# Patient Record
Sex: MALE | Race: BLACK OR AFRICAN AMERICAN | NOT HISPANIC OR LATINO | Employment: OTHER | URBAN - METROPOLITAN AREA
[De-identification: names, ages, dates, MRNs, and addresses within clinical notes are randomized per-mention and may not be internally consistent; named-entity substitution may affect disease eponyms.]

---

## 2017-01-26 ENCOUNTER — OFFICE VISIT (OUTPATIENT)
Dept: INTERNAL MEDICINE | Facility: CLINIC | Age: 82
End: 2017-01-26
Payer: MEDICARE

## 2017-01-26 VITALS
HEIGHT: 70 IN | SYSTOLIC BLOOD PRESSURE: 148 MMHG | HEART RATE: 78 BPM | BODY MASS INDEX: 17.3 KG/M2 | WEIGHT: 120.81 LBS | DIASTOLIC BLOOD PRESSURE: 100 MMHG

## 2017-01-26 DIAGNOSIS — E11.8 TYPE 2 DIABETES MELLITUS WITH COMPLICATION, UNSPECIFIED LONG TERM INSULIN USE STATUS: Primary | ICD-10-CM

## 2017-01-26 DIAGNOSIS — E11.3393 CONTROLLED TYPE 2 DIABETES MELLITUS WITH BOTH EYES AFFECTED BY MODERATE NONPROLIFERATIVE RETINOPATHY WITHOUT MACULAR EDEMA, WITHOUT LONG-TERM CURRENT USE OF INSULIN: ICD-10-CM

## 2017-01-26 DIAGNOSIS — Z85.028 HISTORY OF GASTRIC CANCER: ICD-10-CM

## 2017-01-26 DIAGNOSIS — E78.2 MIXED HYPERLIPIDEMIA: Chronic | ICD-10-CM

## 2017-01-26 DIAGNOSIS — I70.0 ATHEROSCLEROSIS OF AORTA: ICD-10-CM

## 2017-01-26 DIAGNOSIS — M1A.00X0 CHRONIC GOUTY ARTHROPATHY WITHOUT MENTION OF TOPHUS (TOPHI): ICD-10-CM

## 2017-01-26 DIAGNOSIS — Z72.0 TOBACCO ABUSE: Chronic | ICD-10-CM

## 2017-01-26 DIAGNOSIS — I10 ESSENTIAL HYPERTENSION, BENIGN: Chronic | ICD-10-CM

## 2017-01-26 PROCEDURE — 1157F ADVNC CARE PLAN IN RCRD: CPT | Mod: S$GLB,,, | Performed by: INTERNAL MEDICINE

## 2017-01-26 PROCEDURE — 1159F MED LIST DOCD IN RCRD: CPT | Mod: S$GLB,,, | Performed by: INTERNAL MEDICINE

## 2017-01-26 PROCEDURE — 99214 OFFICE O/P EST MOD 30 MIN: CPT | Mod: S$GLB,,, | Performed by: INTERNAL MEDICINE

## 2017-01-26 PROCEDURE — 99999 PR PBB SHADOW E&M-EST. PATIENT-LVL III: CPT | Mod: PBBFAC,,, | Performed by: INTERNAL MEDICINE

## 2017-01-26 PROCEDURE — 3080F DIAST BP >= 90 MM HG: CPT | Mod: S$GLB,,, | Performed by: INTERNAL MEDICINE

## 2017-01-26 PROCEDURE — 1126F AMNT PAIN NOTED NONE PRSNT: CPT | Mod: S$GLB,,, | Performed by: INTERNAL MEDICINE

## 2017-01-26 PROCEDURE — 99499 UNLISTED E&M SERVICE: CPT | Mod: S$GLB,,, | Performed by: INTERNAL MEDICINE

## 2017-01-26 PROCEDURE — 1160F RVW MEDS BY RX/DR IN RCRD: CPT | Mod: S$GLB,,, | Performed by: INTERNAL MEDICINE

## 2017-01-26 PROCEDURE — 3077F SYST BP >= 140 MM HG: CPT | Mod: S$GLB,,, | Performed by: INTERNAL MEDICINE

## 2017-01-26 RX ORDER — LOSARTAN POTASSIUM 50 MG/1
50 TABLET ORAL DAILY
Qty: 90 TABLET | Refills: 3 | Status: SHIPPED | OUTPATIENT
Start: 2017-01-26 | End: 2018-01-24 | Stop reason: SDUPTHER

## 2017-01-26 NOTE — MR AVS SNAPSHOT
[facility]  [address]  [facility] [address]  Phone: [phone]  Fax: [phone]                  Julio Benites   2017 10:20 AM   Office Visit    Description:  Male : 10/25/1934   Provider:  Gregorio Jensen MD   Department:  [facility]           Reason for Visit     Diabetes           Diagnoses this Visit        Comments    Type 2 diabetes mellitus with complication, unspecified long term insulin use status    -  Primary     Essential hypertension, benign         Mixed hyperlipidemia         History of gastric cancer         Tobacco abuse         Atherosclerosis of aorta         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    Follow-up and Disposition History       These Medications        Disp Refills Start End    losartan (COZAAR) 50 MG tablet 90 tablet 3 2017    Take 1 tablet (50 mg total) by mouth once daily. - Oral    Pharmacy: [facility] [address] - [place], [place] - [address]  #: [phone]         [facility] On Call     [facility] Nurse Care Line -  Assistance  Registered nurses in the [facility] provide clinical advisement, health education, appointment booking, and other advisory services.  Call for this free service at [phone].             Medications           Message regarding Medications     Verify the  "changes and/or additions to your medication regime listed below are the same as discussed with your clinician today.  If any of these changes or additions are incorrect, please notify your healthcare provider.        START taking these NEW medications        Refills    losartan (COZAAR) 50 MG tablet 3    Sig: Take 1 tablet (50 mg total) by mouth once daily.    Class: Normal    Route: Oral      STOP taking these medications     aspirin (ECOTRIN) 81 MG EC tablet Take 81 mg by mouth.    colchicine 0.6 mg tablet Take 1 tablet (0.6 mg total) by mouth 2 (two) times daily as needed (gout attack).           Verify that the below list of medications is an accurate representation of the medications you are currently taking.  If none reported, the list may be blank. If incorrect, please contact your healthcare provider. Carry this list with you in case of emergency.           Current Medications     amlodipine (NORVASC) 10 MG tablet Take 1 tablet (10 mg total) by mouth once daily.    hydrochlorothiazide (HYDRODIURIL) 12.5 MG Tab Take 1 tablet (12.5 mg total) by mouth once daily.    metformin (GLUCOPHAGE) 1000 MG tablet Take 1 tablet (1,000 mg total) by mouth 2 (two) times daily with meals.    pantoprazole (PROTONIX) 40 MG tablet Take 1 tablet (40 mg total) by mouth once daily.    pravastatin (PRAVACHOL) 40 MG tablet Take 20 mg by mouth.    losartan (COZAAR) 50 MG tablet Take 1 tablet (50 mg total) by mouth once daily.           Clinical Reference Information           Vital Signs - Last Recorded  Most recent update: 1/26/2017 10:09 AM by Jennifer Blanco MA    BP Pulse Ht Wt BMI    (!) 148/100 78 5' 10" (1.778 m) 54.8 kg (120 lb 13 oz) 17.33 kg/m2      Blood Pressure          Most Recent Value    BP  (!)  148/100      Allergies as of 1/26/2017     Lisinopril      Immunizations Administered on Date of Encounter - 1/26/2017     None      Orders Placed During Today's Visit     Future Labs/Procedures Expected by Expires    " Comprehensive metabolic panel  1/26/2017 1/26/2018    Hemoglobin A1c  1/26/2017 3/20/2018    TSH  1/26/2017 1/26/2018      [facility] Sign-Up     Activating your [facility] account is as easy as 1-2-3!     1) Visit [email or web address], select Sign Up Now, enter this activation code and your date of birth, then select Next.  Activation code not generated  Current Patient Portal Status: Account disabled      2) Create a username and password to use when you visit [facility] in the future and select a security question in case you lose your password and select Next.    3) Enter your e-mail address and click Sign Up!    Additional Information  If you have questions, please e-mail [email or web address] or call [phone] to talk to our [facility] staff. Remember, [facility] is NOT to be used for urgent needs. For medical emergencies, dial 911.         Instructions    Your test results will be communicated to you via: [facility], Telephone or Letter.  If you have not received your test results within one week. Please contact the clinic.           Smoking Cessation     If you would like to quit smoking:   You may be eligible for free services if you are a [place] resident and started smoking cigarettes before September 1, 1988.  Call the Smoking Cessation Trust (SCT) toll free at (393) 244-9399 or [phone].   Call 6-078-QUIT-NOW if you do not meet the above criteria.

## 2017-01-26 NOTE — PROGRESS NOTES
Subjective:       Patient ID: Julio Benites is a 82 y.o. male.    Chief Complaint: Diabetes    HPI Comments: Pt here for f/u. Pt's BP is not well controlled. Tolerating meds well. Pt denies cp/sob/ha/vision or neuro changes. Checking at home and is not well controlled. He is on norvasc and hctz.     DM2 is well controlled on metformin. Last A1c was 6.0. 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 No low sugar symptoms.     HLD is tx with pravastatin which he tolerates well. Aortic atherosclerosis was noted on CXR 2012. He is on appropriate risk mitigation meds and given lifestyle recommendations. This is stable.     Still following with heme-onc where he has been found to be doing well from gastric ca standpoint with FANTASMA. He does have mild anemia that is not iron deficient and is being followed by them. Pt is not symptomatic.    Still smoking and not interested in stopping. He has reduced etoh to only on wkends.     Gout has been quiescent without attacks for a while. Dietary discretion has helped.     Diabetes   Pertinent negatives for hypoglycemia include no headaches. Pertinent negatives for diabetes include no chest pain and no polyuria.   Hypertension   Pertinent negatives include no chest pain, headaches or shortness of breath.     Review of Systems   Constitutional: Negative for appetite change, fever and unexpected weight change.   Eyes: Negative for visual disturbance.   Respiratory: Negative for shortness of breath.    Cardiovascular: Negative for chest pain.   Gastrointestinal: Negative for abdominal pain, blood in stool, constipation and diarrhea.   Endocrine: Negative for polyuria.   Genitourinary: Negative for difficulty urinating.   Neurological: Negative for light-headedness and headaches.   Psychiatric/Behavioral: Negative for dysphoric mood.       Objective:          Assessment:       1. Type 2 diabetes mellitus with complication, unspecified long term insulin  use status    2. Essential hypertension, benign    3. Mixed hyperlipidemia    4. History of gastric cancer    5. Tobacco abuse    6. Atherosclerosis of aorta    7. 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 Appropriate labs    2. Home BP and BS monitoring  3. Add losartan 50mg daily  4. Keep f/u with specialists  5. F/u 2 wks with NP        Physical Exam   Constitutional: He is oriented to person, place, and time. He appears well-developed and well-nourished.   HENT:   Head: Normocephalic and atraumatic.   Eyes: EOM are normal. Pupils are equal, round, and reactive to light.   Neck: Neck supple. Carotid bruit is not present. No thyromegaly present.   Cardiovascular: Normal rate, regular rhythm, S1 normal, S2 normal and normal heart sounds.    No murmur heard.  Pulmonary/Chest: Effort normal and breath sounds normal. He has no wheezes.   Abdominal: Soft. Bowel sounds are normal. He exhibits no mass. There is no hepatosplenomegaly. There is no tenderness.   Musculoskeletal: He exhibits no edema.   Lymphadenopathy:     He has no cervical adenopathy.   Neurological: He is alert and oriented to person, place, and time. No cranial nerve deficit.   Psychiatric: He has a normal mood and affect. His behavior is normal.

## 2017-02-02 ENCOUNTER — OFFICE VISIT (OUTPATIENT)
Dept: INTERNAL MEDICINE | Facility: CLINIC | Age: 82
End: 2017-02-02
Payer: MEDICARE

## 2017-02-02 ENCOUNTER — LAB VISIT (OUTPATIENT)
Dept: LAB | Facility: OTHER | Age: 82
End: 2017-02-02
Attending: INTERNAL MEDICINE
Payer: MEDICARE

## 2017-02-02 VITALS
WEIGHT: 121.69 LBS | BODY MASS INDEX: 17.42 KG/M2 | HEIGHT: 70 IN | HEART RATE: 82 BPM | DIASTOLIC BLOOD PRESSURE: 78 MMHG | SYSTOLIC BLOOD PRESSURE: 126 MMHG

## 2017-02-02 DIAGNOSIS — Z72.0 DECLINED SMOKING CESSATION: ICD-10-CM

## 2017-02-02 DIAGNOSIS — I10 ESSENTIAL HYPERTENSION, BENIGN: Chronic | ICD-10-CM

## 2017-02-02 DIAGNOSIS — H92.02 PAIN IN LEFT EAR: ICD-10-CM

## 2017-02-02 DIAGNOSIS — E11.8 TYPE 2 DIABETES MELLITUS WITH COMPLICATION, UNSPECIFIED LONG TERM INSULIN USE STATUS: ICD-10-CM

## 2017-02-02 DIAGNOSIS — I10 ESSENTIAL HYPERTENSION, BENIGN: Primary | Chronic | ICD-10-CM

## 2017-02-02 LAB
ALBUMIN SERPL BCP-MCNC: 3.5 G/DL
ALP SERPL-CCNC: 72 U/L
ALT SERPL W/O P-5'-P-CCNC: 8 U/L
ANION GAP SERPL CALC-SCNC: 12 MMOL/L
ANION GAP SERPL CALC-SCNC: 12 MMOL/L
AST SERPL-CCNC: 15 U/L
BILIRUB SERPL-MCNC: 0.4 MG/DL
BUN SERPL-MCNC: 20 MG/DL
BUN SERPL-MCNC: 20 MG/DL
CALCIUM SERPL-MCNC: 9.3 MG/DL
CALCIUM SERPL-MCNC: 9.3 MG/DL
CHLORIDE SERPL-SCNC: 109 MMOL/L
CHLORIDE SERPL-SCNC: 109 MMOL/L
CO2 SERPL-SCNC: 22 MMOL/L
CO2 SERPL-SCNC: 22 MMOL/L
CREAT SERPL-MCNC: 1 MG/DL
CREAT SERPL-MCNC: 1 MG/DL
EST. GFR  (AFRICAN AMERICAN): >60 ML/MIN/1.73 M^2
EST. GFR  (AFRICAN AMERICAN): >60 ML/MIN/1.73 M^2
EST. GFR  (NON AFRICAN AMERICAN): >60 ML/MIN/1.73 M^2
EST. GFR  (NON AFRICAN AMERICAN): >60 ML/MIN/1.73 M^2
GLUCOSE SERPL-MCNC: 163 MG/DL
GLUCOSE SERPL-MCNC: 163 MG/DL
POTASSIUM SERPL-SCNC: 3.8 MMOL/L
POTASSIUM SERPL-SCNC: 3.8 MMOL/L
PROT SERPL-MCNC: 6.9 G/DL
SODIUM SERPL-SCNC: 143 MMOL/L
SODIUM SERPL-SCNC: 143 MMOL/L
TSH SERPL DL<=0.005 MIU/L-ACNC: 2.75 UIU/ML

## 2017-02-02 PROCEDURE — 3074F SYST BP LT 130 MM HG: CPT | Mod: S$GLB,,, | Performed by: NURSE PRACTITIONER

## 2017-02-02 PROCEDURE — 99499 UNLISTED E&M SERVICE: CPT | Mod: S$GLB,,, | Performed by: NURSE PRACTITIONER

## 2017-02-02 PROCEDURE — 3078F DIAST BP <80 MM HG: CPT | Mod: S$GLB,,, | Performed by: NURSE PRACTITIONER

## 2017-02-02 PROCEDURE — 1126F AMNT PAIN NOTED NONE PRSNT: CPT | Mod: S$GLB,,, | Performed by: NURSE PRACTITIONER

## 2017-02-02 PROCEDURE — 1160F RVW MEDS BY RX/DR IN RCRD: CPT | Mod: S$GLB,,, | Performed by: NURSE PRACTITIONER

## 2017-02-02 PROCEDURE — 1159F MED LIST DOCD IN RCRD: CPT | Mod: S$GLB,,, | Performed by: NURSE PRACTITIONER

## 2017-02-02 PROCEDURE — 1157F ADVNC CARE PLAN IN RCRD: CPT | Mod: S$GLB,,, | Performed by: NURSE PRACTITIONER

## 2017-02-02 PROCEDURE — 99999 PR PBB SHADOW E&M-EST. PATIENT-LVL III: CPT | Mod: PBBFAC,,, | Performed by: NURSE PRACTITIONER

## 2017-02-02 PROCEDURE — 99214 OFFICE O/P EST MOD 30 MIN: CPT | Mod: S$GLB,,, | Performed by: NURSE PRACTITIONER

## 2017-02-02 NOTE — PROGRESS NOTES
Subjective:       Patient ID: Julio Benites is a 82 y.o. male.    Chief Complaint: Hypertension and Otalgia (left x weeks)    HPI Comments: Patient returns today in f/u for BP management.     Pt's BP is well controlled. Currently taking losartan 50 mg daily, HCTZ 12.5 mg daily, and amlodipine 10 mg daily. Tolerating meds well. Pt denies cp/sob/ha/vision or neuro changes. Checking at home and is well controlled SBP <140.     Reports left ear canal discomfort x 2 weeks. Pt reports that it is tender to touch and itchy, but denies otalgia. Pt wears hearing aides. Has always usually worn hearing aid in right ear, but occasionally wears both hearing aides. Discomfort began since last wearing left side appliance.  He denies pain when chewing or auricle movements, drainage, tinnitus, URI symptoms, fevers, or chills. No warmth or swelling at site.       Hypertension   This is a chronic problem. The problem is controlled. Pertinent negatives include no blurred vision, chest pain, headaches, palpitations, peripheral edema or shortness of breath.   Otalgia    Pertinent negatives include no ear discharge, headaches, rhinorrhea or sore throat.     Review of Systems   Constitutional: Negative for chills and fever.   HENT: Positive for ear pain. Negative for congestion, dental problem, ear discharge, rhinorrhea, sinus pressure, sneezing, sore throat and tinnitus.    Eyes: Negative for blurred vision and visual disturbance.   Respiratory: Negative for chest tightness and shortness of breath.    Cardiovascular: Negative for chest pain, palpitations and leg swelling.   Musculoskeletal: Negative for neck stiffness.   Neurological: Negative for dizziness, weakness and headaches.   Psychiatric/Behavioral: Negative for dysphoric mood.       Objective:      Physical Exam   Constitutional: He is oriented to person, place, and time. He appears well-developed and well-nourished.   HENT:   Head: Normocephalic and atraumatic.   Right Ear:  Tympanic membrane, external ear and ear canal normal. No drainage, swelling or tenderness. No foreign bodies. No middle ear effusion. Decreased hearing is noted.   Left Ear: Tympanic membrane, external ear and ear canal normal. There is tenderness. No drainage or swelling. No foreign bodies.  No middle ear effusion. Decreased hearing is noted.   Hearing impaired, wears hearing aides. Last fitting for hearing instrument x2 months ago. Opening of external auditory meatus tender to touch    Eyes: EOM are normal. Pupils are equal, round, and reactive to light.   Neck: Normal range of motion.   Cardiovascular: Normal rate, regular rhythm, normal heart sounds and intact distal pulses.    Pulmonary/Chest: Effort normal and breath sounds normal.   Musculoskeletal: Normal range of motion.   Neurological: He is alert and oriented to person, place, and time.   Skin: Skin is warm and dry.   Psychiatric: His behavior is normal.       Assessment:       1. Essential hypertension, benign    2. Pain in left ear    3. Declined smoking cessation        Plan:       1. Continue home BP  2. No changes to BP meds.  3. Will call with results of TSH, CMP, and A1c from today  4. Ear discomfort likely from instrument fit. No s/s infection. Pt to contact audiologist re: fit of hearing aides. Clinic prompts d/w pt.   5. F/U with Dr. Jensen July 2017, sooner if needed

## 2017-02-02 NOTE — MR AVS SNAPSHOT
[facility]  [address]  [place] [address]  Phone: [phone]  Fax: [phone]                  Julio Haganday   2017 10:30 AM   Office Visit    Description:  Male : 10/25/1934   Provider:  Crispin Gibson NP   Department:  [facility]           Reason for Visit     Hypertension     Otalgia           Diagnoses this Visit        Comments    Essential hypertension, benign    -  Primary            To Do List           Goals (5 Years of Data)     None      Follow-Up and Disposition     Return in about 6 months (around 2017) for f/u Dr. Jensen, may be sooner depending on labs .      [facility] On Call     [facility] On Call Nurse Care Line -  Assistance  Registered nurses in the [facility] On Call Center provide clinical advisement, health education, appointment booking, and other advisory services.  Call for this free service at [phone].             Medications           Message regarding Medications     Verify the changes and/or additions to your medication regime listed below are the same as discussed with your clinician today.  If any of these changes or additions are incorrect, please notify your healthcare provider.             Verify that the below list of medications is an accurate representation of the medications you are currently taking.  If none reported, the list may be blank. If incorrect, please contact your healthcare provider. Carry this list with you in case of emergency.           Current Medications     amlodipine (NORVASC) 10 MG tablet Take 1 tablet (10 mg total) by mouth once daily.    hydrochlorothiazide (HYDRODIURIL) 12.5 MG Tab Take 1 tablet (12.5 mg total) by mouth once daily.    losartan (COZAAR) 50 MG tablet Take 1 tablet (50 mg total) by mouth once daily.    metformin (GLUCOPHAGE) 1000 MG tablet Take 1 tablet (1,000 mg total) by mouth 2 (two) times daily with meals.    pantoprazole (PROTONIX) 40 MG tablet Take 1  "tablet (40 mg total) by mouth once daily.    pravastatin (PRAVACHOL) 40 MG tablet Take 20 mg by mouth.           Clinical Reference Information           Vital Signs - Last Recorded  Most recent update: 2/2/2017 10:06 AM by Carmen Farris MA    BP Pulse Ht Wt BMI    126/78 (BP Location: Left arm, Patient Position: Sitting, BP Method: Manual) 82 5' 10" (1.778 m) 55.2 kg (121 lb 11.1 oz) 17.46 kg/m2      Blood Pressure          Most Recent Value    BP  126/78      Allergies as of 2/2/2017     Lisinopril      Immunizations Administered on Date of Encounter - 2/2/2017     None      [facility] Sign-Up     Activating your [facility] account is as easy as 1-2-3!     1) Visit [email or web address], select Sign Up Now, enter this activation code and your date of birth, then select Next.  Activation code not generated  Current Patient Portal Status: Account disabled      2) Create a username and password to use when you visit [facility] in the future and select a security question in case you lose your password and select Next.    3) Enter your e-mail address and click Sign Up!    Additional Information  If you have questions, please e-mail [email or web address] or call [phone] to talk to our [facility] staff. Remember, [facility] is NOT to be used for urgent needs. For medical emergencies, dial 911.         Instructions    Your test results will be communicated to you via: [facility], Telephone or Letter.  If you have not received your test results within one week. Please contact the clinic.         Smoking Cessation     If you would like to quit smoking:   You may be eligible for free services if you are a [place] resident and started smoking cigarettes before September 1, 1988.  Call the Smoking Cessation Trust (SCT) toll free at (379) 054-1588 or (888) 622-9088.   Call 0-178-QUIT-NOW if you do not meet the above criteria.            "

## 2017-02-05 LAB — HEMOGLOBIN A1C REFERRAL TEST: 6.2 % (ref 4–5.6)

## 2017-03-06 RX ORDER — PANTOPRAZOLE SODIUM 40 MG/1
TABLET, DELAYED RELEASE ORAL
Qty: 90 TABLET | Refills: 1 | Status: SHIPPED | OUTPATIENT
Start: 2017-03-06 | End: 2017-06-21

## 2017-06-21 ENCOUNTER — OFFICE VISIT (OUTPATIENT)
Dept: INTERNAL MEDICINE | Facility: CLINIC | Age: 82
End: 2017-06-21
Payer: MEDICARE

## 2017-06-21 VITALS
BODY MASS INDEX: 18.79 KG/M2 | DIASTOLIC BLOOD PRESSURE: 86 MMHG | HEART RATE: 64 BPM | RESPIRATION RATE: 18 BRPM | OXYGEN SATURATION: 98 % | SYSTOLIC BLOOD PRESSURE: 164 MMHG | HEIGHT: 67 IN | WEIGHT: 119.69 LBS

## 2017-06-21 DIAGNOSIS — I70.0 ATHEROSCLEROSIS OF AORTA: ICD-10-CM

## 2017-06-21 DIAGNOSIS — E11.3393 CONTROLLED TYPE 2 DIABETES MELLITUS WITH BOTH EYES AFFECTED BY MODERATE NONPROLIFERATIVE RETINOPATHY WITHOUT MACULAR EDEMA, WITHOUT LONG-TERM CURRENT USE OF INSULIN: ICD-10-CM

## 2017-06-21 DIAGNOSIS — E78.2 MIXED HYPERLIPIDEMIA: Chronic | ICD-10-CM

## 2017-06-21 DIAGNOSIS — R97.20 ELEVATED PSA: ICD-10-CM

## 2017-06-21 DIAGNOSIS — M1A.00X0 CHRONIC GOUTY ARTHROPATHY WITHOUT MENTION OF TOPHUS (TOPHI): ICD-10-CM

## 2017-06-21 DIAGNOSIS — I10 ESSENTIAL HYPERTENSION, BENIGN: Chronic | ICD-10-CM

## 2017-06-21 DIAGNOSIS — Z72.0 TOBACCO ABUSE: Chronic | ICD-10-CM

## 2017-06-21 DIAGNOSIS — Z00.00 ENCOUNTER FOR PREVENTIVE HEALTH EXAMINATION: Primary | ICD-10-CM

## 2017-06-21 DIAGNOSIS — Z85.028 HISTORY OF GASTRIC CANCER: ICD-10-CM

## 2017-06-21 PROBLEM — H92.02 PAIN IN LEFT EAR: Status: RESOLVED | Noted: 2017-02-02 | Resolved: 2017-06-21

## 2017-06-21 PROCEDURE — 99999 PR PBB SHADOW E&M-EST. PATIENT-LVL V: CPT | Mod: PBBFAC,,, | Performed by: NURSE PRACTITIONER

## 2017-06-21 PROCEDURE — 99499 UNLISTED E&M SERVICE: CPT | Mod: S$GLB,,, | Performed by: NURSE PRACTITIONER

## 2017-06-21 PROCEDURE — G0439 PPPS, SUBSEQ VISIT: HCPCS | Mod: S$GLB,,, | Performed by: NURSE PRACTITIONER

## 2017-06-21 RX ORDER — PANTOPRAZOLE SODIUM 40 MG/1
40 TABLET, DELAYED RELEASE ORAL DAILY
COMMUNITY
End: 2017-09-22

## 2017-06-21 NOTE — PATIENT INSTRUCTIONS
Diabetes: Activity Tips    Being more active can help you manage your diabetes. The tips on this sheet can help you get the most from your exercise. They can also help you stay safe.  Benefit from briskness  Brisk activity gets your heart beating faster. This can help you increase your fitness, lose extra weight, and manage your blood sugar level. Try brisk walking. Or, if you have foot or leg problems, you can try swimming or bike riding. You can break up your exercise into chunks throughout the day. Work up to at least 30 minutes of steady, brisk exercise on most days.  Warm up and cool down  Warming up and cooling down reduce your risk of injury. They also help limit muscle soreness. Do a mild version of your activity for 5 minutes before and after your routine. You can also learn stretches that will help keep your muscles loose. Your healthcare provider may show you good ways to warm up and stretch.  Do the talk-sing test  The talk-sing test is a simple way to tell how hard youre exercising. If you can talk while exercising, youre in a safe range. If youre out of breath, slow down. If you can carry a tune, its time to  the pace. Walk up a hill. Increase the resistance on your stationary bike. Or swim faster.  What about eating?  You may be told to plan your exercise for 1 to 2 hours after a meal. In most cases, you dont need to eat while being active. If you take insulin or medicine that can cause low blood sugar, test your blood sugar before exercising. And carry a fast-acting sugar that will raise your blood sugar level quickly. This includes glucose tablets or hard candy. Use it if you feel low blood sugar symptoms.  Safety tips  These tips can help you stay safe as you become fit:  · Exercise with a friend or carry a cell phone if you have one.  · Carry or wear identification, such as a necklace or bracelet, that says you have diabetes.  · Use the proper footwear and safety equipment for your  activity.  · Drink water before, during, and after exercise.  · Dress properly for the weather.  · Dont exercise in very hot or very cold weather.  · Dont exercise if you are sick.  · If you are instructed to do so, test your blood sugar before and after you exercise. Have a small carbohydrate snack if your blood sugar is low before you start exercising.   When to stop exercising and call your healthcare provider  Stop exercising and call your healthcare provider right away if you notice any of the following:  · Pain, pressure, tightness, or heaviness in the chest  · Pain or heaviness in the neck, shoulders, back, arms, legs, or feet  · Unusual shortness of breath  · Dizziness or lightheadedness  · Unusually rapid or slow pulse  · Increased joint or muscle pain  · Nausea or vomiting  Date Last Reviewed: 5/1/2016 © 2000-2016 [employer]. [address] [place] [address]. All rights reserved. This information is not intended as a substitute for professional medical care. Always follow your healthcare professional's instructions.          Counseling and Referral of Other Preventative  (Italic type indicates deductible and co-insurance are waived)    Patient Name: Julio Benites  Today's Date: 6/21/2017      SERVICE LIMITATIONS RECOMMENDATION    Vaccines    · Pneumococcal (once after 65)    · Influenza (annually)    · Hepatitis B (if medium/high risk)    · Prevnar 13      Hepatitis B medium/high risk factors:       - End-stage renal disease       - Hemophiliacs who received Factor VII or         IX concentrates       - Clients of institutions for the mentally             retarded       - Persons who live in the same house as          a HepB carrier       - Homosexual men       - Illicit injectable drug abusers     Pneumococcal: Done, no repeat necessary     Influenza: N/A     Hepatitis B: N/A     Prevnar 13: Done, no repeat necessary    Prostate cancer screening (annually to age 75)      Prostate specific antigen (PSA) Shared decision making with Provider. Sometimes a co-pay may be required if the patient decides to have this test. The USPSTF no longer recommends prostate cancer screening routinely in medicine: not to follow    Colorectal cancer screening (to age 75)    · Fecal occult blood test (annual)  · Flexible sigmoidoscopy (5y)  · Screening colonoscopy (10y)  · Barium enema   N/A    Diabetes self-management training (no USPSTF recommendations)  Requires referral by treating physician for patient with diabetes or renal disease. 10 hours of initial DSMT sessions of no less than 30 minutes each in a continuous 12-month period. 2 hours of follow-up DSMT in subsequent years.  N/A    Glaucoma screening (no USPSTF recommendation)  Diabetes mellitus, family history   , age 50 or over    American, age 65 or over  Scheduled, see appointments    Medical nutrition therapy for diabetes or renal disease (no recommended schedule)  Requires referral by treating physician for patient with diabetes or renal disease or kidney transplant within the past 3 years.  Can be provided in same year as diabetes self-management training (DSMT), and CMS recommends medical nutrition therapy take place after DSMT. Up to 3 hours for initial year and 2 hours in subsequent years.  N/A    Cardiovascular screening blood tests (every 5 years)  · Fasting lipid panel  Order as a panel if possible  Last done 7/2016, recommend to repeat every 1  years    Diabetes screening tests (at least every 3 years, Medicare covers annually or at 6-month intervals for prediabetic patients)  · Fasting blood sugar (FBS) or glucose tolerance test (GTT)  Patient must be diagnosed with one of the following:       - Hypertension       - Dyslipidemia       - Obesity (BMI 30kg/m2)       - Previous elevated impaired FBS or GTT       ... or any two of the following:       - Overweight (BMI 25 but <30)       - Family history of  diabetes       - Age 65 or older       - History of gestational diabetes or birth of baby weighing more than 9 pounds  N/A    Abdominal aortic aneurysm screening (once)  · Sonogram   Limited to patients who meet one of the following criteria:       - Men who are 65-75 years old and have smoked more than 100 cigarette in their lifetime       - Anyone with a family history of abdominal aortic aneurysm       - Anyone recommended for screening by the USPSTF  N/A    HIV screening (annually for increased risk patients)  · HIV-1 and HIV-2 by EIA, or STARLA, rapid antibody test or oral mucosa transudate  Patients must be at increased risk for HIV infection per USPSTF guidelines or pregnant. Tests covered annually for patient at increased risk or as requested by the patient. Pregnant patients may receive up to 3 tests during pregnancy.  Risks discussed, screening is not recommended    Smoking cessation counseling (up to 8 sessions per year)  Patients must be asymptomatic of tobacco-related conditions to receive as a preventative service.  Referred to Tobacco Cessation Program.    Subsequent annual wellness visit  At least 12 months since last AWV  Return in one year     The following information is provided to all patients.  This information is to help you find resources for any of the problems found today that may be affecting your health:                Living healthy guide: www.[facility].[place].gov      Understanding Diabetes: www.diabetes.org      Eating healthy: www.cdc.gov/healthyweight      CDC home safety checklist: www.cdc.gov/steadi/patient.html      Agency on Aging: www.goea.[place].[facility]      Alcoholics anonymous (AA): www.aa.org      Physical Activity: www.jerry.nih.gov/pv0ohav      Tobacco use: www.quitwithus[place].org

## 2017-06-21 NOTE — PROGRESS NOTES
"Julio Benites presented for a  Medicare AWV and comprehensive Health Risk Assessment today. The following components were reviewed and updated:    · Medical history  · Family History  · Social history  · Allergies and Current Medications  · Health Risk Assessment  · Health Maintenance  · Care Team     ** See Completed Assessments for Annual Wellness Visit within the encounter summary.**       The following assessments were completed:  · Living Situation  · CAGE  · Depression Screening  · Timed Get Up and Go  · Whisper Test (not performed, wears hearing aids)  · Cognitive Function Screening  ·   ·   ·   · Nutrition Screening  · ADL Screening  · PAQ Screening    Vitals:    06/21/17 0827   BP: (!) 164/86   BP Location: Left arm   Patient Position: Sitting   BP Method: Manual   Pulse: 64   Resp: 18   SpO2: 98%   Weight: 54.3 kg (119 lb 11.4 oz)   Height: 5' 7" (1.702 m)     Body mass index is 18.75 kg/m².  Physical Exam   Constitutional: He is oriented to person, place, and time. He appears well-developed and well-nourished. No distress.   HENT:   Head: Normocephalic.   Right Ear: External ear normal.   Left Ear: External ear normal.   Nose: Nose normal.   Eyes: Conjunctivae are normal. No scleral icterus.   Neck: Normal range of motion. Neck supple.   Cardiovascular: Normal rate, regular rhythm and intact distal pulses.  Exam reveals no gallop and no friction rub.    Murmur heard.  Pulses:       Dorsalis pedis pulses are 2+ on the right side, and 2+ on the left side.        Posterior tibial pulses are 2+ on the right side, and 2+ on the left side.   Pulmonary/Chest: Effort normal and breath sounds normal. No respiratory distress. He has no wheezes. He has no rales. He exhibits no tenderness.   Abdominal: Soft. Bowel sounds are normal.   Musculoskeletal: Normal range of motion. He exhibits no edema.        Right foot: There is normal range of motion and no deformity.        Left foot: There is normal range of motion " and no deformity.   Feet:   Right Foot:   Protective Sensation: 8 sites tested. 8 sites sensed.   Skin Integrity: Positive for callus and dry skin. Negative for ulcer, blister, skin breakdown, erythema or warmth.   Left Foot:   Protective Sensation: 8 sites tested. 8 sites sensed.   Skin Integrity: Positive for callus and dry skin. Negative for ulcer, blister, skin breakdown, erythema or warmth.   Neurological: He is alert and oriented to person, place, and time.   Skin: Skin is warm and dry. He is not diaphoretic. No erythema.   Psychiatric: He has a normal mood and affect. His behavior is normal. Judgment and thought content normal.   Vitals reviewed.        Diagnoses and health risks identified today and associated recommendations/orders:    1. 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  Controlled.  Last Hemoglobin A1C was 6.2% from 2/2017. Treated with metformin.  Encouraged to increase exercise as tolerated and to continue diabetic diet. Education provided.  Encouraged to continue treatment plan.  Monitored by PCP.    - Ambulatory Referral to [facility] ([employer])  - [facility] ASSIGN QUESTIONNAIRE SERIES ([employer])  - [facility] Patient Entered [facility] ([employer])    2. Atherosclerosis of aorta  Noted on chest xray from 12/17/2012.  Chronic.  Stable.  Treated with pravastatin.  Monitored by PCP.     3. Encounter for preventive health examination  Annual Health Risk Assessment (HRA) visit today.  Counseling and referral of health maintenance and preventative health measures performed.  Patient given annual wellness paperwork to take home.  Encouraged to return in 1 year for subsequent HRA visit.  - Tetanus: Counseled on immunization.     - Up to date on all other health maintenance measures.     4. Tobacco abuse  Chronic.  He states he smokes about 1/4 pack of cigarettes per  day.  Interested in quitting.  Monitored by PCP.   - Ambulatory referral to Smoking Cessation Program    5. Essential hypertension, benign  Chronic.  Uncontrolled.  Treated with losartan, amlodipine, HCTZ.  He states he drank coffee prior to visit and has not taken antihypertensive medications yet.  He will eat breakfast after visit and take his pills.  Encouraged to increase exercise as tolerated to at least 2 hours and 30 minutes of moderate-intensity aerobic activity per week and  2 days per week of muscle-strengthening activities.  States he does not walk or do any type of exercise currently.  Also encouraged to improve diet to heart healthy, low sodium diet.  Education provided.  He stated he would monitor BP at home and follow up with PCP in 1 month as scheduled.  Monitored by PCP.    - Ambulatory Referral to [facility] ([employer])  - [facility]STION[facility]IES ([employer])  - [facility] Patient Entered [facility] ([employer])    6. History of gastric cancer  Diagnosis of gastric adenocarcinoma in 2012.  S/p subtotal gastrectomy in 2013.  Stable.  Monitored by PCP.     7. Mixed hyperlipidemia  Chronic.  Stable and controlled.  Treated with pravastatin.  Encouraged patient to continue treatment plan. Monitored by PCP.      8. 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  Stable and controlled on no medication.  Monitored by PCP.     9.  Elevated PSA  Chronic.  Stable.  Evaluated by Urology in 2013.  No repeat PSA's recommended, only annual prostate exam.  He denies any urinary symptoms currently.  Encouraged him to follow up with Urology for prostate exams.  Monitored by PCP and Urology.     Provided Julio with a 5-10 year written screening schedule and personal prevention plan. Recommendations were developed using the USPSTF age appropriate recommendations. Education, counseling, and referrals were provided as needed. After Visit Summary printed and given to patient which  includes a list of additional screenings\tests needed.    Return in about 1 year (around 6/21/2018) for your next Health Risk Assessment visit.    Peg Rodriguez, NP

## 2017-07-11 ENCOUNTER — PATIENT OUTREACH (OUTPATIENT)
Dept: ADMINISTRATIVE | Facility: HOSPITAL | Age: 82
End: 2017-07-11

## 2017-07-25 ENCOUNTER — CLINICAL SUPPORT (OUTPATIENT)
Dept: SMOKING CESSATION | Facility: CLINIC | Age: 82
End: 2017-07-25
Payer: COMMERCIAL

## 2017-07-25 ENCOUNTER — OFFICE VISIT (OUTPATIENT)
Dept: INTERNAL MEDICINE | Facility: CLINIC | Age: 82
End: 2017-07-25
Payer: MEDICARE

## 2017-07-25 ENCOUNTER — LAB VISIT (OUTPATIENT)
Dept: LAB | Facility: OTHER | Age: 82
End: 2017-07-25
Attending: INTERNAL MEDICINE
Payer: MEDICARE

## 2017-07-25 VITALS
WEIGHT: 119.25 LBS | SYSTOLIC BLOOD PRESSURE: 138 MMHG | DIASTOLIC BLOOD PRESSURE: 82 MMHG | HEART RATE: 64 BPM | BODY MASS INDEX: 17.66 KG/M2 | HEIGHT: 69 IN | OXYGEN SATURATION: 98 %

## 2017-07-25 DIAGNOSIS — E11.8 TYPE 2 DIABETES MELLITUS WITH COMPLICATION, UNSPECIFIED LONG TERM INSULIN USE STATUS: ICD-10-CM

## 2017-07-25 DIAGNOSIS — I10 ESSENTIAL HYPERTENSION, BENIGN: Chronic | ICD-10-CM

## 2017-07-25 DIAGNOSIS — M1A.00X0 CHRONIC GOUTY ARTHROPATHY WITHOUT MENTION OF TOPHUS (TOPHI): ICD-10-CM

## 2017-07-25 DIAGNOSIS — F17.210 LIGHT CIGARETTE SMOKER (1-9 CIGARETTES PER DAY): Primary | ICD-10-CM

## 2017-07-25 DIAGNOSIS — E11.8 TYPE 2 DIABETES MELLITUS WITH COMPLICATION, UNSPECIFIED LONG TERM INSULIN USE STATUS: Primary | ICD-10-CM

## 2017-07-25 DIAGNOSIS — E11.3393 CONTROLLED TYPE 2 DIABETES MELLITUS WITH BOTH EYES AFFECTED BY MODERATE NONPROLIFERATIVE RETINOPATHY WITHOUT MACULAR EDEMA, WITHOUT LONG-TERM CURRENT USE OF INSULIN: ICD-10-CM

## 2017-07-25 DIAGNOSIS — Z72.0 TOBACCO ABUSE: Chronic | ICD-10-CM

## 2017-07-25 DIAGNOSIS — I70.0 ATHEROSCLEROSIS OF AORTA: ICD-10-CM

## 2017-07-25 DIAGNOSIS — E78.2 MIXED HYPERLIPIDEMIA: Chronic | ICD-10-CM

## 2017-07-25 DIAGNOSIS — Z85.028 HISTORY OF GASTRIC CANCER: ICD-10-CM

## 2017-07-25 LAB
ALBUMIN SERPL BCP-MCNC: 3.5 G/DL
ALP SERPL-CCNC: 72 U/L
ALT SERPL W/O P-5'-P-CCNC: 11 U/L
ANION GAP SERPL CALC-SCNC: 13 MMOL/L
AST SERPL-CCNC: 22 U/L
BASOPHILS # BLD AUTO: 0.02 K/UL
BASOPHILS NFR BLD: 0.4 %
BILIRUB SERPL-MCNC: 0.3 MG/DL
BUN SERPL-MCNC: 21 MG/DL
CALCIUM SERPL-MCNC: 9.5 MG/DL
CHLORIDE SERPL-SCNC: 109 MMOL/L
CHOLEST/HDLC SERPL: 2.4 {RATIO}
CO2 SERPL-SCNC: 22 MMOL/L
CREAT SERPL-MCNC: 0.8 MG/DL
DIFFERENTIAL METHOD: ABNORMAL
EOSINOPHIL # BLD AUTO: 0.5 K/UL
EOSINOPHIL NFR BLD: 11.4 %
ERYTHROCYTE [DISTWIDTH] IN BLOOD BY AUTOMATED COUNT: 14.6 %
EST. GFR  (AFRICAN AMERICAN): >60 ML/MIN/1.73 M^2
EST. GFR  (NON AFRICAN AMERICAN): >60 ML/MIN/1.73 M^2
GLUCOSE SERPL-MCNC: 97 MG/DL
HCT VFR BLD AUTO: 35.3 %
HDL/CHOLESTEROL RATIO: 41.7 %
HDLC SERPL-MCNC: 139 MG/DL
HDLC SERPL-MCNC: 58 MG/DL
HGB BLD-MCNC: 11.5 G/DL
LDLC SERPL CALC-MCNC: 64.6 MG/DL
LYMPHOCYTES # BLD AUTO: 1.3 K/UL
LYMPHOCYTES NFR BLD: 27.5 %
MCH RBC QN AUTO: 26.3 PG
MCHC RBC AUTO-ENTMCNC: 32.6 G/DL
MCV RBC AUTO: 81 FL
MONOCYTES # BLD AUTO: 0.6 K/UL
MONOCYTES NFR BLD: 12.3 %
NEUTROPHILS # BLD AUTO: 2.2 K/UL
NEUTROPHILS NFR BLD: 48 %
NONHDLC SERPL-MCNC: 81 MG/DL
PLATELET # BLD AUTO: 175 K/UL
PMV BLD AUTO: 11.1 FL
POTASSIUM SERPL-SCNC: 4.3 MMOL/L
PROT SERPL-MCNC: 7 G/DL
RBC # BLD AUTO: 4.38 M/UL
SODIUM SERPL-SCNC: 144 MMOL/L
TRIGL SERPL-MCNC: 82 MG/DL
WBC # BLD AUTO: 4.65 K/UL

## 2017-07-25 PROCEDURE — 99499 UNLISTED E&M SERVICE: CPT | Mod: S$GLB,,, | Performed by: INTERNAL MEDICINE

## 2017-07-25 PROCEDURE — 80061 LIPID PANEL: CPT

## 2017-07-25 PROCEDURE — 36415 COLL VENOUS BLD VENIPUNCTURE: CPT

## 2017-07-25 PROCEDURE — 1126F AMNT PAIN NOTED NONE PRSNT: CPT | Mod: S$GLB,,, | Performed by: INTERNAL MEDICINE

## 2017-07-25 PROCEDURE — 80053 COMPREHEN METABOLIC PANEL: CPT

## 2017-07-25 PROCEDURE — 99999 PR PBB SHADOW E&M-EST. PATIENT-LVL III: CPT | Mod: PBBFAC,,, | Performed by: INTERNAL MEDICINE

## 2017-07-25 PROCEDURE — 83036 HEMOGLOBIN GLYCOSYLATED A1C: CPT

## 2017-07-25 PROCEDURE — 85025 COMPLETE CBC W/AUTO DIFF WBC: CPT

## 2017-07-25 PROCEDURE — 1159F MED LIST DOCD IN RCRD: CPT | Mod: S$GLB,,, | Performed by: INTERNAL MEDICINE

## 2017-07-25 PROCEDURE — 99214 OFFICE O/P EST MOD 30 MIN: CPT | Mod: S$GLB,,, | Performed by: INTERNAL MEDICINE

## 2017-07-25 PROCEDURE — 99404 PREV MED CNSL INDIV APPRX 60: CPT | Mod: S$GLB,,,

## 2017-07-25 PROCEDURE — 99999 PR PBB SHADOW E&M-EST. PATIENT-LVL I: CPT | Mod: PBBFAC,,,

## 2017-07-25 RX ORDER — IBUPROFEN 200 MG
1 TABLET ORAL DAILY
Qty: 21 PATCH | Refills: 0 | Status: SHIPPED | OUTPATIENT
Start: 2017-07-25 | End: 2017-12-22

## 2017-07-25 NOTE — PROGRESS NOTES
Subjective:       Patient ID: Julio Benites is a 82 y.o. male.    Chief Complaint: Diabetes    Pt here for f/u. Pt's BP is well controlled. Tolerating meds well. Pt denies cp/sob/ha/vision or neuro changes. Checking at home and is well controlled.      DM2 is well controlled on metformin. Last A1c was 6.2. He does have complications of diab retinopathy and is due for eye exam which he will schedule. No low sugar symptoms.     HLD is tx with pravastatin which he tolerates well. Aortic atherosclerosis was noted on CXR 2012. He is on appropriate risk mitigation meds and given lifestyle recommendations. This is stable.     He is due for f/u with heme-onc whom he has seen for h/o gastric CA. He does have mild anemia that is not iron deficient and is being followed by them. Pt is not symptomatic.    Still smoking but will be visiting with cessation counselor today.      Gout has been quiescent without attacks for a while. Dietary discretion has helped.       Diabetes   Pertinent negatives for hypoglycemia include no headaches. Pertinent negatives for diabetes include no chest pain and no polyuria.   Hypertension   Pertinent negatives include no chest pain, headaches or shortness of breath.     Review of Systems   Constitutional: Negative for appetite change, fever and unexpected weight change.   Eyes: Negative for visual disturbance.   Respiratory: Negative for shortness of breath.    Cardiovascular: Negative for chest pain.   Gastrointestinal: Negative for abdominal pain, blood in stool, constipation and diarrhea.   Endocrine: Negative for polyuria.   Genitourinary: Negative for difficulty urinating.   Neurological: Negative for light-headedness and headaches.   Psychiatric/Behavioral: Negative for dysphoric mood.       Objective:          Assessment:       1. Type 2 diabetes mellitus with complication, unspecified long term insulin use status    2. Essential hypertension, benign    3. Controlled type 2 diabetes  mellitus with both eyes affected by moderate nonproliferative retinopathy without macular edema, without long-term current use of insulin    4. 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 Atherosclerosis of aorta    6. History of gastric cancer    7. Mixed hyperlipidemia    8. Tobacco abuse        Plan:       1. Appropriate labs    2. Home BP and BS monitoring  3. Keep f/u with specialists  4. Optometry referral          Physical Exam   Constitutional: He is oriented to person, place, and time. He appears well-developed and well-nourished.   HENT:   Head: Normocephalic and atraumatic.   Eyes: EOM are normal. Pupils are equal, round, and reactive to light.   Neck: Neck supple. Carotid bruit is not present. No thyromegaly present.   Cardiovascular: Normal rate, regular rhythm, S1 normal, S2 normal and normal heart sounds.    No murmur heard.  Pulmonary/Chest: Effort normal and breath sounds normal. He has no wheezes.   Abdominal: Soft. Bowel sounds are normal. He exhibits no mass. There is no hepatosplenomegaly. There is no tenderness.   Musculoskeletal: He exhibits no edema.   Lymphadenopathy:     He has no cervical adenopathy.   Neurological: He is alert and oriented to person, place, and time. No cranial nerve deficit.   Psychiatric: He has a normal mood and affect. His behavior is normal.

## 2017-07-26 ENCOUNTER — TELEPHONE (OUTPATIENT)
Dept: INTERNAL MEDICINE | Facility: CLINIC | Age: 82
End: 2017-07-26

## 2017-07-26 LAB
ESTIMATED AVG GLUCOSE: 148 MG/DL
HBA1C MFR BLD HPLC: 6.8 %

## 2017-07-26 NOTE — TELEPHONE ENCOUNTER
Spoke with pt advised per Dr. Jensen that labs look ok. No changes are needed at this time. Pt verbalized understanding

## 2017-08-01 ENCOUNTER — CLINICAL SUPPORT (OUTPATIENT)
Dept: SMOKING CESSATION | Facility: CLINIC | Age: 82
End: 2017-08-01
Payer: COMMERCIAL

## 2017-08-01 DIAGNOSIS — F17.210 LIGHT CIGARETTE SMOKER (1-9 CIGARETTES PER DAY): Primary | ICD-10-CM

## 2017-08-01 PROCEDURE — 99407 BEHAV CHNG SMOKING > 10 MIN: CPT | Mod: S$GLB,,,

## 2017-08-15 ENCOUNTER — CLINICAL SUPPORT (OUTPATIENT)
Dept: SMOKING CESSATION | Facility: CLINIC | Age: 82
End: 2017-08-15
Payer: COMMERCIAL

## 2017-08-15 DIAGNOSIS — F17.210 CIGARETTE NICOTINE DEPENDENCE, UNCOMPLICATED: Primary | ICD-10-CM

## 2017-08-15 PROCEDURE — 99402 PREV MED CNSL INDIV APPRX 30: CPT | Mod: S$GLB,,,

## 2017-08-15 NOTE — Clinical Note
Patient reports he has not smoked since 8/3. Patient remains on prescribed tobacco cessation medication regimen of 14 mg patch, without any negative side effects at this time.

## 2017-08-15 NOTE — PROGRESS NOTES
Individual Follow-Up Form    8/15/2017    Quit Date: 8/3/17    Clinical Status of Patient: Outpatient    Length of Service: 30 minutes    Continuing Medication: yes  Patches    Other Medications: none     Target Symptoms: Withdrawal and medication side effects. The following were  rated moderate (3) to severe (4) on TCRS:  · Moderate (3): none  · Severe (4): none    Comments: Patient here for follow up. Report he has not smoked since last time he followed up by phone.Commended him on on smoking. States he uses patch when needed. Discussed when patient use patch and benefits of using it daily. Re-educated on nicotine patch administration and side effects. States he was using it when he hang out others. Will wear patch daily now. Discussed  learned addiction model, personal reasons for quitting, medications, goals. The patient denies any abnormal behavioral or mental changes at this time.       Diagnosis: F17.210    Next Visit: 2 weeks

## 2017-08-29 ENCOUNTER — DOCUMENTATION ONLY (OUTPATIENT)
Dept: SMOKING CESSATION | Facility: CLINIC | Age: 82
End: 2017-08-29

## 2017-08-29 NOTE — PROGRESS NOTES
Spoke with patient regarding missed appointment at 11 am. States he called yesterday because he wanted to confirm his appointment. Says he was told he did not have an appointment on 8/29. Asked if he can come on 8/31 after his 11 am appointment. Told patient I would have to fit him into my schedule. States he was okay with it.

## 2017-08-31 ENCOUNTER — TELEPHONE (OUTPATIENT)
Dept: HEMATOLOGY/ONCOLOGY | Facility: CLINIC | Age: 82
End: 2017-08-31

## 2017-08-31 DIAGNOSIS — Z85.028 HISTORY OF GASTRIC CANCER: Primary | ICD-10-CM

## 2017-08-31 NOTE — TELEPHONE ENCOUNTER
----- Message from Pamela Hernandez sent at 8/31/2017  6:23 AM CDT -----  Contact: self   Patient would like to reschedule appointment.   Patient can be reached at [phone]

## 2017-08-31 NOTE — TELEPHONE ENCOUNTER
called pt on today but received no answer, a detailed message was left on v/m to contact office to discuss next appointment.

## 2017-09-11 ENCOUNTER — TELEPHONE (OUTPATIENT)
Dept: HEMATOLOGY/ONCOLOGY | Facility: CLINIC | Age: 82
End: 2017-09-11

## 2017-09-20 ENCOUNTER — TELEPHONE (OUTPATIENT)
Dept: SMOKING CESSATION | Facility: CLINIC | Age: 82
End: 2017-09-20

## 2017-09-20 NOTE — TELEPHONE ENCOUNTER
Unsuccessful contact at [phone]  . Left message following up on quit status and office number to contact smoking cessation specialist..

## 2017-09-22 RX ORDER — AMLODIPINE BESYLATE 10 MG/1
TABLET ORAL
Qty: 90 TABLET | Refills: 3 | Status: SHIPPED | OUTPATIENT
Start: 2017-09-22 | End: 2018-11-06

## 2017-09-22 RX ORDER — PANTOPRAZOLE SODIUM 40 MG/1
TABLET, DELAYED RELEASE ORAL
Qty: 30 TABLET | Refills: 5 | Status: SHIPPED | OUTPATIENT
Start: 2017-09-22 | End: 2018-10-09 | Stop reason: SDUPTHER

## 2017-10-13 RX ORDER — PRAVASTATIN SODIUM 20 MG/1
TABLET ORAL
Qty: 90 TABLET | Refills: 3 | Status: SHIPPED | OUTPATIENT
Start: 2017-10-13 | End: 2018-10-18 | Stop reason: SDUPTHER

## 2017-12-21 ENCOUNTER — TELEPHONE (OUTPATIENT)
Dept: INTERNAL MEDICINE | Facility: CLINIC | Age: 82
End: 2017-12-21

## 2017-12-21 DIAGNOSIS — E11.3393 CONTROLLED TYPE 2 DIABETES MELLITUS WITH BOTH EYES AFFECTED BY MODERATE NONPROLIFERATIVE RETINOPATHY WITHOUT MACULAR EDEMA, WITHOUT LONG-TERM CURRENT USE OF INSULIN: Primary | ICD-10-CM

## 2017-12-22 ENCOUNTER — OFFICE VISIT (OUTPATIENT)
Dept: INTERNAL MEDICINE | Facility: CLINIC | Age: 82
End: 2017-12-22
Payer: MEDICARE

## 2017-12-22 ENCOUNTER — LAB VISIT (OUTPATIENT)
Dept: LAB | Facility: OTHER | Age: 82
End: 2017-12-22
Attending: INTERNAL MEDICINE
Payer: MEDICARE

## 2017-12-22 VITALS
DIASTOLIC BLOOD PRESSURE: 84 MMHG | HEIGHT: 69 IN | HEART RATE: 71 BPM | SYSTOLIC BLOOD PRESSURE: 126 MMHG | WEIGHT: 118 LBS | BODY MASS INDEX: 17.48 KG/M2 | OXYGEN SATURATION: 98 %

## 2017-12-22 DIAGNOSIS — E11.3393 CONTROLLED TYPE 2 DIABETES MELLITUS WITH BOTH EYES AFFECTED BY MODERATE NONPROLIFERATIVE RETINOPATHY WITHOUT MACULAR EDEMA, WITHOUT LONG-TERM CURRENT USE OF INSULIN: ICD-10-CM

## 2017-12-22 DIAGNOSIS — R97.20 ELEVATED PSA: ICD-10-CM

## 2017-12-22 DIAGNOSIS — I70.0 ATHEROSCLEROSIS OF AORTA: ICD-10-CM

## 2017-12-22 DIAGNOSIS — Z85.028 HISTORY OF GASTRIC CANCER: ICD-10-CM

## 2017-12-22 DIAGNOSIS — M1A.09X0 IDIOPATHIC CHRONIC GOUT OF MULTIPLE SITES WITHOUT TOPHUS: ICD-10-CM

## 2017-12-22 DIAGNOSIS — Z72.0 TOBACCO ABUSE: Chronic | ICD-10-CM

## 2017-12-22 DIAGNOSIS — I10 ESSENTIAL HYPERTENSION, BENIGN: Chronic | ICD-10-CM

## 2017-12-22 DIAGNOSIS — I10 ESSENTIAL HYPERTENSION, BENIGN: Primary | Chronic | ICD-10-CM

## 2017-12-22 DIAGNOSIS — E78.2 MIXED HYPERLIPIDEMIA: Chronic | ICD-10-CM

## 2017-12-22 PROBLEM — E11.319 DIABETIC RETINOPATHY: Status: ACTIVE | Noted: 2017-12-22

## 2017-12-22 LAB
BASOPHILS # BLD AUTO: 0.02 K/UL
BASOPHILS NFR BLD: 0.4 %
DIFFERENTIAL METHOD: ABNORMAL
EOSINOPHIL # BLD AUTO: 0.3 K/UL
EOSINOPHIL NFR BLD: 4.9 %
ERYTHROCYTE [DISTWIDTH] IN BLOOD BY AUTOMATED COUNT: 14.5 %
ESTIMATED AVG GLUCOSE: 148 MG/DL
HBA1C MFR BLD HPLC: 6.8 %
HCT VFR BLD AUTO: 34 %
HGB BLD-MCNC: 11.2 G/DL
LYMPHOCYTES # BLD AUTO: 1 K/UL
LYMPHOCYTES NFR BLD: 17.2 %
MCH RBC QN AUTO: 26.5 PG
MCHC RBC AUTO-ENTMCNC: 32.9 G/DL
MCV RBC AUTO: 81 FL
MONOCYTES # BLD AUTO: 0.8 K/UL
MONOCYTES NFR BLD: 15.2 %
NEUTROPHILS # BLD AUTO: 3.4 K/UL
NEUTROPHILS NFR BLD: 61.8 %
PLATELET # BLD AUTO: 201 K/UL
PMV BLD AUTO: 11.6 FL
RBC # BLD AUTO: 4.22 M/UL
WBC # BLD AUTO: 5.53 K/UL

## 2017-12-22 PROCEDURE — G0008 ADMIN INFLUENZA VIRUS VAC: HCPCS | Mod: S$GLB,,, | Performed by: INTERNAL MEDICINE

## 2017-12-22 PROCEDURE — 99999 PR PBB SHADOW E&M-EST. PATIENT-LVL III: CPT | Mod: PBBFAC,,, | Performed by: INTERNAL MEDICINE

## 2017-12-22 PROCEDURE — 99214 OFFICE O/P EST MOD 30 MIN: CPT | Mod: S$GLB,,, | Performed by: INTERNAL MEDICINE

## 2017-12-22 PROCEDURE — 90662 IIV NO PRSV INCREASED AG IM: CPT | Mod: S$GLB,,, | Performed by: INTERNAL MEDICINE

## 2017-12-22 PROCEDURE — 99499 UNLISTED E&M SERVICE: CPT | Mod: S$GLB,,, | Performed by: INTERNAL MEDICINE

## 2017-12-22 PROCEDURE — 85025 COMPLETE CBC W/AUTO DIFF WBC: CPT

## 2017-12-22 PROCEDURE — 83036 HEMOGLOBIN GLYCOSYLATED A1C: CPT

## 2017-12-22 PROCEDURE — 36415 COLL VENOUS BLD VENIPUNCTURE: CPT

## 2017-12-22 NOTE — PROGRESS NOTES
Subjective:       Patient ID: Julio Benites is a 83 y.o. male.    Chief Complaint: Diabetes and Hypertension    Pt here for f/u. Pt's BP is well controlled. Tolerating meds well. Pt denies cp/sob/ha/vision or neuro changes. Checking at home and is well controlled.      DM2 is well controlled on metformin. Last A1c was 6.8. He does have complications of diab retinopathy and is due for eye exam which he will schedule. No low sugar symptoms.     HLD is tx with pravastatin which he tolerates well. Aortic atherosclerosis was noted on CXR 2012. He is on appropriate risk mitigation meds and given lifestyle recommendations. This is stable.     He is due for f/u with heme-onc whom he has seen for h/o gastric CA. He does have mild anemia that is not iron deficient and is being followed by them. Pt is not symptomatic.    Still smoking and has failed cessation program. He may try again but wants to think about it.       Gout has been quiescent without attacks for a while. Dietary discretion has helped.     He has h/o elevated PSA, last done in 2012 with value of 5.5. We discussed r/b of retesting and he opts not to do so which is not unreasonable.       Diabetes   Pertinent negatives for hypoglycemia include no headaches. Pertinent negatives for diabetes include no chest pain and no polyuria.   Hypertension   Pertinent negatives include no chest pain, headaches or shortness of breath.     Review of Systems   Constitutional: Negative for appetite change, fever and unexpected weight change.   Eyes: Negative for visual disturbance.   Respiratory: Negative for shortness of breath.    Cardiovascular: Negative for chest pain.   Gastrointestinal: Negative for abdominal pain, blood in stool, constipation and diarrhea.   Endocrine: Negative for polyuria.   Genitourinary: Negative for difficulty urinating.   Neurological: Negative for light-headedness and headaches.   Psychiatric/Behavioral: Negative for dysphoric mood.        Objective:          Assessment:       1. Essential hypertension, benign    2. Elevated PSA    3. Atherosclerosis of aorta    4. 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 Mixed hyperlipidemia    6. History of gastric cancer    7. Idiopathic chronic gout of multiple sites without tophus    8. Tobacco abuse        Plan:       1. Appropriate labs    2. Home BP and BS monitoring  3. Keep f/u with specialists  4. F/u optometry and oncology          Physical Exam   Constitutional: He is oriented to person, place, and time. He appears well-developed and well-nourished.   HENT:   Head: Normocephalic and atraumatic.   Eyes: EOM are normal. Pupils are equal, round, and reactive to light.   Neck: Neck supple. Carotid bruit is not present. No thyromegaly present.   Cardiovascular: Normal rate, regular rhythm, S1 normal, S2 normal and normal heart sounds.    No murmur heard.  Pulmonary/Chest: Effort normal and breath sounds normal. He has no wheezes.   Abdominal: Soft. Bowel sounds are normal. He exhibits no mass. There is no hepatosplenomegaly. There is no tenderness.   Musculoskeletal: He exhibits no edema.   Lymphadenopathy:     He has no cervical adenopathy.   Neurological: He is alert and oriented to person, place, and time. No cranial nerve deficit.   Psychiatric: He has a normal mood and affect. His behavior is normal.

## 2017-12-22 NOTE — PROGRESS NOTES
Patient given HD Influenza IM in the RD. Patient tolerated well and Band-Aid was applied. Lot#[id number] Exp:5/25/2018. Patient advised to wait in the lobby for 15 min to make sure no adverse reactions occur. Patient states verbal understanding and has no further questions. Patient given vaccine information sheet.

## 2017-12-23 ENCOUNTER — TELEPHONE (OUTPATIENT)
Dept: INTERNAL MEDICINE | Facility: CLINIC | Age: 82
End: 2017-12-23

## 2018-01-24 RX ORDER — LOSARTAN POTASSIUM 50 MG/1
TABLET ORAL
Qty: 90 TABLET | Refills: 3 | Status: SHIPPED | OUTPATIENT
Start: 2018-01-24 | End: 2019-04-18 | Stop reason: SDUPTHER

## 2018-03-27 ENCOUNTER — PES CALL (OUTPATIENT)
Dept: ADMINISTRATIVE | Facility: CLINIC | Age: 83
End: 2018-03-27

## 2018-05-22 ENCOUNTER — CLINICAL SUPPORT (OUTPATIENT)
Dept: SMOKING CESSATION | Facility: CLINIC | Age: 83
End: 2018-05-22
Payer: COMMERCIAL

## 2018-05-22 DIAGNOSIS — F17.200 NICOTINE DEPENDENCE: Primary | ICD-10-CM

## 2018-05-22 PROCEDURE — 99407 BEHAV CHNG SMOKING > 10 MIN: CPT | Mod: S$GLB,,,

## 2018-05-22 NOTE — PROGRESS NOTES
Spoke to patient regarding 3-6 month phone follow up. He is not tobacco free and not interested at this time in rejoining program. Left contact information so when he is ready to schedule an appointment.

## 2018-07-02 ENCOUNTER — TELEPHONE (OUTPATIENT)
Dept: INTERNAL MEDICINE | Facility: CLINIC | Age: 83
End: 2018-07-02

## 2018-07-02 ENCOUNTER — OFFICE VISIT (OUTPATIENT)
Dept: INTERNAL MEDICINE | Facility: CLINIC | Age: 83
End: 2018-07-02
Payer: MEDICARE

## 2018-07-02 ENCOUNTER — HOSPITAL ENCOUNTER (OUTPATIENT)
Dept: RADIOLOGY | Facility: OTHER | Age: 83
Discharge: HOME | End: 2018-07-02
Attending: INTERNAL MEDICINE
Payer: MEDICARE

## 2018-07-02 VITALS
DIASTOLIC BLOOD PRESSURE: 80 MMHG | HEART RATE: 81 BPM | WEIGHT: 106.25 LBS | HEIGHT: 69 IN | BODY MASS INDEX: 15.74 KG/M2 | SYSTOLIC BLOOD PRESSURE: 110 MMHG

## 2018-07-02 DIAGNOSIS — I10 ESSENTIAL HYPERTENSION, BENIGN: Chronic | ICD-10-CM

## 2018-07-02 DIAGNOSIS — I70.0 ATHEROSCLEROSIS OF AORTA: ICD-10-CM

## 2018-07-02 DIAGNOSIS — M1A.09X0 IDIOPATHIC CHRONIC GOUT OF MULTIPLE SITES WITHOUT TOPHUS: ICD-10-CM

## 2018-07-02 DIAGNOSIS — Z85.028 HISTORY OF GASTRIC CANCER: ICD-10-CM

## 2018-07-02 DIAGNOSIS — E11.9 DIABETIC EYE EXAM: ICD-10-CM

## 2018-07-02 DIAGNOSIS — Z01.00 DIABETIC EYE EXAM: ICD-10-CM

## 2018-07-02 DIAGNOSIS — R97.20 ELEVATED PSA: ICD-10-CM

## 2018-07-02 DIAGNOSIS — D49.0 GASTRIC NEOPLASM: ICD-10-CM

## 2018-07-02 DIAGNOSIS — E11.3393 CONTROLLED TYPE 2 DIABETES MELLITUS WITH BOTH EYES AFFECTED BY MODERATE NONPROLIFERATIVE RETINOPATHY WITHOUT MACULAR EDEMA, WITHOUT LONG-TERM CURRENT USE OF INSULIN: Primary | ICD-10-CM

## 2018-07-02 DIAGNOSIS — E11.319 DIABETIC RETINOPATHY ASSOCIATED WITH TYPE 2 DIABETES MELLITUS, MACULAR EDEMA PRESENCE UNSPECIFIED, UNSPECIFIED LATERALITY, UNSPECIFIED RETINOPATHY SEVERITY: ICD-10-CM

## 2018-07-02 PROCEDURE — 99999 PR PBB SHADOW E&M-EST. PATIENT-LVL V: CPT | Mod: PBBFAC,,, | Performed by: INTERNAL MEDICINE

## 2018-07-02 PROCEDURE — 3074F SYST BP LT 130 MM HG: CPT | Mod: CPTII,S$GLB,, | Performed by: INTERNAL MEDICINE

## 2018-07-02 PROCEDURE — 99214 OFFICE O/P EST MOD 30 MIN: CPT | Mod: S$GLB,,, | Performed by: INTERNAL MEDICINE

## 2018-07-02 PROCEDURE — 99499 UNLISTED E&M SERVICE: CPT | Mod: S$GLB,,, | Performed by: INTERNAL MEDICINE

## 2018-07-02 PROCEDURE — 3079F DIAST BP 80-89 MM HG: CPT | Mod: CPTII,S$GLB,, | Performed by: INTERNAL MEDICINE

## 2018-07-02 NOTE — TELEPHONE ENCOUNTER
Inform pt that labs look good except still mildly anemic so be sure to f/u with Dr. Euceda. Also, potassium is slightly low so increase foods such as citrus fruits and tomatoes.

## 2018-07-02 NOTE — PROGRESS NOTES
Subjective:       Patient ID: Julio Benites is a 83 y.o. male.    Chief Complaint: Diabetes (f/u)    Pt here for f/u. Pt's BP is well controlled. Tolerating meds well. Pt denies cp/sob/ha/vision or neuro changes. Checking at home and is well controlled.      DM2 is well controlled off metformin which he admits he stopped about a year ago. Last A1c was 6.8. He does have complications of diab retinopathy and is due for eye exam which he will schedule. No low sugar symptoms.     HLD is tx with pravastatin which he tolerates well. Aortic atherosclerosis was noted on CXR 2012. He is on appropriate risk mitigation meds and given lifestyle recommendations. This is stable.     He is due for f/u with heme-onc whom he has seen for h/o gastric CA. He does have mild anemia that is not iron deficient and is being followed by them. Pt is not having any GI symptoms but has lost 12lbs in last 6 mos and admits that appetite is not great. Denies abd pain/dysphagia/n/v/changes in BMs.    Still smoking and has failed cessation program. He may try again but is not interested now.       Gout has been quiescent without attacks for a while. Dietary discretion has helped.     He has h/o elevated PSA, last done in 2012 with value of 5.5. We discussed r/b of retesting and he opts not to do so which is not unreasonable.       Diabetes   Pertinent negatives for hypoglycemia include no headaches. Pertinent negatives for diabetes include no chest pain and no polyuria.   Hypertension   Pertinent negatives include no chest pain, headaches or shortness of breath.     Review of Systems   Constitutional: Negative for appetite change, fever and unexpected weight change.   Eyes: Negative for visual disturbance.   Respiratory: Negative for shortness of breath.    Cardiovascular: Negative for chest pain.   Gastrointestinal: Negative for abdominal pain, blood in stool, constipation and diarrhea.   Endocrine: Negative for polyuria.   Genitourinary:  Negative for difficulty urinating.   Neurological: Negative for light-headedness and headaches.   Psychiatric/Behavioral: Negative for dysphoric mood.       Objective:          Assessment:       1. 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 Diabetic eye exam    3. Diabetic retinopathy associated with type 2 diabetes mellitus, macular edema presence unspecified, unspecified laterality, unspecified retinopathy severity    4. Atherosclerosis of aorta    5. Essential hypertension, benign    6. Elevated PSA    7. History of gastric cancer    8. Idiopathic chronic gout of multiple sites without tophus    9. Gastric neoplasm        Plan:       1. Appropriate labs    2. Home BP and BS monitoring  3. Keep f/u with specialists including optometry and oncology  4. CT abd/pelv  5. Keep weight and diet diary and bring to appt in 3 mos; he understands to let me know if losing any more weight          Physical Exam   Constitutional: He is oriented to person, place, and time. He appears well-developed and well-nourished.   HENT:   Head: Normocephalic and atraumatic.   Eyes: EOM are normal. Pupils are equal, round, and reactive to light.   Neck: Neck supple. Carotid bruit is not present. No thyromegaly present.   Cardiovascular: Normal rate, regular rhythm, S1 normal, S2 normal and normal heart sounds.    No murmur heard.  Pulmonary/Chest: Effort normal and breath sounds normal. He has no wheezes.   Abdominal: Soft. Bowel sounds are normal. He exhibits no mass. There is no hepatosplenomegaly. There is no tenderness.   Musculoskeletal: He exhibits no edema.   Lymphadenopathy:     He has no cervical adenopathy.   Neurological: He is alert and oriented to person, place, and time. No cranial nerve deficit.   Psychiatric: He has a normal mood and affect. His behavior is normal.

## 2018-07-11 ENCOUNTER — CLINICAL SUPPORT (OUTPATIENT)
Dept: SMOKING CESSATION | Facility: CLINIC | Age: 83
End: 2018-07-11
Payer: COMMERCIAL

## 2018-07-11 DIAGNOSIS — F17.200 NICOTINE DEPENDENCE: Primary | ICD-10-CM

## 2018-07-11 PROCEDURE — 99407 BEHAV CHNG SMOKING > 10 MIN: CPT | Mod: S$GLB,,,

## 2018-07-11 NOTE — PROGRESS NOTES
Spoke with patient today in regards to smoking cessation progress 12  month phone follow up, states not tobacco free. Patient is not interested in returning to the smoking cessation program at this time. Will call when ready to schedule. Informed patient of benefit period,  contact information. Will complete and resolve smart form for 12 month phone follow up on Quit attempt #1.

## 2018-07-12 ENCOUNTER — OFFICE VISIT (OUTPATIENT)
Dept: HEMATOLOGY/ONCOLOGY | Facility: CLINIC | Age: 83
End: 2018-07-12
Payer: MEDICARE

## 2018-07-12 ENCOUNTER — TELEPHONE (OUTPATIENT)
Dept: HEMATOLOGY/ONCOLOGY | Facility: CLINIC | Age: 83
End: 2018-07-12

## 2018-07-12 VITALS
BODY MASS INDEX: 15.75 KG/M2 | WEIGHT: 110 LBS | RESPIRATION RATE: 18 BRPM | DIASTOLIC BLOOD PRESSURE: 96 MMHG | SYSTOLIC BLOOD PRESSURE: 167 MMHG | HEART RATE: 59 BPM | TEMPERATURE: 98 F | OXYGEN SATURATION: 100 % | HEIGHT: 70 IN

## 2018-07-12 DIAGNOSIS — Z85.028 HISTORY OF GASTRIC CANCER: Primary | ICD-10-CM

## 2018-07-12 DIAGNOSIS — R64 CACHEXIA: ICD-10-CM

## 2018-07-12 PROCEDURE — 99214 OFFICE O/P EST MOD 30 MIN: CPT | Mod: S$GLB,,, | Performed by: INTERNAL MEDICINE

## 2018-07-12 PROCEDURE — 99999 PR PBB SHADOW E&M-EST. PATIENT-LVL V: CPT | Mod: PBBFAC,,, | Performed by: INTERNAL MEDICINE

## 2018-07-12 PROCEDURE — 3077F SYST BP >= 140 MM HG: CPT | Mod: CPTII,S$GLB,, | Performed by: INTERNAL MEDICINE

## 2018-07-12 PROCEDURE — 99499 UNLISTED E&M SERVICE: CPT | Mod: S$GLB,,, | Performed by: INTERNAL MEDICINE

## 2018-07-12 PROCEDURE — 3080F DIAST BP >= 90 MM HG: CPT | Mod: CPTII,S$GLB,, | Performed by: INTERNAL MEDICINE

## 2018-07-12 RX ORDER — CYPROHEPTADINE HYDROCHLORIDE 4 MG/1
4 TABLET ORAL 4 TIMES DAILY
Qty: 120 TABLET | Refills: 6 | Status: SHIPPED | OUTPATIENT
Start: 2018-07-12 | End: 2018-11-06

## 2018-07-12 NOTE — PROGRESS NOTES
Subjective:       Patient ID: Juilo Benites is a 83 y.o. male.    Chief Complaint: History of gastric cancer  ONCOLOGIC HISTORY: 81 year old male with history of gastric cancer s/p subtotal gastrectomy. The patient initially presented to his primary care physician with complaints of shortness of breath, fatigue, generalized weakness. He was found to be anemic with a hemoglobin of 6.2. He was admitted to the hospital and received blood transfusions.    EGD done on 11/09/2012 revealed likely gastric carcinoma of distal stomach prolapsing into duodenal bulb. Biopsies revealed a small separate fragment of atypical papillary neoplasm. resection. CT abdomen/pelvis revealed there is no evidence of obvious gastric abnormality, although the stomach is not well distended. No evidence of adjacent mesenteric or kary hepatis lymphadenopathy. 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 Subcentimeter hepatic hypodensities which are too small to characterize, however likely represent cysts. Prostatomegaly. On 11/12/2012, the patient underwent a subtotal gastrectomy by Dr. Florentino. Pathology revealed a [id number] adenocarcinoma of the stomach with 30 negative lymph nodes and negative margins   He is currently on surveillance.      HPI He comes in today to review labs. He last saw me in 2016 and was recommended to come back in 6 months but did not do so. He notes that he has fatigue and has a recent weight loss of 14 lbs        Review of Systems   Constitutional: Negative for appetite change, fatigue and unexpected weight change.   HENT: Negative for mouth sores.    Eyes: Negative for visual disturbance.   Respiratory: Negative for cough and shortness of breath.    Cardiovascular: Negative for chest pain.   Gastrointestinal: Negative for abdominal pain and diarrhea.   Genitourinary: Negative for frequency.   Musculoskeletal: Negative for back pain.   Skin: Negative for rash.   Neurological: Negative  for headaches.   Hematological: Negative for adenopathy.   Psychiatric/Behavioral: The patient is not nervous/anxious.    All other systems reviewed and are negative.      Objective:      Physical Exam   Constitutional: He is oriented to person, place, and time. He appears well-developed and well-nourished.   HENT:   Mouth/Throat: No oropharyngeal exudate.   Cardiovascular: Normal rate and normal heart sounds.    Pulmonary/Chest: Effort normal and breath sounds normal. He has no wheezes.   Abdominal: Soft. Bowel sounds are normal. There is no tenderness.   Musculoskeletal: He exhibits no edema or tenderness.   Lymphadenopathy:     He has no cervical adenopathy.   Neurological: He is alert and oriented to person, place, and time. Coordination normal.   Skin: Skin is warm and dry. No rash noted.   Psychiatric: He has a normal mood and affect. Judgment and thought content normal.   Vitals reviewed.        LABS:  WBC   Date Value Ref Range Status   07/02/2018 5.32 3.90 - 12.70 K/uL Final     Hemoglobin   Date Value Ref Range Status   07/02/2018 11.0 (L) 14.0 - 18.0 g/dL Final     POC Hematocrit   Date Value Ref Range Status   11/12/2012 29 (L) 36 - 54 %PCV Final     Hematocrit   Date Value Ref Range Status   07/02/2018 33.7 (L) 40.0 - 54.0 % Final     Platelets   Date Value Ref Range Status   07/02/2018 194 150 - 350 K/uL Final     Gran # (ANC)   Date Value Ref Range Status   07/02/2018 2.5 1.8 - 7.7 K/uL Final     Gran%   Date Value Ref Range Status   07/02/2018 46.6 38.0 - 73.0 % Final       Chemistry        Component Value Date/Time     07/02/2018 1049    K 3.4 (L) 07/02/2018 1049     07/02/2018 1049    CO2 22 (L) 07/02/2018 1049    BUN 18 07/02/2018 1049    CREATININE 0.9 07/02/2018 1049     (H) 07/02/2018 1049        Component Value Date/Time    CALCIUM 9.4 07/02/2018 1049    ALKPHOS 75 07/02/2018 1049    AST 20 07/02/2018 1049    ALT 11 07/02/2018 1049    BILITOT 0.2 07/02/2018 1049     ESTGFRAFRICA >60 07/02/2018 1049    EGFRNONAA >60 07/02/2018 1049          Assessment:       1. History of gastric cancer    2. Cachexia        Plan:        1,2. Recommended Endoscopic surveillance and CT scans, however he wants to think about it and will let us know when he is ready to schedule. If above tests are normal he does not need regular surveillance with us. Also escribed periactin    Above care plan was discussed with patient and all questions were addressed to his satisfaction

## 2018-07-18 ENCOUNTER — HOSPITAL ENCOUNTER (OUTPATIENT)
Dept: RADIOLOGY | Facility: OTHER | Age: 83
Discharge: HOME | End: 2018-07-18
Attending: INTERNAL MEDICINE
Payer: MEDICARE

## 2018-07-18 DIAGNOSIS — Z85.028 HISTORY OF GASTRIC CANCER: ICD-10-CM

## 2018-07-18 PROCEDURE — 74177 CT ABD & PELVIS W/CONTRAST: CPT | Mod: 26,,, | Performed by: RADIOLOGY

## 2018-07-18 PROCEDURE — 74177 CT ABD & PELVIS W/CONTRAST: CPT | Mod: TC

## 2018-07-18 PROCEDURE — 71260 CT THORAX DX C+: CPT | Mod: 26,,, | Performed by: RADIOLOGY

## 2018-07-18 PROCEDURE — 25500020 PHARM REV CODE 255: Performed by: INTERNAL MEDICINE

## 2018-07-18 RX ADMIN — IOHEXOL 75 ML: 350 INJECTION, SOLUTION INTRAVENOUS at 11:07

## 2018-07-18 RX ADMIN — IOHEXOL 30 ML: 350 INJECTION, SOLUTION INTRAVENOUS at 10:07

## 2018-07-20 ENCOUNTER — TELEPHONE (OUTPATIENT)
Dept: ENDOSCOPY | Facility: HOSPITAL | Age: 83
End: 2018-07-20

## 2018-07-20 NOTE — TELEPHONE ENCOUNTER
Contacted Pt to schedule EGD/colonoscopy, no answer, left message for Pt to call back to schedule, number provided [phone].

## 2018-07-25 ENCOUNTER — OFFICE VISIT (OUTPATIENT)
Dept: OPTOMETRY | Facility: CLINIC | Age: 83
End: 2018-07-25
Payer: MEDICARE

## 2018-07-25 DIAGNOSIS — H25.12 NUCLEAR SCLEROSIS OF LEFT EYE: ICD-10-CM

## 2018-07-25 DIAGNOSIS — E11.9 TYPE 2 DIABETES MELLITUS WITHOUT OPHTHALMIC MANIFESTATIONS: Primary | ICD-10-CM

## 2018-07-25 DIAGNOSIS — H52.4 PRESBYOPIA: ICD-10-CM

## 2018-07-25 DIAGNOSIS — H40.013 OPEN ANGLE WITH BORDERLINE FINDINGS OF BOTH EYES: ICD-10-CM

## 2018-07-25 PROCEDURE — 99999 PR PBB SHADOW E&M-EST. PATIENT-LVL I: CPT | Mod: PBBFAC,,, | Performed by: OPTOMETRIST

## 2018-07-25 PROCEDURE — 92014 COMPRE OPH EXAM EST PT 1/>: CPT | Mod: S$GLB,,, | Performed by: OPTOMETRIST

## 2018-07-25 NOTE — PROGRESS NOTES
Assessment /Plan     For exam results, see Encounter Report.    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  No retinopathy--monitor yearly.  BS control.  2.  Due to increased c/d ratio R>L.  Previous HVF normal OU.  No family history of glaucoma.  Rim tissue healthy OU.  Low risk.  Monitor yearly.    3.  Educated on cataracts and affects on vision.  Patient happy with vision.  Monitor.  4.  Continue w/ current rx        RTC 1 year for dm exam.

## 2018-07-25 NOTE — LETTER
July 25, 2018      Gregorio Jensen MD  [address]  [place] [address]           [facility]  [address]  [place] [address]  Phone: [phone]  Fax: [phone]          Patient: Julio Benites   MR Number: [id number]   YOB: 1934   Date of Visit: 7/25/2018       Dear Dr. Gregorio Jensen:    Thank you for referring Julio Benites to me for evaluation. Attached you will find relevant portions of my assessment and plan of care.    If you have questions, please do not hesitate to call me. I look forward to following Julio Benites along with you.    Sincerely,    Ema Pike, OD    Enclosure  CC:  No Recipients    If you would like to receive this communication electronically, please contact [email or web address] or [phone] to request more information on [facility] Link access.    For providers and/or their staff who would like to refer a patient to [facility], please contact us through our one-stop-shop provider referral line, [facility], at [phone].    If you feel you have received this communication in error or would no longer like to receive these types of communications, please e-mail [email or web address]

## 2018-09-27 ENCOUNTER — TELEPHONE (OUTPATIENT)
Dept: INTERNAL MEDICINE | Facility: CLINIC | Age: 83
End: 2018-09-27

## 2018-10-09 ENCOUNTER — TELEPHONE (OUTPATIENT)
Dept: INTERNAL MEDICINE | Facility: CLINIC | Age: 83
End: 2018-10-09

## 2018-10-09 RX ORDER — PANTOPRAZOLE SODIUM 40 MG/1
40 TABLET, DELAYED RELEASE ORAL DAILY
Qty: 30 TABLET | Refills: 5 | Status: SHIPPED | OUTPATIENT
Start: 2018-10-09 | End: 2019-07-31 | Stop reason: SDUPTHER

## 2018-10-09 NOTE — TELEPHONE ENCOUNTER
----- Message from Mame Felipe sent at 10/8/2018  8:47 AM CDT -----  Contact: Pt  Can the clinic reply in [facility]:No       Please refill the medication(s) listed below. Please call the patient when the prescription(s) is ready for  at the phone number [phone]    Medication #1:pantoprazole (PROTONIX) 40 MG tablet    Medication #2:      Preferred Pharmacy: [facility] [address] [place] [id number] [phone]

## 2018-10-09 NOTE — TELEPHONE ENCOUNTER
----- Message from Latisha uS sent at 10/9/2018  8:22 AM CDT -----  Name of Who is Calling: DALLIN BLACKMON [[id number]]      What is the request in detail: Pt checking on the status of the refill request for pantoprazole (PROTONIX) 40 MG tablet    Can the clinic reply by [facility]:   No       What Number [facility] Back [facility]: [phone]

## 2018-10-18 RX ORDER — PRAVASTATIN SODIUM 20 MG/1
20 TABLET ORAL NIGHTLY
Qty: 90 TABLET | Refills: 3 | Status: SHIPPED | OUTPATIENT
Start: 2018-10-18 | End: 2019-07-31 | Stop reason: SDUPTHER

## 2018-11-06 ENCOUNTER — OFFICE VISIT (OUTPATIENT)
Dept: INTERNAL MEDICINE | Facility: CLINIC | Age: 83
End: 2018-11-06
Payer: MEDICARE

## 2018-11-06 ENCOUNTER — LAB VISIT (OUTPATIENT)
Dept: LAB | Facility: OTHER | Age: 83
End: 2018-11-06
Attending: INTERNAL MEDICINE
Payer: MEDICARE

## 2018-11-06 ENCOUNTER — TELEPHONE (OUTPATIENT)
Dept: INTERNAL MEDICINE | Facility: CLINIC | Age: 83
End: 2018-11-06

## 2018-11-06 VITALS
WEIGHT: 108.25 LBS | BODY MASS INDEX: 15.5 KG/M2 | HEART RATE: 71 BPM | SYSTOLIC BLOOD PRESSURE: 146 MMHG | DIASTOLIC BLOOD PRESSURE: 102 MMHG | HEIGHT: 70 IN | OXYGEN SATURATION: 98 %

## 2018-11-06 DIAGNOSIS — E78.2 MIXED HYPERLIPIDEMIA: Chronic | ICD-10-CM

## 2018-11-06 DIAGNOSIS — Z85.028 HISTORY OF GASTRIC CANCER: ICD-10-CM

## 2018-11-06 DIAGNOSIS — I10 ESSENTIAL HYPERTENSION, BENIGN: Chronic | ICD-10-CM

## 2018-11-06 DIAGNOSIS — I10 ESSENTIAL HYPERTENSION, BENIGN: Primary | Chronic | ICD-10-CM

## 2018-11-06 DIAGNOSIS — E11.319 DIABETIC RETINOPATHY ASSOCIATED WITH TYPE 2 DIABETES MELLITUS, MACULAR EDEMA PRESENCE UNSPECIFIED, UNSPECIFIED LATERALITY, UNSPECIFIED RETINOPATHY SEVERITY: ICD-10-CM

## 2018-11-06 DIAGNOSIS — I70.0 ATHEROSCLEROSIS OF AORTA: ICD-10-CM

## 2018-11-06 DIAGNOSIS — E11.3393 CONTROLLED TYPE 2 DIABETES MELLITUS WITH BOTH EYES AFFECTED BY MODERATE NONPROLIFERATIVE RETINOPATHY WITHOUT MACULAR EDEMA, WITHOUT LONG-TERM CURRENT USE OF INSULIN: ICD-10-CM

## 2018-11-06 DIAGNOSIS — M1A.09X0 IDIOPATHIC CHRONIC GOUT OF MULTIPLE SITES WITHOUT TOPHUS: ICD-10-CM

## 2018-11-06 LAB
ANION GAP SERPL CALC-SCNC: 9 MMOL/L
BUN SERPL-MCNC: 17 MG/DL
CALCIUM SERPL-MCNC: 9.9 MG/DL
CHLORIDE SERPL-SCNC: 108 MMOL/L
CO2 SERPL-SCNC: 27 MMOL/L
CREAT SERPL-MCNC: 0.8 MG/DL
EST. GFR  (AFRICAN AMERICAN): >60 ML/MIN/1.73 M^2
EST. GFR  (NON AFRICAN AMERICAN): >60 ML/MIN/1.73 M^2
GLUCOSE SERPL-MCNC: 104 MG/DL
POTASSIUM SERPL-SCNC: 4.2 MMOL/L
SODIUM SERPL-SCNC: 144 MMOL/L

## 2018-11-06 PROCEDURE — 99999 PR PBB SHADOW E&M-EST. PATIENT-LVL III: CPT | Mod: PBBFAC,HCNC,, | Performed by: INTERNAL MEDICINE

## 2018-11-06 PROCEDURE — 1101F PT FALLS ASSESS-DOCD LE1/YR: CPT | Mod: CPTII,HCNC,S$GLB, | Performed by: INTERNAL MEDICINE

## 2018-11-06 PROCEDURE — 3080F DIAST BP >= 90 MM HG: CPT | Mod: CPTII,HCNC,S$GLB, | Performed by: INTERNAL MEDICINE

## 2018-11-06 PROCEDURE — 80048 BASIC METABOLIC PNL TOTAL CA: CPT | Mod: HCNC

## 2018-11-06 PROCEDURE — 36415 COLL VENOUS BLD VENIPUNCTURE: CPT | Mod: HCNC

## 2018-11-06 PROCEDURE — 3077F SYST BP >= 140 MM HG: CPT | Mod: CPTII,HCNC,S$GLB, | Performed by: INTERNAL MEDICINE

## 2018-11-06 PROCEDURE — 99214 OFFICE O/P EST MOD 30 MIN: CPT | Mod: HCNC,S$GLB,, | Performed by: INTERNAL MEDICINE

## 2018-11-06 RX ORDER — NIFEDIPINE 60 MG/1
60 TABLET, EXTENDED RELEASE ORAL DAILY
Qty: 30 TABLET | Refills: 11 | Status: SHIPPED | OUTPATIENT
Start: 2018-11-06 | End: 2019-07-31 | Stop reason: SDUPTHER

## 2018-11-06 NOTE — PROGRESS NOTES
Subjective:       Patient ID: Julio Benites is a 84 y.o. male.    Chief Complaint: Hypertension    Pt here for f/u. Pt's BP is not well controlled. Tolerating meds well. Pt denies cp/sob/ha/vision or neuro changes. Not checking at home.      DM2 is well controlled on metformin 1000mg daily. Last A1c was 6.9. 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 No low sugar symptoms.     HLD is tx with pravastatin which he tolerates well. Aortic atherosclerosis was noted on CXR 2012. He is on appropriate risk mitigation meds and given lifestyle recommendations. This is stable.     He is up to date with heme-onc whom he has seen for h/o gastric CA. He does have mild anemia that is not iron deficient and is being followed by them. Pt is not having any GI symptoms. He is thin but weight is stable. Denies abd pain/dysphagia/n/v/changes in BMs.    Still smoking and has failed cessation program and is not interested in further intervention at this time.       Gout has been quiescent without attacks for a while. Dietary discretion has helped.     He has h/o elevated PSA, last done in 2012 with value of 5.5. We discussed r/b of retesting and he opts not to do so which is not unreasonable.       Diabetes   Pertinent negatives for hypoglycemia include no headaches. Pertinent negatives for diabetes include no chest pain and no polyuria.   Hypertension   Pertinent negatives include no chest pain, headaches or shortness of breath.     Review of Systems   Constitutional: Negative for appetite change, fever and unexpected weight change.   Eyes: Negative for visual disturbance.   Respiratory: Negative for shortness of breath.    Cardiovascular: Negative for chest pain.   Gastrointestinal: Negative for abdominal pain, blood in stool, constipation and diarrhea.   Endocrine: Negative for polyuria.   Genitourinary: Negative for difficulty urinating.   Neurological: Negative for light-headedness and  headaches.   Psychiatric/Behavioral: Negative for dysphoric mood.       Objective:          Assessment:       1. Essential hypertension, benign    2. Atherosclerosis of aorta    3. Mixed hyperlipidemia    4. History of gastric cancer    5. Idiopathic chronic gout of multiple sites without tophus    6. 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 Diabetic retinopathy associated with type 2 diabetes mellitus, macular edema presence unspecified, unspecified laterality, unspecified retinopathy severity        Plan:       1. Appropriate labs    2. Home BP and BS monitoring and f/u 2 wks nursing BP check  3. Keep f/u with specialists including optometry and oncology  4. Change amlodipine to nifedipine 60mg daily          Physical Exam   Constitutional: He is oriented to person, place, and time. He appears well-developed and well-nourished.   HENT:   Head: Normocephalic and atraumatic.   Eyes: EOM are normal. Pupils are equal, round, and reactive to light.   Neck: Neck supple. Carotid bruit is not present. No thyromegaly present.   Cardiovascular: Normal rate, regular rhythm, S1 normal, S2 normal and normal heart sounds.   No murmur heard.  Pulmonary/Chest: Effort normal and breath sounds normal. He has no wheezes.   Abdominal: Soft. Bowel sounds are normal. He exhibits no mass. There is no hepatosplenomegaly. There is no tenderness.   Musculoskeletal: He exhibits no edema.   Lymphadenopathy:     He has no cervical adenopathy.   Neurological: He is alert and oriented to person, place, and time. No cranial nerve deficit.   Psychiatric: He has a normal mood and affect. His behavior is normal.

## 2018-11-06 NOTE — TELEPHONE ENCOUNTER
Called pt and Informed pt that labs look good. No changes are needed at this time.  pt showed verbal understanding

## 2018-11-06 NOTE — PATIENT INSTRUCTIONS
1. Stop amlodipine  2. Start nifedipine daily  3. Monitor blood pressure at home  4. Follow up with my nurse in 2-3 weeks for a blood pressure check

## 2018-11-20 ENCOUNTER — CLINICAL SUPPORT (OUTPATIENT)
Dept: INTERNAL MEDICINE | Facility: CLINIC | Age: 83
End: 2018-11-20
Payer: MEDICARE

## 2018-11-20 VITALS — OXYGEN SATURATION: 99 % | SYSTOLIC BLOOD PRESSURE: 120 MMHG | DIASTOLIC BLOOD PRESSURE: 80 MMHG | HEART RATE: 77 BPM

## 2018-11-20 PROCEDURE — 99999 PR PBB SHADOW E&M-EST. PATIENT-LVL III: CPT | Mod: PBBFAC,HCNC,,

## 2018-11-20 NOTE — PATIENT INSTRUCTIONS
Julio XAVIER Hockaday 84 y.o. male is here today for Blood Pressure check.   History of HTN yes.    Review of patient's allergies indicates:   Allergen Reactions    Lisinopril Rash     Patient reports history of rash with previous lisinopril use.     Nicoderm cq [nicotine] Rash     Creatinine   Date Value Ref Range Status   11/06/2018 0.8 0.5 - 1.4 mg/dL Final     Sodium   Date Value Ref Range Status   11/06/2018 144 136 - 145 mmol/L Final     Potassium   Date Value Ref Range Status   11/06/2018 4.2 3.5 - 5.1 mmol/L Final   ]  Patient verifies taking blood pressure medications on a regular basis at the same time of the day.     Current Outpatient Medications:     losartan (COZAAR) 50 MG tablet, take 1 tablet by mouth once daily, Disp: 90 tablet, Rfl: 3    NIFEdipine (PROCARDIA-XL) 60 MG (OSM) 24 hr tablet, Take 1 tablet (60 mg total) by mouth once daily., Disp: 30 tablet, Rfl: 11    metformin (GLUCOPHAGE) 1000 MG tablet, Take 1 tablet (1,000 mg total) by mouth 2 (two) times daily with meals. (Patient taking differently: Take 1,000 mg by mouth daily with breakfast. ), Disp: 180 tablet, Rfl: 0    pantoprazole (PROTONIX) 40 MG tablet, Take 1 tablet (40 mg total) by mouth once daily., Disp: 30 tablet, Rfl: 5    pravastatin (PRAVACHOL) 20 MG tablet, Take 1 tablet (20 mg total) by mouth nightly., Disp: 90 tablet, Rfl: 3  Does patient have record of home blood pressure readings yes. Readings have been averaging 107/60, 140/80.   Last dose of blood pressure medication was taken at 10:30 am.  Patient is asymptomatic.   Denies c/o headaches, dizziness, chest pains, sob, blurred vision, numbness or tingling to extremities.    BP: 120/80 , Pulse: 77 .    Dr. Jensen notified.

## 2019-04-18 RX ORDER — LOSARTAN POTASSIUM 50 MG/1
TABLET ORAL
Qty: 90 TABLET | Refills: 1 | Status: SHIPPED | OUTPATIENT
Start: 2019-04-18 | End: 2019-05-07

## 2019-05-07 ENCOUNTER — LAB VISIT (OUTPATIENT)
Dept: LAB | Facility: OTHER | Age: 84
End: 2019-05-07
Attending: INTERNAL MEDICINE
Payer: MEDICARE

## 2019-05-07 ENCOUNTER — TELEPHONE (OUTPATIENT)
Dept: INTERNAL MEDICINE | Facility: CLINIC | Age: 84
End: 2019-05-07

## 2019-05-07 ENCOUNTER — OFFICE VISIT (OUTPATIENT)
Dept: INTERNAL MEDICINE | Facility: CLINIC | Age: 84
End: 2019-05-07
Payer: MEDICARE

## 2019-05-07 VITALS
WEIGHT: 103.38 LBS | BODY MASS INDEX: 16.22 KG/M2 | SYSTOLIC BLOOD PRESSURE: 144 MMHG | DIASTOLIC BLOOD PRESSURE: 90 MMHG | OXYGEN SATURATION: 99 % | HEART RATE: 79 BPM | HEIGHT: 67 IN

## 2019-05-07 VITALS
HEIGHT: 70 IN | DIASTOLIC BLOOD PRESSURE: 90 MMHG | SYSTOLIC BLOOD PRESSURE: 144 MMHG | HEART RATE: 79 BPM | WEIGHT: 103.38 LBS | BODY MASS INDEX: 14.8 KG/M2

## 2019-05-07 DIAGNOSIS — E78.2 MIXED HYPERLIPIDEMIA: Chronic | ICD-10-CM

## 2019-05-07 DIAGNOSIS — R63.4 WEIGHT LOSS: ICD-10-CM

## 2019-05-07 DIAGNOSIS — I10 ESSENTIAL HYPERTENSION, BENIGN: Chronic | ICD-10-CM

## 2019-05-07 DIAGNOSIS — Z72.0 TOBACCO ABUSE: Chronic | ICD-10-CM

## 2019-05-07 DIAGNOSIS — Z78.9 ALCOHOL USE: Chronic | ICD-10-CM

## 2019-05-07 DIAGNOSIS — Z85.028 HISTORY OF GASTRIC CANCER: ICD-10-CM

## 2019-05-07 DIAGNOSIS — I70.0 ATHEROSCLEROSIS OF AORTA: ICD-10-CM

## 2019-05-07 DIAGNOSIS — R97.20 ELEVATED PSA: ICD-10-CM

## 2019-05-07 DIAGNOSIS — E11.3393 CONTROLLED TYPE 2 DIABETES MELLITUS WITH BOTH EYES AFFECTED BY MODERATE NONPROLIFERATIVE RETINOPATHY WITHOUT MACULAR EDEMA, WITHOUT LONG-TERM CURRENT USE OF INSULIN: ICD-10-CM

## 2019-05-07 DIAGNOSIS — M1A.09X0 IDIOPATHIC CHRONIC GOUT OF MULTIPLE SITES WITHOUT TOPHUS: ICD-10-CM

## 2019-05-07 DIAGNOSIS — E11.3393 CONTROLLED TYPE 2 DIABETES MELLITUS WITH BOTH EYES AFFECTED BY MODERATE NONPROLIFERATIVE RETINOPATHY WITHOUT MACULAR EDEMA, WITHOUT LONG-TERM CURRENT USE OF INSULIN: Primary | ICD-10-CM

## 2019-05-07 DIAGNOSIS — E11.319 DIABETIC RETINOPATHY ASSOCIATED WITH TYPE 2 DIABETES MELLITUS, MACULAR EDEMA PRESENCE UNSPECIFIED, UNSPECIFIED LATERALITY, UNSPECIFIED RETINOPATHY SEVERITY: ICD-10-CM

## 2019-05-07 DIAGNOSIS — Z00.00 ENCOUNTER FOR PREVENTIVE HEALTH EXAMINATION: Primary | ICD-10-CM

## 2019-05-07 LAB
ALBUMIN SERPL BCP-MCNC: 3.9 G/DL (ref 3.5–5.2)
ALP SERPL-CCNC: 79 U/L (ref 55–135)
ALT SERPL W/O P-5'-P-CCNC: 10 U/L (ref 10–44)
ANION GAP SERPL CALC-SCNC: 11 MMOL/L (ref 8–16)
AST SERPL-CCNC: 22 U/L (ref 10–40)
BASOPHILS # BLD AUTO: 0.02 K/UL (ref 0–0.2)
BASOPHILS NFR BLD: 0.5 % (ref 0–1.9)
BILIRUB SERPL-MCNC: 0.5 MG/DL (ref 0.1–1)
BUN SERPL-MCNC: 20 MG/DL (ref 8–23)
CALCIUM SERPL-MCNC: 10.1 MG/DL (ref 8.7–10.5)
CHLORIDE SERPL-SCNC: 108 MMOL/L (ref 95–110)
CHOLEST SERPL-MCNC: 172 MG/DL (ref 120–199)
CHOLEST/HDLC SERPL: 2.3 {RATIO} (ref 2–5)
CO2 SERPL-SCNC: 25 MMOL/L (ref 23–29)
CREAT SERPL-MCNC: 0.9 MG/DL (ref 0.5–1.4)
DIFFERENTIAL METHOD: ABNORMAL
EOSINOPHIL # BLD AUTO: 0.4 K/UL (ref 0–0.5)
EOSINOPHIL NFR BLD: 9.9 % (ref 0–8)
ERYTHROCYTE [DISTWIDTH] IN BLOOD BY AUTOMATED COUNT: 15.7 % (ref 11.5–14.5)
EST. GFR  (AFRICAN AMERICAN): >60 ML/MIN/1.73 M^2
EST. GFR  (NON AFRICAN AMERICAN): >60 ML/MIN/1.73 M^2
ESTIMATED AVG GLUCOSE: 151 MG/DL (ref 68–131)
GLUCOSE SERPL-MCNC: 118 MG/DL (ref 70–110)
HBA1C MFR BLD HPLC: 6.9 % (ref 4–5.6)
HCT VFR BLD AUTO: 36.6 % (ref 40–54)
HDLC SERPL-MCNC: 74 MG/DL (ref 40–75)
HDLC SERPL: 43 % (ref 20–50)
HGB BLD-MCNC: 12 G/DL (ref 14–18)
LDLC SERPL CALC-MCNC: 83.6 MG/DL (ref 63–159)
LYMPHOCYTES # BLD AUTO: 1.2 K/UL (ref 1–4.8)
LYMPHOCYTES NFR BLD: 32 % (ref 18–48)
MCH RBC QN AUTO: 25.8 PG (ref 27–31)
MCHC RBC AUTO-ENTMCNC: 32.8 G/DL (ref 32–36)
MCV RBC AUTO: 79 FL (ref 82–98)
MONOCYTES # BLD AUTO: 0.5 K/UL (ref 0.3–1)
MONOCYTES NFR BLD: 12 % (ref 4–15)
NEUTROPHILS # BLD AUTO: 1.7 K/UL (ref 1.8–7.7)
NEUTROPHILS NFR BLD: 45.3 % (ref 38–73)
NONHDLC SERPL-MCNC: 98 MG/DL
PLATELET # BLD AUTO: 215 K/UL (ref 150–350)
PMV BLD AUTO: 10.7 FL (ref 9.2–12.9)
POTASSIUM SERPL-SCNC: 3.5 MMOL/L (ref 3.5–5.1)
PROT SERPL-MCNC: 7.7 G/DL (ref 6–8.4)
RBC # BLD AUTO: 4.65 M/UL (ref 4.6–6.2)
SODIUM SERPL-SCNC: 144 MMOL/L (ref 136–145)
TRIGL SERPL-MCNC: 72 MG/DL (ref 30–150)
TSH SERPL DL<=0.005 MIU/L-ACNC: 1.7 UIU/ML (ref 0.4–4)
WBC # BLD AUTO: 3.84 K/UL (ref 3.9–12.7)

## 2019-05-07 PROCEDURE — 99999 PR PBB SHADOW E&M-EST. PATIENT-LVL III: ICD-10-PCS | Mod: PBBFAC,HCNC,, | Performed by: NURSE PRACTITIONER

## 2019-05-07 PROCEDURE — 3080F DIAST BP >= 90 MM HG: CPT | Mod: HCNC,CPTII,S$GLB, | Performed by: NURSE PRACTITIONER

## 2019-05-07 PROCEDURE — 80053 COMPREHEN METABOLIC PANEL: CPT | Mod: HCNC

## 2019-05-07 PROCEDURE — 1101F PT FALLS ASSESS-DOCD LE1/YR: CPT | Mod: HCNC,CPTII,S$GLB, | Performed by: INTERNAL MEDICINE

## 2019-05-07 PROCEDURE — 36415 COLL VENOUS BLD VENIPUNCTURE: CPT | Mod: HCNC

## 2019-05-07 PROCEDURE — 3077F PR MOST RECENT SYSTOLIC BLOOD PRESSURE >= 140 MM HG: ICD-10-PCS | Mod: HCNC,CPTII,S$GLB, | Performed by: NURSE PRACTITIONER

## 2019-05-07 PROCEDURE — 99999 PR PBB SHADOW E&M-EST. PATIENT-LVL III: CPT | Mod: PBBFAC,HCNC,, | Performed by: INTERNAL MEDICINE

## 2019-05-07 PROCEDURE — 99214 OFFICE O/P EST MOD 30 MIN: CPT | Mod: HCNC,S$GLB,, | Performed by: INTERNAL MEDICINE

## 2019-05-07 PROCEDURE — 99999 PR PBB SHADOW E&M-EST. PATIENT-LVL III: CPT | Mod: PBBFAC,HCNC,, | Performed by: NURSE PRACTITIONER

## 2019-05-07 PROCEDURE — 3077F SYST BP >= 140 MM HG: CPT | Mod: HCNC,CPTII,S$GLB, | Performed by: INTERNAL MEDICINE

## 2019-05-07 PROCEDURE — 99214 PR OFFICE/OUTPT VISIT, EST, LEVL IV, 30-39 MIN: ICD-10-PCS | Mod: HCNC,S$GLB,, | Performed by: INTERNAL MEDICINE

## 2019-05-07 PROCEDURE — 83036 HEMOGLOBIN GLYCOSYLATED A1C: CPT | Mod: HCNC

## 2019-05-07 PROCEDURE — G0439 PPPS, SUBSEQ VISIT: HCPCS | Mod: HCNC,S$GLB,, | Performed by: NURSE PRACTITIONER

## 2019-05-07 PROCEDURE — 3080F PR MOST RECENT DIASTOLIC BLOOD PRESSURE >= 90 MM HG: ICD-10-PCS | Mod: HCNC,CPTII,S$GLB, | Performed by: NURSE PRACTITIONER

## 2019-05-07 PROCEDURE — 3077F SYST BP >= 140 MM HG: CPT | Mod: HCNC,CPTII,S$GLB, | Performed by: NURSE PRACTITIONER

## 2019-05-07 PROCEDURE — 99499 RISK ADDL DX/OHS AUDIT: ICD-10-PCS | Mod: S$GLB,,, | Performed by: NURSE PRACTITIONER

## 2019-05-07 PROCEDURE — 85025 COMPLETE CBC W/AUTO DIFF WBC: CPT | Mod: HCNC

## 2019-05-07 PROCEDURE — 3077F PR MOST RECENT SYSTOLIC BLOOD PRESSURE >= 140 MM HG: ICD-10-PCS | Mod: HCNC,CPTII,S$GLB, | Performed by: INTERNAL MEDICINE

## 2019-05-07 PROCEDURE — 3080F DIAST BP >= 90 MM HG: CPT | Mod: HCNC,CPTII,S$GLB, | Performed by: INTERNAL MEDICINE

## 2019-05-07 PROCEDURE — G0439 PR MEDICARE ANNUAL WELLNESS SUBSEQUENT VISIT: ICD-10-PCS | Mod: HCNC,S$GLB,, | Performed by: NURSE PRACTITIONER

## 2019-05-07 PROCEDURE — 3080F PR MOST RECENT DIASTOLIC BLOOD PRESSURE >= 90 MM HG: ICD-10-PCS | Mod: HCNC,CPTII,S$GLB, | Performed by: INTERNAL MEDICINE

## 2019-05-07 PROCEDURE — 80061 LIPID PANEL: CPT | Mod: HCNC

## 2019-05-07 PROCEDURE — 84443 ASSAY THYROID STIM HORMONE: CPT | Mod: HCNC

## 2019-05-07 PROCEDURE — 99999 PR PBB SHADOW E&M-EST. PATIENT-LVL III: ICD-10-PCS | Mod: PBBFAC,HCNC,, | Performed by: INTERNAL MEDICINE

## 2019-05-07 PROCEDURE — 99499 UNLISTED E&M SERVICE: CPT | Mod: S$GLB,,, | Performed by: NURSE PRACTITIONER

## 2019-05-07 PROCEDURE — 1101F PR PT FALLS ASSESS DOC 0-1 FALLS W/OUT INJ PAST YR: ICD-10-PCS | Mod: HCNC,CPTII,S$GLB, | Performed by: INTERNAL MEDICINE

## 2019-05-07 RX ORDER — LOSARTAN POTASSIUM 100 MG/1
100 TABLET ORAL DAILY
Qty: 90 TABLET | Refills: 3 | Status: SHIPPED | OUTPATIENT
Start: 2019-05-07 | End: 2019-07-31 | Stop reason: SDUPTHER

## 2019-05-07 RX ORDER — METFORMIN HYDROCHLORIDE 1000 MG/1
1000 TABLET ORAL 2 TIMES DAILY WITH MEALS
Qty: 180 TABLET | Refills: 0 | Status: CANCELLED | OUTPATIENT
Start: 2019-05-07

## 2019-05-07 NOTE — PROGRESS NOTES
Subjective:       Patient ID: Julio Benites is a 84 y.o. male.    Chief Complaint: Diabetes    Pt here for f/u. Pt's BP is not well controlled. Tolerating meds well. Pt denies cp/sob/ha/vision or neuro changes. Home readings are also elevated.      DM2 is well controlled on metformin 1000mg daily but admits he has not taken the medication in almost a year. Last A1c was 6.9. He says FBS have been 120s or less. 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 No low sugar symptoms.     HLD is tx with pravastatin which he tolerates well. Aortic atherosclerosis was noted on CXR 2012. He is on appropriate risk mitigation meds and given lifestyle recommendations. This is stable.     He has seen heme-onc h/o gastric CA. He does have mild anemia that is not iron deficient and is being followed by them. Pt is not having any GI symptoms but has lost weight since I lost saw him. He reports appetite is not great. Denies abd pain/dysphagia/n/v/changes in BMs. Of note, he did have CT 10 mos ago that did not show any concerning problems related to h/o gastric cancer or his weight. He was to see GI to consider EGD/c-scope but did not do so.     Still smoking and has been to cessation program; not interested in further intervention at this time.       Gout has been quiescent without attacks for a while. Dietary discretion has helped.     He has h/o elevated PSA, last done in 2012 with value of 5.5. We discussed r/b of retesting and he opts not to do so which is not unreasonable.     Hypertension   Pertinent negatives include no chest pain, headaches or shortness of breath.   Diabetes   Pertinent negatives for hypoglycemia include no headaches. Pertinent negatives for diabetes include no chest pain and no polyuria.     Review of Systems   Constitutional: Negative for appetite change, fever and unexpected weight change.   Eyes: Negative for visual disturbance.   Respiratory: Negative for shortness of  breath.    Cardiovascular: Negative for chest pain.   Gastrointestinal: Negative for abdominal pain, blood in stool, constipation and diarrhea.   Endocrine: Negative for polyuria.   Genitourinary: Negative for difficulty urinating.   Neurological: Negative for light-headedness and headaches.   Psychiatric/Behavioral: Negative for dysphoric mood.       Objective:          Assessment:       1. 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 Diabetic retinopathy associated with type 2 diabetes mellitus, macular edema presence unspecified, unspecified laterality, unspecified retinopathy severity    3. Atherosclerosis of aorta    4. Essential hypertension, benign    5. Mixed hyperlipidemia    6. Elevated PSA    7. History of gastric cancer    8. Idiopathic chronic gout of multiple sites without tophus    9. Tobacco abuse    10. Weight loss        Plan:       1. Appropriate labs    2. Home BP and BS monitoring and f/u 2 wks nursing BP check  3. Keep f/u with specialists   4. GI referral; he understands importance of completing this  5. Increase losartan to 100mg daily  6. Will hold on restarting metformin until A1c returns; will restart if indicated  7. Home weight monitoring and let us know if losing anymore; I have asked him to supplement meals with glucerna shakes as well              Physical Exam   Constitutional: He is oriented to person, place, and time. He appears well-developed and well-nourished.   HENT:   Head: Normocephalic and atraumatic.   Eyes: Pupils are equal, round, and reactive to light. EOM are normal.   Neck: Neck supple. Carotid bruit is not present. No thyromegaly present.   Cardiovascular: Normal rate, regular rhythm, S1 normal, S2 normal and normal heart sounds.   No murmur heard.  Pulmonary/Chest: Effort normal and breath sounds normal. He has no wheezes.   Abdominal: Soft.  Bowel sounds are normal. He exhibits no mass. There is no hepatosplenomegaly. There is no tenderness.   Musculoskeletal: He exhibits no edema.   Lymphadenopathy:     He has no cervical adenopathy.   Neurological: He is alert and oriented to person, place, and time. No cranial nerve deficit.   Psychiatric: He has a normal mood and affect. His behavior is normal.

## 2019-05-07 NOTE — PROGRESS NOTES
"Julio Benites presented for a  Medicare AWV and comprehensive Health Risk Assessment today. The following components were reviewed and updated:    · Medical history  · Family History  · Social history  · Allergies and Current Medications  · Health Risk Assessment  · Health Maintenance  · Care Team     ** See Completed Assessments for Annual Wellness Visit within the encounter summary.**       The following assessments were completed:  · Living Situation  · CAGE  · Depression Screening  · Timed Get Up and Go  · Whisper Test  · Cognitive Function Screening  · Nutrition Screening  · ADL Screening  · PAQ Screening          Vitals:    05/07/19 0911   BP: (!) 144/90   Pulse: 79   SpO2: 99%   Weight: 46.9 kg (103 lb 6.3 oz)   Height: 5' 7" (1.702 m)     Body mass index is 16.19 kg/m².     Physical Exam   Constitutional: He is oriented to person, place, and time. He appears well-developed and well-nourished.   HENT:   Head: Normocephalic and atraumatic. Not macrocephalic and not microcephalic. Head is without raccoon's eyes, without Currie's sign, without abrasion, without contusion, without laceration, without right periorbital erythema and without left periorbital erythema. Hair is normal.   Right Ear: No lacerations. No drainage, swelling or tenderness. No foreign bodies. No mastoid tenderness. Tympanic membrane is not injected, not scarred, not perforated, not erythematous, not retracted and not bulging. Tympanic membrane mobility is normal. No middle ear effusion. No hemotympanum. No decreased hearing is noted.   Left Ear: No lacerations. No drainage, swelling or tenderness. No foreign bodies. No mastoid tenderness. Tympanic membrane is not injected, not scarred, not perforated, not erythematous, not retracted and not bulging. Tympanic membrane mobility is normal.  No middle ear effusion. No hemotympanum. No decreased hearing is noted.   Nose: Nose normal. No mucosal edema, rhinorrhea, nose lacerations, sinus " tenderness or nasal deformity.   Mouth/Throat: Uvula is midline.   Eyes: Conjunctivae and lids are normal. No scleral icterus.   Neck: Trachea normal. Neck supple. No spinous process tenderness and no muscular tenderness present. No neck rigidity. No edema, no erythema and normal range of motion present. No thyroid mass and no thyromegaly present.   Cardiovascular: Normal rate, regular rhythm, normal heart sounds and intact distal pulses. Exam reveals no gallop and no friction rub.   No murmur heard.  Pulmonary/Chest: Effort normal and breath sounds normal. No stridor. No respiratory distress. He has no wheezes. He has no rales. He exhibits no tenderness.   Abdominal: Soft. Bowel sounds are normal. He exhibits no distension.   Musculoskeletal: Normal range of motion.   Lymphadenopathy:        Head (right side): No submental, no submandibular, no tonsillar, no preauricular and no posterior auricular adenopathy present.        Head (left side): No submental, no submandibular, no tonsillar, no preauricular, no posterior auricular and no occipital adenopathy present.   Neurological: He is alert and oriented to person, place, and time. No cranial nerve deficit.   Skin: Skin is warm and dry.   Psychiatric: He has a normal mood and affect. His behavior is normal. Judgment and thought content normal.   Vitals reviewed.      Diagnoses and health risks identified today and associated recommendations/orders:    1. Encounter for preventive health examination  Annual Health Risk Assessment (HRA) visit today.  Counseling and referral of health maintenance and preventative health measures performed.  Patient given annual wellness paperwork to take home.  Encouraged to return in 1 year for subsequent HRA visit.     2. Atherosclerosis of aorta  Chronic. Stable. Noted on CXR from 2012. Continue current treatment plan as previously prescribed by PCP.    3. Mixed hyperlipidemia  Chronic. Stable. Continue current treatment plan as  previously prescribed by PCP.    4. Essential hypertension, benign  Chronic. Stable. Uncontrolled. Encouraged to increase exercise as tolerated (moderate-intensity aerobic activity and muscle-strengthening activities) improve diet to heart healthy, low sodium diet. Continue current treatment plan as previously prescribed by PCP.    5. 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 Stable. Last Hgb A1c=6.9 from 7/2/18. Continue current treatment plan as previously prescribed by PCP.    6. Diabetic retinopathy associated with type 2 diabetes mellitus, macular edema presence unspecified, unspecified laterality, unspecified retinopathy severity  Chronic. Stable. Continue current treatment plan as previously prescribed by PCP.    7. Elevated PSA  Chronic. Stable. Continue current treatment plan as previously prescribed by PCP.    8. History of gastric cancer  Chronic. Stable. Continue current treatment plan as previously prescribed by PCP.    9. Idiopathic chronic gout of multiple sites without tophus  Chronic. Stable. Continue current treatment plan as previously prescribed by PCP.    10. Tobacco abuse  Chronic. Stable. Continue current treatment plan as previously prescribed by PCP.    11. Alcohol use  Chronic. Stable. Continue current treatment plan as previously prescribed by PCP.        Provided Julio with a 5-10 year written screening schedule and personal prevention plan. Recommendations were developed using the USPSTF age appropriate recommendations. Education, counseling, and referrals were provided as needed. After Visit Summary printed and given to patient which includes a list of additional screenings\tests needed.    Follow up in about 1 year (around 5/7/2020).    Jefferson Luis NP  I offered to discuss end of life issues, including information on how to make advance directives  that the patient could use to name someone who would make medical decisions on their behalf if they became too ill to make themselves.    ___Patient declined  _X_Patient is interested, I provided paper work and offered to discuss.

## 2019-05-07 NOTE — PATIENT INSTRUCTIONS
Counseling and Referral of Other Preventative  (Italic type indicates deductible and co-insurance are waived)    Patient Name: Julio Benites  Today's Date: 5/7/2019    Health Maintenance       Date Due Completion Date    TETANUS VACCINE 10/25/1952 ---    Shingles Vaccine (1 of 2) 10/25/1984 ---    Hemoglobin A1c 01/02/2019 7/2/2018    Lipid Panel 07/02/2019 7/2/2018    Eye Exam 07/25/2019 7/25/2018    Influenza Vaccine 08/01/2019 11/6/2018        No orders of the defined types were placed in this encounter.    The following information is provided to all patients.  This information is to help you find resources for any of the problems found today that may be affecting your health:                Living healthy guide: www.[facility].[place].gov      Understanding Diabetes: www.diabetes.org      Eating healthy: www.cdc.gov/healthyweight      [facility] home safety checklist: www.cdc.gov/steadi/patient.html      Agency on Aging: www.goea.[place].[facility]      Alcoholics anonymous (AA): www.aa.org      Physical Activity: www.jerry.nih.gov/go6elqx      Tobacco use: www.quitwithusla.org

## 2019-06-24 DIAGNOSIS — M10.9 ACUTE GOUT, UNSPECIFIED CAUSE, UNSPECIFIED SITE: Primary | ICD-10-CM

## 2019-06-24 RX ORDER — COLCHICINE 0.6 MG/1
0.6 TABLET ORAL 2 TIMES DAILY PRN
Qty: 60 TABLET | Refills: 3 | Status: SHIPPED | OUTPATIENT
Start: 2019-06-24 | End: 2022-01-02 | Stop reason: SDUPTHER

## 2019-06-24 NOTE — TELEPHONE ENCOUNTER
Refill request. Medication pending authorization, routed to covering provider.   Pt in waiting room.  LOV 05/07/19

## 2019-07-24 ENCOUNTER — PATIENT OUTREACH (OUTPATIENT)
Dept: ADMINISTRATIVE | Facility: HOSPITAL | Age: 84
End: 2019-07-24

## 2019-07-24 DIAGNOSIS — E11.9 DIABETES MELLITUS WITHOUT COMPLICATION: ICD-10-CM

## 2019-07-24 DIAGNOSIS — F17.200 HEAVY TOBACCO SMOKER: Primary | ICD-10-CM

## 2019-07-31 ENCOUNTER — PATIENT OUTREACH (OUTPATIENT)
Dept: ADMINISTRATIVE | Facility: HOSPITAL | Age: 84
End: 2019-07-31

## 2019-07-31 DIAGNOSIS — E78.2 MIXED HYPERLIPIDEMIA: Primary | ICD-10-CM

## 2019-07-31 DIAGNOSIS — K21.9 GASTROESOPHAGEAL REFLUX DISEASE, ESOPHAGITIS PRESENCE NOT SPECIFIED: Primary | ICD-10-CM

## 2019-07-31 DIAGNOSIS — I10 ESSENTIAL HYPERTENSION, BENIGN: ICD-10-CM

## 2019-07-31 DIAGNOSIS — E78.2 MIXED HYPERLIPIDEMIA: ICD-10-CM

## 2019-07-31 RX ORDER — PANTOPRAZOLE SODIUM 40 MG/1
40 TABLET, DELAYED RELEASE ORAL DAILY
Qty: 30 TABLET | Refills: 2 | Status: SHIPPED | OUTPATIENT
Start: 2019-07-31 | End: 2019-10-15 | Stop reason: SDUPTHER

## 2019-07-31 RX ORDER — LOSARTAN POTASSIUM 100 MG/1
100 TABLET ORAL DAILY
Qty: 90 TABLET | Refills: 2 | Status: SHIPPED | OUTPATIENT
Start: 2019-07-31 | End: 2019-11-25 | Stop reason: SDUPTHER

## 2019-07-31 RX ORDER — NIFEDIPINE 60 MG/1
60 TABLET, EXTENDED RELEASE ORAL DAILY
Qty: 30 TABLET | Refills: 2 | Status: SHIPPED | OUTPATIENT
Start: 2019-07-31 | End: 2019-11-25 | Stop reason: SDUPTHER

## 2019-07-31 RX ORDER — PRAVASTATIN SODIUM 20 MG/1
20 TABLET ORAL NIGHTLY
Qty: 90 TABLET | Refills: 2 | Status: SHIPPED | OUTPATIENT
Start: 2019-07-31 | End: 2019-10-22 | Stop reason: SDUPTHER

## 2019-10-02 ENCOUNTER — NURSE TRIAGE (OUTPATIENT)
Dept: ADMINISTRATIVE | Facility: CLINIC | Age: 84
End: 2019-10-02

## 2019-10-02 NOTE — TELEPHONE ENCOUNTER
Please call pt at 2pm to check on him and his BP readings if we haven't heard from him by then. Ensure he has f/u appt with me as scheduled.

## 2019-10-02 NOTE — TELEPHONE ENCOUNTER
Reason for Disposition   Systolic BP >= 180 OR Diastolic >= 110, and missed most recent dose of blood pressure medication    Additional Information   Negative: Sounds like a life-threatening emergency to the triager   Negative: Pregnant > 20 weeks and new hand or face swelling   Negative: Pregnant > 20 weeks and BP > 140/90   Negative: Systolic BP >= 160 OR Diastolic >= 100, and any cardiac or neurologic symptoms (e.g., chest pain, difficulty breathing, unsteady gait, blurred vision)   Negative: Patient sounds very sick or weak to the triager   Negative: BP Systolic BP >= 140 OR Diastolic >= 90 and postpartum < 4 weeks    Protocols used: HIGH BLOOD PRESSURE-A-OH    Pt stated he is at the Dentist office and his BP is 204/116. Pt stated he didn't take any of his blood pressure medication this morning because he did not eat. Pt stated he is about to go home now and take his medication. Pt denies chest pain, difficulty breathing,unsteady gait or blurred vision. Pt advised to take medication as soon as possible and care advice provided per protocol. Pt advised Nurse with PCP's office will contact him for follow up per triage protocol. Pt verbalized understating. High priority message sent to PCP's office to follow up with Pt.

## 2019-10-02 NOTE — TELEPHONE ENCOUNTER
Spoke to pt in regards to how he is feeling since BP is so high. Pt states that he is feeling good. Pt states he is still asymptomatic and is currently at the pharmacy. Nurse advised pt to take BP meds as soon as he arrives home, and to check BP about 1 hr- 1 hr 1/2 after taking meds. Pt verbalized understanding and stated he would call office with BP reading to get further advise if needed. Pt has scheduled appt with MD on 10/22 to further discuss BP issue. Message routed to MD.

## 2019-10-03 ENCOUNTER — TELEPHONE (OUTPATIENT)
Dept: INTERNAL MEDICINE | Facility: CLINIC | Age: 84
End: 2019-10-03

## 2019-10-03 NOTE — TELEPHONE ENCOUNTER
Pt returned call to office. Pt states BP WNL, last BP reading 122/71. Pt states he also needs a clearance for dental procedure on 10/16/19. Pt appt scheduled 10/22/19. Message routed to MD

## 2019-10-10 ENCOUNTER — TELEPHONE (OUTPATIENT)
Dept: INTERNAL MEDICINE | Facility: CLINIC | Age: 84
End: 2019-10-10

## 2019-10-10 NOTE — TELEPHONE ENCOUNTER
LVM for patient to get on the nurse schedule for a blood pressure check to approve his dental exam.

## 2019-10-15 DIAGNOSIS — K21.9 GASTROESOPHAGEAL REFLUX DISEASE, ESOPHAGITIS PRESENCE NOT SPECIFIED: ICD-10-CM

## 2019-10-15 RX ORDER — PANTOPRAZOLE SODIUM 40 MG/1
TABLET, DELAYED RELEASE ORAL
Qty: 90 TABLET | Refills: 2 | Status: SHIPPED | OUTPATIENT
Start: 2019-10-15 | End: 2019-11-15 | Stop reason: SDUPTHER

## 2019-10-22 ENCOUNTER — OFFICE VISIT (OUTPATIENT)
Dept: INTERNAL MEDICINE | Facility: CLINIC | Age: 84
End: 2019-10-22
Payer: MEDICARE

## 2019-10-22 ENCOUNTER — LAB VISIT (OUTPATIENT)
Dept: LAB | Facility: OTHER | Age: 84
End: 2019-10-22
Attending: INTERNAL MEDICINE
Payer: MEDICARE

## 2019-10-22 VITALS
SYSTOLIC BLOOD PRESSURE: 138 MMHG | WEIGHT: 102.31 LBS | DIASTOLIC BLOOD PRESSURE: 82 MMHG | OXYGEN SATURATION: 98 % | HEART RATE: 86 BPM | BODY MASS INDEX: 16.02 KG/M2

## 2019-10-22 DIAGNOSIS — E11.319 DIABETIC RETINOPATHY ASSOCIATED WITH TYPE 2 DIABETES MELLITUS, MACULAR EDEMA PRESENCE UNSPECIFIED, UNSPECIFIED LATERALITY, UNSPECIFIED RETINOPATHY SEVERITY: ICD-10-CM

## 2019-10-22 DIAGNOSIS — Z85.028 HISTORY OF GASTRIC CANCER: ICD-10-CM

## 2019-10-22 DIAGNOSIS — I70.0 ATHEROSCLEROSIS OF AORTA: ICD-10-CM

## 2019-10-22 DIAGNOSIS — I10 ESSENTIAL HYPERTENSION, BENIGN: Primary | Chronic | ICD-10-CM

## 2019-10-22 DIAGNOSIS — I10 ESSENTIAL HYPERTENSION, BENIGN: Chronic | ICD-10-CM

## 2019-10-22 DIAGNOSIS — E78.2 MIXED HYPERLIPIDEMIA: Chronic | ICD-10-CM

## 2019-10-22 LAB
ALBUMIN SERPL BCP-MCNC: 3.8 G/DL (ref 3.5–5.2)
ALP SERPL-CCNC: 79 U/L (ref 55–135)
ALT SERPL W/O P-5'-P-CCNC: 10 U/L (ref 10–44)
ANION GAP SERPL CALC-SCNC: 12 MMOL/L (ref 8–16)
AST SERPL-CCNC: 19 U/L (ref 10–40)
BASOPHILS # BLD AUTO: 0.03 K/UL (ref 0–0.2)
BASOPHILS NFR BLD: 0.6 % (ref 0–1.9)
BILIRUB SERPL-MCNC: 0.4 MG/DL (ref 0.1–1)
BUN SERPL-MCNC: 20 MG/DL (ref 8–23)
CALCIUM SERPL-MCNC: 10 MG/DL (ref 8.7–10.5)
CHLORIDE SERPL-SCNC: 108 MMOL/L (ref 95–110)
CO2 SERPL-SCNC: 25 MMOL/L (ref 23–29)
CREAT SERPL-MCNC: 0.9 MG/DL (ref 0.5–1.4)
DIFFERENTIAL METHOD: ABNORMAL
EOSINOPHIL # BLD AUTO: 0.5 K/UL (ref 0–0.5)
EOSINOPHIL NFR BLD: 9.6 % (ref 0–8)
ERYTHROCYTE [DISTWIDTH] IN BLOOD BY AUTOMATED COUNT: 15 % (ref 11.5–14.5)
EST. GFR  (AFRICAN AMERICAN): >60 ML/MIN/1.73 M^2
EST. GFR  (NON AFRICAN AMERICAN): >60 ML/MIN/1.73 M^2
ESTIMATED AVG GLUCOSE: 154 MG/DL (ref 68–131)
GLUCOSE SERPL-MCNC: 123 MG/DL (ref 70–110)
HBA1C MFR BLD HPLC: 7 % (ref 4–5.6)
HCT VFR BLD AUTO: 38 % (ref 40–54)
HGB BLD-MCNC: 12 G/DL (ref 14–18)
IMM GRANULOCYTES # BLD AUTO: 0.03 K/UL (ref 0–0.04)
IMM GRANULOCYTES NFR BLD AUTO: 0.6 % (ref 0–0.5)
LYMPHOCYTES # BLD AUTO: 1.2 K/UL (ref 1–4.8)
LYMPHOCYTES NFR BLD: 25.4 % (ref 18–48)
MCH RBC QN AUTO: 25.7 PG (ref 27–31)
MCHC RBC AUTO-ENTMCNC: 31.6 G/DL (ref 32–36)
MCV RBC AUTO: 81 FL (ref 82–98)
MONOCYTES # BLD AUTO: 0.6 K/UL (ref 0.3–1)
MONOCYTES NFR BLD: 12.9 % (ref 4–15)
NEUTROPHILS # BLD AUTO: 2.4 K/UL (ref 1.8–7.7)
NEUTROPHILS NFR BLD: 50.9 % (ref 38–73)
NRBC BLD-RTO: 0 /100 WBC
PLATELET # BLD AUTO: 215 K/UL (ref 150–350)
PMV BLD AUTO: 11.4 FL (ref 9.2–12.9)
POTASSIUM SERPL-SCNC: 3.8 MMOL/L (ref 3.5–5.1)
PROT SERPL-MCNC: 7.8 G/DL (ref 6–8.4)
RBC # BLD AUTO: 4.67 M/UL (ref 4.6–6.2)
SODIUM SERPL-SCNC: 145 MMOL/L (ref 136–145)
WBC # BLD AUTO: 4.8 K/UL (ref 3.9–12.7)

## 2019-10-22 PROCEDURE — 80053 COMPREHEN METABOLIC PANEL: CPT

## 2019-10-22 PROCEDURE — 99999 PR PBB SHADOW E&M-EST. PATIENT-LVL III: CPT | Mod: PBBFAC,,, | Performed by: INTERNAL MEDICINE

## 2019-10-22 PROCEDURE — 36415 COLL VENOUS BLD VENIPUNCTURE: CPT

## 2019-10-22 PROCEDURE — 85025 COMPLETE CBC W/AUTO DIFF WBC: CPT

## 2019-10-22 PROCEDURE — 99999 PR PBB SHADOW E&M-EST. PATIENT-LVL III: ICD-10-PCS | Mod: PBBFAC,,, | Performed by: INTERNAL MEDICINE

## 2019-10-22 PROCEDURE — 99214 OFFICE O/P EST MOD 30 MIN: CPT | Mod: S$GLB,,, | Performed by: INTERNAL MEDICINE

## 2019-10-22 PROCEDURE — 83036 HEMOGLOBIN GLYCOSYLATED A1C: CPT

## 2019-10-22 PROCEDURE — 99214 PR OFFICE/OUTPT VISIT, EST, LEVL IV, 30-39 MIN: ICD-10-PCS | Mod: S$GLB,,, | Performed by: INTERNAL MEDICINE

## 2019-10-22 PROCEDURE — 99213 OFFICE O/P EST LOW 20 MIN: CPT | Mod: PBBFAC | Performed by: INTERNAL MEDICINE

## 2019-10-22 RX ORDER — PRAVASTATIN SODIUM 20 MG/1
20 TABLET ORAL NIGHTLY
Qty: 90 TABLET | Refills: 3 | Status: SHIPPED | OUTPATIENT
Start: 2019-10-22 | End: 2020-01-03 | Stop reason: SDUPTHER

## 2019-10-22 NOTE — PROGRESS NOTES
Subjective:       Patient ID: Julio Benites is a 84 y.o. male.    Chief Complaint: Diabetes    Pt here for f/u. Pt's BP is well controlled. Tolerating meds well. Pt denies cp/sob/ha/vision or neuro changes. Home readings are controlled.      DM2 is well controlled with diet. Was previously on metformin but was stopped as A1c was at goal. Last A1c was 6.9. He is not checking sugars. 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 No low sugar symptoms.     HLD is tx with pravastatin which he tolerates well. Aortic atherosclerosis was noted on CXR 2012. He is on appropriate risk mitigation meds and given lifestyle recommendations. This is stable.     He has seen heme-onc h/o gastric CA. He does have mild anemia that is not iron deficient and is being followed by them. Pt is not having any GI symptoms. His weight is low but is stable and has not lost weight in a year. He reports appetite is good. Denies abd pain/dysphagia/n/v/changes in BMs. Oncology said he did not need further f/u with them. He saw GI for EGD/c-scope but concerning issues found except a few small colon polyps and a few small non-bleeding nodules in stomach (was biopsied but path not available to me--will be requested).     Still smoking and has been to cessation program; not interested in further intervention at this time.       Gout has been quiescent without attacks for a while. Dietary discretion has helped.     He has h/o elevated PSA, last done in 2012 with value of 5.5. We discussed r/b of retesting and he opts not to do so which is not unreasonable.     Hypertension   Pertinent negatives include no chest pain, headaches or shortness of breath.   Diabetes   Pertinent negatives for hypoglycemia include no headaches. Pertinent negatives for diabetes include no chest pain and no polyuria.     Review of Systems   Constitutional: Negative for appetite change, fever and unexpected weight change.   Eyes: Negative for  visual disturbance.   Respiratory: Negative for shortness of breath.    Cardiovascular: Negative for chest pain.   Gastrointestinal: Negative for abdominal pain, blood in stool, constipation and diarrhea.   Endocrine: Negative for polyuria.   Genitourinary: Negative for difficulty urinating.   Neurological: Negative for light-headedness and headaches.   Psychiatric/Behavioral: Negative for dysphoric mood.       Objective:          Assessment:       1. Essential hypertension, benign    2. Atherosclerosis of aorta    3. Diabetic retinopathy associated with type 2 diabetes mellitus, macular edema presence unspecified, unspecified laterality, unspecified retinopathy severity    4. Mixed hyperlipidemia    5. History of gastric cancer        Plan:       1. Appropriate labs    2. Home BP and BS monitoring   3. Keep f/u with specialists   4. Will resume metformin if indicated pending A1c  5. Home weight monitoring and let us know if losing wt  6. Request pathology reports from EGD/c-scope              Physical Exam   Constitutional: He is oriented to person, place, and time. He appears well-developed and well-nourished.   HENT:   Head: Normocephalic and atraumatic.   Eyes: Pupils are equal, round, and reactive to light. EOM are normal.   Neck: Neck supple. Carotid bruit is not present. No thyromegaly present.   Cardiovascular: Normal rate, regular rhythm, S1 normal, S2 normal and normal heart sounds.   No murmur heard.  Pulmonary/Chest: Effort normal and breath sounds normal. He has no wheezes.   Abdominal: Soft. Bowel sounds are normal. He exhibits no mass. There is no hepatosplenomegaly. There is no tenderness.   Musculoskeletal: He exhibits no edema.   Lymphadenopathy:     He has no cervical adenopathy.   Neurological: He is alert and oriented to person, place, and time. No cranial nerve deficit.   Psychiatric: He has a normal mood and affect. His behavior is normal.

## 2019-10-23 ENCOUNTER — IMMUNIZATION (OUTPATIENT)
Dept: PHARMACY | Facility: CLINIC | Age: 84
End: 2019-10-23
Payer: COMMERCIAL

## 2019-10-23 ENCOUNTER — TELEPHONE (OUTPATIENT)
Dept: INTERNAL MEDICINE | Facility: CLINIC | Age: 84
End: 2019-10-23

## 2019-10-23 RX ORDER — METFORMIN HYDROCHLORIDE 500 MG/1
500 TABLET ORAL
Qty: 90 TABLET | Refills: 1 | Status: SHIPPED | OUTPATIENT
Start: 2019-10-23 | End: 2020-01-03 | Stop reason: SDUPTHER

## 2019-10-23 NOTE — TELEPHONE ENCOUNTER
Inform pt that labs indicate blood sugars have increased some. Therefore, I'd like him to restart metformin at 500mg once daily and I'll send to pharmacy.

## 2019-11-15 DIAGNOSIS — K21.9 GASTROESOPHAGEAL REFLUX DISEASE, ESOPHAGITIS PRESENCE NOT SPECIFIED: ICD-10-CM

## 2019-11-15 RX ORDER — PANTOPRAZOLE SODIUM 40 MG/1
TABLET, DELAYED RELEASE ORAL
Qty: 30 TABLET | Refills: 0 | Status: SHIPPED | OUTPATIENT
Start: 2019-11-15 | End: 2019-11-25 | Stop reason: SDUPTHER

## 2019-11-16 ENCOUNTER — PATIENT OUTREACH (OUTPATIENT)
Dept: ADMINISTRATIVE | Facility: OTHER | Age: 84
End: 2019-11-16

## 2019-11-25 DIAGNOSIS — E78.2 MIXED HYPERLIPIDEMIA: Chronic | ICD-10-CM

## 2019-11-25 DIAGNOSIS — I10 ESSENTIAL HYPERTENSION, BENIGN: ICD-10-CM

## 2019-11-25 DIAGNOSIS — K21.9 GASTROESOPHAGEAL REFLUX DISEASE, ESOPHAGITIS PRESENCE NOT SPECIFIED: ICD-10-CM

## 2019-11-25 RX ORDER — NIFEDIPINE 60 MG/1
60 TABLET, EXTENDED RELEASE ORAL DAILY
Qty: 90 TABLET | Refills: 3 | Status: SHIPPED | OUTPATIENT
Start: 2019-11-25 | End: 2020-01-03 | Stop reason: SDUPTHER

## 2019-11-25 RX ORDER — PANTOPRAZOLE SODIUM 40 MG/1
TABLET, DELAYED RELEASE ORAL
Qty: 90 TABLET | Refills: 3 | Status: SHIPPED | OUTPATIENT
Start: 2019-11-25 | End: 2020-01-03 | Stop reason: SDUPTHER

## 2019-11-25 RX ORDER — LOSARTAN POTASSIUM 100 MG/1
100 TABLET ORAL DAILY
Qty: 90 TABLET | Refills: 3 | Status: SHIPPED | OUTPATIENT
Start: 2019-11-25 | End: 2020-01-03 | Stop reason: SDUPTHER

## 2019-12-03 ENCOUNTER — TELEPHONE (OUTPATIENT)
Dept: INTERNAL MEDICINE | Facility: CLINIC | Age: 84
End: 2019-12-03

## 2019-12-03 ENCOUNTER — OFFICE VISIT (OUTPATIENT)
Dept: INTERNAL MEDICINE | Facility: CLINIC | Age: 84
End: 2019-12-03
Attending: INTERNAL MEDICINE
Payer: MEDICARE

## 2019-12-03 ENCOUNTER — HOSPITAL ENCOUNTER (OUTPATIENT)
Dept: RADIOLOGY | Facility: OTHER | Age: 84
Discharge: HOME | End: 2019-12-03
Attending: INTERNAL MEDICINE
Payer: MEDICARE

## 2019-12-03 VITALS
OXYGEN SATURATION: 95 % | SYSTOLIC BLOOD PRESSURE: 132 MMHG | DIASTOLIC BLOOD PRESSURE: 84 MMHG | BODY MASS INDEX: 14.27 KG/M2 | WEIGHT: 96.31 LBS | HEIGHT: 69 IN | HEART RATE: 105 BPM

## 2019-12-03 DIAGNOSIS — J06.9 UPPER RESPIRATORY TRACT INFECTION, UNSPECIFIED TYPE: Primary | ICD-10-CM

## 2019-12-03 DIAGNOSIS — J06.9 UPPER RESPIRATORY TRACT INFECTION, UNSPECIFIED TYPE: ICD-10-CM

## 2019-12-03 DIAGNOSIS — J40 BRONCHITIS: ICD-10-CM

## 2019-12-03 PROCEDURE — 1159F MED LIST DOCD IN RCRD: CPT | Mod: S$GLB,,, | Performed by: INTERNAL MEDICINE

## 2019-12-03 PROCEDURE — 1101F PR PT FALLS ASSESS DOC 0-1 FALLS W/OUT INJ PAST YR: ICD-10-PCS | Mod: CPTII,S$GLB,, | Performed by: INTERNAL MEDICINE

## 2019-12-03 PROCEDURE — 99999 PR PBB SHADOW E&M-EST. PATIENT-LVL III: ICD-10-PCS | Mod: PBBFAC,,, | Performed by: INTERNAL MEDICINE

## 2019-12-03 PROCEDURE — 1126F AMNT PAIN NOTED NONE PRSNT: CPT | Mod: S$GLB,,, | Performed by: INTERNAL MEDICINE

## 2019-12-03 PROCEDURE — 99214 OFFICE O/P EST MOD 30 MIN: CPT | Mod: S$GLB,,, | Performed by: INTERNAL MEDICINE

## 2019-12-03 PROCEDURE — 71046 X-RAY EXAM CHEST 2 VIEWS: CPT | Mod: TC,FY

## 2019-12-03 PROCEDURE — 99999 PR PBB SHADOW E&M-EST. PATIENT-LVL III: CPT | Mod: PBBFAC,,, | Performed by: INTERNAL MEDICINE

## 2019-12-03 PROCEDURE — 3075F PR MOST RECENT SYSTOLIC BLOOD PRESS GE 130-139MM HG: ICD-10-PCS | Mod: CPTII,S$GLB,, | Performed by: INTERNAL MEDICINE

## 2019-12-03 PROCEDURE — 1101F PT FALLS ASSESS-DOCD LE1/YR: CPT | Mod: CPTII,S$GLB,, | Performed by: INTERNAL MEDICINE

## 2019-12-03 PROCEDURE — 71046 XR CHEST PA AND LATERAL: ICD-10-PCS | Mod: 26,,, | Performed by: RADIOLOGY

## 2019-12-03 PROCEDURE — 3079F PR MOST RECENT DIASTOLIC BLOOD PRESSURE 80-89 MM HG: ICD-10-PCS | Mod: CPTII,S$GLB,, | Performed by: INTERNAL MEDICINE

## 2019-12-03 PROCEDURE — 3079F DIAST BP 80-89 MM HG: CPT | Mod: CPTII,S$GLB,, | Performed by: INTERNAL MEDICINE

## 2019-12-03 PROCEDURE — 1159F PR MEDICATION LIST DOCUMENTED IN MEDICAL RECORD: ICD-10-PCS | Mod: S$GLB,,, | Performed by: INTERNAL MEDICINE

## 2019-12-03 PROCEDURE — 99214 PR OFFICE/OUTPT VISIT, EST, LEVL IV, 30-39 MIN: ICD-10-PCS | Mod: S$GLB,,, | Performed by: INTERNAL MEDICINE

## 2019-12-03 PROCEDURE — 71046 X-RAY EXAM CHEST 2 VIEWS: CPT | Mod: 26,,, | Performed by: RADIOLOGY

## 2019-12-03 PROCEDURE — 1126F PR PAIN SEVERITY QUANTIFIED, NO PAIN PRESENT: ICD-10-PCS | Mod: S$GLB,,, | Performed by: INTERNAL MEDICINE

## 2019-12-03 PROCEDURE — 3075F SYST BP GE 130 - 139MM HG: CPT | Mod: CPTII,S$GLB,, | Performed by: INTERNAL MEDICINE

## 2019-12-03 RX ORDER — ALBUTEROL SULFATE 90 UG/1
2 AEROSOL, METERED RESPIRATORY (INHALATION) EVERY 6 HOURS PRN
Qty: 1 EACH | Refills: 0 | Status: SHIPPED | OUTPATIENT
Start: 2019-12-03 | End: 2021-09-29

## 2019-12-03 RX ORDER — AZITHROMYCIN 250 MG/1
TABLET, FILM COATED ORAL
Qty: 6 TABLET | Refills: 0 | Status: SHIPPED | OUTPATIENT
Start: 2019-12-03 | End: 2019-12-08

## 2019-12-03 NOTE — PROGRESS NOTES
"Subjective:       Patient ID: Julio Benites is a 85 y.o. male.    Chief Complaint: Shortness of Breath; Cough; and Nasal Congestion    Here for urgent visit  Pt normally cared for by my colleague Dr. Jensen and patient is new to me. I have reviewed patient's past medical, surgical, and social history in addition to MAR and allergies.     84 yo M with PMHx of gastric CA, HTN, DM, tobacco abuse, etoh abuse, HLD, mild anemia, gout, GERD presents with CC of cough and congestion.     2 weeks prior developed cough that became more and more productive. BARON 1-2 blocks at baseline down to 25ft. + active cigarette smoker. He denies nasal congestion, rhinorrhea, sore throat, F/C, wheezing, CP, pleuritic CP, N/V, myalgias, BRBPR, melena.       Review of Systems   Constitutional: Negative for appetite change, chills, fever and unexpected weight change.   HENT: Negative for hearing loss, sore throat and trouble swallowing.    Eyes: Negative for visual disturbance.   Respiratory: Positive for cough and shortness of breath. Negative for chest tightness.    Cardiovascular: Negative for chest pain and leg swelling.   Gastrointestinal: Negative for abdominal pain, blood in stool, constipation, diarrhea, nausea and vomiting.   Endocrine: Negative for polydipsia and polyuria.   Genitourinary: Negative for decreased urine volume, difficulty urinating, dysuria, frequency and urgency.   Musculoskeletal: Negative for gait problem.   Skin: Negative for rash.   Neurological: Negative for dizziness and numbness.   Psychiatric/Behavioral: The patient is not nervous/anxious.        Objective:      Vitals:    12/03/19 1258   BP: 132/84   Pulse: 105   SpO2: 95%   Weight: 43.7 kg (96 lb 5.5 oz)   Height: 5' 9" (1.753 m)      Physical Exam   Constitutional: He is oriented to person, place, and time. He appears well-developed and well-nourished. No distress.   HENT:   Head: Normocephalic and atraumatic.   Mouth/Throat: Oropharynx is clear and " moist. No oropharyngeal exudate.   Eyes: Pupils are equal, round, and reactive to light. Conjunctivae and EOM are normal. No scleral icterus.   Neck: No thyromegaly present.   Cardiovascular: Normal rate, regular rhythm and normal heart sounds.   No murmur heard.  Pulmonary/Chest: Effort normal and breath sounds normal. He has no wheezes. He has no rales.   Abdominal: Soft. He exhibits no distension. There is no tenderness.   Musculoskeletal: He exhibits no edema or tenderness.   Lymphadenopathy:     He has no cervical adenopathy.   Neurological: He is alert and oriented to person, place, and time.   Skin: Skin is warm and dry.   Psychiatric: He has a normal mood and affect. His behavior is normal.       Assessment:       1. Upper respiratory tract infection, unspecified type    2. Bronchitis        Plan:       Julio was seen today for shortness of breath, cough and nasal congestion.    Diagnoses and all orders for this visit:    Upper respiratory tract infection, unspecified type   Duration, lack of constellation of symptoms, and co morbidities buy him Abx regardless of CXR results  -     X-Ray Chest PA And Lateral; Future   Office and Emergency Department prompts discussed.    Bronchitis  -     albuterol (PROVENTIL/VENTOLIN HFA) 90 mcg/actuation inhaler; Inhale 2 puffs into the lungs every 6 (six) hours as needed for Wheezing.  -     azithromycin (Z-NOMI) 250 MG tablet; Take 2 tablets by mouth on day 1; Take 1 tablet by mouth on days 2-5           Oscar Kee MD  [facility]        Side effects of medication(s) were discussed in detail and patient voiced understanding.  Patient will call back for any issues or complications.

## 2019-12-04 NOTE — TELEPHONE ENCOUNTER
Attempted to call Mr. Benites to inform him that his CXR is normal and to RTC if symptoms persist or worsen despite Abx, but no answer.  LVM to call the office.

## 2020-01-03 ENCOUNTER — OFFICE VISIT (OUTPATIENT)
Dept: INTERNAL MEDICINE | Facility: CLINIC | Age: 85
End: 2020-01-03
Payer: MEDICARE

## 2020-01-03 VITALS
BODY MASS INDEX: 14.63 KG/M2 | SYSTOLIC BLOOD PRESSURE: 178 MMHG | HEIGHT: 69 IN | DIASTOLIC BLOOD PRESSURE: 110 MMHG | OXYGEN SATURATION: 99 % | HEART RATE: 96 BPM | WEIGHT: 98.75 LBS

## 2020-01-03 DIAGNOSIS — R63.0 DECREASED APPETITE: ICD-10-CM

## 2020-01-03 DIAGNOSIS — E78.2 MIXED HYPERLIPIDEMIA: Chronic | ICD-10-CM

## 2020-01-03 DIAGNOSIS — K59.00 CONSTIPATION, UNSPECIFIED CONSTIPATION TYPE: Primary | ICD-10-CM

## 2020-01-03 DIAGNOSIS — I10 ESSENTIAL HYPERTENSION, BENIGN: ICD-10-CM

## 2020-01-03 DIAGNOSIS — K21.9 GASTROESOPHAGEAL REFLUX DISEASE, ESOPHAGITIS PRESENCE NOT SPECIFIED: ICD-10-CM

## 2020-01-03 PROCEDURE — 3077F PR MOST RECENT SYSTOLIC BLOOD PRESSURE >= 140 MM HG: ICD-10-PCS | Mod: HCNC,CPTII,S$GLB, | Performed by: INTERNAL MEDICINE

## 2020-01-03 PROCEDURE — 99214 OFFICE O/P EST MOD 30 MIN: CPT | Mod: HCNC,S$GLB,, | Performed by: INTERNAL MEDICINE

## 2020-01-03 PROCEDURE — 3080F PR MOST RECENT DIASTOLIC BLOOD PRESSURE >= 90 MM HG: ICD-10-PCS | Mod: HCNC,CPTII,S$GLB, | Performed by: INTERNAL MEDICINE

## 2020-01-03 PROCEDURE — 99999 PR PBB SHADOW E&M-EST. PATIENT-LVL III: ICD-10-PCS | Mod: PBBFAC,HCNC,, | Performed by: INTERNAL MEDICINE

## 2020-01-03 PROCEDURE — 3077F SYST BP >= 140 MM HG: CPT | Mod: HCNC,CPTII,S$GLB, | Performed by: INTERNAL MEDICINE

## 2020-01-03 PROCEDURE — 99214 PR OFFICE/OUTPT VISIT, EST, LEVL IV, 30-39 MIN: ICD-10-PCS | Mod: HCNC,S$GLB,, | Performed by: INTERNAL MEDICINE

## 2020-01-03 PROCEDURE — 1159F MED LIST DOCD IN RCRD: CPT | Mod: HCNC,S$GLB,, | Performed by: INTERNAL MEDICINE

## 2020-01-03 PROCEDURE — 1101F PR PT FALLS ASSESS DOC 0-1 FALLS W/OUT INJ PAST YR: ICD-10-PCS | Mod: HCNC,CPTII,S$GLB, | Performed by: INTERNAL MEDICINE

## 2020-01-03 PROCEDURE — 1101F PT FALLS ASSESS-DOCD LE1/YR: CPT | Mod: HCNC,CPTII,S$GLB, | Performed by: INTERNAL MEDICINE

## 2020-01-03 PROCEDURE — 99999 PR PBB SHADOW E&M-EST. PATIENT-LVL III: CPT | Mod: PBBFAC,HCNC,, | Performed by: INTERNAL MEDICINE

## 2020-01-03 PROCEDURE — 3080F DIAST BP >= 90 MM HG: CPT | Mod: HCNC,CPTII,S$GLB, | Performed by: INTERNAL MEDICINE

## 2020-01-03 PROCEDURE — 1126F PR PAIN SEVERITY QUANTIFIED, NO PAIN PRESENT: ICD-10-PCS | Mod: HCNC,S$GLB,, | Performed by: INTERNAL MEDICINE

## 2020-01-03 PROCEDURE — 1126F AMNT PAIN NOTED NONE PRSNT: CPT | Mod: HCNC,S$GLB,, | Performed by: INTERNAL MEDICINE

## 2020-01-03 PROCEDURE — 1159F PR MEDICATION LIST DOCUMENTED IN MEDICAL RECORD: ICD-10-PCS | Mod: HCNC,S$GLB,, | Performed by: INTERNAL MEDICINE

## 2020-01-03 RX ORDER — LOSARTAN POTASSIUM 100 MG/1
100 TABLET ORAL DAILY
Qty: 90 TABLET | Refills: 3 | Status: SHIPPED | OUTPATIENT
Start: 2020-01-03 | End: 2021-01-12

## 2020-01-03 RX ORDER — NIFEDIPINE 60 MG/1
60 TABLET, EXTENDED RELEASE ORAL DAILY
Qty: 90 TABLET | Refills: 3 | Status: SHIPPED | OUTPATIENT
Start: 2020-01-03 | End: 2020-01-17

## 2020-01-03 RX ORDER — METFORMIN HYDROCHLORIDE 500 MG/1
500 TABLET ORAL
Qty: 90 TABLET | Refills: 1 | Status: SHIPPED | OUTPATIENT
Start: 2020-01-03 | End: 2020-01-03

## 2020-01-03 RX ORDER — PRAVASTATIN SODIUM 20 MG/1
20 TABLET ORAL NIGHTLY
Qty: 90 TABLET | Refills: 3 | Status: SHIPPED | OUTPATIENT
Start: 2020-01-03 | End: 2020-05-29 | Stop reason: SDUPTHER

## 2020-01-03 RX ORDER — PANTOPRAZOLE SODIUM 40 MG/1
TABLET, DELAYED RELEASE ORAL
Qty: 90 TABLET | Refills: 3 | Status: SHIPPED | OUTPATIENT
Start: 2020-01-03 | End: 2021-01-12

## 2020-01-03 NOTE — PROGRESS NOTES
Subjective:       Patient ID: Julio Benites is a 85 y.o. male.    Chief Complaint: Constipation and Anorexia    Pt was having decreased appetite for a couple of weeks but it improved in the last week and is eating more. He denies any abd pain/n/v. He has had some constipation but has attributed to not eating as much. No blood in stool. He has lost 5lbs in the last month b/c of decr appetite but is feeling better. He saw my partner 4 wks ago for SOB on exertion but this has improved. Denies chest pain.      Pt's BP is not well controlled. Tolerating meds well but ran out a month and has had insurance issues which he worked out and will be getting meds today. Pt denies cp/sob/ha/vision or neuro changes. Home readings are also high.    He is most concerned about getting his meds refilled now that his insurance is straightened out and requests paper Rx.       Review of Systems   Constitutional: Negative for fatigue and fever.   Eyes: Negative for visual disturbance.   Respiratory: Negative for shortness of breath.    Cardiovascular: Negative for chest pain.   Gastrointestinal: Positive for constipation. Negative for abdominal pain, blood in stool, nausea and vomiting.   Neurological: Negative for headaches.   Psychiatric/Behavioral: Negative for dysphoric mood.       Objective:      Physical Exam   Constitutional: He is oriented to person, place, and time. He appears well-developed and well-nourished.   Eyes: Pupils are equal, round, and reactive to light. EOM are normal.   Neck: Neck supple. No thyromegaly present.   Cardiovascular: Normal rate, regular rhythm and normal heart sounds.   Pulmonary/Chest: Effort normal and breath sounds normal.   Abdominal: Soft. Bowel sounds are normal. He exhibits no distension and no mass. There is no tenderness.   Musculoskeletal: He exhibits no edema.   Lymphadenopathy:     He has no cervical adenopathy.   Neurological: He is alert and oriented to person, place, and time. No  cranial nerve deficit.   Psychiatric: He has a normal mood and affect. His behavior is normal.       Assessment:       1. Constipation, unspecified constipation type    2. Decreased appetite    3. Mixed hyperlipidemia    4. Essential hypertension, benign    5. Gastroesophageal reflux disease, esophagitis presence not specified        Plan:       1. Refill requested meds--paper Rxs given; he will get filled today and resume today  2. Pt's appetite has improved; he does not want to pursue tx for constipation as he thinks it will improve now that appetite is better  3. I advised he go to ED due to elevated BP readings but he is adamantly against this; he is asymptomatic but we discussed in detail rationale for going to ED and symptoms that would prompt such which he understood  4. Pt understands to let me know immediately if appetite, wt changes occur again or if SOB occurs again, or any other concerning symptoms

## 2020-01-16 ENCOUNTER — TELEPHONE (OUTPATIENT)
Dept: OPTOMETRY | Facility: CLINIC | Age: 85
End: 2020-01-16

## 2020-01-16 NOTE — TELEPHONE ENCOUNTER
[facility] Ins Benefits as of 1/16/2020    Member Name: DALLIN BLACKMON  Member ID: [id number]  Social Security Number: [id number]  YOB: 1934  Address: [address] [place] [address]  Phone Number:   Gender: Male  Responsible Member: DALLINAYO MALHOTRAGATITO    Network: [address] Medicare FF  Group: [employer] [place] ([id number])  Benefit Level: 766

## 2020-01-17 ENCOUNTER — TELEPHONE (OUTPATIENT)
Dept: INTERNAL MEDICINE | Facility: CLINIC | Age: 85
End: 2020-01-17

## 2020-01-17 ENCOUNTER — CLINICAL SUPPORT (OUTPATIENT)
Dept: INTERNAL MEDICINE | Facility: CLINIC | Age: 85
End: 2020-01-17
Payer: MEDICARE

## 2020-01-17 ENCOUNTER — PATIENT OUTREACH (OUTPATIENT)
Dept: ADMINISTRATIVE | Facility: OTHER | Age: 85
End: 2020-01-17

## 2020-01-17 VITALS — SYSTOLIC BLOOD PRESSURE: 140 MMHG | DIASTOLIC BLOOD PRESSURE: 90 MMHG | OXYGEN SATURATION: 99 % | HEART RATE: 66 BPM

## 2020-01-17 DIAGNOSIS — I10 ESSENTIAL HYPERTENSION, BENIGN: ICD-10-CM

## 2020-01-17 PROCEDURE — 99999 PR PBB SHADOW E&M-EST. PATIENT-LVL II: ICD-10-PCS | Mod: PBBFAC,HCNC,,

## 2020-01-17 PROCEDURE — 99999 PR PBB SHADOW E&M-EST. PATIENT-LVL II: CPT | Mod: PBBFAC,HCNC,,

## 2020-01-17 RX ORDER — NIFEDIPINE 90 MG/1
90 TABLET, EXTENDED RELEASE ORAL DAILY
Qty: 90 TABLET | Refills: 3 | Status: SHIPPED | OUTPATIENT
Start: 2020-01-17 | End: 2020-02-26 | Stop reason: SDUPTHER

## 2020-01-17 NOTE — Clinical Note
Does patient have record of home blood pressure readings no. Readings have been averaging unknown. Last dose of blood pressure medication was taken at 2100.Patient is asymptomatic. States he takes medication most of the time, may miss a day or two. BP: (!) 146/72 , Pulse: 76.Blood pressure reading after 15 minutes was 142/90, Pulse 66.

## 2020-01-17 NOTE — PROGRESS NOTES
Julio XAVIER Hockaday 85 y.o. male is here today for Blood Pressure check.   History of HTN yes.    Review of patient's allergies indicates:   Allergen Reactions    Lisinopril Rash     Patient reports history of rash with previous lisinopril use.     Nicoderm cq [nicotine] Rash     Creatinine   Date Value Ref Range Status   10/22/2019 0.9 0.5 - 1.4 mg/dL Final     Sodium   Date Value Ref Range Status   10/22/2019 145 136 - 145 mmol/L Final     Potassium   Date Value Ref Range Status   10/22/2019 3.8 3.5 - 5.1 mmol/L Final   ]  Patient denies taking blood pressure medications on a regular bases at the same time of the day.     Current Outpatient Medications:     albuterol (PROVENTIL/VENTOLIN HFA) 90 mcg/actuation inhaler, Inhale 2 puffs into the lungs every 6 (six) hours as needed for Wheezing., Disp: 1 each, Rfl: 0    colchicine (COLCRYS) 0.6 mg tablet, Take 1 tablet (0.6 mg total) by mouth 2 (two) times daily as needed (gout attack)., Disp: 60 tablet, Rfl: 3    losartan (COZAAR) 100 MG tablet, Take 1 tablet (100 mg total) by mouth once daily., Disp: 90 tablet, Rfl: 3    NIFEdipine (PROCARDIA-XL) 60 MG (OSM) 24 hr tablet, Take 1 tablet (60 mg total) by mouth once daily., Disp: 90 tablet, Rfl: 3    pantoprazole (PROTONIX) 40 MG tablet, TAKE TABLET BY MOUTH ONCE DAILY, Disp: 90 tablet, Rfl: 3    pravastatin (PRAVACHOL) 20 MG tablet, Take 1 tablet (20 mg total) by mouth nightly., Disp: 90 tablet, Rfl: 3  Does patient have record of home blood pressure readings no. Readings have been averaging unknown.   Last dose of blood pressure medication was taken at 2100.  Patient is asymptomatic.   States he takes medication most of the time, may miss a day or two.   BP: (!) 146/72 , Pulse: 76.  Blood pressure reading after 15 minutes was 142/90, Pulse 66.  Dr. Jensen notified.

## 2020-01-17 NOTE — TELEPHONE ENCOUNTER
"Spoke with pt and informed him that  would like for him to know "  increase nifedipine to 90mg daily and f/u nursing BP check in 4 weeks." scheduled nurse visit for 2/14/2020 @ 0900am. Pt verbalized understanding   "

## 2020-01-17 NOTE — TELEPHONE ENCOUNTER
Does patient have record of home blood pressure readings no. Readings have been averaging unknown.   Last dose of blood pressure medication was taken at 2100.  Patient is asymptomatic.   States he takes medication most of the time, may miss a day or two.   BP: (!) 146/72 , Pulse: 76.  Blood pressure reading after 15 minutes was 142/90, Pulse 66.

## 2020-01-22 ENCOUNTER — PES CALL (OUTPATIENT)
Dept: ADMINISTRATIVE | Facility: CLINIC | Age: 85
End: 2020-01-22

## 2020-02-26 DIAGNOSIS — I10 ESSENTIAL HYPERTENSION, BENIGN: ICD-10-CM

## 2020-02-26 RX ORDER — NIFEDIPINE 90 MG/1
90 TABLET, EXTENDED RELEASE ORAL DAILY
Qty: 30 TABLET | Refills: 2 | Status: SHIPPED | OUTPATIENT
Start: 2020-02-26 | End: 2020-05-26

## 2020-04-07 ENCOUNTER — TELEPHONE (OUTPATIENT)
Dept: INTERNAL MEDICINE | Facility: CLINIC | Age: 85
End: 2020-04-07

## 2020-04-07 NOTE — TELEPHONE ENCOUNTER
----- Message from Xenia Boyce sent at 4/7/2020  1:59 PM CDT -----  Contact: pt   Type:  Patient Returning Call    Who Called: DALLIN BLACKMON [[id number]]    Who Left Message for Patient: Staff     Does the patient know what this is regarding?:no     Best Call Back Number:[phone] (home)     Additional Information:

## 2020-06-17 ENCOUNTER — PATIENT OUTREACH (OUTPATIENT)
Dept: ADMINISTRATIVE | Facility: HOSPITAL | Age: 85
End: 2020-06-17

## 2020-06-29 ENCOUNTER — OFFICE VISIT (OUTPATIENT)
Dept: HOME HEALTH SERVICES | Facility: CLINIC | Age: 85
End: 2020-06-29
Payer: MEDICARE

## 2020-06-29 VITALS
WEIGHT: 105 LBS | HEART RATE: 77 BPM | DIASTOLIC BLOOD PRESSURE: 98 MMHG | HEIGHT: 68 IN | BODY MASS INDEX: 15.91 KG/M2 | SYSTOLIC BLOOD PRESSURE: 147 MMHG

## 2020-06-29 DIAGNOSIS — Z72.0 TOBACCO ABUSE: Chronic | ICD-10-CM

## 2020-06-29 DIAGNOSIS — Z74.09 OTHER REDUCED MOBILITY: ICD-10-CM

## 2020-06-29 DIAGNOSIS — I10 ESSENTIAL HYPERTENSION, BENIGN: Chronic | ICD-10-CM

## 2020-06-29 DIAGNOSIS — Z85.028 HISTORY OF GASTRIC CANCER: ICD-10-CM

## 2020-06-29 DIAGNOSIS — I70.0 ATHEROSCLEROSIS OF AORTA: ICD-10-CM

## 2020-06-29 DIAGNOSIS — R63.6 UNDERWEIGHT: ICD-10-CM

## 2020-06-29 DIAGNOSIS — E78.2 MIXED HYPERLIPIDEMIA: Chronic | ICD-10-CM

## 2020-06-29 DIAGNOSIS — Z00.00 ENCOUNTER FOR PREVENTIVE HEALTH EXAMINATION: Primary | ICD-10-CM

## 2020-06-29 DIAGNOSIS — E11.9 TYPE 2 DIABETES MELLITUS WITHOUT COMPLICATION, WITHOUT LONG-TERM CURRENT USE OF INSULIN: ICD-10-CM

## 2020-06-29 PROCEDURE — 3077F PR MOST RECENT SYSTOLIC BLOOD PRESSURE >= 140 MM HG: ICD-10-PCS | Mod: CPTII,S$GLB,, | Performed by: NURSE PRACTITIONER

## 2020-06-29 PROCEDURE — 3080F PR MOST RECENT DIASTOLIC BLOOD PRESSURE >= 90 MM HG: ICD-10-PCS | Mod: CPTII,S$GLB,, | Performed by: NURSE PRACTITIONER

## 2020-06-29 PROCEDURE — 3080F DIAST BP >= 90 MM HG: CPT | Mod: CPTII,S$GLB,, | Performed by: NURSE PRACTITIONER

## 2020-06-29 PROCEDURE — G0439 PR MEDICARE ANNUAL WELLNESS SUBSEQUENT VISIT: ICD-10-PCS | Mod: S$GLB,,, | Performed by: NURSE PRACTITIONER

## 2020-06-29 PROCEDURE — 3051F PR MOST RECENT HEMOGLOBIN A1C LEVEL 7.0 - < 8.0%: ICD-10-PCS | Mod: CPTII,S$GLB,, | Performed by: NURSE PRACTITIONER

## 2020-06-29 PROCEDURE — 99499 UNLISTED E&M SERVICE: CPT | Mod: S$GLB,,, | Performed by: NURSE PRACTITIONER

## 2020-06-29 PROCEDURE — 3051F HG A1C>EQUAL 7.0%<8.0%: CPT | Mod: CPTII,S$GLB,, | Performed by: NURSE PRACTITIONER

## 2020-06-29 PROCEDURE — 99499 RISK ADDL DX/OHS AUDIT: ICD-10-PCS | Mod: S$GLB,,, | Performed by: NURSE PRACTITIONER

## 2020-06-29 PROCEDURE — G0439 PPPS, SUBSEQ VISIT: HCPCS | Mod: S$GLB,,, | Performed by: NURSE PRACTITIONER

## 2020-06-29 PROCEDURE — 3077F SYST BP >= 140 MM HG: CPT | Mod: CPTII,S$GLB,, | Performed by: NURSE PRACTITIONER

## 2020-06-30 PROBLEM — E11.9 TYPE 2 DIABETES MELLITUS WITHOUT COMPLICATION, WITHOUT LONG-TERM CURRENT USE OF INSULIN: Status: ACTIVE | Noted: 2020-06-30

## 2020-06-30 PROBLEM — E11.319 DIABETIC RETINOPATHY: Status: RESOLVED | Noted: 2017-12-22 | Resolved: 2020-06-30

## 2020-06-30 NOTE — PATIENT INSTRUCTIONS
Counseling and Referral of Other Preventative  (Italic type indicates deductible and co-insurance are waived)    Patient Name: Julio Benites  Today's Date: 6/30/2020    Health Maintenance       Date Due Completion Date    Hemoglobin A1c 04/22/2020 10/22/2019    Lipid Panel 05/07/2020 5/7/2019    TETANUS VACCINE 07/06/2020 (Originally 10/25/1952) ---    Shingles Vaccine (1 of 2) 07/06/2020 (Originally 10/25/1984) ---        No orders of the defined types were placed in this encounter.    The following information is provided to all patients.  This information is to help you find resources for any of the problems found today that may be affecting your health:                Living healthy guide: www.[facility].[place].gov      Understanding Diabetes: www.diabetes.org      Eating healthy: www.cdc.gov/healthyweight      [facility] home safety checklist: www.cdc.gov/steadi/patient.html      Agency on Aging: www.goea.[place].[facility]      Alcoholics anonymous (AA): www.aa.org      Physical Activity: www.jerry.nih.gov/ml9yndr      Tobacco use: www.quitwithusla.org

## 2020-06-30 NOTE — PROGRESS NOTES
"Julio Benites presented for a  Medicare AWV and comprehensive Health Risk Assessment today. The following components were reviewed and updated:    · Medical history  · Family History  · Social history  · Allergies and Current Medications  · Health Risk Assessment  · Health Maintenance  · Care Team     ** See Completed Assessments for Annual Wellness Visit within the encounter summary.**       The following assessments were completed:  · Living Situation  · CAGE  · Depression Screening  · Timed Get Up and Go  · Whisper Test  · Cognitive Function Screening  ·   ·   ·   · Nutrition Screening  · ADL Screening  · PAQ Screening    Vitals:    06/29/20 1054   BP: (!) 147/98   Pulse: 77   Weight: 47.6 kg (105 lb)   Height: 5' 8" (1.727 m)     Body mass index is 15.97 kg/m².  Physical Exam  Constitutional:       Appearance: Normal appearance.   HENT:      Head: Normocephalic and atraumatic.      Ears:      Comments: [place]  Hearing aids present     Nose: Nose normal.   Eyes:      Extraocular Movements: Extraocular movements intact.   Neck:      Musculoskeletal: Normal range of motion.   Cardiovascular:      Rate and Rhythm: Normal rate and regular rhythm.      Heart sounds: Normal heart sounds.   Pulmonary:      Effort: Pulmonary effort is normal. No respiratory distress.      Breath sounds: Normal breath sounds.   Abdominal:      General: Bowel sounds are normal. There is no distension.      Palpations: Abdomen is soft.   Musculoskeletal: Normal range of motion.         General: No swelling.   Skin:     General: Skin is warm and dry.   Neurological:      General: No focal deficit present.      Mental Status: He is alert and oriented to person, place, and time.   Psychiatric:         Mood and Affect: Mood normal.         Behavior: Behavior normal.           Diagnoses and health risks identified today and associated recommendations/orders:    1. Encounter for preventive health examination  Assessment completed. Preventive " measures reviewed with patient. Patient verbalizes understanding.    2. Other reduced mobility  Stable, followed by PCP.    3. Underweight  Stable, followed by PCP.    4. Atherosclerosis of aorta  Stable, followed by PCP.    5. Essential hypertension, benign  Stable, followed by PCP.    6. Mixed hyperlipidemia  Stable, followed by PCP.    7. History of gastric cancer  Stable, followed by PCP.    8. Type 2 diabetes mellitus without complication, without long-term current use of insulin  Stable, followed by PCP.    9. Tobacco abuse  Stable, followed by PCP.    Provided Julio with a 5-10 year written screening schedule and personal prevention plan. Recommendations were developed using the USPSTF age appropriate recommendations. Education, counseling, and referrals were provided as needed. After Visit Summary printed and given to patient which includes a list of additional screenings\tests needed.    No follow-ups on file.    Mi Rico NP  I offered to discuss end of life issues, including information on how to make advance directives that the patient could use to name someone who would make medical decisions on their behalf if they became too ill to make themselves.    ___Patient declined  _X_Patient is interested, I provided paper work and offered to discuss.

## 2020-09-14 ENCOUNTER — IMMUNIZATION (OUTPATIENT)
Dept: PHARMACY | Facility: CLINIC | Age: 85
End: 2020-09-14
Payer: MEDICARE

## 2021-01-12 DIAGNOSIS — I10 ESSENTIAL HYPERTENSION, BENIGN: ICD-10-CM

## 2021-01-12 DIAGNOSIS — K21.9 GASTROESOPHAGEAL REFLUX DISEASE: ICD-10-CM

## 2021-01-12 RX ORDER — PANTOPRAZOLE SODIUM 40 MG/1
TABLET, DELAYED RELEASE ORAL
Qty: 90 TABLET | Refills: 0 | Status: SHIPPED | OUTPATIENT
Start: 2021-01-12 | End: 2021-04-07

## 2021-01-12 RX ORDER — LOSARTAN POTASSIUM 100 MG/1
TABLET ORAL
Qty: 90 TABLET | Refills: 0 | Status: SHIPPED | OUTPATIENT
Start: 2021-01-12 | End: 2021-04-07

## 2021-04-07 DIAGNOSIS — K21.9 GASTROESOPHAGEAL REFLUX DISEASE: ICD-10-CM

## 2021-04-07 DIAGNOSIS — I10 ESSENTIAL HYPERTENSION, BENIGN: ICD-10-CM

## 2021-04-07 RX ORDER — PANTOPRAZOLE SODIUM 40 MG/1
TABLET, DELAYED RELEASE ORAL
Qty: 90 TABLET | Refills: 0 | Status: SHIPPED | OUTPATIENT
Start: 2021-04-07 | End: 2021-08-24 | Stop reason: SDUPTHER

## 2021-04-07 RX ORDER — LOSARTAN POTASSIUM 100 MG/1
TABLET ORAL
Qty: 90 TABLET | Refills: 0 | Status: SHIPPED | OUTPATIENT
Start: 2021-04-07 | End: 2021-08-05 | Stop reason: SDUPTHER

## 2021-05-24 ENCOUNTER — PES CALL (OUTPATIENT)
Dept: ADMINISTRATIVE | Facility: CLINIC | Age: 86
End: 2021-05-24

## 2021-06-15 ENCOUNTER — TELEPHONE (OUTPATIENT)
Dept: ADMINISTRATIVE | Facility: CLINIC | Age: 86
End: 2021-06-15

## 2021-06-18 RX ORDER — NIFEDIPINE 90 MG/1
90 TABLET, FILM COATED, EXTENDED RELEASE ORAL DAILY
Qty: 90 TABLET | Refills: 0 | Status: SHIPPED | OUTPATIENT
Start: 2021-06-18 | End: 2021-09-11

## 2021-08-02 ENCOUNTER — PES CALL (OUTPATIENT)
Dept: ADMINISTRATIVE | Facility: CLINIC | Age: 86
End: 2021-08-02

## 2021-08-05 DIAGNOSIS — I10 ESSENTIAL HYPERTENSION, BENIGN: ICD-10-CM

## 2021-08-05 RX ORDER — LOSARTAN POTASSIUM 100 MG/1
100 TABLET ORAL DAILY
Qty: 30 TABLET | Refills: 0 | Status: SHIPPED | OUTPATIENT
Start: 2021-08-05 | End: 2021-09-02

## 2021-08-10 ENCOUNTER — PATIENT OUTREACH (OUTPATIENT)
Dept: ADMINISTRATIVE | Facility: HOSPITAL | Age: 86
End: 2021-08-10

## 2021-08-10 DIAGNOSIS — Z13.6 ENCOUNTER FOR LIPID SCREENING FOR CARDIOVASCULAR DISEASE: Primary | ICD-10-CM

## 2021-08-10 DIAGNOSIS — Z13.220 ENCOUNTER FOR LIPID SCREENING FOR CARDIOVASCULAR DISEASE: Primary | ICD-10-CM

## 2021-08-10 DIAGNOSIS — E11.9 DIABETES MELLITUS WITHOUT COMPLICATION: ICD-10-CM

## 2021-08-24 ENCOUNTER — OFFICE VISIT (OUTPATIENT)
Dept: INTERNAL MEDICINE | Facility: CLINIC | Age: 86
End: 2021-08-24
Payer: MEDICARE

## 2021-08-24 VITALS
BODY MASS INDEX: 13.67 KG/M2 | HEIGHT: 68 IN | HEART RATE: 85 BPM | SYSTOLIC BLOOD PRESSURE: 128 MMHG | WEIGHT: 90.19 LBS | DIASTOLIC BLOOD PRESSURE: 86 MMHG | OXYGEN SATURATION: 97 %

## 2021-08-24 DIAGNOSIS — R06.02 SHORTNESS OF BREATH: ICD-10-CM

## 2021-08-24 DIAGNOSIS — R63.4 WEIGHT LOSS: ICD-10-CM

## 2021-08-24 DIAGNOSIS — M1A.09X0 IDIOPATHIC CHRONIC GOUT OF MULTIPLE SITES WITHOUT TOPHUS: ICD-10-CM

## 2021-08-24 DIAGNOSIS — I10 ESSENTIAL HYPERTENSION, BENIGN: Primary | ICD-10-CM

## 2021-08-24 DIAGNOSIS — E78.2 MIXED HYPERLIPIDEMIA: ICD-10-CM

## 2021-08-24 DIAGNOSIS — R97.20 ELEVATED PSA: ICD-10-CM

## 2021-08-24 DIAGNOSIS — I70.0 ATHEROSCLEROSIS OF AORTA: ICD-10-CM

## 2021-08-24 DIAGNOSIS — Z85.028 HISTORY OF GASTRIC CANCER: ICD-10-CM

## 2021-08-24 DIAGNOSIS — E11.9 TYPE 2 DIABETES MELLITUS WITHOUT COMPLICATION, WITHOUT LONG-TERM CURRENT USE OF INSULIN: ICD-10-CM

## 2021-08-24 DIAGNOSIS — K21.9 GASTROESOPHAGEAL REFLUX DISEASE WITHOUT ESOPHAGITIS: ICD-10-CM

## 2021-08-24 DIAGNOSIS — Z72.0 TOBACCO ABUSE: ICD-10-CM

## 2021-08-24 DIAGNOSIS — D64.9 ANEMIA, UNSPECIFIED TYPE: ICD-10-CM

## 2021-08-24 DIAGNOSIS — Z78.9 ALCOHOL USE: ICD-10-CM

## 2021-08-24 PROCEDURE — 1159F MED LIST DOCD IN RCRD: CPT | Mod: CPTII,S$GLB,, | Performed by: INTERNAL MEDICINE

## 2021-08-24 PROCEDURE — 1126F AMNT PAIN NOTED NONE PRSNT: CPT | Mod: CPTII,S$GLB,, | Performed by: INTERNAL MEDICINE

## 2021-08-24 PROCEDURE — 1101F PR PT FALLS ASSESS DOC 0-1 FALLS W/OUT INJ PAST YR: ICD-10-PCS | Mod: CPTII,S$GLB,, | Performed by: INTERNAL MEDICINE

## 2021-08-24 PROCEDURE — 99499 RISK ADDL DX/OHS AUDIT: ICD-10-PCS | Mod: HCNC,S$GLB,, | Performed by: INTERNAL MEDICINE

## 2021-08-24 PROCEDURE — 3288F PR FALLS RISK ASSESSMENT DOCUMENTED: ICD-10-PCS | Mod: CPTII,S$GLB,, | Performed by: INTERNAL MEDICINE

## 2021-08-24 PROCEDURE — 1126F PR PAIN SEVERITY QUANTIFIED, NO PAIN PRESENT: ICD-10-PCS | Mod: CPTII,S$GLB,, | Performed by: INTERNAL MEDICINE

## 2021-08-24 PROCEDURE — 1159F PR MEDICATION LIST DOCUMENTED IN MEDICAL RECORD: ICD-10-PCS | Mod: CPTII,S$GLB,, | Performed by: INTERNAL MEDICINE

## 2021-08-24 PROCEDURE — 99499 UNLISTED E&M SERVICE: CPT | Mod: HCNC,S$GLB,, | Performed by: INTERNAL MEDICINE

## 2021-08-24 PROCEDURE — 99999 PR PBB SHADOW E&M-EST. PATIENT-LVL IV: ICD-10-PCS | Mod: PBBFAC,,, | Performed by: INTERNAL MEDICINE

## 2021-08-24 PROCEDURE — 99214 OFFICE O/P EST MOD 30 MIN: CPT | Mod: S$GLB,,, | Performed by: INTERNAL MEDICINE

## 2021-08-24 PROCEDURE — 3288F FALL RISK ASSESSMENT DOCD: CPT | Mod: CPTII,S$GLB,, | Performed by: INTERNAL MEDICINE

## 2021-08-24 PROCEDURE — 99999 PR PBB SHADOW E&M-EST. PATIENT-LVL IV: CPT | Mod: PBBFAC,,, | Performed by: INTERNAL MEDICINE

## 2021-08-24 PROCEDURE — 1160F RVW MEDS BY RX/DR IN RCRD: CPT | Mod: CPTII,S$GLB,, | Performed by: INTERNAL MEDICINE

## 2021-08-24 PROCEDURE — 1101F PT FALLS ASSESS-DOCD LE1/YR: CPT | Mod: CPTII,S$GLB,, | Performed by: INTERNAL MEDICINE

## 2021-08-24 PROCEDURE — 99214 PR OFFICE/OUTPT VISIT, EST, LEVL IV, 30-39 MIN: ICD-10-PCS | Mod: S$GLB,,, | Performed by: INTERNAL MEDICINE

## 2021-08-24 PROCEDURE — 1160F PR REVIEW ALL MEDS BY PRESCRIBER/CLIN PHARMACIST DOCUMENTED: ICD-10-PCS | Mod: CPTII,S$GLB,, | Performed by: INTERNAL MEDICINE

## 2021-08-24 RX ORDER — PRAVASTATIN SODIUM 20 MG/1
20 TABLET ORAL NIGHTLY
Qty: 90 TABLET | Refills: 3 | Status: SHIPPED | OUTPATIENT
Start: 2021-08-24 | End: 2022-07-26

## 2021-08-24 RX ORDER — PANTOPRAZOLE SODIUM 40 MG/1
40 TABLET, DELAYED RELEASE ORAL DAILY
Qty: 90 TABLET | Refills: 3 | Status: SHIPPED | OUTPATIENT
Start: 2021-08-24 | End: 2022-11-01

## 2021-08-25 ENCOUNTER — TELEPHONE (OUTPATIENT)
Dept: INTERNAL MEDICINE | Facility: CLINIC | Age: 86
End: 2021-08-25

## 2021-08-25 DIAGNOSIS — D50.9 MICROCYTIC ANEMIA: Primary | ICD-10-CM

## 2021-08-25 PROBLEM — E11.9 TYPE 2 DIABETES MELLITUS WITHOUT COMPLICATION, WITHOUT LONG-TERM CURRENT USE OF INSULIN: Status: RESOLVED | Noted: 2020-06-30 | Resolved: 2021-08-25

## 2021-08-25 PROBLEM — E11.29 TYPE 2 DIABETES MELLITUS WITH MICROALBUMINURIA, WITHOUT LONG-TERM CURRENT USE OF INSULIN: Chronic | Status: ACTIVE | Noted: 2020-06-30

## 2021-08-25 PROBLEM — R80.9 TYPE 2 DIABETES MELLITUS WITH MICROALBUMINURIA, WITHOUT LONG-TERM CURRENT USE OF INSULIN: Chronic | Status: ACTIVE | Noted: 2020-06-30

## 2021-08-26 ENCOUNTER — HOSPITAL ENCOUNTER (OUTPATIENT)
Dept: RADIOLOGY | Facility: OTHER | Age: 86
Discharge: HOME | End: 2021-08-26
Attending: INTERNAL MEDICINE
Payer: MEDICARE

## 2021-08-26 ENCOUNTER — TELEPHONE (OUTPATIENT)
Dept: INTERNAL MEDICINE | Facility: CLINIC | Age: 86
End: 2021-08-26

## 2021-08-26 DIAGNOSIS — R63.4 WEIGHT LOSS: ICD-10-CM

## 2021-08-26 DIAGNOSIS — T17.908D ASPIRATION INTO RESPIRATORY TRACT, SUBSEQUENT ENCOUNTER: ICD-10-CM

## 2021-08-26 DIAGNOSIS — I77.810 ECTATIC THORACIC AORTA: ICD-10-CM

## 2021-08-26 DIAGNOSIS — R06.02 SHORTNESS OF BREATH: ICD-10-CM

## 2021-08-26 DIAGNOSIS — R13.11 DYSPHAGIA, ORAL PHASE: ICD-10-CM

## 2021-08-26 DIAGNOSIS — J61 ASBESTOSIS: Primary | ICD-10-CM

## 2021-08-26 DIAGNOSIS — R68.89 OTHER GENERAL SYMPTOMS AND SIGNS: ICD-10-CM

## 2021-08-26 PROCEDURE — 71250 CT THORAX DX C-: CPT | Mod: 26,,, | Performed by: RADIOLOGY

## 2021-08-26 PROCEDURE — 71250 CT CHEST WITHOUT CONTRAST: ICD-10-PCS | Mod: 26,,, | Performed by: RADIOLOGY

## 2021-08-26 PROCEDURE — 71250 CT THORAX DX C-: CPT | Mod: TC

## 2021-08-26 RX ORDER — AMOXICILLIN AND CLAVULANATE POTASSIUM 875; 125 MG/1; MG/1
1 TABLET, FILM COATED ORAL 2 TIMES DAILY
Qty: 20 TABLET | Refills: 0 | Status: SHIPPED | OUTPATIENT
Start: 2021-08-26 | End: 2021-09-29

## 2021-09-15 ENCOUNTER — PES CALL (OUTPATIENT)
Dept: ADMINISTRATIVE | Facility: CLINIC | Age: 86
End: 2021-09-15

## 2021-09-21 ENCOUNTER — HOSPITAL ENCOUNTER (OUTPATIENT)
Dept: RADIOLOGY | Facility: OTHER | Age: 86
Discharge: HOME | End: 2021-09-21
Attending: INTERNAL MEDICINE
Payer: MEDICARE

## 2021-09-21 ENCOUNTER — CLINICAL SUPPORT (OUTPATIENT)
Dept: INTERNAL MEDICINE | Facility: CLINIC | Age: 86
End: 2021-09-21
Payer: MEDICARE

## 2021-09-21 VITALS — BODY MASS INDEX: 14.38 KG/M2 | WEIGHT: 94.56 LBS

## 2021-09-21 DIAGNOSIS — R68.89 OTHER GENERAL SYMPTOMS AND SIGNS: ICD-10-CM

## 2021-09-21 DIAGNOSIS — R13.11 DYSPHAGIA, ORAL PHASE: ICD-10-CM

## 2021-09-21 DIAGNOSIS — T17.908D ASPIRATION INTO RESPIRATORY TRACT, SUBSEQUENT ENCOUNTER: ICD-10-CM

## 2021-09-21 PROCEDURE — 74230 X-RAY XM SWLNG FUNCJ C+: CPT | Mod: 26,HCNC,, | Performed by: RADIOLOGY

## 2021-09-21 PROCEDURE — 92611 MOTION FLUOROSCOPY/SWALLOW: CPT | Mod: HCNC

## 2021-09-21 PROCEDURE — 25500020 PHARM REV CODE 255: Mod: HCNC | Performed by: INTERNAL MEDICINE

## 2021-09-21 PROCEDURE — 74230 X-RAY XM SWLNG FUNCJ C+: CPT | Mod: TC,HCNC

## 2021-09-21 PROCEDURE — A9698 NON-RAD CONTRAST MATERIALNOC: HCPCS | Mod: HCNC | Performed by: INTERNAL MEDICINE

## 2021-09-21 PROCEDURE — 74230 FL MODIFIED BARIUM SWALLOW SPEECH STUDY: ICD-10-PCS | Mod: 26,HCNC,, | Performed by: RADIOLOGY

## 2021-09-21 RX ADMIN — BARIUM SULFATE 50 ML: 0.81 POWDER, FOR SUSPENSION ORAL at 10:09

## 2021-09-22 ENCOUNTER — TELEPHONE (OUTPATIENT)
Dept: INTERNAL MEDICINE | Facility: CLINIC | Age: 86
End: 2021-09-22

## 2021-09-22 DIAGNOSIS — J39.8 OTHER SPECIFIED DISEASES OF UPPER RESPIRATORY TRACT: ICD-10-CM

## 2021-09-22 DIAGNOSIS — R13.10 DYSPHAGIA, UNSPECIFIED TYPE: Primary | ICD-10-CM

## 2021-09-28 ENCOUNTER — IMMUNIZATION (OUTPATIENT)
Dept: INTERNAL MEDICINE | Facility: CLINIC | Age: 86
End: 2021-09-28
Payer: MEDICARE

## 2021-09-28 DIAGNOSIS — Z23 NEED FOR VACCINATION: Primary | ICD-10-CM

## 2021-09-28 PROBLEM — J61 ASBESTOSIS: Status: ACTIVE | Noted: 2021-09-28

## 2021-09-28 PROBLEM — K21.9 GASTROESOPHAGEAL REFLUX DISEASE WITHOUT ESOPHAGITIS: Status: ACTIVE | Noted: 2021-09-28

## 2021-09-28 PROBLEM — I77.810 ECTATIC THORACIC AORTA: Status: ACTIVE | Noted: 2021-09-28

## 2021-09-28 PROCEDURE — 91300 COVID-19, MRNA, LNP-S, PF, 30 MCG/0.3 ML DOSE VACCINE: CPT | Mod: HCNC,PBBFAC | Performed by: INTERNAL MEDICINE

## 2021-09-28 PROCEDURE — 0003A COVID-19, MRNA, LNP-S, PF, 30 MCG/0.3 ML DOSE VACCINE: CPT | Mod: HCNC,PBBFAC | Performed by: INTERNAL MEDICINE

## 2021-09-29 ENCOUNTER — OFFICE VISIT (OUTPATIENT)
Dept: INTERNAL MEDICINE | Facility: CLINIC | Age: 86
End: 2021-09-29
Payer: MEDICARE

## 2021-09-29 ENCOUNTER — OFFICE VISIT (OUTPATIENT)
Dept: GASTROENTEROLOGY | Facility: CLINIC | Age: 86
End: 2021-09-29
Payer: MEDICARE

## 2021-09-29 VITALS
SYSTOLIC BLOOD PRESSURE: 118 MMHG | OXYGEN SATURATION: 99 % | HEART RATE: 79 BPM | BODY MASS INDEX: 14.03 KG/M2 | HEIGHT: 68 IN | WEIGHT: 92.56 LBS | DIASTOLIC BLOOD PRESSURE: 84 MMHG

## 2021-09-29 VITALS
DIASTOLIC BLOOD PRESSURE: 99 MMHG | HEART RATE: 65 BPM | HEIGHT: 69 IN | BODY MASS INDEX: 13.71 KG/M2 | SYSTOLIC BLOOD PRESSURE: 169 MMHG | WEIGHT: 92.56 LBS

## 2021-09-29 DIAGNOSIS — I77.810 ECTATIC THORACIC AORTA: ICD-10-CM

## 2021-09-29 DIAGNOSIS — I70.0 ATHEROSCLEROSIS OF AORTA: ICD-10-CM

## 2021-09-29 DIAGNOSIS — K21.9 GASTROESOPHAGEAL REFLUX DISEASE WITHOUT ESOPHAGITIS: ICD-10-CM

## 2021-09-29 DIAGNOSIS — Z72.0 TOBACCO ABUSE: ICD-10-CM

## 2021-09-29 DIAGNOSIS — J61 ASBESTOSIS: ICD-10-CM

## 2021-09-29 DIAGNOSIS — E11.29 TYPE 2 DIABETES MELLITUS WITH MICROALBUMINURIA, WITHOUT LONG-TERM CURRENT USE OF INSULIN: ICD-10-CM

## 2021-09-29 DIAGNOSIS — R13.10 DYSPHAGIA, UNSPECIFIED TYPE: ICD-10-CM

## 2021-09-29 DIAGNOSIS — Z85.028 HISTORY OF GASTRIC CANCER: ICD-10-CM

## 2021-09-29 DIAGNOSIS — R80.9 TYPE 2 DIABETES MELLITUS WITH MICROALBUMINURIA, WITHOUT LONG-TERM CURRENT USE OF INSULIN: ICD-10-CM

## 2021-09-29 DIAGNOSIS — Z78.9 ALCOHOL USE: ICD-10-CM

## 2021-09-29 DIAGNOSIS — D64.9 ANEMIA, UNSPECIFIED TYPE: ICD-10-CM

## 2021-09-29 DIAGNOSIS — I10 ESSENTIAL HYPERTENSION, BENIGN: Primary | ICD-10-CM

## 2021-09-29 DIAGNOSIS — M1A.09X0 IDIOPATHIC CHRONIC GOUT OF MULTIPLE SITES WITHOUT TOPHUS: ICD-10-CM

## 2021-09-29 DIAGNOSIS — E78.2 MIXED HYPERLIPIDEMIA: ICD-10-CM

## 2021-09-29 PROCEDURE — 1126F AMNT PAIN NOTED NONE PRSNT: CPT | Mod: HCNC,CPTII,S$GLB, | Performed by: STUDENT IN AN ORGANIZED HEALTH CARE EDUCATION/TRAINING PROGRAM

## 2021-09-29 PROCEDURE — 99999 PR PBB SHADOW E&M-EST. PATIENT-LVL III: CPT | Mod: PBBFAC,HCNC,, | Performed by: INTERNAL MEDICINE

## 2021-09-29 PROCEDURE — 1160F PR REVIEW ALL MEDS BY PRESCRIBER/CLIN PHARMACIST DOCUMENTED: ICD-10-PCS | Mod: HCNC,CPTII,S$GLB, | Performed by: INTERNAL MEDICINE

## 2021-09-29 PROCEDURE — 1126F AMNT PAIN NOTED NONE PRSNT: CPT | Mod: HCNC,CPTII,S$GLB, | Performed by: INTERNAL MEDICINE

## 2021-09-29 PROCEDURE — 1159F PR MEDICATION LIST DOCUMENTED IN MEDICAL RECORD: ICD-10-PCS | Mod: HCNC,CPTII,S$GLB, | Performed by: STUDENT IN AN ORGANIZED HEALTH CARE EDUCATION/TRAINING PROGRAM

## 2021-09-29 PROCEDURE — 99999 PR PBB SHADOW E&M-EST. PATIENT-LVL III: ICD-10-PCS | Mod: PBBFAC,HCNC,, | Performed by: STUDENT IN AN ORGANIZED HEALTH CARE EDUCATION/TRAINING PROGRAM

## 2021-09-29 PROCEDURE — 1101F PR PT FALLS ASSESS DOC 0-1 FALLS W/OUT INJ PAST YR: ICD-10-PCS | Mod: HCNC,CPTII,S$GLB, | Performed by: STUDENT IN AN ORGANIZED HEALTH CARE EDUCATION/TRAINING PROGRAM

## 2021-09-29 PROCEDURE — 3288F PR FALLS RISK ASSESSMENT DOCUMENTED: ICD-10-PCS | Mod: HCNC,CPTII,S$GLB, | Performed by: INTERNAL MEDICINE

## 2021-09-29 PROCEDURE — 1126F PR PAIN SEVERITY QUANTIFIED, NO PAIN PRESENT: ICD-10-PCS | Mod: HCNC,CPTII,S$GLB, | Performed by: INTERNAL MEDICINE

## 2021-09-29 PROCEDURE — 1101F PT FALLS ASSESS-DOCD LE1/YR: CPT | Mod: HCNC,CPTII,S$GLB, | Performed by: INTERNAL MEDICINE

## 2021-09-29 PROCEDURE — 1159F MED LIST DOCD IN RCRD: CPT | Mod: HCNC,CPTII,S$GLB, | Performed by: INTERNAL MEDICINE

## 2021-09-29 PROCEDURE — 1160F RVW MEDS BY RX/DR IN RCRD: CPT | Mod: HCNC,CPTII,S$GLB, | Performed by: INTERNAL MEDICINE

## 2021-09-29 PROCEDURE — 1101F PR PT FALLS ASSESS DOC 0-1 FALLS W/OUT INJ PAST YR: ICD-10-PCS | Mod: HCNC,CPTII,S$GLB, | Performed by: INTERNAL MEDICINE

## 2021-09-29 PROCEDURE — 1126F PR PAIN SEVERITY QUANTIFIED, NO PAIN PRESENT: ICD-10-PCS | Mod: HCNC,CPTII,S$GLB, | Performed by: STUDENT IN AN ORGANIZED HEALTH CARE EDUCATION/TRAINING PROGRAM

## 2021-09-29 PROCEDURE — 99214 OFFICE O/P EST MOD 30 MIN: CPT | Mod: HCNC,S$GLB,, | Performed by: INTERNAL MEDICINE

## 2021-09-29 PROCEDURE — 99214 PR OFFICE/OUTPT VISIT, EST, LEVL IV, 30-39 MIN: ICD-10-PCS | Mod: HCNC,S$GLB,, | Performed by: INTERNAL MEDICINE

## 2021-09-29 PROCEDURE — 99999 PR PBB SHADOW E&M-EST. PATIENT-LVL III: CPT | Mod: PBBFAC,HCNC,, | Performed by: STUDENT IN AN ORGANIZED HEALTH CARE EDUCATION/TRAINING PROGRAM

## 2021-09-29 PROCEDURE — 99204 OFFICE O/P NEW MOD 45 MIN: CPT | Mod: HCNC,S$GLB,, | Performed by: STUDENT IN AN ORGANIZED HEALTH CARE EDUCATION/TRAINING PROGRAM

## 2021-09-29 PROCEDURE — 1159F PR MEDICATION LIST DOCUMENTED IN MEDICAL RECORD: ICD-10-PCS | Mod: HCNC,CPTII,S$GLB, | Performed by: INTERNAL MEDICINE

## 2021-09-29 PROCEDURE — 3288F PR FALLS RISK ASSESSMENT DOCUMENTED: ICD-10-PCS | Mod: HCNC,CPTII,S$GLB, | Performed by: STUDENT IN AN ORGANIZED HEALTH CARE EDUCATION/TRAINING PROGRAM

## 2021-09-29 PROCEDURE — 99204 PR OFFICE/OUTPT VISIT, NEW, LEVL IV, 45-59 MIN: ICD-10-PCS | Mod: HCNC,S$GLB,, | Performed by: STUDENT IN AN ORGANIZED HEALTH CARE EDUCATION/TRAINING PROGRAM

## 2021-09-29 PROCEDURE — 99999 PR PBB SHADOW E&M-EST. PATIENT-LVL III: ICD-10-PCS | Mod: PBBFAC,HCNC,, | Performed by: INTERNAL MEDICINE

## 2021-09-29 PROCEDURE — 1159F MED LIST DOCD IN RCRD: CPT | Mod: HCNC,CPTII,S$GLB, | Performed by: STUDENT IN AN ORGANIZED HEALTH CARE EDUCATION/TRAINING PROGRAM

## 2021-09-29 PROCEDURE — 3288F FALL RISK ASSESSMENT DOCD: CPT | Mod: HCNC,CPTII,S$GLB, | Performed by: INTERNAL MEDICINE

## 2021-09-29 PROCEDURE — 1101F PT FALLS ASSESS-DOCD LE1/YR: CPT | Mod: HCNC,CPTII,S$GLB, | Performed by: STUDENT IN AN ORGANIZED HEALTH CARE EDUCATION/TRAINING PROGRAM

## 2021-09-29 PROCEDURE — 3288F FALL RISK ASSESSMENT DOCD: CPT | Mod: HCNC,CPTII,S$GLB, | Performed by: STUDENT IN AN ORGANIZED HEALTH CARE EDUCATION/TRAINING PROGRAM

## 2021-10-07 ENCOUNTER — TELEPHONE (OUTPATIENT)
Dept: REHABILITATION | Facility: HOSPITAL | Age: 86
End: 2021-10-07

## 2021-11-01 ENCOUNTER — TELEPHONE (OUTPATIENT)
Dept: INTERNAL MEDICINE | Facility: CLINIC | Age: 86
End: 2021-11-01
Payer: MEDICARE

## 2022-01-01 ENCOUNTER — HOSPITAL ENCOUNTER (OUTPATIENT)
Facility: HOSPITAL | Age: 87
Discharge: HOME | End: 2022-01-03
Attending: EMERGENCY MEDICINE | Admitting: FAMILY MEDICINE
Payer: MEDICARE

## 2022-01-01 DIAGNOSIS — I21.4 NSTEMI (NON-ST ELEVATED MYOCARDIAL INFARCTION): ICD-10-CM

## 2022-01-01 DIAGNOSIS — R07.9 CHEST PAIN: ICD-10-CM

## 2022-01-01 DIAGNOSIS — R00.1 BRADYCARDIA: ICD-10-CM

## 2022-01-01 DIAGNOSIS — R79.89 ELEVATED TROPONIN: Primary | ICD-10-CM

## 2022-01-01 DIAGNOSIS — R06.02 SOB (SHORTNESS OF BREATH): ICD-10-CM

## 2022-01-01 LAB
ALBUMIN SERPL BCP-MCNC: 3.5 G/DL (ref 3.5–5.2)
ALP SERPL-CCNC: 59 U/L (ref 55–135)
ALT SERPL W/O P-5'-P-CCNC: 14 U/L (ref 10–44)
ANION GAP SERPL CALC-SCNC: 15 MMOL/L (ref 8–16)
AST SERPL-CCNC: 19 U/L (ref 10–40)
BASOPHILS # BLD AUTO: 0.02 K/UL (ref 0–0.2)
BASOPHILS NFR BLD: 0.3 % (ref 0–1.9)
BILIRUB SERPL-MCNC: 0.3 MG/DL (ref 0.1–1)
BILIRUB UR QL STRIP: NEGATIVE
BNP SERPL-MCNC: 27 PG/ML (ref 0–99)
BUN SERPL-MCNC: 36 MG/DL (ref 8–23)
CALCIUM SERPL-MCNC: 9.3 MG/DL (ref 8.7–10.5)
CHLORIDE SERPL-SCNC: 112 MMOL/L (ref 95–110)
CLARITY UR: CLEAR
CO2 SERPL-SCNC: 19 MMOL/L (ref 23–29)
COLOR UR: YELLOW
CREAT SERPL-MCNC: 0.8 MG/DL (ref 0.5–1.4)
DIFFERENTIAL METHOD: ABNORMAL
EOSINOPHIL # BLD AUTO: 0.1 K/UL (ref 0–0.5)
EOSINOPHIL NFR BLD: 1.2 % (ref 0–8)
ERYTHROCYTE [DISTWIDTH] IN BLOOD BY AUTOMATED COUNT: 14.8 % (ref 11.5–14.5)
EST. GFR  (AFRICAN AMERICAN): >60 ML/MIN/1.73 M^2
EST. GFR  (NON AFRICAN AMERICAN): >60 ML/MIN/1.73 M^2
GLUCOSE SERPL-MCNC: 87 MG/DL (ref 70–110)
GLUCOSE UR QL STRIP: NEGATIVE
HCT VFR BLD AUTO: 34.1 % (ref 40–54)
HGB BLD-MCNC: 11 G/DL (ref 14–18)
HGB UR QL STRIP: NEGATIVE
HYALINE CASTS #/AREA URNS LPF: 1 /LPF
IMM GRANULOCYTES # BLD AUTO: 0.02 K/UL (ref 0–0.04)
IMM GRANULOCYTES NFR BLD AUTO: 0.3 % (ref 0–0.5)
KETONES UR QL STRIP: NEGATIVE
LEUKOCYTE ESTERASE UR QL STRIP: ABNORMAL
LYMPHOCYTES # BLD AUTO: 0.8 K/UL (ref 1–4.8)
LYMPHOCYTES NFR BLD: 13 % (ref 18–48)
MCH RBC QN AUTO: 25.9 PG (ref 27–31)
MCHC RBC AUTO-ENTMCNC: 32.3 G/DL (ref 32–36)
MCV RBC AUTO: 80 FL (ref 82–98)
MICROSCOPIC COMMENT: ABNORMAL
MONOCYTES # BLD AUTO: 0.7 K/UL (ref 0.3–1)
MONOCYTES NFR BLD: 11 % (ref 4–15)
NEUTROPHILS # BLD AUTO: 4.8 K/UL (ref 1.8–7.7)
NEUTROPHILS NFR BLD: 74.2 % (ref 38–73)
NITRITE UR QL STRIP: NEGATIVE
NRBC BLD-RTO: 0 /100 WBC
PH UR STRIP: 5 [PH] (ref 5–8)
PLATELET # BLD AUTO: 181 K/UL (ref 150–450)
PMV BLD AUTO: 11.1 FL (ref 9.2–12.9)
POTASSIUM SERPL-SCNC: 4.2 MMOL/L (ref 3.5–5.1)
PROT SERPL-MCNC: 6.6 G/DL (ref 6–8.4)
PROT UR QL STRIP: ABNORMAL
RBC # BLD AUTO: 4.25 M/UL (ref 4.6–6.2)
RBC #/AREA URNS HPF: 1 /HPF (ref 0–4)
SARS-COV-2 RDRP RESP QL NAA+PROBE: NEGATIVE
SODIUM SERPL-SCNC: 146 MMOL/L (ref 136–145)
SP GR UR STRIP: 1.02 (ref 1–1.03)
TROPONIN I SERPL DL<=0.01 NG/ML-MCNC: 0.04 NG/ML (ref 0–0.03)
URN SPEC COLLECT METH UR: ABNORMAL
UROBILINOGEN UR STRIP-ACNC: ABNORMAL EU/DL
WBC # BLD AUTO: 6.48 K/UL (ref 3.9–12.7)
WBC #/AREA URNS HPF: 13 /HPF (ref 0–5)

## 2022-01-01 PROCEDURE — U0002 COVID-19 LAB TEST NON-CDC: HCPCS | Mod: HCNC | Performed by: EMERGENCY MEDICINE

## 2022-01-01 PROCEDURE — 84484 ASSAY OF TROPONIN QUANT: CPT | Mod: HCNC | Performed by: EMERGENCY MEDICINE

## 2022-01-01 PROCEDURE — 25000003 PHARM REV CODE 250: Mod: HCNC | Performed by: NURSE PRACTITIONER

## 2022-01-01 PROCEDURE — 99285 EMERGENCY DEPT VISIT HI MDM: CPT | Mod: 25,HCNC,CS

## 2022-01-01 PROCEDURE — 87086 URINE CULTURE/COLONY COUNT: CPT | Mod: HCNC | Performed by: NURSE PRACTITIONER

## 2022-01-01 PROCEDURE — 85025 COMPLETE CBC W/AUTO DIFF WBC: CPT | Mod: HCNC | Performed by: EMERGENCY MEDICINE

## 2022-01-01 PROCEDURE — 96372 THER/PROPH/DIAG INJ SC/IM: CPT | Mod: 59,HCNC

## 2022-01-01 PROCEDURE — 83880 ASSAY OF NATRIURETIC PEPTIDE: CPT | Mod: HCNC | Performed by: EMERGENCY MEDICINE

## 2022-01-01 PROCEDURE — 81000 URINALYSIS NONAUTO W/SCOPE: CPT | Mod: HCNC | Performed by: NURSE PRACTITIONER

## 2022-01-01 PROCEDURE — 80053 COMPREHEN METABOLIC PANEL: CPT | Mod: HCNC | Performed by: EMERGENCY MEDICINE

## 2022-01-01 PROCEDURE — 63600175 PHARM REV CODE 636 W HCPCS: Mod: HCNC | Performed by: NURSE PRACTITIONER

## 2022-01-01 PROCEDURE — A4216 STERILE WATER/SALINE, 10 ML: HCPCS | Mod: HCNC | Performed by: NURSE PRACTITIONER

## 2022-01-01 PROCEDURE — G0378 HOSPITAL OBSERVATION PER HR: HCPCS | Mod: HCNC

## 2022-01-01 RX ORDER — NALOXONE HCL 0.4 MG/ML
0.02 VIAL (ML) INJECTION
Status: DISCONTINUED | OUTPATIENT
Start: 2022-01-01 | End: 2022-01-03 | Stop reason: HOSPADM

## 2022-01-01 RX ORDER — TALC
6 POWDER (GRAM) TOPICAL NIGHTLY PRN
Status: DISCONTINUED | OUTPATIENT
Start: 2022-01-01 | End: 2022-01-03 | Stop reason: HOSPADM

## 2022-01-01 RX ORDER — DIAZEPAM 5 MG/1
5 TABLET ORAL EVERY 6 HOURS PRN
Status: DISCONTINUED | OUTPATIENT
Start: 2022-01-01 | End: 2022-01-03 | Stop reason: HOSPADM

## 2022-01-01 RX ORDER — PRAVASTATIN SODIUM 20 MG/1
20 TABLET ORAL NIGHTLY
Status: DISCONTINUED | OUTPATIENT
Start: 2022-01-02 | End: 2022-01-03 | Stop reason: HOSPADM

## 2022-01-01 RX ORDER — IPRATROPIUM BROMIDE AND ALBUTEROL SULFATE 2.5; .5 MG/3ML; MG/3ML
3 SOLUTION RESPIRATORY (INHALATION) EVERY 4 HOURS PRN
Status: DISCONTINUED | OUTPATIENT
Start: 2022-01-01 | End: 2022-01-03 | Stop reason: HOSPADM

## 2022-01-01 RX ORDER — IBUPROFEN 200 MG
24 TABLET ORAL
Status: DISCONTINUED | OUTPATIENT
Start: 2022-01-01 | End: 2022-01-03 | Stop reason: HOSPADM

## 2022-01-01 RX ORDER — NIFEDIPINE 30 MG/1
90 TABLET, EXTENDED RELEASE ORAL DAILY
Refills: 3 | Status: DISCONTINUED | OUTPATIENT
Start: 2022-01-02 | End: 2022-01-03 | Stop reason: HOSPADM

## 2022-01-01 RX ORDER — LOSARTAN POTASSIUM 50 MG/1
100 TABLET ORAL DAILY
Status: DISCONTINUED | OUTPATIENT
Start: 2022-01-02 | End: 2022-01-03 | Stop reason: HOSPADM

## 2022-01-01 RX ORDER — IBUPROFEN 200 MG
16 TABLET ORAL
Status: DISCONTINUED | OUTPATIENT
Start: 2022-01-01 | End: 2022-01-03 | Stop reason: HOSPADM

## 2022-01-01 RX ORDER — BARIUM SULFATE 0.81 G/G
POWDER, FOR SUSPENSION ORAL
COMMUNITY
Start: 2021-09-21 | End: 2022-01-01 | Stop reason: CLARIF

## 2022-01-01 RX ORDER — POLYETHYLENE GLYCOL 3350 17 G/17G
17 POWDER, FOR SOLUTION ORAL DAILY PRN
Status: DISCONTINUED | OUTPATIENT
Start: 2022-01-01 | End: 2022-01-03 | Stop reason: HOSPADM

## 2022-01-01 RX ORDER — FOLIC ACID 1 MG/1
1 TABLET ORAL DAILY
Status: DISCONTINUED | OUTPATIENT
Start: 2022-01-02 | End: 2022-01-03 | Stop reason: HOSPADM

## 2022-01-01 RX ORDER — ENOXAPARIN SODIUM 100 MG/ML
40 INJECTION SUBCUTANEOUS EVERY 24 HOURS
Status: DISCONTINUED | OUTPATIENT
Start: 2022-01-01 | End: 2022-01-03 | Stop reason: HOSPADM

## 2022-01-01 RX ORDER — ONDANSETRON 2 MG/ML
4 INJECTION INTRAMUSCULAR; INTRAVENOUS EVERY 8 HOURS PRN
Status: DISCONTINUED | OUTPATIENT
Start: 2022-01-01 | End: 2022-01-03 | Stop reason: HOSPADM

## 2022-01-01 RX ORDER — SODIUM CHLORIDE 0.9 % (FLUSH) 0.9 %
10 SYRINGE (ML) INJECTION EVERY 8 HOURS
Status: DISCONTINUED | OUTPATIENT
Start: 2022-01-01 | End: 2022-01-03 | Stop reason: HOSPADM

## 2022-01-01 RX ORDER — PANTOPRAZOLE SODIUM 40 MG/1
40 TABLET, DELAYED RELEASE ORAL DAILY
Status: DISCONTINUED | OUTPATIENT
Start: 2022-01-02 | End: 2022-01-03 | Stop reason: HOSPADM

## 2022-01-01 RX ORDER — ACETAMINOPHEN 325 MG/1
650 TABLET ORAL EVERY 8 HOURS PRN
Status: DISCONTINUED | OUTPATIENT
Start: 2022-01-01 | End: 2022-01-03 | Stop reason: HOSPADM

## 2022-01-01 RX ORDER — GLUCAGON 1 MG
1 KIT INJECTION
Status: DISCONTINUED | OUTPATIENT
Start: 2022-01-01 | End: 2022-01-03 | Stop reason: HOSPADM

## 2022-01-01 RX ADMIN — Medication 10 ML: at 10:01

## 2022-01-01 RX ADMIN — ENOXAPARIN SODIUM 40 MG: 100 INJECTION SUBCUTANEOUS at 09:01

## 2022-01-02 LAB
ANION GAP SERPL CALC-SCNC: 15 MMOL/L (ref 8–16)
BASOPHILS # BLD AUTO: 0.01 K/UL (ref 0–0.2)
BASOPHILS NFR BLD: 0.2 % (ref 0–1.9)
BUN SERPL-MCNC: 31 MG/DL (ref 8–23)
CALCIUM SERPL-MCNC: 9.7 MG/DL (ref 8.7–10.5)
CHLORIDE SERPL-SCNC: 111 MMOL/L (ref 95–110)
CO2 SERPL-SCNC: 21 MMOL/L (ref 23–29)
CREAT SERPL-MCNC: 0.8 MG/DL (ref 0.5–1.4)
DIFFERENTIAL METHOD: ABNORMAL
EOSINOPHIL # BLD AUTO: 0.1 K/UL (ref 0–0.5)
EOSINOPHIL NFR BLD: 1.9 % (ref 0–8)
ERYTHROCYTE [DISTWIDTH] IN BLOOD BY AUTOMATED COUNT: 14.6 % (ref 11.5–14.5)
EST. GFR  (AFRICAN AMERICAN): >60 ML/MIN/1.73 M^2
EST. GFR  (NON AFRICAN AMERICAN): >60 ML/MIN/1.73 M^2
ESTIMATED AVG GLUCOSE: 137 MG/DL (ref 68–131)
GLUCOSE SERPL-MCNC: 108 MG/DL (ref 70–110)
HBA1C MFR BLD: 6.4 % (ref 4–5.6)
HCT VFR BLD AUTO: 37.9 % (ref 40–54)
HGB BLD-MCNC: 12.2 G/DL (ref 14–18)
IMM GRANULOCYTES # BLD AUTO: 0.02 K/UL (ref 0–0.04)
IMM GRANULOCYTES NFR BLD AUTO: 0.3 % (ref 0–0.5)
LYMPHOCYTES # BLD AUTO: 1.1 K/UL (ref 1–4.8)
LYMPHOCYTES NFR BLD: 18.1 % (ref 18–48)
MAGNESIUM SERPL-MCNC: 1.9 MG/DL (ref 1.6–2.6)
MCH RBC QN AUTO: 25.8 PG (ref 27–31)
MCHC RBC AUTO-ENTMCNC: 32.2 G/DL (ref 32–36)
MCV RBC AUTO: 80 FL (ref 82–98)
MONOCYTES # BLD AUTO: 0.6 K/UL (ref 0.3–1)
MONOCYTES NFR BLD: 10.5 % (ref 4–15)
NEUTROPHILS # BLD AUTO: 4 K/UL (ref 1.8–7.7)
NEUTROPHILS NFR BLD: 69 % (ref 38–73)
NRBC BLD-RTO: 0 /100 WBC
PLATELET # BLD AUTO: 196 K/UL (ref 150–450)
PMV BLD AUTO: 10.9 FL (ref 9.2–12.9)
POTASSIUM SERPL-SCNC: 4.5 MMOL/L (ref 3.5–5.1)
RBC # BLD AUTO: 4.73 M/UL (ref 4.6–6.2)
SODIUM SERPL-SCNC: 147 MMOL/L (ref 136–145)
TROPONIN I SERPL DL<=0.01 NG/ML-MCNC: 0.03 NG/ML (ref 0–0.03)
TROPONIN I SERPL DL<=0.01 NG/ML-MCNC: 0.05 NG/ML (ref 0–0.03)
TROPONIN I SERPL DL<=0.01 NG/ML-MCNC: 0.38 NG/ML (ref 0–0.03)
TROPONIN I SERPL DL<=0.01 NG/ML-MCNC: 0.56 NG/ML (ref 0–0.03)
WBC # BLD AUTO: 5.81 K/UL (ref 3.9–12.7)

## 2022-01-02 PROCEDURE — 25000003 PHARM REV CODE 250: Mod: HCNC | Performed by: NURSE PRACTITIONER

## 2022-01-02 PROCEDURE — 99220 PR INITIAL OBSERVATION CARE,LEVL III: ICD-10-PCS | Mod: HCNC,,, | Performed by: INTERNAL MEDICINE

## 2022-01-02 PROCEDURE — 80048 BASIC METABOLIC PNL TOTAL CA: CPT | Mod: HCNC | Performed by: NURSE PRACTITIONER

## 2022-01-02 PROCEDURE — A4216 STERILE WATER/SALINE, 10 ML: HCPCS | Mod: HCNC | Performed by: NURSE PRACTITIONER

## 2022-01-02 PROCEDURE — 99900035 HC TECH TIME PER 15 MIN (STAT): Mod: HCNC

## 2022-01-02 PROCEDURE — 83735 ASSAY OF MAGNESIUM: CPT | Mod: HCNC | Performed by: NURSE PRACTITIONER

## 2022-01-02 PROCEDURE — 84484 ASSAY OF TROPONIN QUANT: CPT | Mod: HCNC | Performed by: NURSE PRACTITIONER

## 2022-01-02 PROCEDURE — 96372 THER/PROPH/DIAG INJ SC/IM: CPT | Mod: 59,HCNC

## 2022-01-02 PROCEDURE — 85025 COMPLETE CBC W/AUTO DIFF WBC: CPT | Mod: HCNC | Performed by: NURSE PRACTITIONER

## 2022-01-02 PROCEDURE — G0378 HOSPITAL OBSERVATION PER HR: HCPCS | Mod: HCNC

## 2022-01-02 PROCEDURE — 63600175 PHARM REV CODE 636 W HCPCS: Mod: HCNC | Performed by: NURSE PRACTITIONER

## 2022-01-02 PROCEDURE — 96374 THER/PROPH/DIAG INJ IV PUSH: CPT | Mod: HCNC

## 2022-01-02 PROCEDURE — 84484 ASSAY OF TROPONIN QUANT: CPT | Mod: 91,HCNC | Performed by: FAMILY MEDICINE

## 2022-01-02 PROCEDURE — 83036 HEMOGLOBIN GLYCOSYLATED A1C: CPT | Mod: HCNC | Performed by: NURSE PRACTITIONER

## 2022-01-02 PROCEDURE — 99220 PR INITIAL OBSERVATION CARE,LEVL III: CPT | Mod: HCNC,,, | Performed by: INTERNAL MEDICINE

## 2022-01-02 PROCEDURE — 84484 ASSAY OF TROPONIN QUANT: CPT | Mod: 91,HCNC | Performed by: NURSE PRACTITIONER

## 2022-01-02 PROCEDURE — 94761 N-INVAS EAR/PLS OXIMETRY MLT: CPT | Mod: HCNC

## 2022-01-02 RX ORDER — GUAIFENESIN 100 MG/5ML
200 SOLUTION ORAL EVERY 4 HOURS PRN
Status: DISCONTINUED | OUTPATIENT
Start: 2022-01-02 | End: 2022-01-02

## 2022-01-02 RX ORDER — GUAIFENESIN 100 MG/5ML
200 SOLUTION ORAL EVERY 4 HOURS PRN
Status: DISCONTINUED | OUTPATIENT
Start: 2022-01-02 | End: 2022-01-03 | Stop reason: HOSPADM

## 2022-01-02 RX ORDER — COLCHICINE 0.6 MG/1
0.6 TABLET ORAL 2 TIMES DAILY PRN
Status: ON HOLD | COMMUNITY
End: 2023-07-14 | Stop reason: SDUPTHER

## 2022-01-02 RX ADMIN — NIFEDIPINE 90 MG: 30 TABLET, FILM COATED, EXTENDED RELEASE ORAL at 08:01

## 2022-01-02 RX ADMIN — THERA TABS 1 TABLET: TAB at 08:01

## 2022-01-02 RX ADMIN — Medication 10 ML: at 02:01

## 2022-01-02 RX ADMIN — PRAVASTATIN SODIUM 20 MG: 20 TABLET ORAL at 08:01

## 2022-01-02 RX ADMIN — FOLIC ACID 1 MG: 1 TABLET ORAL at 08:01

## 2022-01-02 RX ADMIN — ONDANSETRON 4 MG: 2 INJECTION INTRAMUSCULAR; INTRAVENOUS at 11:01

## 2022-01-02 RX ADMIN — ENOXAPARIN SODIUM 40 MG: 100 INJECTION SUBCUTANEOUS at 06:01

## 2022-01-02 RX ADMIN — Medication 10 ML: at 06:01

## 2022-01-02 RX ADMIN — LOSARTAN POTASSIUM 100 MG: 50 TABLET, FILM COATED ORAL at 08:01

## 2022-01-02 RX ADMIN — PANTOPRAZOLE SODIUM 40 MG: 40 TABLET, DELAYED RELEASE ORAL at 08:01

## 2022-01-02 RX ADMIN — Medication 10 ML: at 10:01

## 2022-01-02 NOTE — ED NOTES
Pt resting in bed with eyes closed. No complaints noted at this time. Pending admit bed. Will continue to monitor.

## 2022-01-02 NOTE — ASSESSMENT & PLAN NOTE
Presented with c/o SOB and cough for 2 months  Reports taking Abx without relief  Smokes about 1 PPD for past 71 years  Drinks pint of whiskey every weekend  Hx of HLD, DM, HTN  Troponin 0.036  CXR with no airspace disease. Changes suggestive of prior asbestos exposure.  EKG without any ST or T wave abnormalities  -trend troponin 0.036 > 0.029 > 0.051  -ECHO pending   -consult cardiology-appreciates rec's

## 2022-01-02 NOTE — ASSESSMENT & PLAN NOTE
Reported mild anemia that is not iron deficient and followed by hematology  Hgb currenlty stable  monitor

## 2022-01-02 NOTE — ASSESSMENT & PLAN NOTE
Dangers of cigarette smoking were reviewed with patient in detail for 3 minutes and patient was encouraged to quit. Nicotine replacement options were discussed with the patient and they decided to not engage in cessation at this time.

## 2022-01-02 NOTE — HPI
Julio Benites is an 87-year-old male with a past history of gastric carcinoma, he had 2/3 of his stomach removed 70 years ago and states he had no chemotherapy or radiation therapy, current smoker of 71 years, current use of alcohol, HTN, HLD, DM, GERD who presented to the emergency department with shortness of breath and a productive cough for the past 2 months. He reports his daughters told him to come to the ED because they were noticing he was SOB. Patient denies use of antibiotics. He states she is fine at rest and SOB on exertion, for example ambulating to the mailbox then he feels the need to sit. He denies any chest pain, Nausea, vomiting, diarrhea, fever, or chills.     In ED troponin 0.036, EKG without ST or T wave abnormalities. CXR with no airspace disease. Changes suggestive of prior asbestos exposure. Admitted to [facility] for further care.

## 2022-01-02 NOTE — ED NOTES
Pt. Is sleeping without distress presently. Respirations unlabored, no visible distress. Call light in reach, side rails up. Urinal and glasses and pt. Phone in reach.

## 2022-01-02 NOTE — ED NOTES
Received report from CRYSTAL Brewer. Assumed care of patient. 86yo M resting in bed with eyes closed, arousable to verbal stimuli. Pt oriented x4, GCS 15. Pt denies all complaints at this time, asking about home medications. Pt bradycardic in 40s while sleeping - on CM. Pending admit bed.    Respirations even, unlabored, symmetrical chest expansion, BBS clear. Abd soft, nondistended, nontender, bowel sounds present. Full ROM and sensation bilaterally. Afebrile, VSS, bed locked in lowest position, side rails up x2. Will continue to monitor.

## 2022-01-02 NOTE — PHARMACY MED REC
"Admission Medication History     The home medication history was taken by Maisha Jordan CPhT.    Medication history obtained from,Patient verified    You may go to "Admission" then "Reconcile Home Medications" tabs to review and/or act upon these items.      The home medication list has been updated by the Pharmacy department.    Please read ALL comments highlighted in yellow.    Please address this information as you see fit.     Feel free to contact us if you have any questions or require assistance.          Maisha Jordan CPhT.  Ext [phone]                  .          "

## 2022-01-02 NOTE — ASSESSMENT & PLAN NOTE
Reports drinking a pint of whiskey on weekends  Denies history of withdrawal  -CIWA  -PRN diazepam  -Seizure precautions  -Aspiration precautions  -PRN lorazepam

## 2022-01-02 NOTE — ASSESSMENT & PLAN NOTE
Hemoglobin A1C   Date Value Ref Range Status   08/24/2021 6.8 (H) 4.0 - 5.6 % Final   Per PCP patient is at goal, well controlled with diet. Was previously on metformin but was stopped as A1c was at goal. He is not checking sugars. 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  -A1c pending

## 2022-01-02 NOTE — PROGRESS NOTES
[facility]  Progress Note    Patient Name: Julio Benites  MRN: [id number]  Patient Class: OP- Observation   Admission Date: 1/1/2022  Length of Stay: 0 days  Attending Physician: Sanna Fallon*  Primary Care Provider: Gregorio Jensen MD        Subjective:     Principal Problem:NSTEMI (non-ST elevated myocardial infarction)        HPI:  Julio Benites is an 87-year-old male with a past history of gastric carcinoma, he had 2/3 of his stomach removed 70 years ago and states he had no chemotherapy or radiation therapy, current smoker of 71 years, current use of alcohol, HTN, HLD, DM, GERD who presented to the emergency department with shortness of breath and a productive cough for the past 2 months. He reports his daughters told him to come to the ED because they were noticing he was SOB. Patient denies use of antibiotics. He states she is fine at rest and SOB on exertion, for example ambulating to the mailbox then he feels the need to sit. He denies any chest pain, Nausea, vomiting, diarrhea, fever, or chills.     In ED troponin 0.036, EKG without ST or T wave abnormalities. CXR with no airspace disease. Changes suggestive of prior asbestos exposure. Admitted to [facility] for further care.      Overview/Hospital Course:  No notes on file    Interval History: awake and alert,     Troponin 0.036 > 0.029 > 0.051-   Oxygen saturation is 96% on RA  TTE pending   Appreciates cardiology rec's    Review of Systems   Constitutional: Negative for activity change, appetite change, chills, diaphoresis, fatigue, fever and unexpected weight change.   Eyes: Negative for visual disturbance.   Respiratory: Positive for cough and shortness of breath. Negative for wheezing.    Cardiovascular: Negative for chest pain, palpitations and leg swelling.   Gastrointestinal: Negative for diarrhea, nausea and vomiting.   Genitourinary: Negative for difficulty urinating.    Musculoskeletal: Negative for myalgias.   Skin: Negative for color change.   Neurological: Negative for dizziness, weakness, light-headedness and headaches.     Objective:     Vital Signs (Most Recent):  Temp: 97.7 °F (36.5 °C) (01/02/22 0715)  Pulse: (!) 46 (01/02/22 0802)  Resp: (!) 21 (01/02/22 0802)  BP: (!) 156/77 (01/02/22 0802)  SpO2: 96 % (01/02/22 0802) Vital Signs (24h Range):  Temp:  [97.7 °F (36.5 °C)-98.1 °F (36.7 °C)] 97.7 °F (36.5 °C)  Pulse:  [41-97] 46  Resp:  [15-29] 21  SpO2:  [93 %-100 %] 96 %  BP: (138-209)/() 156/77     Weight: 45.4 kg (100 lb)  Body mass index is 14.77 kg/m².    Intake/Output Summary (Last 24 hours) at 1/2/2022 1025  Last data filed at 1/2/2022 0335  Gross per 24 hour   Intake --   Output 250 ml   Net -250 ml      Physical Exam  Vitals and nursing note reviewed.   Constitutional:       General: He is not in acute distress.     Appearance: He is cachectic. He is not ill-appearing.   HENT:      Head: Normocephalic and atraumatic.   Cardiovascular:      Rate and Rhythm: Normal rate and regular rhythm.      Pulses: Normal pulses.      Heart sounds: Normal heart sounds. No murmur heard.      Pulmonary:      Effort: Pulmonary effort is normal. Tachypnea present. No respiratory distress.      Breath sounds: Normal breath sounds.   Abdominal:      General: Bowel sounds are normal. There is no distension.      Palpations: Abdomen is soft.      Tenderness: There is no abdominal tenderness.   Musculoskeletal:         General: No swelling. Normal range of motion.      Cervical back: Normal range of motion.   Skin:     General: Skin is warm and dry.      Capillary Refill: Capillary refill takes 2 to 3 seconds.   Neurological:      General: No focal deficit present.      Mental Status: He is alert and oriented to person, place, and time. Mental status is at baseline.   Psychiatric:         Mood and Affect: Mood normal.         Behavior: Behavior normal.         Significant Labs:    A1C:   Recent Labs   Lab 08/24/21  1507 01/02/22  0223   HGBA1C 6.8* 6.4*     ABGs: No results for input(s): PH, PCO2, HCO3, POCSATURATED, BE, TOTALHB, COHB, METHB, O2HB, POCFIO2, PO2 in the last 48 hours.  Blood Culture: No results for input(s): LABBLOO in the last 48 hours.  CBC:   Recent Labs   Lab 01/01/22 1804 01/02/22 0223   WBC 6.48 5.81   HGB 11.0* 12.2*   HCT 34.1* 37.9*    196     CMP:   Recent Labs   Lab 01/01/22 1804 01/02/22 0223   * 147*   K 4.2 4.5   * 111*   CO2 19* 21*   GLU 87 108   BUN 36* 31*   CREATININE 0.8 0.8   CALCIUM 9.3 9.7   PROT 6.6  --    ALBUMIN 3.5  --    BILITOT 0.3  --    ALKPHOS 59  --    AST 19  --    ALT 14  --    ANIONGAP 15 15   EGFRNONAA >60 >60     Lactic Acid: No results for input(s): LACTATE in the last 48 hours.  Lipase: No results for input(s): LIPASE in the last 48 hours.  Lipid Panel: No results for input(s): CHOL, HDL, LDLCALC, TRIG, CHOLHDL in the last 48 hours.  Magnesium:   Recent Labs   Lab 01/02/22 0223   MG 1.9     Troponin:   Recent Labs   Lab 01/01/22  1804 01/02/22  0021 01/02/22 0224   TROPONINI 0.036* 0.029* 0.051*     TSH:   Recent Labs   Lab 08/24/21  1507   TSH 1.267     Urine Culture: No results for input(s): LABURIN in the last 48 hours.  Urine Studies:   Recent Labs   Lab 01/01/22  2000   COLORU Yellow   APPEARANCEUA Clear   PHUR 5.0   SPECGRAV 1.025   PROTEINUA Trace*   GLUCUA Negative   KETONESU Negative   BILIRUBINUA Negative   OCCULTUA Negative   NITRITE Negative   UROBILINOGEN 2.0-3.0*   LEUKOCYTESUR 2+*   RBCUA 1   WBCUA 13*   HYALINECASTS 1       Significant Imaging: I have reviewed all pertinent imaging results/findings within the past 24 hours.      Assessment/Plan:      * NSTEMI (non-ST elevated myocardial infarction)  Presented with c/o SOB and cough for 2 months  Reports taking Abx without relief  Smokes about 1 PPD for past 71 years  Drinks pint of whiskey every weekend  Hx of HLD, DM, HTN  Troponin 0.036  CXR  with no airspace disease. Changes suggestive of prior asbestos exposure.  EKG without any ST or T wave abnormalities  -trend troponin 0.036 > 0.029 > 0.051  -ECHO pending   -consult cardiology-appreciates rec's     Gastroesophageal reflux disease without esophagitis  Continue home protonix      Asbestosis  Per PCP on 9/29/21 CT was complete which showed findings of probable aspiration and asbestosis (former occupation as ship builder). Was tx with augmentin and he felt this helped. Swallow study showed some aspiration. Has seen speech therapy and GI. He has pulm referral pending.   -aspiration precautions      Ectatic thoracic aorta  stable      Type 2 diabetes mellitus with microalbuminuria, without long-term current use of insulin  Hemoglobin A1C   Date Value Ref Range Status   08/24/2021 6.8 (H) 4.0 - 5.6 % Final   Per PCP patient is at goal, well controlled with diet. Was previously on metformin but was stopped as A1c was at goal. He is not checking sugars. 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  -A1c pending    Anemia  Reported mild anemia that is not iron deficient and followed by hematology  Hgb veronaenlty stable  monitor      Idiopathic chronic gout of multiple sites without tophus  Was taking Colchicine but gout has been controlled with dietary means      Atherosclerosis of aorta  Noted on CXR 2012  Continue with home statin      History of gastric cancer  Per PCP on 9/29/21: He has seen heme-onc h/o gastric CA. His weight is low and down 10lbs in last year. He reports appetite isn't great. Oncology said he did not need further f/u with them. He saw GI for EGD/c-scope but no concerning issues found except a few small colon polyps and a few small non-bleeding nodules in stomach (was biopsied but path not available to me).      Tobacco abuse  Dangers of cigarette smoking were reviewed with patient in detail for 3 minutes and patient was encouraged to quit. Nicotine  replacement options were discussed with the patient and they decided to not engage in cessation at this time.    Alcohol use  Reports drinking a pint of whiskey on weekends  Denies history of withdrawal  -CIWA  -PRN diazepam  -Seizure precautions  -Aspiration precautions  -PRN lorazepam       Mixed hyperlipidemia  Continue home statin      Essential hypertension, benign  Hypertensive on admission  Takes Nifedipine and losartan at home  Resume and monitor        VTE Risk Mitigation (From admission, onward)         Ordered     enoxaparin injection 40 mg  Daily         01/01/22 1949     IP VTE HIGH RISK PATIENT  Once         01/01/22 1948     Place sequential compression device  Until discontinued         01/01/22 1949                Discharge Planning   KYLAH:      Code Status: Full Code   Is the patient medically ready for discharge?:     Reason for patient still in hospital (select all that apply): Patient trending condition                     Sanna Fallon MD  Department of Hospital Medicine   [facility] - Emergency Dept

## 2022-01-02 NOTE — SUBJECTIVE & OBJECTIVE
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     Nicoderm cq [nicotine] Rash       No current facility-administered medications on file prior to encounter.     Current Outpatient Medications on File Prior to Encounter   Medication Sig    colchicine (COLCRYS) 0.6 mg tablet Take 1 tablet (0.6 mg total) by mouth 2 (two) times daily as needed (gout attack). (Patient not taking: Reported on 9/29/2021)    losartan (COZAAR) 100 MG tablet TAKE 1 TABLET BY MOUTH EVERY DAY    NIFEdipine (ADALAT CC) 90 MG TbSR TAKE 1 TABLET (90 MG TOTAL) BY MOUTH ONCE DAILY.    pantoprazole (PROTONIX) 40 MG tablet Take 1 tablet (40 mg total) by mouth once daily.    pravastatin (PRAVACHOL) 20 MG tablet Take 1 tablet (20 mg total) by mouth nightly.    [DISCONTINUED] VARIBAR THIN LIQUID 81 % (w/w) Powd oral powder      Family History     Problem Relation (Age of Onset)    Breast cancer Sister, Daughter    Lung cancer Mother    No Known Problems Father, Son, Maternal Grandmother, Maternal Grandfather, Paternal Grandmother, Paternal Grandfather        Tobacco Use    Smoking status:  Current Every Day Smoker     Packs/day: 0.25     Types: Cigarettes    Smokeless tobacco: Never Used    Tobacco comment: patient states that he started smoking cigarettes again: half a pack of cigarettes over 4 or 5 days   Substance and Sexual Activity    Alcohol use: Yes     Comment: drinks scotch 2X weekly    Drug use: No    Sexual activity: Not Currently     Partners: Female     Review of Systems   Constitutional: Negative for activity change, appetite change, chills, diaphoresis, fatigue, fever and unexpected weight change.   HENT: Negative for trouble swallowing.    Eyes: Negative for visual disturbance.   Respiratory: Positive for cough and shortness of breath. Negative for wheezing.    Cardiovascular: Negative for chest pain, palpitations and leg swelling.   Gastrointestinal: Negative for diarrhea, nausea and vomiting.   Genitourinary: Negative for difficulty urinating.   Musculoskeletal: Negative for myalgias.   Skin: Negative for color change.   Neurological: Negative for dizziness, weakness, light-headedness and headaches.     Objective:     Vital Signs (Most Recent):  Temp: 97.9 °F (36.6 °C) (01/01/22 2119)  Pulse: 68 (01/01/22 2119)  Resp: (!) 22 (01/01/22 2119)  BP: (!) 188/92 (01/01/22 2103)  SpO2: 99 % (01/01/22 2119) Vital Signs (24h Range):  Temp:  [97.7 °F (36.5 °C)-97.9 °F (36.6 °C)] 97.9 °F (36.6 °C)  Pulse:  [58-97] 68  Resp:  [19-29] 22  SpO2:  [96 %-100 %] 99 %  BP: (138-209)/() 188/92     Weight: 45.4 kg (100 lb)  Body mass index is 14.77 kg/m².    Physical Exam  Vitals and nursing note reviewed.   Constitutional:       General: He is not in acute distress.     Appearance: He is cachectic. He is not ill-appearing.   HENT:      Head: Normocephalic and atraumatic.      Mouth/Throat:      Mouth: Mucous membranes are moist.   Eyes:      Extraocular Movements: Extraocular movements intact.      Pupils: Pupils are equal, round, and reactive to light.   Cardiovascular:      Rate and  Rhythm: Normal rate and regular rhythm.      Pulses: Normal pulses.      Heart sounds: Normal heart sounds. No murmur heard.      Pulmonary:      Effort: Pulmonary effort is normal. Tachypnea present. No respiratory distress.      Breath sounds: Normal breath sounds.   Abdominal:      General: Bowel sounds are normal. There is no distension.      Palpations: Abdomen is soft.      Tenderness: There is no abdominal tenderness.   Musculoskeletal:         General: No swelling. Normal range of motion.      Cervical back: Normal range of motion.   Skin:     General: Skin is warm and dry.      Capillary Refill: Capillary refill takes 2 to 3 seconds.   Neurological:      General: No focal deficit present.      Mental Status: He is alert and oriented to person, place, and time. Mental status is at baseline.   Psychiatric:         Mood and Affect: Mood normal.         Behavior: Behavior normal.         Thought Content: Thought content normal.         Judgment: Judgment normal.           CRANIAL NERVES     CN III, IV, VI   Pupils are equal, round, and reactive to light.       Significant Labs:   All pertinent labs within the past 24 hours have been reviewed.  Recent Lab Results       01/01/22  2000   01/01/22  1816   01/01/22  1804        Albumin     3.5       Alkaline Phosphatase     59       ALT     14       Anion Gap     15       Appearance, UA Clear           AST     19       Baso #     0.02       Basophil %     0.3       Bilirubin (UA) Negative           BILIRUBIN TOTAL     0.3  Comment: For infants and newborns, interpretation of results should be based  on gestational age, weight and in agreement with clinical  observations.    Premature Infant recommended reference ranges:  Up to 24 hours.............<8.0 mg/dL  Up to 48 hours............<12.0 mg/dL  3-5 days..................<15.0 mg/dL  6-29 days.................<15.0 mg/dL         BNP     27  Comment: Values of less than 100 pg/ml are consistent with non-CHF  populations.       BUN     36       Calcium     9.3       Chloride     112       CO2     19       Color, UA Yellow           Creatinine     0.8       Differential Method     Automated       eGFR if      >60       eGFR if non      >60  Comment: Calculation used to obtain the estimated glomerular filtration  rate (eGFR) is the CKD-EPI equation.          Eos #     0.1       Eosinophil %     1.2       Glucose     87       Glucose, UA Negative           Gran # (ANC)     4.8       Gran %     74.2       Hematocrit     34.1       Hemoglobin     11.0       Hyaline Casts, UA 1           Immature Grans (Abs)     0.02  Comment: Mild elevation in immature granulocytes is non specific and   can be seen in a variety of conditions including stress response,   acute inflammation, trauma and pregnancy. Correlation with other   laboratory and clinical findings is essential.         Immature Granulocytes     0.3       Ketones, UA Negative           Leukocytes, UA 2+           Lymph #     0.8       Lymph %     13.0       MCH     25.9       MCHC     32.3       MCV     80       Microscopic Comment SEE COMMENT  Comment: Other formed elements not mentioned in the report are not   present in the microscopic examination.              Mono #     0.7       Mono %     11.0       MPV     11.1       NITRITE UA Negative           nRBC     0       Occult Blood UA Negative           pH, UA 5.0           Platelets     181       Potassium     4.2       PROTEIN TOTAL     6.6       Protein, UA Trace  Comment: Recommend a 24 hour urine protein or a urine   protein/creatinine ratio if globulin induced proteinuria is  clinically suspected.             RBC     4.25       RBC, UA 1           RDW     14.8       SARS-CoV-2 RNA, Amplification, Qual   Negative  Comment: This test utilizes isothermal nucleic acid amplification   technology to detect the SARS-CoV-2 RdRp nucleic acid segment.   The analytical sensitivity (limit of  detection) is 125 genome   equivalents/mL.     A POSITIVE result implies infection with the SARS-CoV-2 virus;  the patient is presumed to be contagious.    A NEGATIVE result means that SARS-CoV-2 nucleic acids are not  present above the limit of detection. A NEGATIVE result should be   treated as presumptive. It does not rule out the possibility of   COVID-19 and should not be the sole basis for treatment decisions.   If COVID-19 is strongly suspected based on clinical and exposure   history, re-testing using an alternate molecular assay should be   considered.       This test is only for use under the Food and Drug   Administration s Emergency Use Authorization (EUA).   Commercial kits are provided by [employer].   Performance characteristics of the EUA have been independently  verified by [facility] Department of  Pathology and Laboratory Medicine.   _________________________________________________________________  The ID NOW COVID-19 Letter of Authorization, along with the   authorized Fact Sheet for Healthcare Providers, the authorized Fact  Sheet for Patients, and authorized labeling are available on the FDA   website:  www.fda.gov/MedicalDevices/Safety/EmergencySituations/epa619953.htm           Sodium     146       Specific [place], UA 1.025           Specimen UA Urine, Clean Catch           Troponin I     0.036  Comment: The reference interval for Troponin I represents the 99th percentile   cutoff   for our facility and is consistent with 3rd generation assay   performance.         UROBILINOGEN UA 2.0-3.0           WBC, UA 13           WBC     6.48             Significant Imaging: I have reviewed all pertinent imaging results/findings within the past 24 hours.

## 2022-01-02 NOTE — H&P
[facility] Emergency [facility] Medicine  History & Physical    Patient Name: Julio Benites  MRN: [id number]  Patient Class: OP- Observation  Admission Date: 1/1/2022  Attending Physician: Sanna Fallon*   Primary Care Provider: Gregorio Jensen MD         Patient information was obtained from patient, past medical records and ER records.     Subjective:     Principal Problem:NSTEMI (non-ST elevated myocardial infarction)    Chief Complaint:   Chief Complaint   Patient presents with    Cough     Cough x 4 months. Denies improvement with oral abx. + dyspnea with coughing spells.         HPI: Julio Benites is an 87-year-old male with a past history of gastric carcinoma, he had 2/3 of his stomach removed 70 years ago and states he had no chemotherapy or radiation therapy, current smoker of 71 years, current use of alcohol, HTN, HLD, DM, GERD who presented to the emergency department with shortness of breath and a productive cough for the past 2 months. He reports his daughters told him to come to the ED because they were noticing he was SOB. Patient denies use of antibiotics. He states she is fine at rest and SOB on exertion, for example ambulating to the mailbox then he feels the need to sit. He denies any chest pain, Nausea, vomiting, diarrhea, fever, or chills.     In ED troponin 0.036, EKG without ST or T wave abnormalities. CXR with no airspace disease. Changes suggestive of prior asbestos exposure. Admitted to [facility] for further care.      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     Nicoderm cq [nicotine] Rash       No current facility-administered medications on file prior to encounter.     Current Outpatient Medications on File Prior to Encounter   Medication Sig    colchicine (COLCRYS) 0.6 mg tablet Take 1 tablet (0.6 mg total) by mouth 2 (two) times daily as needed (gout attack). (Patient not taking: Reported on 9/29/2021)    losartan (COZAAR) 100 MG tablet TAKE 1 TABLET BY MOUTH EVERY DAY    NIFEdipine (ADALAT CC) 90 MG TbSR TAKE 1 TABLET (90 MG TOTAL) BY MOUTH ONCE DAILY.    pantoprazole (PROTONIX) 40 MG tablet Take 1 tablet (40 mg total) by mouth once daily.    pravastatin (PRAVACHOL) 20 MG tablet Take 1 tablet (20 mg total) by mouth nightly.    [DISCONTINUED] VARIBAR THIN LIQUID 81 % (w/w) Powd oral powder      Family History     Problem Relation (Age of Onset)    Breast cancer Sister, Daughter    Lung cancer Mother    No Known Problems Father, Son, Maternal Grandmother, Maternal Grandfather, Paternal Grandmother, Paternal Grandfather        Tobacco Use    Smoking status: Current Every Day Smoker     Packs/day: 0.25     Types: Cigarettes    Smokeless tobacco: Never Used    Tobacco comment: patient states that he started smoking cigarettes again: half a pack of cigarettes over 4 or 5 days   Substance and Sexual Activity    Alcohol use: Yes     Comment: drinks scotch 2X weekly    Drug use: No    Sexual activity: Not Currently     Partners: Female     Review of Systems   Constitutional: Negative for  activity change, appetite change, chills, diaphoresis, fatigue, fever and unexpected weight change.   HENT: Negative for trouble swallowing.    Eyes: Negative for visual disturbance.   Respiratory: Positive for cough and shortness of breath. Negative for wheezing.    Cardiovascular: Negative for chest pain, palpitations and leg swelling.   Gastrointestinal: Negative for diarrhea, nausea and vomiting.   Genitourinary: Negative for difficulty urinating.   Musculoskeletal: Negative for myalgias.   Skin: Negative for color change.   Neurological: Negative for dizziness, weakness, light-headedness and headaches.     Objective:     Vital Signs (Most Recent):  Temp: 97.9 °F (36.6 °C) (01/01/22 2119)  Pulse: 68 (01/01/22 2119)  Resp: (!) 22 (01/01/22 2119)  BP: (!) 188/92 (01/01/22 2103)  SpO2: 99 % (01/01/22 2119) Vital Signs (24h Range):  Temp:  [97.7 °F (36.5 °C)-97.9 °F (36.6 °C)] 97.9 °F (36.6 °C)  Pulse:  [58-97] 68  Resp:  [19-29] 22  SpO2:  [96 %-100 %] 99 %  BP: (138-209)/() 188/92     Weight: 45.4 kg (100 lb)  Body mass index is 14.77 kg/m².    Physical Exam  Vitals and nursing note reviewed.   Constitutional:       General: He is not in acute distress.     Appearance: He is cachectic. He is not ill-appearing.   HENT:      Head: Normocephalic and atraumatic.      Mouth/Throat:      Mouth: Mucous membranes are moist.   Eyes:      Extraocular Movements: Extraocular movements intact.      Pupils: Pupils are equal, round, and reactive to light.   Cardiovascular:      Rate and Rhythm: Normal rate and regular rhythm.      Pulses: Normal pulses.      Heart sounds: Normal heart sounds. No murmur heard.      Pulmonary:      Effort: Pulmonary effort is normal. Tachypnea present. No respiratory distress.      Breath sounds: Normal breath sounds.   Abdominal:      General: Bowel sounds are normal. There is no distension.      Palpations: Abdomen is soft.      Tenderness: There is no abdominal tenderness.    Musculoskeletal:         General: No swelling. Normal range of motion.      Cervical back: Normal range of motion.   Skin:     General: Skin is warm and dry.      Capillary Refill: Capillary refill takes 2 to 3 seconds.   Neurological:      General: No focal deficit present.      Mental Status: He is alert and oriented to person, place, and time. Mental status is at baseline.   Psychiatric:         Mood and Affect: Mood normal.         Behavior: Behavior normal.         Thought Content: Thought content normal.         Judgment: Judgment normal.           CRANIAL NERVES     CN III, IV, VI   Pupils are equal, round, and reactive to light.       Significant Labs:   All pertinent labs within the past 24 hours have been reviewed.  Recent Lab Results       01/01/22 2000 01/01/22  1816   01/01/22  1804        Albumin     3.5       Alkaline Phosphatase     59       ALT     14       Anion Gap     15       Appearance, UA Clear           AST     19       Baso #     0.02       Basophil %     0.3       Bilirubin (UA) Negative           BILIRUBIN TOTAL     0.3  Comment: For infants and newborns, interpretation of results should be based  on gestational age, weight and in agreement with clinical  observations.    Premature Infant recommended reference ranges:  Up to 24 hours.............<8.0 mg/dL  Up to 48 hours............<12.0 mg/dL  3-5 days..................<15.0 mg/dL  6-29 days.................<15.0 mg/dL         BNP     27  Comment: Values of less than 100 pg/ml are consistent with non-CHF populations.       BUN     36       Calcium     9.3       Chloride     112       CO2     19       Color, UA Yellow           Creatinine     0.8       Differential Method     Automated       eGFR if      >60       eGFR if non      >60  Comment: Calculation used to obtain the estimated glomerular filtration  rate (eGFR) is the CKD-EPI equation.          Eos #     0.1       Eosinophil %     1.2        Glucose     87       Glucose, UA Negative           Gran # (ANC)     4.8       Gran %     74.2       Hematocrit     34.1       Hemoglobin     11.0       Hyaline Casts, UA 1           Immature Grans (Abs)     0.02  Comment: Mild elevation in immature granulocytes is non specific and   can be seen in a variety of conditions including stress response,   acute inflammation, trauma and pregnancy. Correlation with other   laboratory and clinical findings is essential.         Immature Granulocytes     0.3       Ketones, UA Negative           Leukocytes, UA 2+           Lymph #     0.8       Lymph %     13.0       MCH     25.9       MCHC     32.3       MCV     80       Microscopic Comment SEE COMMENT  Comment: Other formed elements not mentioned in the report are not   present in the microscopic examination.              Mono #     0.7       Mono %     11.0       MPV     11.1       NITRITE UA Negative           nRBC     0       Occult Blood UA Negative           pH, UA 5.0           Platelets     181       Potassium     4.2       PROTEIN TOTAL     6.6       Protein, UA Trace  Comment: Recommend a 24 hour urine protein or a urine   protein/creatinine ratio if globulin induced proteinuria is  clinically suspected.             RBC     4.25       RBC, UA 1           RDW     14.8       SARS-CoV-2 RNA, Amplification, Qual   Negative  Comment: This test utilizes isothermal nucleic acid amplification   technology to detect the SARS-CoV-2 RdRp nucleic acid segment.   The analytical sensitivity (limit of detection) is 125 genome   equivalents/mL.     A POSITIVE result implies infection with the SARS-CoV-2 virus;  the patient is presumed to be contagious.    A NEGATIVE result means that SARS-CoV-2 nucleic acids are not  present above the limit of detection. A NEGATIVE result should be   treated as presumptive. It does not rule out the possibility of   COVID-19 and should not be the sole basis for treatment decisions.   If COVID-19 is  strongly suspected based on clinical and exposure   history, re-testing using an alternate molecular assay should be   considered.       This test is only for use under the Food and Drug   Administration s Emergency Use Authorization (EUA).   Commercial kits are provided by [employer].   Performance characteristics of the EUA have been independently  verified by [facility] Department of  Pathology and Laboratory Medicine.   _________________________________________________________________  The ID NOW COVID-19 Letter of Authorization, along with the   authorized Fact Sheet for Healthcare Providers, the authorized Fact  Sheet for Patients, and authorized labeling are available on the FDA   website:  www.fda.gov/MedicalDevices/Safety/EmergencySituations/vcm775676.htm           Sodium     146       Specific [place], UA 1.025           Specimen UA Urine, Clean Catch           Troponin I     0.036  Comment: The reference interval for Troponin I represents the 99th percentile   cutoff   for our facility and is consistent with 3rd generation assay   performance.         UROBILINOGEN UA 2.0-3.0           WBC, UA 13           WBC     6.48             Significant Imaging: I have reviewed all pertinent imaging results/findings within the past 24 hours.    Assessment/Plan:     * NSTEMI (non-ST elevated myocardial infarction)  Presented with c/o SOB and cough for 2 months  Reports taking Abx without relief  Smokes about 1 PPD for past 71 years  Drinks pint of whiskey every weekend  Hx of HLD, DM, HTN  Troponin 0.036  CXR with no airspace disease. Changes suggestive of prior asbestos exposure.  EKG without any ST or T wave abnormalities  -trend troponin  -ECHO  -consult cardiology    Gastroesophageal reflux disease without esophagitis  Continue home protonix      Asbestosis  Per PCP on 9/29/21 CT was complete which showed findings of probable aspiration and asbestosis (former occupation as ship builder). Was tx  with augmentin and he felt this helped. Swallow study showed some aspiration. Has seen speech therapy and GI. He has pulm referral pending.   -aspiration precautions      Ectatic thoracic aorta  stable      Type 2 diabetes mellitus with microalbuminuria, without long-term current use of insulin  Hemoglobin A1C   Date Value Ref Range Status   08/24/2021 6.8 (H) 4.0 - 5.6 % Final   Per PCP patient is at goal, well controlled with diet. Was previously on metformin but was stopped as A1c was at goal. He is not checking sugars. 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  -A1c pending    Anemia  Reported mild anemia that is not iron deficient and followed by hematology  Hgb currenlty stable  monitor      Idiopathic chronic gout of multiple sites without tophus  Was taking Colchicine but gout has been controlled with dietary means      Atherosclerosis of aorta  Noted on CXR 2012  Continue with home statin      History of gastric cancer  Per PCP on 9/29/21: He has seen heme-onc h/o gastric CA. His weight is low and down 10lbs in last year. He reports appetite isn't great. Oncology said he did not need further f/u with them. He saw GI for EGD/c-scope but no concerning issues found except a few small colon polyps and a few small non-bleeding nodules in stomach (was biopsied but path not available to me).      Tobacco abuse  Dangers of cigarette smoking were reviewed with patient in detail for 3 minutes and patient was encouraged to quit. Nicotine replacement options were discussed with the patient and they decided to not engage in cessation at this time.    Alcohol use  Reports drinking a pint of whiskey on weekends  Denies history of withdrawal  -CIWA  -PRN diazepam  -Seizure precautions  -Aspiration precautions  -PRN lorazepam       Mixed hyperlipidemia  Continue home statin      Essential hypertension, benign  Hypertensive on admission  Takes Nifedipine and losartan at home  Resume and  monitor        VTE Risk Mitigation (From admission, onward)         Ordered     enoxaparin injection 40 mg  Daily         01/01/22 1949     IP VTE HIGH RISK PATIENT  Once         01/01/22 1948     Place sequential compression device  Until discontinued         01/01/22 1949                   Hillary Miller NP  Department of Hospital Medicine   [facility] - Emergency Dept

## 2022-01-02 NOTE — ED PROVIDER NOTES
Encounter Date: 1/1/2022       History     Chief Complaint   Patient presents with    Cough     Cough x 4 months. Denies improvement with oral abx. + dyspnea with coughing spells.      The patient is an 87-year-old male who presents to the emergency department with shortness of breath and a productive cough for the past 2 months.  The patient states he took a round of antibiotics and had no improvement.  The patient has been having dyspnea on exertion as well.  He denies any chest pain.  Patient has a remote history of gastric carcinoma, he had 2/3 of his stomach removed 70 years ago and states he had no chemotherapy or radiation therapy.  The patient continues to smoke approximately 1 pack a day for the past 71 years.  He also drinks pt of whiskey each weekend (he drinks on Fridays and Sundays).        Review of patient's allergies indicates:   Allergen Reactions    Lisinopril Rash     Patient reports history of rash with previous lisinopril use.    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     Past Surgical History:   Procedure Laterality Date    CATARACT EXTRACTION Right     EYE SURGERY      INFECTED SKIN DEBRIDEMENT  8/2012    spider bite with surgery to right forearm    [place] KAVIN,SWALLOW FUNCTION,CINE/VIDEO RECORD  9/22/2021         subtotal gastrectomy  2013     Family History   Problem Relation Age of Onset    Lung cancer Mother     Breast cancer Sister     No Known Problems Father     Breast cancer Daughter     No Known Problems Son     No Known Problems Maternal Grandmother     No Known  Problems Maternal Grandfather     No Known Problems Paternal Grandmother     No Known Problems Paternal Grandfather      Social History     Tobacco Use    Smoking status: Current Every Day Smoker     Packs/day: 0.25     Types: Cigarettes    Smokeless tobacco: Never Used    Tobacco comment: patient states that he started smoking cigarettes again: half a pack of cigarettes over 4 or 5 days   Substance Use Topics    Alcohol use: Yes     Comment: drinks scotch 2X weekly    Drug use: No     Review of Systems   Constitutional: Negative for activity change, appetite change, chills, fatigue, fever and unexpected weight change.   HENT: Negative for sore throat.    Respiratory: Positive for cough and shortness of breath. Negative for chest tightness.    Cardiovascular: Negative for chest pain, palpitations and leg swelling.   Gastrointestinal: Negative for nausea.   Genitourinary: Negative for dysuria.   Musculoskeletal: Negative for back pain.   Skin: Negative for rash.   Neurological: Negative for weakness.   Hematological: Does not bruise/bleed easily.       Physical Exam     Initial Vitals   BP Pulse Resp Temp SpO2   01/01/22 1605 01/01/22 1559 01/01/22 1600 01/01/22 1600 01/01/22 1559   138/80 68 19 97.7 °F (36.5 °C) 100 %      MAP       --                Physical Exam    Nursing note and vitals reviewed.  Constitutional:   Very thin cachectic male   HENT:   Head: Normocephalic and atraumatic.   Eyes: EOM are normal. Pupils are equal, round, and reactive to light.   Neck: Neck supple.   Normal range of motion.  Cardiovascular: Normal rate, regular rhythm, normal heart sounds and intact distal pulses.   Pulmonary/Chest: He has rhonchi (Scattered diffuse rhonchi).   Abdominal: Abdomen is soft. Bowel sounds are normal. He exhibits no distension. There is no abdominal tenderness. There is no rebound and no guarding.   Musculoskeletal:         General: No edema. Normal range of motion.      Cervical back: Normal range  of motion and neck supple.     Neurological: He is alert and oriented to person, place, and time.   Skin: Skin is warm and dry.   Psychiatric: He has a normal mood and affect. His behavior is normal. Judgment and thought content normal.         ED Course   Procedures  Labs Reviewed   CBC W/ AUTO DIFFERENTIAL - Abnormal; Notable for the following components:       Result Value    RBC 4.25 (*)     Hemoglobin 11.0 (*)     Hematocrit 34.1 (*)     MCV 80 (*)     MCH 25.9 (*)     RDW 14.8 (*)     Lymph # 0.8 (*)     Gran % 74.2 (*)     Lymph % 13.0 (*)     All other components within normal limits   COMPREHENSIVE METABOLIC PANEL - Abnormal; Notable for the following components:    Sodium 146 (*)     Chloride 112 (*)     CO2 19 (*)     BUN 36 (*)     All other components within normal limits   TROPONIN I - Abnormal; Notable for the following components:    Troponin I 0.036 (*)     All other components within normal limits   B-TYPE NATRIURETIC PEPTIDE   SARS-COV-2 RNA AMPLIFICATION, QUAL     EKG Readings: (Independently Interpreted)   Initial: 1820. Rhythm: Normal Sinus Rhythm. Heart Rate: 62. Ectopy: No Ectopy. Conduction: 1st Degree AV Block. ST Segments: Normal ST Segments. T Waves: Normal.       Imaging Results          X-Ray Chest AP Portable (Final result)  Result time 01/01/22 18:18:09    Final result by Andrea Escobar MD (01/01/22 18:18:09)                 Impression:      No airspace disease.    Changes suggestive of prior asbestos exposure.      Electronically signed by: Andrea Escobar MD  Date:    01/01/2022  Time:    18:18             Narrative:    EXAMINATION:  XR CHEST AP PORTABLE    CLINICAL HISTORY:  cough;    TECHNIQUE:  Single frontal view of the chest was performed.    COMPARISON:  12/03/2019.    FINDINGS:  Monitoring EKG leads are present.  The trachea is unremarkable.  There are calcifications of the aortic knob.  The cardiomediastinal silhouette is within normal limits.  There is no evidence of free air  beneath the hemidiaphragms.  There are no pleural effusions.  There is unchanged appearance of bilateral pleural plaques.  There is no evidence of a pneumothorax.  There is no evidence of pneumomediastinum.  No airspace disease identified.  There are degenerative changes in the osseous structures.                                 Medications - No data to display              ED Course as of 01/01/22 1921   Sat Jan 01, 2022   1920 Will consult [facility] hospitalist for admission for elevated troponin and shortness of breath [ST]      ED Course User Index  [ST] Claudia Beck MD             Clinical Impression:   Final diagnoses:  [R06.02] SOB (shortness of breath)  [R77.8] Elevated troponin (Primary)          ED Disposition Condition    Observation               Claudia Beck MD  01/01/22 [id number]

## 2022-01-02 NOTE — ED NOTES
Pt. Is awake, pt. Void 250cc clear yellow urine per urinal (some urine spilled onto gown and bed. Assisted with linen change. Pt. Is able to stand and ambulate to bedside chair. Pt. Provided extra pillows and blanket. Repositioned in bed for comfort. Pt. Requesting water-picture placed at bedside, tolerating well. Pt.is watching t.v. he states he feels ok and has no complaints.

## 2022-01-02 NOTE — ASSESSMENT & PLAN NOTE
Per PCP on 9/29/21: He has seen heme-onc h/o gastric CA. His weight is low and down 10lbs in last year. He reports appetite isn't great. Oncology said he did not need further f/u with them. He saw GI for EGD/c-scope but no concerning issues found except a few small colon polyps and a few small non-bleeding nodules in stomach (was biopsied but path not available to me).

## 2022-01-02 NOTE — ED NOTES
"Pt reporting nausea at this time. RN to bedside with PRN ondansetron. Pt states "it's because I took all those pills." Pt denies any other symptoms. VSS. Pending admit bed.  "

## 2022-01-02 NOTE — SUBJECTIVE & OBJECTIVE
Interval History: awake and alert,     Troponin 0.036 > 0.029 > 0.051-   Oxygen saturation is 96% on RA  TTE pending   Appreciates cardiology rec's    Review of Systems   Constitutional: Negative for activity change, appetite change, chills, diaphoresis, fatigue, fever and unexpected weight change.   Eyes: Negative for visual disturbance.   Respiratory: Positive for cough and shortness of breath. Negative for wheezing.    Cardiovascular: Negative for chest pain, palpitations and leg swelling.   Gastrointestinal: Negative for diarrhea, nausea and vomiting.   Genitourinary: Negative for difficulty urinating.   Musculoskeletal: Negative for myalgias.   Skin: Negative for color change.   Neurological: Negative for dizziness, weakness, light-headedness and headaches.     Objective:     Vital Signs (Most Recent):  Temp: 97.7 °F (36.5 °C) (01/02/22 0715)  Pulse: (!) 46 (01/02/22 0802)  Resp: (!) 21 (01/02/22 0802)  BP: (!) 156/77 (01/02/22 0802)  SpO2: 96 % (01/02/22 0802) Vital Signs (24h Range):  Temp:  [97.7 °F (36.5 °C)-98.1 °F (36.7 °C)] 97.7 °F (36.5 °C)  Pulse:  [41-97] 46  Resp:  [15-29] 21  SpO2:  [93 %-100 %] 96 %  BP: (138-209)/() 156/77     Weight: 45.4 kg (100 lb)  Body mass index is 14.77 kg/m².    Intake/Output Summary (Last 24 hours) at 1/2/2022 1025  Last data filed at 1/2/2022 0335  Gross per 24 hour   Intake --   Output 250 ml   Net -250 ml      Physical Exam  Vitals and nursing note reviewed.   Constitutional:       General: He is not in acute distress.     Appearance: He is cachectic. He is not ill-appearing.   HENT:      Head: Normocephalic and atraumatic.   Cardiovascular:      Rate and Rhythm: Normal rate and regular rhythm.      Pulses: Normal pulses.      Heart sounds: Normal heart sounds. No murmur heard.      Pulmonary:      Effort: Pulmonary effort is normal. Tachypnea present. No respiratory distress.      Breath sounds: Normal breath sounds.   Abdominal:      General: Bowel sounds are  normal. There is no distension.      Palpations: Abdomen is soft.      Tenderness: There is no abdominal tenderness.   Musculoskeletal:         General: No swelling. Normal range of motion.      Cervical back: Normal range of motion.   Skin:     General: Skin is warm and dry.      Capillary Refill: Capillary refill takes 2 to 3 seconds.   Neurological:      General: No focal deficit present.      Mental Status: He is alert and oriented to person, place, and time. Mental status is at baseline.   Psychiatric:         Mood and Affect: Mood normal.         Behavior: Behavior normal.         Significant Labs:   A1C:   Recent Labs   Lab 08/24/21  1507 01/02/22 0223   HGBA1C 6.8* 6.4*     ABGs: No results for input(s): PH, PCO2, HCO3, POCSATURATED, BE, TOTALHB, COHB, METHB, O2HB, POCFIO2, PO2 in the last 48 hours.  Blood Culture: No results for input(s): LABBLOO in the last 48 hours.  CBC:   Recent Labs   Lab 01/01/22 1804 01/02/22 0223   WBC 6.48 5.81   HGB 11.0* 12.2*   HCT 34.1* 37.9*    196     CMP:   Recent Labs   Lab 01/01/22  1804 01/02/22 0223   * 147*   K 4.2 4.5   * 111*   CO2 19* 21*   GLU 87 108   BUN 36* 31*   CREATININE 0.8 0.8   CALCIUM 9.3 9.7   PROT 6.6  --    ALBUMIN 3.5  --    BILITOT 0.3  --    ALKPHOS 59  --    AST 19  --    ALT 14  --    ANIONGAP 15 15   EGFRNONAA >60 >60     Lactic Acid: No results for input(s): LACTATE in the last 48 hours.  Lipase: No results for input(s): LIPASE in the last 48 hours.  Lipid Panel: No results for input(s): CHOL, HDL, LDLCALC, TRIG, CHOLHDL in the last 48 hours.  Magnesium:   Recent Labs   Lab 01/02/22 0223   MG 1.9     Troponin:   Recent Labs   Lab 01/01/22  1804 01/02/22  0021 01/02/22 0224   TROPONINI 0.036* 0.029* 0.051*     TSH:   Recent Labs   Lab 08/24/21  1507   TSH 1.267     Urine Culture: No results for input(s): LABURIN in the last 48 hours.  Urine Studies:   Recent Labs   Lab 01/01/22 2000   COLORU Yellow   APPEARANCEUA Clear    PHUR 5.0   SPECGRAV 1.025   PROTEINUA Trace*   GLUCUA Negative   KETONESU Negative   BILIRUBINUA Negative   OCCULTUA Negative   NITRITE Negative   UROBILINOGEN 2.0-3.0*   LEUKOCYTESUR 2+*   RBCUA 1   WBCUA 13*   HYALINECASTS 1       Significant Imaging: I have reviewed all pertinent imaging results/findings within the past 24 hours.

## 2022-01-02 NOTE — ASSESSMENT & PLAN NOTE
Presented with c/o SOB and cough for 2 months  Reports taking Abx without relief  Smokes about 1 PPD for past 71 years  Drinks pint of whiskey every weekend  Hx of HLD, DM, HTN  Troponin 0.036  CXR with no airspace disease. Changes suggestive of prior asbestos exposure.  EKG without any ST or T wave abnormalities  -trend troponin  -ECHO  -consult cardiology

## 2022-01-02 NOTE — ASSESSMENT & PLAN NOTE
Per PCP on 9/29/21 CT was complete which showed findings of probable aspiration and asbestosis (former occupation as ship builder). Was tx with augmentin and he felt this helped. Swallow study showed some aspiration. Has seen speech therapy and GI. He has pulm referral pending.   -aspiration precautions

## 2022-01-02 NOTE — CONSULTS
[facility] - Emergency Dept  Cardiology  Consult Note    Patient Name: Julio Benites  MRN: [id number]  Admission Date: 1/1/2022  Hospital Length of Stay: 0 days  Code Status: Full Code   Attending Provider: Sanna Fallon*   Consulting Provider: Marsha Norris MD  Primary Care Physician: Gregorio Jensen MD  Principal Problem:NSTEMI (non-ST elevated myocardial infarction)    Patient information was obtained from patient, past medical records and ER records.     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 Reports on-going for 2 weeks. Denies CP. Reports h/o aspiration. Cardiology consulted for elevated troponin.    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     Nicoderm cq [nicotine] Rash       No current facility-administered medications on file prior to encounter.     Current  Outpatient Medications on File Prior to Encounter   Medication Sig    losartan (COZAAR) 100 MG tablet TAKE 1 TABLET BY MOUTH EVERY DAY    NIFEdipine (ADALAT CC) 90 MG TbSR TAKE 1 TABLET (90 MG TOTAL) BY MOUTH ONCE DAILY.    pantoprazole (PROTONIX) 40 MG tablet Take 1 tablet (40 mg total) by mouth once daily.    pravastatin (PRAVACHOL) 20 MG tablet Take 1 tablet (20 mg total) by mouth nightly.    colchicine (COLCRYS) 0.6 mg tablet Take 1 tablet (0.6 mg total) by mouth 2 (two) times daily as needed (gout attack). (Patient not taking: Reported on 9/29/2021)     Family History     Problem Relation (Age of Onset)    Breast cancer Sister, Daughter    Lung cancer Mother    No Known Problems Father, Son, Maternal Grandmother, Maternal Grandfather, Paternal Grandmother, Paternal Grandfather        Tobacco Use    Smoking status: Current Every Day Smoker     Packs/day: 0.25     Types: Cigarettes    Smokeless tobacco: Never Used    Tobacco comment: patient states that he started smoking cigarettes again: half a pack of cigarettes over 4 or 5 days   Substance and Sexual Activity    Alcohol use: Yes     Comment: drinks scotch 2X weekly    Drug use: No    Sexual activity: Not Currently     Partners: Female     Review of Systems   Constitutional: Negative for fever.   HENT: Negative for nosebleeds.    Cardiovascular: Negative for chest pain, irregular heartbeat, leg swelling, near-syncope, orthopnea, palpitations, paroxysmal nocturnal dyspnea and syncope.        As above   Respiratory: Positive for cough and shortness of breath. Negative for hemoptysis.    Hematologic/Lymphatic: Negative for bleeding problem.   Musculoskeletal: Negative for arthritis.   Gastrointestinal: Negative for hematochezia.   Genitourinary: Negative for hematuria.   Neurological: Negative for seizures.   Allergic/Immunologic: Negative for environmental allergies.     Objective:     Vital Signs (Most Recent):  Temp: 97.7 °F (36.5 °C) (01/02/22  0715)  Pulse: (!) 46 (01/02/22 0802)  Resp: (!) 21 (01/02/22 0802)  BP: (!) 156/77 (01/02/22 0802)  SpO2: 96 % (01/02/22 0802) Vital Signs (24h Range):  Temp:  [97.7 °F (36.5 °C)-98.1 °F (36.7 °C)] 97.7 °F (36.5 °C)  Pulse:  [41-97] 46  Resp:  [15-29] 21  SpO2:  [93 %-100 %] 96 %  BP: (138-209)/() 156/77     Weight: 45.4 kg (100 lb)  Body mass index is 14.77 kg/m².    SpO2: 96 %  O2 Device (Oxygen Therapy): room air      Intake/Output Summary (Last 24 hours) at 1/2/2022 0907  Last data filed at 1/2/2022 0335  Gross per 24 hour   Intake --   Output 250 ml   Net -250 ml       Lines/Drains/Airways     Peripheral Intravenous Line                 Peripheral IV - Single Lumen 01/01/22 1805 20 G Right Forearm <1 day                Physical Exam  Constitutional:       General: He is not in acute distress.     Appearance: He is well-developed. He is not diaphoretic.   HENT:      Head: Normocephalic.   Neck:      Vascular: No JVD.   Cardiovascular:      Rate and Rhythm: Normal rate and regular rhythm.      Heart sounds: No murmur heard.  No friction rub. No gallop.    Pulmonary:      Effort: Pulmonary effort is normal. No respiratory distress.      Breath sounds: Rhonchi present.   Abdominal:      Palpations: Abdomen is soft.      Tenderness: There is no abdominal tenderness.   Musculoskeletal:         General: No swelling.      Cervical back: Normal range of motion.   Skin:     General: Skin is warm.   Neurological:      Mental Status: He is alert.   Psychiatric:         Mood and Affect: Mood normal.         Significant Labs:   CMP   Recent Labs   Lab 01/01/22  1804 01/02/22  0223   * 147*   K 4.2 4.5   * 111*   CO2 19* 21*   GLU 87 108   BUN 36* 31*   CREATININE 0.8 0.8   CALCIUM 9.3 9.7   PROT 6.6  --    ALBUMIN 3.5  --    BILITOT 0.3  --    ALKPHOS 59  --    AST 19  --    ALT 14  --    ANIONGAP 15 15   ESTGFRAFRICA >60 >60   EGFRNONAA >60 >60   , CBC   Recent Labs   Lab 01/01/22  1804 01/01/22  180  01/02/22  0223   WBC 6.48  --  5.81   HGB 11.0*  --  12.2*   HCT 34.1*   < > 37.9*     --  196    < > = values in this interval not displayed.   , INR No results for input(s): INR, PROTIME in the last 48 hours. and Troponin   Recent Labs   Lab 01/01/22  1804 01/02/22  0021 01/02/22  0224   TROPONINI 0.036* 0.029* 0.051*       Assessment and Plan:     SOB, cough  - Unclear if due to aspiration. CXR personally reviewd with focal densities - read as pleural plaques (asbestos exposure)  - No evidence of volume overload  - Possible ACS? Symptoms on going for 2 weeks, with troponins positive but low range so far. Recommend echo. Continue to trend troponin until peaks    Troponin elevated  Trend until peaks    DM  Management per primary team      Active Diagnoses:    Diagnosis Date Noted POA    PRINCIPAL PROBLEM:  NSTEMI (non-ST elevated myocardial infarction) [I21.4] 01/01/2022 Yes    Ectatic thoracic aorta [I77.810] 09/28/2021 Yes    Asbestosis [J61] 09/28/2021 Yes    Gastroesophageal reflux disease without esophagitis [K21.9] 09/28/2021 Yes    Type 2 diabetes mellitus with microalbuminuria, without long-term current use of insulin [E11.29, R80.9] 06/30/2020 Yes     Chronic    Atherosclerosis of aorta [I70.0] 08/31/2015 Yes    Idiopathic chronic gout of multiple sites without tophus [M1A.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] 08/31/2015 Yes    History of gastric cancer [Z85.028] 05/01/2014 Yes    Alcohol use [Z72.89] 11/08/2012 Yes     Chronic    Tobacco abuse [Z72.0] 11/08/2012 Yes     Chronic    Essential hypertension, benign [I10] 11/07/2012 Yes     Chronic    Mixed hyperlipidemia [E78.2] 11/07/2012 Yes     Chronic    Anemia [D64.9] 08/25/2012 Yes      Problems Resolved During this Admission:       VTE Risk Mitigation (From admission, onward)         Ordered     enoxaparin injection 40 mg  Daily         01/01/22 1949     IP VTE HIGH RISK PATIENT  Once         01/01/22 1948     Place sequential compression device  Until  discontinued         01/01/22 1949                Thank you for your consult. I will follow-up with patient. Please contact us if you have any additional questions.    Marsha Norris MD  Cardiology   [facility] - Emergency Dept

## 2022-01-03 ENCOUNTER — TELEPHONE (OUTPATIENT)
Dept: INTERNAL MEDICINE | Facility: CLINIC | Age: 87
End: 2022-01-03
Payer: MEDICARE

## 2022-01-03 ENCOUNTER — CLINICAL SUPPORT (OUTPATIENT)
Dept: SMOKING CESSATION | Facility: CLINIC | Age: 87
End: 2022-01-03
Payer: COMMERCIAL

## 2022-01-03 VITALS
RESPIRATION RATE: 17 BRPM | OXYGEN SATURATION: 98 % | HEIGHT: 69 IN | BODY MASS INDEX: 12 KG/M2 | DIASTOLIC BLOOD PRESSURE: 68 MMHG | HEART RATE: 64 BPM | SYSTOLIC BLOOD PRESSURE: 136 MMHG | WEIGHT: 81 LBS | TEMPERATURE: 98 F

## 2022-01-03 DIAGNOSIS — F17.210 CIGARETTE SMOKER: Primary | ICD-10-CM

## 2022-01-03 LAB
ANION GAP SERPL CALC-SCNC: 12 MMOL/L (ref 8–16)
AV INDEX (PROSTH): 0.5
AV MEAN GRADIENT: 6 MMHG
AV PEAK GRADIENT: 13 MMHG
AV VALVE AREA: 1.53 CM2
AV VELOCITY RATIO: 0.51
BACTERIA UR CULT: NO GROWTH
BASOPHILS # BLD AUTO: 0.03 K/UL (ref 0–0.2)
BASOPHILS NFR BLD: 0.3 % (ref 0–1.9)
BSA FOR ECHO PROCEDURE: 1.34 M2
BUN SERPL-MCNC: 21 MG/DL (ref 8–23)
CALCIUM SERPL-MCNC: 9.4 MG/DL (ref 8.7–10.5)
CHLORIDE SERPL-SCNC: 110 MMOL/L (ref 95–110)
CO2 SERPL-SCNC: 22 MMOL/L (ref 23–29)
CREAT SERPL-MCNC: 0.7 MG/DL (ref 0.5–1.4)
CV ECHO LV RWT: 0.48 CM
DIFFERENTIAL METHOD: ABNORMAL
DOP CALC AO PEAK VEL: 1.77 M/S
DOP CALC AO VTI: 30.49 CM
DOP CALC LVOT AREA: 3 CM2
DOP CALC LVOT DIAMETER: 1.97 CM
DOP CALC LVOT PEAK VEL: 0.9 M/S
DOP CALC LVOT STROKE VOLUME: 46.52 CM3
DOP CALC MV VTI: 32.09 CM
DOP CALCLVOT PEAK VEL VTI: 15.27 CM
E WAVE DECELERATION TIME: 238.4 MSEC
E/A RATIO: 0.87
E/E' RATIO: 17.75 M/S
ECHO LV POSTERIOR WALL: 0.84 CM (ref 0.6–1.1)
EJECTION FRACTION: 40 %
EOSINOPHIL # BLD AUTO: 0 K/UL (ref 0–0.5)
EOSINOPHIL NFR BLD: 0.1 % (ref 0–8)
ERYTHROCYTE [DISTWIDTH] IN BLOOD BY AUTOMATED COUNT: 14.6 % (ref 11.5–14.5)
EST. GFR  (AFRICAN AMERICAN): >60 ML/MIN/1.73 M^2
EST. GFR  (NON AFRICAN AMERICAN): >60 ML/MIN/1.73 M^2
FRACTIONAL SHORTENING: 15 % (ref 28–44)
GLUCOSE SERPL-MCNC: 109 MG/DL (ref 70–110)
HCT VFR BLD AUTO: 34.7 % (ref 40–54)
HGB BLD-MCNC: 11.4 G/DL (ref 14–18)
IMM GRANULOCYTES # BLD AUTO: 0.03 K/UL (ref 0–0.04)
IMM GRANULOCYTES NFR BLD AUTO: 0.3 % (ref 0–0.5)
INTERVENTRICULAR SEPTUM: 0.85 CM (ref 0.6–1.1)
LEFT ATRIUM SIZE: 1.8 CM
LEFT INTERNAL DIMENSION IN SYSTOLE: 2.95 CM (ref 2.1–4)
LEFT VENTRICLE DIASTOLIC VOLUME INDEX: 35.23 ML/M2
LEFT VENTRICLE DIASTOLIC VOLUME: 49.68 ML
LEFT VENTRICLE MASS INDEX: 57 G/M2
LEFT VENTRICLE SYSTOLIC VOLUME INDEX: 23.9 ML/M2
LEFT VENTRICLE SYSTOLIC VOLUME: 33.68 ML
LEFT VENTRICULAR INTERNAL DIMENSION IN DIASTOLE: 3.47 CM (ref 3.5–6)
LEFT VENTRICULAR MASS: 80.14 G
LV LATERAL E/E' RATIO: 17.75 M/S
LV SEPTAL E/E' RATIO: 17.75 M/S
LYMPHOCYTES # BLD AUTO: 0.8 K/UL (ref 1–4.8)
LYMPHOCYTES NFR BLD: 7.9 % (ref 18–48)
MAGNESIUM SERPL-MCNC: 1.8 MG/DL (ref 1.6–2.6)
MCH RBC QN AUTO: 25.7 PG (ref 27–31)
MCHC RBC AUTO-ENTMCNC: 32.9 G/DL (ref 32–36)
MCV RBC AUTO: 78 FL (ref 82–98)
MONOCYTES # BLD AUTO: 0.9 K/UL (ref 0.3–1)
MONOCYTES NFR BLD: 8.8 % (ref 4–15)
MV A" WAVE DURATION": 14.08 MSEC
MV MEAN GRADIENT: 0 MMHG
MV PEAK A VEL: 0.82 M/S
MV PEAK E VEL: 0.71 M/S
MV PEAK GRADIENT: 3 MMHG
MV STENOSIS PRESSURE HALF TIME: 69.14 MS
MV VALVE AREA BY CONTINUITY EQUATION: 1.45 CM2
MV VALVE AREA P 1/2 METHOD: 3.18 CM2
NEUTROPHILS # BLD AUTO: 8.1 K/UL (ref 1.8–7.7)
NEUTROPHILS NFR BLD: 82.6 % (ref 38–73)
NRBC BLD-RTO: 0 /100 WBC
PISA TR MAX VEL: 2.6 M/S
PLATELET # BLD AUTO: 217 K/UL (ref 150–450)
PMV BLD AUTO: 11.8 FL (ref 9.2–12.9)
POCT GLUCOSE: 101 MG/DL (ref 70–110)
POCT GLUCOSE: 124 MG/DL (ref 70–110)
POTASSIUM SERPL-SCNC: 3.9 MMOL/L (ref 3.5–5.1)
PULM VEIN S/D RATIO: 1.73
PV PEAK D VEL: 0.26 M/S
PV PEAK S VEL: 0.45 M/S
PV PEAK VELOCITY: 0.87 CM/S
RA PRESSURE: 3 MMHG
RBC # BLD AUTO: 4.44 M/UL (ref 4.6–6.2)
RIGHT VENTRICULAR END-DIASTOLIC DIMENSION: 2.07 CM
SODIUM SERPL-SCNC: 144 MMOL/L (ref 136–145)
TDI LATERAL: 0.04 M/S
TDI SEPTAL: 0.04 M/S
TDI: 0.04 M/S
TR MAX PG: 27 MMHG
TROPONIN I SERPL DL<=0.01 NG/ML-MCNC: 0.41 NG/ML (ref 0–0.03)
TV REST PULMONARY ARTERY PRESSURE: 30 MMHG
WBC # BLD AUTO: 9.8 K/UL (ref 3.9–12.7)

## 2022-01-03 PROCEDURE — 99226 PR SUBSEQUENT OBSERVATION CARE,LEVEL III: CPT | Mod: HCNC,,, | Performed by: NURSE PRACTITIONER

## 2022-01-03 PROCEDURE — 90694 VACC AIIV4 NO PRSRV 0.5ML IM: CPT | Mod: HCNC | Performed by: FAMILY MEDICINE

## 2022-01-03 PROCEDURE — 36415 COLL VENOUS BLD VENIPUNCTURE: CPT | Mod: HCNC | Performed by: FAMILY MEDICINE

## 2022-01-03 PROCEDURE — 90471 IMMUNIZATION ADMIN: CPT | Mod: HCNC | Performed by: FAMILY MEDICINE

## 2022-01-03 PROCEDURE — 25000003 PHARM REV CODE 250: Mod: HCNC | Performed by: NURSE PRACTITIONER

## 2022-01-03 PROCEDURE — 99999 PR PBB SHADOW E&M-EST. PATIENT-LVL I: ICD-10-PCS | Mod: PBBFAC,,,

## 2022-01-03 PROCEDURE — 80048 BASIC METABOLIC PNL TOTAL CA: CPT | Mod: HCNC | Performed by: NURSE PRACTITIONER

## 2022-01-03 PROCEDURE — 63600175 PHARM REV CODE 636 W HCPCS: Mod: HCNC | Performed by: FAMILY MEDICINE

## 2022-01-03 PROCEDURE — 84484 ASSAY OF TROPONIN QUANT: CPT | Mod: HCNC | Performed by: FAMILY MEDICINE

## 2022-01-03 PROCEDURE — 99900035 HC TECH TIME PER 15 MIN (STAT): Mod: HCNC

## 2022-01-03 PROCEDURE — G0378 HOSPITAL OBSERVATION PER HR: HCPCS | Mod: HCNC

## 2022-01-03 PROCEDURE — G0008 ADMIN INFLUENZA VIRUS VAC: HCPCS | Mod: HCNC | Performed by: FAMILY MEDICINE

## 2022-01-03 PROCEDURE — 85025 COMPLETE CBC W/AUTO DIFF WBC: CPT | Mod: HCNC | Performed by: NURSE PRACTITIONER

## 2022-01-03 PROCEDURE — 99407 BEHAV CHNG SMOKING > 10 MIN: CPT | Mod: S$GLB,,,

## 2022-01-03 PROCEDURE — 94761 N-INVAS EAR/PLS OXIMETRY MLT: CPT | Mod: HCNC

## 2022-01-03 PROCEDURE — 36415 COLL VENOUS BLD VENIPUNCTURE: CPT | Mod: HCNC | Performed by: NURSE PRACTITIONER

## 2022-01-03 PROCEDURE — A4216 STERILE WATER/SALINE, 10 ML: HCPCS | Mod: HCNC | Performed by: NURSE PRACTITIONER

## 2022-01-03 PROCEDURE — 99999 PR PBB SHADOW E&M-EST. PATIENT-LVL I: CPT | Mod: PBBFAC,,,

## 2022-01-03 PROCEDURE — 99407 PR TOBACCO USE CESSATION INTENSIVE >10 MINUTES: ICD-10-PCS | Mod: S$GLB,,,

## 2022-01-03 PROCEDURE — 25000003 PHARM REV CODE 250: Mod: HCNC | Performed by: FAMILY MEDICINE

## 2022-01-03 PROCEDURE — 99226 PR SUBSEQUENT OBSERVATION CARE,LEVEL III: ICD-10-PCS | Mod: HCNC,,, | Performed by: NURSE PRACTITIONER

## 2022-01-03 PROCEDURE — 83735 ASSAY OF MAGNESIUM: CPT | Mod: HCNC | Performed by: NURSE PRACTITIONER

## 2022-01-03 RX ORDER — ASPIRIN 81 MG/1
81 TABLET ORAL DAILY
Status: DISCONTINUED | OUTPATIENT
Start: 2022-01-03 | End: 2022-01-03

## 2022-01-03 RX ORDER — FOLIC ACID 1 MG/1
1 TABLET ORAL DAILY
Qty: 30 TABLET | Refills: 0 | Status: SHIPPED | OUTPATIENT
Start: 2022-01-04 | End: 2022-03-11

## 2022-01-03 RX ORDER — ASPIRIN 81 MG/1
81 TABLET ORAL DAILY
Qty: 30 TABLET | Refills: 5 | Status: SHIPPED | OUTPATIENT
Start: 2022-01-03 | End: 2022-03-11 | Stop reason: SDUPTHER

## 2022-01-03 RX ORDER — METOPROLOL SUCCINATE 25 MG/1
12.5 TABLET, EXTENDED RELEASE ORAL DAILY
Qty: 15 TABLET | Refills: 11 | Status: SHIPPED | OUTPATIENT
Start: 2022-01-04 | End: 2022-03-11 | Stop reason: SDUPTHER

## 2022-01-03 RX ORDER — ASPIRIN 81 MG/1
81 TABLET ORAL DAILY
Status: DISCONTINUED | OUTPATIENT
Start: 2022-01-03 | End: 2022-01-03 | Stop reason: HOSPADM

## 2022-01-03 RX ADMIN — LOSARTAN POTASSIUM 100 MG: 50 TABLET, FILM COATED ORAL at 08:01

## 2022-01-03 RX ADMIN — ASPIRIN 81 MG: 81 TABLET, COATED ORAL at 04:01

## 2022-01-03 RX ADMIN — FOLIC ACID 1 MG: 1 TABLET ORAL at 08:01

## 2022-01-03 RX ADMIN — PANTOPRAZOLE SODIUM 40 MG: 40 TABLET, DELAYED RELEASE ORAL at 08:01

## 2022-01-03 RX ADMIN — INFLUENZA VACCINE, ADJUVANTED 0.5 ML: 15; 15; 15; 15 INJECTION, SUSPENSION INTRAMUSCULAR at 04:01

## 2022-01-03 RX ADMIN — THERA TABS 1 TABLET: TAB at 08:01

## 2022-01-03 RX ADMIN — NIFEDIPINE 90 MG: 30 TABLET, FILM COATED, EXTENDED RELEASE ORAL at 08:01

## 2022-01-03 RX ADMIN — Medication 10 ML: at 06:01

## 2022-01-03 RX ADMIN — Medication 10 ML: at 01:01

## 2022-01-03 RX ADMIN — METOPROLOL SUCCINATE 12.5 MG: 25 TABLET, EXTENDED RELEASE ORAL at 01:01

## 2022-01-03 NOTE — SUBJECTIVE & OBJECTIVE
Review of Systems   Constitutional: Negative. Negative for diaphoresis and fever.   HENT: Negative.    Eyes: Negative.    Cardiovascular: Negative.  Negative for chest pain, irregular heartbeat, leg swelling, near-syncope, orthopnea, palpitations, paroxysmal nocturnal dyspnea and syncope.   Respiratory: Negative for cough and shortness of breath.    Endocrine: Negative.    Hematologic/Lymphatic: Negative.    Skin: Negative.    Musculoskeletal: Negative.    Gastrointestinal: Negative.    Genitourinary: Negative.    Neurological: Negative.    Psychiatric/Behavioral: Negative.    Allergic/Immunologic: Negative.      Objective:     Vital Signs (Most Recent):  Temp: 97.6 °F (36.4 °C) (01/03/22 0857)  Pulse: 70 (01/03/22 0857)  Resp: 20 (01/03/22 0857)  BP: (!) 162/93 (01/03/22 0857)  SpO2: 97 % (01/03/22 0857) Vital Signs (24h Range):  Temp:  [96.4 °F (35.8 °C)-98.2 °F (36.8 °C)] 97.6 °F (36.4 °C)  Pulse:  [] 70  Resp:  [16-24] 20  SpO2:  [95 %-100 %] 97 %  BP: (121-180)/(77-93) 162/93     Weight: 36.9 kg (81 lb 5.6 oz)  Body mass index is 12.01 kg/m².     SpO2: 97 %  O2 Device (Oxygen Therapy): room air      Intake/Output Summary (Last 24 hours) at 1/3/2022 1037  Last data filed at 1/3/2022 0600  Gross per 24 hour   Intake 0 ml   Output 475 ml   Net -475 ml       Lines/Drains/Airways     Peripheral Intravenous Line                 Peripheral IV - Single Lumen 01/01/22 1805 20 G Right Forearm 1 day                Physical Exam  Constitutional:       General: He is awake.      Appearance: He is cachectic.   HENT:      Head: Atraumatic.   Eyes:      General:         Right eye: No discharge.         Left eye: No discharge.   Cardiovascular:      Rate and Rhythm: Normal rate.      Heart sounds: Murmur heard.       Pulmonary:      Breath sounds: No rales.   Abdominal:      General: Bowel sounds are normal.      Palpations: Abdomen is soft.   Musculoskeletal:      Right lower leg: No edema.      Left lower leg: No  edema.   Skin:     General: Skin is warm and dry.      Capillary Refill: Capillary refill takes 2 to 3 seconds.   Neurological:      Mental Status: Mental status is at baseline.         Significant Labs:   BMP:   Recent Labs   Lab 01/01/22 1804 01/02/22 0223 01/03/22  0506   GLU 87 108 109   * 147* 144   K 4.2 4.5 3.9   * 111* 110   CO2 19* 21* 22*   BUN 36* 31* 21   CREATININE 0.8 0.8 0.7   CALCIUM 9.3 9.7 9.4   MG  --  1.9 1.8   , CMP   Recent Labs   Lab 01/01/22 1804 01/02/22 0223 01/03/22  0506   * 147* 144   K 4.2 4.5 3.9   * 111* 110   CO2 19* 21* 22*   GLU 87 108 109   BUN 36* 31* 21   CREATININE 0.8 0.8 0.7   CALCIUM 9.3 9.7 9.4   PROT 6.6  --   --    ALBUMIN 3.5  --   --    BILITOT 0.3  --   --    ALKPHOS 59  --   --    AST 19  --   --    ALT 14  --   --    ANIONGAP 15 15 12   ESTGFRAFRICA >60 >60 >60   EGFRNONAA >60 >60 >60   , CBC   Recent Labs   Lab 01/01/22 1804 01/01/22 1804 01/02/22 0223 01/02/22 0223 01/03/22  0506   WBC 6.48  --  5.81  --  9.80   HGB 11.0*  --  12.2*  --  11.4*   HCT 34.1*   < > 37.9*   < > 34.7*     --  196  --  217    < > = values in this interval not displayed.   , INR No results for input(s): INR, PROTIME in the last 48 hours., Lipid Panel No results for input(s): CHOL, HDL, LDLCALC, TRIG, CHOLHDL in the last 48 hours., Troponin   Recent Labs   Lab 01/02/22  1042 01/02/22  1653 01/03/22  0034   TROPONINI 0.381* 0.556* 0.415*    and All pertinent lab results from the last 24 hours have been reviewed.    Significant Imaging: Echocardiogram: Transthoracic echo (TTE) complete (Cupid Only): No results found for this or any previous visit.

## 2022-01-03 NOTE — PLAN OF CARE
Problem: Adult Inpatient Plan of Care  Goal: Plan of Care Review  1/3/2022 1719 by Hien Patino RN  Outcome: Met  1/3/2022 0844 by Hien Patino RN  Outcome: Ongoing, Progressing  Goal: Patient-Specific Goal (Individualized)  Outcome: Met  Goal: Absence of Hospital-Acquired Illness or Injury  Outcome: Met  Goal: Optimal Comfort and Wellbeing  Outcome: Met  Goal: Readiness for Transition of Care  Outcome: Met     Problem: Fall Injury Risk  Goal: Absence of Fall and Fall-Related Injury  Outcome: Met     Problem: Diabetes Comorbidity  Goal: Blood Glucose Level Within Targeted Range  Outcome: Met

## 2022-01-03 NOTE — PROGRESS NOTES
Patient has received discharge instructions. Prescriptions received. Instructions reviewed with pt using teachback method. All questions answered to pt satisfaction. Any non-implanted  IV access and telemetry present,  have been  removed per bedside nurse. Transport will be called for discharge         01/03/22 1714   Admission   Communication Issues? Patient Hearing   Shift   Virtual Nurse - Rounding Complete  (Discharge)   Virtual Nurse - Patient Verbalized Approval Of Camera Use   Safety/Activity   Patient Rounds bed in low position;call light in patient/parent reach;visualized patient   Activity Management Sitting at edge of bed - L2

## 2022-01-03 NOTE — PLAN OF CARE
Discharge orders noted. TN went to meet with patient. I informed him PCP and cards follow-up to be scheduled. Patient declined coming to [facility] for cards follow-up. I spoke with [facility] navigator to schedule closer to his house. Patient is in agreement (Will schedule near [facility]). No further CM needs. Patient confirmed he will have transport on discharge.     Cards appt. Scheduled. Patient is awaiting PCP follow-up. He will be called with follow-up information if not in Epic.     Patient Contacts    Name Relation Home Work Mobile   Natali Ahn Daughter   [phone]   Jovita To   [phone]     Future Appointments   Date Time Provider Department Center   1/10/2022 10:00 AM Janeth Whittaker, [facility]   1/20/2022 10:00 AM Izaiah Castellanos MD [facility]   2/21/2022  8:30 AM LAB, [facility]     Follow-up With  Details  Why  Contact Info   Gregorio Jensen MD    Appointment requested. You will be called with follow-up information. Primary Care Physician  [id number]  [place] [address]  [phone]      01/03/22 1544   Final Note   Assessment Type Final Discharge Note   Anticipated Discharge Disposition Home   Hospital Resources/Appts/Education Provided Appointments scheduled by Navigator/Coordinator   Post-Acute Status   Discharge Delays None known at this time     Amy Honeycutt RN    [phone]

## 2022-01-03 NOTE — ASSESSMENT & PLAN NOTE
Troponin peaked at 0.5  EKG SR without acute ischemic changes  TTE for completeness  NSTEMI likely type II in setting of hypertensive urgency on admission (209/134)  Continue statin, ARB, BB added- recommend asa if no contraindication

## 2022-01-03 NOTE — ASSESSMENT & PLAN NOTE
Hemoglobin A1C   Date Value Ref Range Status   08/24/2021 6.8 (H) 4.0 - 5.6 % Final   Per PCP patient is at goal, well controlled with diet. Was previously on metformin but was stopped as A1c was at goal. He is not checking sugars. 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

## 2022-01-03 NOTE — PROGRESS NOTES
01/02/22 2100   Patient Request   Patient Requested patient states he's unable to hear VN   Nurse Notification   Bedside Nurse Notified? Yes   Name of Bedside Nurse PAVITHRA Johnson   Nurse Notfication Method Secure Chat   Nurse Notified Of Patient Request   Admission   Initial VN Admission Questions Incomplete   Communication Issues? Patient Hearing   Shift   Virtual Nurse - Rounding Incomplete

## 2022-01-03 NOTE — ASSESSMENT & PLAN NOTE
Presented with c/o SOB and cough for 2 months  Reports taking Abx without relief  Smokes about 1 PPD for past 71 years  Drinks pint of whiskey every weekend  Hx of HLD, DM, HTN  Troponin 0.036  CXR with no airspace disease. Changes suggestive of prior asbestos exposure.  EKG without any ST or T wave abnormalities  -trend troponin 0.036 > 0.029 > 0.051  -ECHO   · The left ventricle is normal in size with concentric remodeling and mildly decreased systolic function.  · The estimated ejection fraction is 40%.  · There are segmental left ventricular wall motion abnormalities.  · Grade I left ventricular diastolic dysfunction.        -consult cardiology-appreciates rec's  continue statin, CCB, ARB, BB, and aspirin

## 2022-01-03 NOTE — PLAN OF CARE
TN went to meet with patient. Patient is [facility]. He reports he is independent and lives at home alone. He does not have any DME or HH. Patient still drives, but will have his friend transport on discharge. TN requested PCP and cards follow-up from access navigator. Awaiting to be scheduled. Patient inquired about having someone do some housekeeping at his house, etc. I did tell him inform him these services are out of pocket. I also provided patient with a Senior Resource Guide. Patient encouraged to call with any questions or concerns.   will continue to follow patient through transitions of care and assist with any discharge needs.     Patient Contacts    Name Relation Home Work Mobile   Natali Ahn Daughter   [phone]   Jovita To Relative   [phone]     Future Appointments   Date Time Provider Department Center   1/10/2022 10:00 AM Janeth Whittaker, [facility]   1/20/2022  3:20 PM Marsha Norris MD [facility] C[facility]   2/21/2022  8:30 AM LAB, [facility]        01/03/22 1522   Discharge Assessment   Assessment Type Discharge Planning Assessment   Confirmed/corrected address, phone number and insurance Yes   Confirmed Demographics Correct on Facesheet   Source of Information patient   Lives With alone   Facility Arrived From: Home   Do you expect to return to your current living situation? Yes   Prior to hospitilization cognitive status: Alert/Oriented   Current cognitive status: Alert/Oriented   Walking or Climbing Stairs Difficulty none   Dressing/Bathing Difficulty none   Do you have any problems with: Needs other help   Specify other help Housekeeping   Equipment Currently Used at Home none   Do you have any problems affording any of your prescribed medications? No   Is the patient taking medications as prescribed? yes   How do you get to doctors appointments? car, drives self;family or friend will provide   Are you on dialysis? No   Do you  take coumadin? No   Discharge Plan A Home   Discharge Plan B Home;Home Health   DME Needed Upon Discharge  none   Discharge Plan discussed with: Patient   Discharge Barriers Identified None     Amy Honeycutt RN    [phone]

## 2022-01-03 NOTE — DISCHARGE SUMMARY
[facility]  Discharge Summary      Patient Name: Julio Benites  MRN: [id number]  Patient Class: OP- Observation  Admission Date: 1/1/2022  Hospital Length of Stay: 0 days  Discharge Date and Time: 1/3/2022  5:46 PM  Attending Physician: Sanna Fallon*   Discharging Provider: Sanna Fallon MD  Primary Care Provider: Gregorio Jensen MD      HPI:   Julio Benites is an 87-year-old male with a past history of gastric carcinoma, he had 2/3 of his stomach removed 70 years ago and states he had no chemotherapy or radiation therapy, current smoker of 71 years, current use of alcohol, HTN, HLD, DM, GERD who presented to the emergency department with shortness of breath and a productive cough for the past 2 months. He reports his daughters told him to come to the ED because they were noticing he was SOB. Patient denies use of antibiotics. He states she is fine at rest and SOB on exertion, for example ambulating to the mailbox then he feels the need to sit. He denies any chest pain, Nausea, vomiting, diarrhea, fever, or chills.     In ED troponin 0.036, EKG without ST or T wave abnormalities. CXR with no airspace disease. Changes suggestive of prior asbestos exposure. Admitted to [facility] for further care.      * No surgery found *      Hospital Course:   No notes on file     Goals of Care Treatment Preferences:  Code Status: Full Code      Consults:   Consults (From admission, onward)          Status Ordering Provider     Cardiology-[facility]  Once        Provider:  (Not yet assigned)    ERICA Zabala            * NSTEMI (non-ST elevated myocardial infarction)  Presented with c/o SOB and cough for 2 months  Reports taking Abx without relief  Smokes about 1 PPD for past 71 years  Drinks pint of whiskey every weekend  Hx of HLD, DM, HTN  Troponin 0.036  CXR with no airspace disease. Changes suggestive of prior asbestos exposure.  EKG without  any ST or T wave abnormalities  -trend troponin 0.036 > 0.029 > 0.051  -ECHO   The left ventricle is normal in size with concentric remodeling and mildly decreased systolic function.  The estimated ejection fraction is 40%.  There are segmental left ventricular wall motion abnormalities.  Grade I left ventricular diastolic dysfunction.        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 Swallow study showed some aspiration. Has seen speech therapy and GI. He has pulm referral pending.   -aspiration precautions      Ectatic thoracic aorta  stable      Type 2 diabetes mellitus with microalbuminuria, without long-term current use of insulin  Hemoglobin A1C   Date Value Ref Range Status   08/24/2021 6.8 (H) 4.0 - 5.6 % Final   Per PCP patient is at goal, well controlled with diet. Was previously on metformin but was stopped as A1c was at goal. He is not checking sugars. 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      Anemia  Reported mild anemia that is not iron deficient and followed by hematology  Hgb currenlty stable  monitor      Idiopathic chronic gout of multiple sites without tophus  Was taking Colchicine but gout has been controlled with dietary means      Atherosclerosis of aorta  Noted on CXR 2012  Continue with home statin      History of gastric cancer  Per PCP on 9/29/21: He has seen heme-onc h/o gastric CA. His weight is low and down 10lbs in last year. He reports appetite isn't great. Oncology said he did not need further f/u with them. He saw GI for EGD/c-scope but no concerning issues found except a few small colon polyps and a few small non-bleeding nodules in  stomach (was biopsied but path not available to me).      Tobacco abuse  Dangers of cigarette smoking were reviewed with patient in detail for 3 minutes and patient was encouraged to quit. Nicotine replacement options were discussed with the patient and they decided to not engage in cessation at this time.    Alcohol use  Reports drinking a pint of whiskey on weekends  Denies history of withdrawal  -CIWA  -PRN diazepam  -Seizure precautions  -Aspiration precautions  -PRN lorazepam       Mixed hyperlipidemia  Continue home statin      Essential hypertension, benign  Hypertensive on admission  Takes Nifedipine and losartan at home  Resume and monitor        Final Active Diagnoses:    Diagnosis Date Noted POA    PRINCIPAL PROBLEM:  NSTEMI (non-ST elevated myocardial infarction) [I21.4] 01/01/2022 Yes    Ectatic thoracic aorta [I77.810] 09/28/2021 Yes    Asbestosis [J61] 09/28/2021 Yes    Gastroesophageal reflux disease without esophagitis [K21.9] 09/28/2021 Yes    Type 2 diabetes mellitus with microalbuminuria, without long-term current use of insulin [E11.29, R80.9] 06/30/2020 Yes     Chronic    Atherosclerosis of aorta [I70.0] 08/31/2015 Yes    Idiopathic chronic gout of multiple sites without tophus [M1A.09X0] 08/31/2015 Yes    History of gastric cancer [Z85.028] 05/01/2014 Yes    Alcohol use [Z72.89] 11/08/2012 Yes     Chronic    Tobacco abuse [Z72.0] 11/08/2012 Yes     Chronic    Essential hypertension, benign [I10] 11/07/2012 Yes     Chronic    Mixed hyperlipidemia [E78.2] 11/07/2012 Yes     Chronic    Anemia [D64.9] 08/25/2012 Yes      Problems Resolved During this Admission:       Discharged Condition: stable    Disposition: Home or Self Care    Follow Up:     Patient Instructions:      Diet Cardiac     Activity as tolerated         Pending Diagnostic Studies:       Procedure Component Value Units Date/Time    EKG 12-lead [[id number]]     Order Status: Sent Lab Status: No result             Medications:  Reconciled Home Medications:      Medication List        START taking these medications      aspirin 81 MG EC tablet  Commonly known as: ECOTRIN  Take 1 tablet (81 mg total) by mouth once daily.     folic acid 1 MG tablet  Commonly known as: FOLVITE  Take 1 tablet (1 mg total) by mouth once daily.  Start taking on: January 4, 2022     metoprolol succinate 25 MG 24 hr tablet  Commonly known as: TOPROL-XL  Take 0.5 tablets (12.5 mg total) by mouth once daily.  Start taking on: January 4, 2022            CONTINUE taking these medications      colchicine 0.6 mg tablet  Commonly known as: COLCRYS  Take 0.6 mg by mouth 2 (two) times daily as needed (gout attack).     losartan 100 MG tablet  Commonly known as: COZAAR  TAKE 1 TABLET BY MOUTH EVERY DAY     NIFEdipine 90 MG Tbsr  Commonly known as: ADALAT CC  TAKE 1 TABLET (90 MG TOTAL) BY MOUTH ONCE DAILY.     pantoprazole 40 MG tablet  Commonly known as: PROTONIX  Take 1 tablet (40 mg total) by mouth once daily.     pravastatin 20 MG tablet  Commonly known as: PRAVACHOL  Take 1 tablet (20 mg total) by mouth nightly.              Indwelling Lines/Drains at time of discharge:   Lines/Drains/Airways       None                   Time spent on the discharge of patient: 35 minutes         Sanna Fallon MD  Department of Hospital Medicine  [facility]

## 2022-01-03 NOTE — ED NOTES
Report received from Trish ROJAS  Care assumed.  Rounded on pt, Family friend at the bedside. Updated on bed assignment and she contacted family.   Pt resting on stretcher, cardiac monitor in progress. Bed low and locked, SR up.   Call bell in reach.   Instruct to call with problems, needs, or concerns.  Verbalized understanding  Will continue to monitor

## 2022-01-03 NOTE — TELEPHONE ENCOUNTER
Just wanted to inform you that I was able to schedule patient for an upcoming appointment at 3:40 on 1/5/2022.

## 2022-01-03 NOTE — PROGRESS NOTES
[facility] - Telemetry  Cardiology  Progress Note    Patient Name: Julio Benites  MRN: [id number]  Admission Date: 1/1/2022  Hospital Length of Stay: 0 days  Code Status: Full Code   Attending Physician: Sanna Fallon*   Primary Care Physician: Gregorio Jensen MD  Expected Discharge Date:   Principal Problem:NSTEMI (non-ST elevated myocardial infarction)    Subjective:     Hospital Course:   01/03/2021 Intermittent ST and accelerated junctional rhythm noted. Low dose BB initiated. No reports of chest pain. Hemodynamically stable.            Review of Systems   Constitutional: Negative. Negative for diaphoresis and fever.   HENT: Negative.    Eyes: Negative.    Cardiovascular: Negative.  Negative for chest pain, irregular heartbeat, leg swelling, near-syncope, orthopnea, palpitations, paroxysmal nocturnal dyspnea and syncope.   Respiratory: Negative for cough and shortness of breath.    Endocrine: Negative.    Hematologic/Lymphatic: Negative.    Skin: Negative.    Musculoskeletal: Negative.    Gastrointestinal: Negative.    Genitourinary: Negative.    Neurological: Negative.    Psychiatric/Behavioral: Negative.    Allergic/Immunologic: Negative.      Objective:     Vital Signs (Most Recent):  Temp: 97.6 °F (36.4 °C) (01/03/22 0857)  Pulse: 70 (01/03/22 0857)  Resp: 20 (01/03/22 0857)  BP: (!) 162/93 (01/03/22 0857)  SpO2: 97 % (01/03/22 0857) Vital Signs (24h Range):  Temp:  [96.4 °F (35.8 °C)-98.2 °F (36.8 °C)] 97.6 °F (36.4 °C)  Pulse:  [] 70  Resp:  [16-24] 20  SpO2:  [95 %-100 %] 97 %  BP: (121-180)/(77-93) 162/93     Weight: 36.9 kg (81 lb 5.6 oz)  Body mass index is 12.01 kg/m².     SpO2: 97 %  O2 Device (Oxygen Therapy): room air      Intake/Output Summary (Last 24 hours) at 1/3/2022 1037  Last data filed at 1/3/2022 0600  Gross per 24 hour   Intake 0 ml   Output 475 ml   Net -475 ml       Lines/Drains/Airways     Peripheral Intravenous Line                 Peripheral IV - Single Lumen  01/01/22 1805 20 G Right Forearm 1 day                Physical Exam  Constitutional:       General: He is awake.      Appearance: He is cachectic.   HENT:      Head: Atraumatic.   Eyes:      General:         Right eye: No discharge.         Left eye: No discharge.   Cardiovascular:      Rate and Rhythm: Normal rate.      Heart sounds: Murmur heard.       Pulmonary:      Breath sounds: No rales.   Abdominal:      General: Bowel sounds are normal.      Palpations: Abdomen is soft.   Musculoskeletal:      Right lower leg: No edema.      Left lower leg: No edema.   Skin:     General: Skin is warm and dry.      Capillary Refill: Capillary refill takes 2 to 3 seconds.   Neurological:      Mental Status: Mental status is at baseline.         Significant Labs:   BMP:   Recent Labs   Lab 01/01/22 1804 01/02/22 0223 01/03/22  0506   GLU 87 108 109   * 147* 144   K 4.2 4.5 3.9   * 111* 110   CO2 19* 21* 22*   BUN 36* 31* 21   CREATININE 0.8 0.8 0.7   CALCIUM 9.3 9.7 9.4   MG  --  1.9 1.8   , CMP   Recent Labs   Lab 01/01/22 1804 01/02/22 0223 01/03/22  0506   * 147* 144   K 4.2 4.5 3.9   * 111* 110   CO2 19* 21* 22*   GLU 87 108 109   BUN 36* 31* 21   CREATININE 0.8 0.8 0.7   CALCIUM 9.3 9.7 9.4   PROT 6.6  --   --    ALBUMIN 3.5  --   --    BILITOT 0.3  --   --    ALKPHOS 59  --   --    AST 19  --   --    ALT 14  --   --    ANIONGAP 15 15 12   ESTGFRAFRICA >60 >60 >60   EGFRNONAA >60 >60 >60   , CBC   Recent Labs   Lab 01/01/22  1804 01/01/22 1804 01/02/22 0223 01/02/22 0223 01/03/22  0506   WBC 6.48  --  5.81  --  9.80   HGB 11.0*  --  12.2*  --  11.4*   HCT 34.1*   < > 37.9*   < > 34.7*     --  196  --  217    < > = values in this interval not displayed.   , INR No results for input(s): INR, PROTIME in the last 48 hours., Lipid Panel No results for input(s): CHOL, HDL, LDLCALC, TRIG, CHOLHDL in the last 48 hours., Troponin   Recent Labs   Lab 01/02/22  1042 01/02/22  1653 01/03/22  0034    TROPONINI 0.381* 0.556* 0.415*    and All pertinent lab results from the last 24 hours have been reviewed.    Significant Imaging: Echocardiogram: Transthoracic echo (TTE) complete (Cupid Only): No results found for this or any previous visit.    Assessment and Plan:     Brief HPI: Patient seen this morning on rounds without CV complaint. POC discussed for TTE today.     * NSTEMI (non-ST elevated myocardial infarction)  Troponin peaked at 0.5  EKG SR without acute ischemic changes  TTE for completeness  NSTEMI likely type II in setting of hypertensive urgency on admission (209/134)  Continue statin, ARB, BB added- recommend asa if no contraindication     Tobacco abuse  Smoking cessation     Mixed hyperlipidemia  Continue statin     Essential hypertension, benign  BP uncontrolled on admission   Continue CCB, ARB, BB added- low dose- intermittent accelerated junctional rhythm and ST overnight          VTE Risk Mitigation (From admission, onward)         Ordered     enoxaparin injection 40 mg  Daily         01/01/22 1949     IP VTE HIGH RISK PATIENT  Once         01/01/22 1948     Place sequential compression device  Until discontinued         01/01/22 1949                Mian Mccormick NP  Cardiology  [facility] - Telemetry

## 2022-01-03 NOTE — PLAN OF CARE
Plan of care reviewed with patient, understanding verbalized. Pt remains SR on tele. No complaints or acute distress noted. BG monitored and SSI administered per orders. Frequent weight shift encouraged. Instructed to call for any assistance, understanding verbalized. Bed alarm on, call light in reach, fall precautions in place. Will continue current plan of care.

## 2022-01-03 NOTE — HPI
86 yo male h/o DM, HTN, HLD  Gastric carcinoma (sp resection ?1950)  Active tobacco use     Adm for SOB, cough. Reports on-going for 2 weeks. Denies CP. Reports h/o aspiration. Cardiology consulted for elevated troponin.     Trop 0.036-->0.029-->0.051  No CP

## 2022-01-03 NOTE — PROGRESS NOTES
[facility]  Progress Note    Patient Name: Julio Benites  MRN: [id number]  Patient Class: OP- Observation   Admission Date: 1/1/2022  Length of Stay: 0 days  Attending Physician: Sanna Fallon*  Primary Care Provider: Gregorio Jensen MD        Subjective:     Principal Problem:NSTEMI (non-ST elevated myocardial infarction)        HPI:  Julio Benites is an 87-year-old male with a past history of gastric carcinoma, he had 2/3 of his stomach removed 70 years ago and states he had no chemotherapy or radiation therapy, current smoker of 71 years, current use of alcohol, HTN, HLD, DM, GERD who presented to the emergency department with shortness of breath and a productive cough for the past 2 months. He reports his daughters told him to come to the ED because they were noticing he was SOB. Patient denies use of antibiotics. He states she is fine at rest and SOB on exertion, for example ambulating to the mailbox then he feels the need to sit. He denies any chest pain, Nausea, vomiting, diarrhea, fever, or chills.     In ED troponin 0.036, EKG without ST or T wave abnormalities. CXR with no airspace disease. Changes suggestive of prior asbestos exposure. Admitted to [facility] for further care.      Overview/Hospital Course:  No notes on file    Interval History: awake and alert,   EKG with sinus rhythm without acute ischemic changes  Troponin 0.036 > 0.029 > 0.051-   Oxygen saturation is 96% on RA  TTE reviewed  Appreciates cardiology rec's continue statin, CCB, ARB, BB, and aspirin    Review of Systems   Constitutional: Negative for activity change, chills, diaphoresis, fatigue, fever and unexpected weight change.   Eyes: Negative for visual disturbance.   Respiratory: Negative for cough, shortness of breath and wheezing.    Cardiovascular: Negative for chest pain, palpitations and leg swelling.   Gastrointestinal: Negative for diarrhea, nausea and vomiting.    Genitourinary: Negative for difficulty urinating.   Musculoskeletal: Negative for myalgias.   Skin: Negative for color change.   Neurological: Negative for dizziness, weakness, light-headedness and headaches.     Objective:     Vital Signs (Most Recent):  Temp: 97.6 °F (36.4 °C) (01/03/22 0857)  Pulse: 66 (01/03/22 1104)  Resp: 18 (01/03/22 1104)  BP: (!) 162/93 (01/03/22 0857)  SpO2: 98 % (01/03/22 1104) Vital Signs (24h Range):  Temp:  [96.4 °F (35.8 °C)-98.2 °F (36.8 °C)] 97.6 °F (36.4 °C)  Pulse:  [] 66  Resp:  [16-24] 18  SpO2:  [95 %-100 %] 98 %  BP: (121-162)/(77-93) 162/93     Weight: 36.7 kg (81 lb)  Body mass index is 11.96 kg/m².    Intake/Output Summary (Last 24 hours) at 1/3/2022 1448  Last data filed at 1/3/2022 0600  Gross per 24 hour   Intake 0 ml   Output 475 ml   Net -475 ml      Physical Exam  Vitals and nursing note reviewed.   Constitutional:       General: He is not in acute distress.     Appearance: He is cachectic. He is not ill-appearing.   HENT:      Head: Normocephalic and atraumatic.   Cardiovascular:      Rate and Rhythm: Normal rate and regular rhythm.      Pulses: Normal pulses.      Heart sounds: Normal heart sounds. No murmur heard.      Pulmonary:      Effort: Pulmonary effort is normal. Tachypnea present. No respiratory distress.      Breath sounds: Normal breath sounds.   Abdominal:      General: Bowel sounds are normal. There is no distension.      Palpations: Abdomen is soft.      Tenderness: There is no abdominal tenderness.   Musculoskeletal:         General: No swelling. Normal range of motion.      Cervical back: Normal range of motion.   Skin:     General: Skin is warm and dry.      Capillary Refill: Capillary refill takes 2 to 3 seconds.   Neurological:      General: No focal deficit present.      Mental Status: He is alert and oriented to person, place, and time. Mental status is at baseline.   Psychiatric:         Mood and Affect: Mood normal.         Behavior:  Behavior normal.         Significant Labs:   A1C:   Recent Labs   Lab 08/24/21  1507 01/02/22  0223   HGBA1C 6.8* 6.4*     ABGs: No results for input(s): PH, PCO2, HCO3, POCSATURATED, BE, TOTALHB, COHB, METHB, O2HB, POCFIO2, PO2 in the last 48 hours.  Blood Culture: No results for input(s): LABBLOO in the last 48 hours.  CBC:   Recent Labs   Lab 01/01/22 1804 01/02/22 0223 01/03/22  0506   WBC 6.48 5.81 9.80   HGB 11.0* 12.2* 11.4*   HCT 34.1* 37.9* 34.7*    196 217     CMP:   Recent Labs   Lab 01/01/22  1804 01/02/22 0223 01/03/22  0506   * 147* 144   K 4.2 4.5 3.9   * 111* 110   CO2 19* 21* 22*   GLU 87 108 109   BUN 36* 31* 21   CREATININE 0.8 0.8 0.7   CALCIUM 9.3 9.7 9.4   PROT 6.6  --   --    ALBUMIN 3.5  --   --    BILITOT 0.3  --   --    ALKPHOS 59  --   --    AST 19  --   --    ALT 14  --   --    ANIONGAP 15 15 12   EGFRNONAA >60 >60 >60     Lactic Acid: No results for input(s): LACTATE in the last 48 hours.  Lipase: No results for input(s): LIPASE in the last 48 hours.  Lipid Panel: No results for input(s): CHOL, HDL, LDLCALC, TRIG, CHOLHDL in the last 48 hours.  Magnesium:   Recent Labs   Lab 01/02/22  0223 01/03/22  0506   MG 1.9 1.8     Troponin:   Recent Labs   Lab 01/02/22  1042 01/02/22  1653 01/03/22  0034   TROPONINI 0.381* 0.556* 0.415*     TSH:   Recent Labs   Lab 08/24/21  1507   TSH 1.267     Urine Culture:   Recent Labs   Lab 01/01/22 2000   LABURIN No growth     Urine Studies:   Recent Labs   Lab 01/01/22 2000   COLORU Yellow   APPEARANCEUA Clear   PHUR 5.0   SPECGRAV 1.025   PROTEINUA Trace*   GLUCUA Negative   KETONESU Negative   BILIRUBINUA Negative   OCCULTUA Negative   NITRITE Negative   UROBILINOGEN 2.0-3.0*   LEUKOCYTESUR 2+*   RBCUA 1   WBCUA 13*   HYALINECASTS 1       Significant Imaging: I have reviewed all pertinent imaging results/findings within the past 24 hours.      Assessment/Plan:      * NSTEMI (non-ST elevated myocardial infarction)  Presented with  c/o SOB and cough for 2 months  Reports taking Abx without relief  Smokes about 1 PPD for past 71 years  Drinks pint of whiskey every weekend  Hx of HLD, DM, HTN  Troponin 0.036  CXR with no airspace disease. Changes suggestive of prior asbestos exposure.  EKG without any ST or T wave abnormalities  -trend troponin 0.036 > 0.029 > 0.051  -ECHO   · The left ventricle is normal in size with concentric remodeling and mildly decreased systolic function.  · The estimated ejection fraction is 40%.  · There are segmental left ventricular wall motion abnormalities.  · Grade I left ventricular diastolic dysfunction.        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 Swallow study showed some aspiration. Has seen speech therapy and GI. He has pulm referral pending.   -aspiration precautions      Ectatic thoracic aorta  stable      Type 2 diabetes mellitus with microalbuminuria, without long-term current use of insulin  Hemoglobin A1C   Date Value Ref Range Status   08/24/2021 6.8 (H) 4.0 - 5.6 % Final   Per PCP patient is at goal, well controlled with diet. Was previously on metformin but was stopped as A1c was at goal. He is not checking sugars. 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      Anemia  Reported mild anemia that is not iron deficient and followed by hematology  Hgb currenlty stable  monitor      Idiopathic chronic gout of multiple sites without tophus  Was taking Colchicine but gout has been controlled with dietary means      Atherosclerosis of aorta  Noted on CXR 2012  Continue with home statin      History of gastric cancer  Per PCP on 9/29/21: He has seen heme-onc  h/o gastric CA. His weight is low and down 10lbs in last year. He reports appetite isn't great. Oncology said he did not need further f/u with them. He saw GI for EGD/c-scope but no concerning issues found except a few small colon polyps and a few small non-bleeding nodules in stomach (was biopsied but path not available to me).      Tobacco abuse  Dangers of cigarette smoking were reviewed with patient in detail for 3 minutes and patient was encouraged to quit. Nicotine replacement options were discussed with the patient and they decided to not engage in cessation at this time.    Alcohol use  Reports drinking a pint of whiskey on weekends  Denies history of withdrawal  -CIWA  -PRN diazepam  -Seizure precautions  -Aspiration precautions  -PRN lorazepam       Mixed hyperlipidemia  Continue home statin      Essential hypertension, benign  Hypertensive on admission  Takes Nifedipine and losartan at home  Resume and monitor        VTE Risk Mitigation (From admission, onward)         Ordered     enoxaparin injection 40 mg  Daily         01/01/22 1949     IP VTE HIGH RISK PATIENT  Once         01/01/22 1948     Place sequential compression device  Until discontinued         01/01/22 1949                Discharge Planning   KYLAH: 1/3/2022     Code Status: Full Code   Is the patient medically ready for discharge?:     Reason for patient still in hospital (select all that apply): Pending disposition                 Sanna Fallon MD  Department of Hospital Medicine   [facility]

## 2022-01-03 NOTE — PROGRESS NOTES
Individual Follow-Up Form    1/3/2022    Quit Date: To be determined    Clinical Status of Patient: INpatient    Length of Service: 30 minutes    Comments: Smoking cessation education provided. Pt is a 0.5 pk/day cigarette smoker x 72 yrs, He denies nicotine withdrawal symptoms and states that he does not wish to use the nicotine patch during this hospital admission. Pt states ready to quit smoking. He is currently enrolled in the [employer]. Ambulatory referral to Smoking Cessation Program following hospital discharge.     Diagnosis: F17.210    Next Visit: Telephone appointment 1/10/2021 10am with DANETTE Fowler ( [facility])

## 2022-01-03 NOTE — NURSING
Patient discharged. IV and heart monitor removed. AVS provided to patient and virtual nurse reviewed discharge education with patient. Patient left via wheelchair with family. No distress noted.

## 2022-01-03 NOTE — HOSPITAL COURSE
01/03/2021 Intermittent ST and accelerated junctional rhythm noted. Low dose BB initiated. No reports of chest pain. Hemodynamically stable.

## 2022-01-03 NOTE — TELEPHONE ENCOUNTER
Patient was scheduled for hospital follow up on 1/5/2022 at 3:40. Routed to Emilie to inform. Thanks

## 2022-01-03 NOTE — ASSESSMENT & PLAN NOTE
BP uncontrolled on admission   Continue CCB, ARB, BB added- low dose- intermittent accelerated junctional rhythm and ST overnight

## 2022-01-03 NOTE — SUBJECTIVE & OBJECTIVE
Interval History: awake and alert,   EKG with sinus rhythm without acute ischemic changes  Troponin 0.036 > 0.029 > 0.051-   Oxygen saturation is 96% on RA  TTE reviewed  Appreciates cardiology rec's continue statin, CCB, ARB, BB, and aspirin    Review of Systems   Constitutional: Negative for activity change, chills, diaphoresis, fatigue, fever and unexpected weight change.   Eyes: Negative for visual disturbance.   Respiratory: Negative for cough, shortness of breath and wheezing.    Cardiovascular: Negative for chest pain, palpitations and leg swelling.   Gastrointestinal: Negative for diarrhea, nausea and vomiting.   Genitourinary: Negative for difficulty urinating.   Musculoskeletal: Negative for myalgias.   Skin: Negative for color change.   Neurological: Negative for dizziness, weakness, light-headedness and headaches.     Objective:     Vital Signs (Most Recent):  Temp: 97.6 °F (36.4 °C) (01/03/22 0857)  Pulse: 66 (01/03/22 1104)  Resp: 18 (01/03/22 1104)  BP: (!) 162/93 (01/03/22 0857)  SpO2: 98 % (01/03/22 1104) Vital Signs (24h Range):  Temp:  [96.4 °F (35.8 °C)-98.2 °F (36.8 °C)] 97.6 °F (36.4 °C)  Pulse:  [] 66  Resp:  [16-24] 18  SpO2:  [95 %-100 %] 98 %  BP: (121-162)/(77-93) 162/93     Weight: 36.7 kg (81 lb)  Body mass index is 11.96 kg/m².    Intake/Output Summary (Last 24 hours) at 1/3/2022 1448  Last data filed at 1/3/2022 0600  Gross per 24 hour   Intake 0 ml   Output 475 ml   Net -475 ml      Physical Exam  Vitals and nursing note reviewed.   Constitutional:       General: He is not in acute distress.     Appearance: He is cachectic. He is not ill-appearing.   HENT:      Head: Normocephalic and atraumatic.   Cardiovascular:      Rate and Rhythm: Normal rate and regular rhythm.      Pulses: Normal pulses.      Heart sounds: Normal heart sounds. No murmur heard.      Pulmonary:      Effort: Pulmonary effort is normal. Tachypnea present. No respiratory distress.      Breath sounds: Normal  breath sounds.   Abdominal:      General: Bowel sounds are normal. There is no distension.      Palpations: Abdomen is soft.      Tenderness: There is no abdominal tenderness.   Musculoskeletal:         General: No swelling. Normal range of motion.      Cervical back: Normal range of motion.   Skin:     General: Skin is warm and dry.      Capillary Refill: Capillary refill takes 2 to 3 seconds.   Neurological:      General: No focal deficit present.      Mental Status: He is alert and oriented to person, place, and time. Mental status is at baseline.   Psychiatric:         Mood and Affect: Mood normal.         Behavior: Behavior normal.         Significant Labs:   A1C:   Recent Labs   Lab 08/24/21  1507 01/02/22 0223   HGBA1C 6.8* 6.4*     ABGs: No results for input(s): PH, PCO2, HCO3, POCSATURATED, BE, TOTALHB, COHB, METHB, O2HB, POCFIO2, PO2 in the last 48 hours.  Blood Culture: No results for input(s): LABBLOO in the last 48 hours.  CBC:   Recent Labs   Lab 01/01/22  1804 01/02/22 0223 01/03/22  0506   WBC 6.48 5.81 9.80   HGB 11.0* 12.2* 11.4*   HCT 34.1* 37.9* 34.7*    196 217     CMP:   Recent Labs   Lab 01/01/22  1804 01/02/22  0223 01/03/22  0506   * 147* 144   K 4.2 4.5 3.9   * 111* 110   CO2 19* 21* 22*   GLU 87 108 109   BUN 36* 31* 21   CREATININE 0.8 0.8 0.7   CALCIUM 9.3 9.7 9.4   PROT 6.6  --   --    ALBUMIN 3.5  --   --    BILITOT 0.3  --   --    ALKPHOS 59  --   --    AST 19  --   --    ALT 14  --   --    ANIONGAP 15 15 12   EGFRNONAA >60 >60 >60     Lactic Acid: No results for input(s): LACTATE in the last 48 hours.  Lipase: No results for input(s): LIPASE in the last 48 hours.  Lipid Panel: No results for input(s): CHOL, HDL, LDLCALC, TRIG, CHOLHDL in the last 48 hours.  Magnesium:   Recent Labs   Lab 01/02/22  0223 01/03/22  0506   MG 1.9 1.8     Troponin:   Recent Labs   Lab 01/02/22  1042 01/02/22  1653 01/03/22  0034   TROPONINI 0.381* 0.556* 0.415*     TSH:   Recent Labs    Lab 08/24/21  1507   TSH 1.267     Urine Culture:   Recent Labs   Lab 01/01/22 2000   LABURIN No growth     Urine Studies:   Recent Labs   Lab 01/01/22 2000   COLORU Yellow   APPEARANCEUA Clear   PHUR 5.0   SPECGRAV 1.025   PROTEINUA Trace*   GLUCUA Negative   KETONESU Negative   BILIRUBINUA Negative   OCCULTUA Negative   NITRITE Negative   UROBILINOGEN 2.0-3.0*   LEUKOCYTESUR 2+*   RBCUA 1   WBCUA 13*   HYALINECASTS 1       Significant Imaging: I have reviewed all pertinent imaging results/findings within the past 24 hours.

## 2022-01-03 NOTE — TELEPHONE ENCOUNTER
----- Message from Emilie Ulloa sent at 1/3/2022  3:11 PM CST -----  Regarding: Hosp f/u appt  Patient is preparing for discharge from [facility] and is requiring a hospital follow up appointment with   within 7 days.  If possible, can you please assist with scheduling this patient and message me back?    Dx NSTEMI       Thank you,    Emilie Ulloa  High End Access Navigator/Given discharge project   [facility]

## 2022-01-04 ENCOUNTER — TELEPHONE (OUTPATIENT)
Dept: INTERNAL MEDICINE | Facility: CLINIC | Age: 87
End: 2022-01-04
Payer: MEDICARE

## 2022-01-04 NOTE — TELEPHONE ENCOUNTER
Scrubbing schedules to offer virtuals to symptomatic pts due to covid uptick and also to make sure hosp and ER f/u pts are not symptomatic, and are already established w/ PCP      I did not reach out to pt to check on symptoms, instead read through hosp visit notes which show pt's cough and SOB are ongoing and not acute.

## 2022-01-05 ENCOUNTER — OFFICE VISIT (OUTPATIENT)
Dept: INTERNAL MEDICINE | Facility: CLINIC | Age: 87
End: 2022-01-05
Payer: MEDICARE

## 2022-01-05 VITALS
OXYGEN SATURATION: 97 % | BODY MASS INDEX: 12.64 KG/M2 | WEIGHT: 85.31 LBS | HEIGHT: 69 IN | DIASTOLIC BLOOD PRESSURE: 70 MMHG | SYSTOLIC BLOOD PRESSURE: 120 MMHG | HEART RATE: 83 BPM

## 2022-01-05 DIAGNOSIS — R80.9 TYPE 2 DIABETES MELLITUS WITH MICROALBUMINURIA, WITHOUT LONG-TERM CURRENT USE OF INSULIN: ICD-10-CM

## 2022-01-05 DIAGNOSIS — Z09 HOSPITAL DISCHARGE FOLLOW-UP: Primary | ICD-10-CM

## 2022-01-05 DIAGNOSIS — E11.29 TYPE 2 DIABETES MELLITUS WITH MICROALBUMINURIA, WITHOUT LONG-TERM CURRENT USE OF INSULIN: ICD-10-CM

## 2022-01-05 DIAGNOSIS — R63.4 WEIGHT LOSS: ICD-10-CM

## 2022-01-05 DIAGNOSIS — I10 ESSENTIAL HYPERTENSION, BENIGN: ICD-10-CM

## 2022-01-05 DIAGNOSIS — I25.10 CORONARY ARTERY DISEASE, UNSPECIFIED VESSEL OR LESION TYPE, UNSPECIFIED WHETHER ANGINA PRESENT, UNSPECIFIED WHETHER NATIVE OR TRANSPLANTED HEART: ICD-10-CM

## 2022-01-05 DIAGNOSIS — R53.81 PHYSICAL DEBILITY: ICD-10-CM

## 2022-01-05 DIAGNOSIS — I25.2 HISTORY OF NON-ST ELEVATION MYOCARDIAL INFARCTION (NSTEMI): ICD-10-CM

## 2022-01-05 DIAGNOSIS — R06.02 SHORTNESS OF BREATH: ICD-10-CM

## 2022-01-05 DIAGNOSIS — D64.9 ANEMIA, UNSPECIFIED TYPE: ICD-10-CM

## 2022-01-05 PROCEDURE — 1101F PR PT FALLS ASSESS DOC 0-1 FALLS W/OUT INJ PAST YR: ICD-10-PCS | Mod: HCNC,CPTII,S$GLB, | Performed by: STUDENT IN AN ORGANIZED HEALTH CARE EDUCATION/TRAINING PROGRAM

## 2022-01-05 PROCEDURE — 1126F AMNT PAIN NOTED NONE PRSNT: CPT | Mod: HCNC,CPTII,S$GLB, | Performed by: STUDENT IN AN ORGANIZED HEALTH CARE EDUCATION/TRAINING PROGRAM

## 2022-01-05 PROCEDURE — 1159F MED LIST DOCD IN RCRD: CPT | Mod: HCNC,CPTII,S$GLB, | Performed by: STUDENT IN AN ORGANIZED HEALTH CARE EDUCATION/TRAINING PROGRAM

## 2022-01-05 PROCEDURE — 99999 PR PBB SHADOW E&M-EST. PATIENT-LVL V: ICD-10-PCS | Mod: PBBFAC,HCNC,, | Performed by: STUDENT IN AN ORGANIZED HEALTH CARE EDUCATION/TRAINING PROGRAM

## 2022-01-05 PROCEDURE — 99499 RISK ADDL DX/OHS AUDIT: ICD-10-PCS | Mod: S$GLB,,, | Performed by: STUDENT IN AN ORGANIZED HEALTH CARE EDUCATION/TRAINING PROGRAM

## 2022-01-05 PROCEDURE — 1126F PR PAIN SEVERITY QUANTIFIED, NO PAIN PRESENT: ICD-10-PCS | Mod: HCNC,CPTII,S$GLB, | Performed by: STUDENT IN AN ORGANIZED HEALTH CARE EDUCATION/TRAINING PROGRAM

## 2022-01-05 PROCEDURE — 1159F PR MEDICATION LIST DOCUMENTED IN MEDICAL RECORD: ICD-10-PCS | Mod: HCNC,CPTII,S$GLB, | Performed by: STUDENT IN AN ORGANIZED HEALTH CARE EDUCATION/TRAINING PROGRAM

## 2022-01-05 PROCEDURE — 99499 UNLISTED E&M SERVICE: CPT | Mod: S$GLB,,, | Performed by: STUDENT IN AN ORGANIZED HEALTH CARE EDUCATION/TRAINING PROGRAM

## 2022-01-05 PROCEDURE — 99496 TRANSITIONAL CARE MANAGE SERVICE 7 DAY DISCHARGE: ICD-10-PCS | Mod: HCNC,S$GLB,, | Performed by: STUDENT IN AN ORGANIZED HEALTH CARE EDUCATION/TRAINING PROGRAM

## 2022-01-05 PROCEDURE — 99999 PR PBB SHADOW E&M-EST. PATIENT-LVL V: CPT | Mod: PBBFAC,HCNC,, | Performed by: STUDENT IN AN ORGANIZED HEALTH CARE EDUCATION/TRAINING PROGRAM

## 2022-01-05 PROCEDURE — 3288F PR FALLS RISK ASSESSMENT DOCUMENTED: ICD-10-PCS | Mod: HCNC,CPTII,S$GLB, | Performed by: STUDENT IN AN ORGANIZED HEALTH CARE EDUCATION/TRAINING PROGRAM

## 2022-01-05 PROCEDURE — 1101F PT FALLS ASSESS-DOCD LE1/YR: CPT | Mod: HCNC,CPTII,S$GLB, | Performed by: STUDENT IN AN ORGANIZED HEALTH CARE EDUCATION/TRAINING PROGRAM

## 2022-01-05 PROCEDURE — 99496 TRANSJ CARE MGMT HIGH F2F 7D: CPT | Mod: HCNC,S$GLB,, | Performed by: STUDENT IN AN ORGANIZED HEALTH CARE EDUCATION/TRAINING PROGRAM

## 2022-01-05 PROCEDURE — 3288F FALL RISK ASSESSMENT DOCD: CPT | Mod: HCNC,CPTII,S$GLB, | Performed by: STUDENT IN AN ORGANIZED HEALTH CARE EDUCATION/TRAINING PROGRAM

## 2022-01-05 RX ORDER — TIOTROPIUM BROMIDE AND OLODATEROL 3.124; 2.736 UG/1; UG/1
2 SPRAY, METERED RESPIRATORY (INHALATION) DAILY
Qty: 4 G | Refills: 5 | Status: SHIPPED | OUTPATIENT
Start: 2022-01-05 | End: 2022-09-27

## 2022-01-05 NOTE — PROGRESS NOTES
Subjective:       Patient ID: Julio Benites is a 87 y.o. male.    Chief Complaint: Coronary artery disease, unspecified vessel or lesion type, unspecified whether angina present, unspecified whether native or transplanted heart [I25.10]    Patient is new to me, PCP is Dr. Jensen.    HTN, HLD, CAD, anemia, T2DM, abestos exposure, tobacco use disorder (early remission), history gastric cancer (s/p subtotal gastrectomy)    Hospitalized for NSTEMI 01JAN2022  SOB and cough for 2 months  Smoked about 1 PPD for past 71 years.   Quit smoking when went into the hospital.   Concern for asbestosis on chest CT [id number]. Worked as ship builder.   Treated for aspiration pneumonia at that time as well.  Swallow study with aspiration   Was referred to pulmonology, but missed appointment  Echo in hospital with EF 40%, grade I DD  Has appointment with cardiologist scheduled 20JAN  Currently taking ASA, pravastatin, metoprolol, nifedipine, and losartan    Significant weight loss  Endorses was ~120 lbs 5 years ago  110 lbs 2018  States he has difficulty maintaining weight after subtotal gastrectomy for stomach cancer   Currently taking Protonix  Endorses lost at least 10 lbs in the last year  Endorses minimal appetite  Has tried meal supplements previously, but not using currently  Followed with heme/onc JULY2018 for cachexia, prescribed periactin, but no longer taking  Saw Dr. Holly for GI, had EGD/colonoscopy [id number]   EGD with gastric nodules, biopsied (results unavailable)  Colonoscopy with severe diverticulosis and polyps     Anemia of chronic disease -   Iron WNL, ferritin elevated   Hemoglobin stable    Lives by himself and completes all his own ADL's  Neighbor is able to drive him when needed  Daughter lives out of state  Has cane for ambulation, but rarely uses this     Type 2 diabetes mellitus -   Not currently on medication  Hemoglobin A1C       Date                     Value               Ref Range           Status       "          01/02/2022               6.4 (H)             4.0 - 5.6 %         Final                        Review of Systems   Constitutional: Positive for appetite change and unexpected weight change. Negative for chills, fatigue and fever.   Respiratory: Positive for cough and shortness of breath.    Cardiovascular: Negative for chest pain, palpitations and leg swelling.   Gastrointestinal: Negative for abdominal pain, blood in stool, constipation, diarrhea, nausea and vomiting.   Genitourinary: Negative for difficulty urinating and frequency.   Skin: Negative for rash.   Neurological: Positive for weakness. Negative for dizziness, syncope and headaches.         Current Outpatient Medications   Medication Instructions    aspirin (ECOTRIN) 81 mg, Oral, Daily    colchicine (COLCRYS) 0.6 mg, Oral, 2 times daily PRN    folic acid (FOLVITE) 1 mg, Oral, Daily    losartan (COZAAR) 100 MG tablet TAKE 1 TABLET BY MOUTH EVERY DAY    metoprolol succinate (TOPROL-XL) 12.5 mg, Oral, Daily    NIFEdipine (ADALAT CC) 90 mg, Oral, Daily    pantoprazole (PROTONIX) 40 mg, Oral, Daily    pravastatin (PRAVACHOL) 20 mg, Oral, Nightly     Objective:      Vitals:    01/05/22 1528   BP: 120/70   Pulse: 83   SpO2: 97%   Weight: 38.7 kg (85 lb 5.1 oz)   Height: 5' 9" (1.753 m)   PainSc: 0-No pain     Body mass index is 12.6 kg/m².    Physical Exam  Vitals and nursing note reviewed.   Constitutional:       General: He is not in acute distress.     Appearance: Normal appearance. He is cachectic. He is ill-appearing (chronically). He is not diaphoretic.      Comments: Seated in wheelchair   HENT:      Head: Normocephalic and atraumatic.      Right Ear: Tympanic membrane, ear canal and external ear normal. There is no impacted cerumen.      Left Ear: Tympanic membrane, ear canal and external ear normal. There is no impacted cerumen.      Nose: Nose normal. No rhinorrhea.      Mouth/Throat:      Mouth: Mucous membranes are moist.      " Pharynx: Oropharynx is clear. No oropharyngeal exudate or posterior oropharyngeal erythema.   Eyes:      General: No scleral icterus.        Right eye: No discharge.         Left eye: No discharge.      Conjunctiva/sclera: Conjunctivae normal.   Neck:      Thyroid: No thyromegaly or thyroid tenderness.      Trachea: Trachea normal.   Cardiovascular:      Rate and Rhythm: Normal rate and regular rhythm.      Heart sounds: Normal heart sounds. No murmur heard.  No friction rub. No gallop.    Pulmonary:      Effort: Pulmonary effort is normal. No respiratory distress.      Breath sounds: Normal breath sounds. No stridor. No wheezing, rhonchi or rales.   Chest:   Breasts:      Right: No supraclavicular adenopathy.      Left: No supraclavicular adenopathy.       Musculoskeletal:         General: No swelling or deformity.      Cervical back: Neck supple.   Lymphadenopathy:      Head:      Right side of head: No submandibular or posterior auricular adenopathy.      Left side of head: No submandibular or posterior auricular adenopathy.      Cervical: No cervical adenopathy.      Right cervical: No superficial, deep or posterior cervical adenopathy.     Left cervical: No superficial, deep or posterior cervical adenopathy.      Upper Body:      Right upper body: No supraclavicular adenopathy.      Left upper body: No supraclavicular adenopathy.   Skin:     General: Skin is warm and dry.      Comments: Color WNL   Neurological:      Mental Status: He is alert. Mental status is at baseline.   Psychiatric:         Mood and Affect: Mood normal.         Speech: Speech normal.         Behavior: Behavior normal.         Cognition and Memory: Memory is impaired.         Assessment:       1. Hospital discharge follow-up    2. Coronary artery disease, unspecified vessel or lesion type, unspecified whether angina present, unspecified whether native or transplanted heart    3. History of non-ST elevation myocardial infarction (NSTEMI)     4. Shortness of breath    5. Anemia, unspecified type    6. Weight loss    7. Physical debility    8. Essential hypertension, benign    9. Type 2 diabetes mellitus with microalbuminuria, without long-term current use of insulin        Plan:       Hospital discharge follow-up  Coronary artery disease, unspecified vessel or lesion type, unspecified whether angina present, unspecified whether native or transplanted heart  History of non-ST elevation myocardial infarction (NSTEMI)  Essential hypertension, benign  Continue ASA, pravastatin, metoprolol, nifedipine, and losartan  Continue medication, evaluation, and management per cardiology. Has appointment scheduled    Shortness of breath  Referral to pulmonology placed again, has appointment scheduled tomorrow  Start Stiolto inhaler given his longstanding history of smoking and concern dyspnea contributing to cachexia   -     Ambulatory referral/consult to Pulmonology; Future  -     tiotropium-olodateroL (STIOLTO RESPIMAT) 2.5-2.5 mcg/actuation Mist; Inhale 2 puffs into the lungs once daily. Controller    Anemia, unspecified type  Check FOBT  Iron studies consistent with AoCD  -     Fecal Immunochemical Test (iFOBT); Future    Weight loss  Physical debility  Start high calorie ensure/boost supplementation 2-3 times per day in addition to meals.   Daughter states she will purchase supplements for patient.   Avoid periactin at this time due to high fall risk.   Will order home health to assist patient with medication adherence and monitoring of health status  Needs home PT for balance and fall prevention.  Needs evaluation for assistive devices   RTC in 1 month for follow up of weight.   -     Ambulatory referral/consult to Home Health; Future  -     Ambulatory referral/consult to Physical/Occupational Therapy; Future    Type 2 diabetes mellitus with microalbuminuria, without long-term current use of insulin  Noted, HbA1c at goal off medication      76 minutes were  spent in extensive chart review of prior documentation, recent hospitalization, and review of results, and evaluation, treatment, and counseling of patient on the same day of service.      Dana Rodriguez MD  1/5/2022      Transitional Care Note    Family and/or Caretaker present at visit?  Yes.  Diagnostic tests reviewed/disposition: No diagnosic tests pending after this hospitalization.  Disease/illness education: Yes  Home health/community services discussion/referrals: Patient does not have home health established from hospital visit.  They do need home health.  We will set up home health for the patient.   Establishment or re-establishment of referral orders for community resources: Physical therapy.   Discussion with other health care providers: No discussion with other health care providers necessary.

## 2022-01-06 ENCOUNTER — OFFICE VISIT (OUTPATIENT)
Dept: PULMONOLOGY | Facility: CLINIC | Age: 87
End: 2022-01-06
Payer: MEDICARE

## 2022-01-06 ENCOUNTER — PATIENT OUTREACH (OUTPATIENT)
Dept: ADMINISTRATIVE | Facility: OTHER | Age: 87
End: 2022-01-06
Payer: MEDICARE

## 2022-01-06 VITALS
OXYGEN SATURATION: 97 % | DIASTOLIC BLOOD PRESSURE: 70 MMHG | SYSTOLIC BLOOD PRESSURE: 110 MMHG | HEIGHT: 69 IN | HEART RATE: 74 BPM | WEIGHT: 89.75 LBS | BODY MASS INDEX: 13.29 KG/M2

## 2022-01-06 DIAGNOSIS — I70.0 ATHEROSCLEROSIS OF AORTA: Primary | ICD-10-CM

## 2022-01-06 DIAGNOSIS — R06.02 SHORTNESS OF BREATH: ICD-10-CM

## 2022-01-06 DIAGNOSIS — J41.0 SIMPLE CHRONIC BRONCHITIS: ICD-10-CM

## 2022-01-06 PROCEDURE — 3288F FALL RISK ASSESSMENT DOCD: CPT | Mod: CPTII,S$GLB,, | Performed by: INTERNAL MEDICINE

## 2022-01-06 PROCEDURE — 1126F PR PAIN SEVERITY QUANTIFIED, NO PAIN PRESENT: ICD-10-PCS | Mod: CPTII,S$GLB,, | Performed by: INTERNAL MEDICINE

## 2022-01-06 PROCEDURE — 3288F PR FALLS RISK ASSESSMENT DOCUMENTED: ICD-10-PCS | Mod: CPTII,S$GLB,, | Performed by: INTERNAL MEDICINE

## 2022-01-06 PROCEDURE — 99499 UNLISTED E&M SERVICE: CPT | Mod: S$GLB,,, | Performed by: INTERNAL MEDICINE

## 2022-01-06 PROCEDURE — 99203 OFFICE O/P NEW LOW 30 MIN: CPT | Mod: S$GLB,,, | Performed by: INTERNAL MEDICINE

## 2022-01-06 PROCEDURE — 1101F PT FALLS ASSESS-DOCD LE1/YR: CPT | Mod: CPTII,S$GLB,, | Performed by: INTERNAL MEDICINE

## 2022-01-06 PROCEDURE — 99999 PR PBB SHADOW E&M-EST. PATIENT-LVL III: ICD-10-PCS | Mod: PBBFAC,,, | Performed by: INTERNAL MEDICINE

## 2022-01-06 PROCEDURE — 1126F AMNT PAIN NOTED NONE PRSNT: CPT | Mod: CPTII,S$GLB,, | Performed by: INTERNAL MEDICINE

## 2022-01-06 PROCEDURE — 1101F PR PT FALLS ASSESS DOC 0-1 FALLS W/OUT INJ PAST YR: ICD-10-PCS | Mod: CPTII,S$GLB,, | Performed by: INTERNAL MEDICINE

## 2022-01-06 PROCEDURE — 99999 PR PBB SHADOW E&M-EST. PATIENT-LVL III: CPT | Mod: PBBFAC,,, | Performed by: INTERNAL MEDICINE

## 2022-01-06 PROCEDURE — 99203 PR OFFICE/OUTPT VISIT, NEW, LEVL III, 30-44 MIN: ICD-10-PCS | Mod: S$GLB,,, | Performed by: INTERNAL MEDICINE

## 2022-01-06 PROCEDURE — 99499 RISK ADDL DX/OHS AUDIT: ICD-10-PCS | Mod: S$GLB,,, | Performed by: INTERNAL MEDICINE

## 2022-01-06 NOTE — PROGRESS NOTES
Subjective:       Patient ID: Julio Benites is a 87 y.o. male.    Chief Complaint: Shortness of Breath    HPI:   Julio Benites is a 87 y.o. male new to me who presents for evaluation of dyspnea.    Patient was hospitalized earlier this month with an NSTEMI. TTE w/ depressed LVEF of 40% with mild DD. Following with cardiology. C/f aspiration PNA. Does report a postprandial cough.    Longstanding smoking history up to 1ppd. Quit when he was hospitalized and has not resumed smoking. Has an appointment with smoking cessation clinic. Using a LABA/LAMA with some improvement.    Currently taking ASA, pravastatin, metoprolol, nifedipine, and losartan    Other exposures include asbestosis. He worked in the ship yards.    Patient in a wheelchair. Does not ambulate regularly. BMI 13. Longstanding weight loss.     Review of Systems   Constitutional: Positive for weight loss. Negative for fever, chills, activity change, appetite change, night sweats and weakness.   HENT: Negative for postnasal drip, sinus pressure, voice change and congestion.    Eyes: Negative for redness.   Respiratory: Positive for cough, shortness of breath, previous hospitalization due to pulmonary problems and dyspnea on extertion. Negative for hemoptysis, sputum production, wheezing, asthma nighttime symptoms, somnolence and Paroxysmal Nocturnal Dyspnea.    Cardiovascular: Negative for chest pain, palpitations and leg swelling.   Musculoskeletal: Negative for joint swelling and myalgias.   Skin: Negative for rash.   Gastrointestinal: Negative for acid reflux.   Neurological: Negative for syncope and weakness.   Psychiatric/Behavioral: Negative for sleep disturbance.         Social History     Tobacco Use    Smoking status: Former Smoker     Packs/day: 0.25     Types: Cigarettes     Quit date: 2022     Years since quittin.2    Smokeless tobacco: Never Used    Tobacco comment: patient states that he started smoking cigarettes again: half  a pack of cigarettes over 4 or 5 days   Substance Use Topics    Alcohol use: Yes     Comment: drinks scotch 2X weekly       Review of patient's allergies indicates:   Allergen Reactions    Lisinopril Rash     Patient reports history of rash with previous lisinopril use.    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    losartan (COZAAR) 100 MG tablet TAKE 1 TABLET BY MOUTH EVERY DAY    NIFEdipine (ADALAT CC) 90 MG TbSR TAKE 1 TABLET (90 MG TOTAL) BY MOUTH ONCE DAILY.    pantoprazole (PROTONIX) 40 MG tablet Take 1 tablet (40 mg total) by mouth once daily.    pravastatin (PRAVACHOL) 20 MG tablet Take 1 tablet (20 mg total) by mouth nightly.    tiotropium-olodateroL (STIOLTO RESPIMAT) 2.5-2.5 mcg/actuation Mist Inhale 2 puffs into the lungs once daily. Controller     No current facility-administered medications on file prior to visit.       Objective:      Vitals:    01/06/22 1337   BP: 110/70   BP Location: Left arm   Patient Position:  "Sitting   Pulse: 74   SpO2: 97%   Weight: 40.7 kg (89 lb 11.6 oz)   Height: 5' 9" (1.753 m)     Physical Exam   Constitutional: He is oriented to person, place, and time. He appears cachectic.   HENT:   Nose: No mucosal edema.   Mouth/Throat: Oropharynx is clear and moist. Mallampati Score: I.   Neck: No tracheal deviation present.   Cardiovascular: Normal rate, regular rhythm and intact distal pulses.   Pulmonary/Chest: Normal expansion, symmetric chest wall expansion, effort normal and breath sounds normal. No respiratory distress. He has no wheezes. He has no rhonchi. He has no rales.   Abdominal: He exhibits no distension.   Musculoskeletal:         General: No edema.      Cervical back: Neck supple.   Lymphadenopathy: No supraclavicular adenopathy is present.     He has no cervical adenopathy.   Neurological: He is alert and oriented to person, place, and time.   Skin: Skin is warm and dry. No cyanosis or erythema. Nails show no clubbing.   Psychiatric: He has a normal mood and affect.   Nursing note and vitals reviewed.      Personal Diagnostic Review    Labs-  Bicarb 21 on 1/2/22    CT Chest 8/26/21- Images personally reviewed and compared to prior. I agree w/ the radiologist who notes  Secretions within the right mainstem and right lower lobe bronchus as well as within distal left lower lobe bronchi.  Tree-in-bud nodularity in the left lower lobe and left lung apex.  Constellation of findings most suggestive of aspiration.  Consider short-term follow-up.     New basilar bronchiectasis basilar, peripheral predominant reticulation in this patient with this best is exposure, concerning for development of asbestosis.     Ectatic ascending aorta measuring 4.1 cm.  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    CXR 1/1/21- Images personally reviewed. I agree w/ the radiologist who notes  No airspace disease.  Changes suggestive of prior asbestos exposure.      No flowsheet data found.        Assessment: "     Problem List Items Addressed This Visit        Pulmonary    Simple chronic bronchitis     Needs PFTs when recovered fromt his acute illness.     - Cont LABA/LAMA  - RTC in 3mn with pfts and 6 MWT at that time              Cardiac/Vascular    Atherosclerosis of aorta - Primary     Noted on CT Chest. Mnmt per PCP              Other    RESOLVED: Shortness of breath

## 2022-01-06 NOTE — PROGRESS NOTES
Health Maintenance Due   Topic Date Due    Shingles Vaccine (1 of 2) Never done     Updates were requested from care everywhere.  Chart was reviewed for overdue Proactive [facility] Encounters ([facility]) topics (CRS, Breast Cancer Screening, Eye exam)  Health Maintenance has been updated.  LINKS immunization registry triggered.  Immunizations were reconciled.

## 2022-01-10 ENCOUNTER — CLINICAL SUPPORT (OUTPATIENT)
Dept: SMOKING CESSATION | Facility: CLINIC | Age: 87
End: 2022-01-10
Payer: COMMERCIAL

## 2022-01-10 DIAGNOSIS — F17.210 CIGARETTE SMOKER: Primary | ICD-10-CM

## 2022-01-10 PROCEDURE — 99403 PR PREVENT COUNSEL,INDIV,45 MIN: ICD-10-PCS | Mod: S$GLB,,,

## 2022-01-10 PROCEDURE — 99403 PREV MED CNSL INDIV APPRX 45: CPT | Mod: S$GLB,,,

## 2022-01-10 NOTE — Clinical Note
Patient was seen for tobacco cessation intake.  Patient is currently tobacco free and in need of counseling only.  Patient has agreed to bi weekly follow up.

## 2022-01-17 ENCOUNTER — LAB VISIT (OUTPATIENT)
Dept: LAB | Facility: HOSPITAL | Age: 87
End: 2022-01-17
Attending: STUDENT IN AN ORGANIZED HEALTH CARE EDUCATION/TRAINING PROGRAM
Payer: MEDICARE

## 2022-01-17 DIAGNOSIS — D64.9 ANEMIA, UNSPECIFIED TYPE: ICD-10-CM

## 2022-01-17 PROCEDURE — 82274 ASSAY TEST FOR BLOOD FECAL: CPT | Mod: HCNC | Performed by: STUDENT IN AN ORGANIZED HEALTH CARE EDUCATION/TRAINING PROGRAM

## 2022-01-18 ENCOUNTER — CLINICAL SUPPORT (OUTPATIENT)
Dept: SMOKING CESSATION | Facility: CLINIC | Age: 87
End: 2022-01-18
Payer: COMMERCIAL

## 2022-01-18 DIAGNOSIS — F17.210 CIGARETTE NICOTINE DEPENDENCE, UNCOMPLICATED: Primary | ICD-10-CM

## 2022-01-18 LAB — HEMOCCULT STL QL IA: NEGATIVE

## 2022-01-18 PROCEDURE — 99406 BEHAV CHNG SMOKING 3-10 MIN: CPT | Mod: S$GLB,,,

## 2022-01-18 PROCEDURE — 99406 PR TOBACCO USE CESSATION INTERMEDIATE 3-10 MINUTES: ICD-10-PCS | Mod: S$GLB,,,

## 2022-01-18 PROCEDURE — 99999 PR PBB SHADOW E&M-EST. PATIENT-LVL I: CPT | Mod: PBBFAC,,,

## 2022-01-18 PROCEDURE — 99999 PR PBB SHADOW E&M-EST. PATIENT-LVL I: ICD-10-PCS | Mod: PBBFAC,,,

## 2022-01-19 ENCOUNTER — TELEPHONE (OUTPATIENT)
Dept: INTERNAL MEDICINE | Facility: CLINIC | Age: 87
End: 2022-01-19
Payer: MEDICARE

## 2022-01-19 NOTE — TELEPHONE ENCOUNTER
Please let patient know stool testing was negative for blood, thank you!     Called to give patient the above message. There was no answer on either number.

## 2022-01-20 ENCOUNTER — OFFICE VISIT (OUTPATIENT)
Dept: CARDIOLOGY | Facility: CLINIC | Age: 87
End: 2022-01-20
Payer: MEDICARE

## 2022-01-20 ENCOUNTER — TELEPHONE (OUTPATIENT)
Dept: INTERNAL MEDICINE | Facility: CLINIC | Age: 87
End: 2022-01-20
Payer: MEDICARE

## 2022-01-20 VITALS
SYSTOLIC BLOOD PRESSURE: 108 MMHG | OXYGEN SATURATION: 98 % | DIASTOLIC BLOOD PRESSURE: 70 MMHG | WEIGHT: 92.38 LBS | HEART RATE: 78 BPM | HEIGHT: 69 IN | BODY MASS INDEX: 13.68 KG/M2

## 2022-01-20 DIAGNOSIS — I25.10 CORONARY ARTERY DISEASE INVOLVING NATIVE CORONARY ARTERY OF NATIVE HEART WITHOUT ANGINA PECTORIS: Primary | ICD-10-CM

## 2022-01-20 DIAGNOSIS — R06.02 SOB (SHORTNESS OF BREATH): ICD-10-CM

## 2022-01-20 DIAGNOSIS — I25.10 ASCVD (ARTERIOSCLEROTIC CARDIOVASCULAR DISEASE): ICD-10-CM

## 2022-01-20 DIAGNOSIS — E78.2 MIXED HYPERLIPIDEMIA: ICD-10-CM

## 2022-01-20 DIAGNOSIS — I77.810 ECTATIC THORACIC AORTA: ICD-10-CM

## 2022-01-20 DIAGNOSIS — J40 BRONCHITIS: ICD-10-CM

## 2022-01-20 DIAGNOSIS — F17.200 SMOKER: ICD-10-CM

## 2022-01-20 DIAGNOSIS — E11.9 TYPE 2 DIABETES MELLITUS WITHOUT COMPLICATION, WITHOUT LONG-TERM CURRENT USE OF INSULIN: ICD-10-CM

## 2022-01-20 DIAGNOSIS — I10 ESSENTIAL HYPERTENSION, BENIGN: ICD-10-CM

## 2022-01-20 DIAGNOSIS — R79.89 ELEVATED TROPONIN: ICD-10-CM

## 2022-01-20 PROCEDURE — 1126F AMNT PAIN NOTED NONE PRSNT: CPT | Mod: HCNC,CPTII,S$GLB, | Performed by: INTERNAL MEDICINE

## 2022-01-20 PROCEDURE — 1159F MED LIST DOCD IN RCRD: CPT | Mod: HCNC,CPTII,S$GLB, | Performed by: INTERNAL MEDICINE

## 2022-01-20 PROCEDURE — 3288F PR FALLS RISK ASSESSMENT DOCUMENTED: ICD-10-PCS | Mod: HCNC,CPTII,S$GLB, | Performed by: INTERNAL MEDICINE

## 2022-01-20 PROCEDURE — 99999 PR PBB SHADOW E&M-EST. PATIENT-LVL IV: CPT | Mod: PBBFAC,HCNC,, | Performed by: INTERNAL MEDICINE

## 2022-01-20 PROCEDURE — 3288F FALL RISK ASSESSMENT DOCD: CPT | Mod: HCNC,CPTII,S$GLB, | Performed by: INTERNAL MEDICINE

## 2022-01-20 PROCEDURE — 1101F PR PT FALLS ASSESS DOC 0-1 FALLS W/OUT INJ PAST YR: ICD-10-PCS | Mod: HCNC,CPTII,S$GLB, | Performed by: INTERNAL MEDICINE

## 2022-01-20 PROCEDURE — 1126F PR PAIN SEVERITY QUANTIFIED, NO PAIN PRESENT: ICD-10-PCS | Mod: HCNC,CPTII,S$GLB, | Performed by: INTERNAL MEDICINE

## 2022-01-20 PROCEDURE — 1159F PR MEDICATION LIST DOCUMENTED IN MEDICAL RECORD: ICD-10-PCS | Mod: HCNC,CPTII,S$GLB, | Performed by: INTERNAL MEDICINE

## 2022-01-20 PROCEDURE — 99204 OFFICE O/P NEW MOD 45 MIN: CPT | Mod: HCNC,S$GLB,, | Performed by: INTERNAL MEDICINE

## 2022-01-20 PROCEDURE — 99204 PR OFFICE/OUTPT VISIT, NEW, LEVL IV, 45-59 MIN: ICD-10-PCS | Mod: HCNC,S$GLB,, | Performed by: INTERNAL MEDICINE

## 2022-01-20 PROCEDURE — 1101F PT FALLS ASSESS-DOCD LE1/YR: CPT | Mod: HCNC,CPTII,S$GLB, | Performed by: INTERNAL MEDICINE

## 2022-01-20 PROCEDURE — 99999 PR PBB SHADOW E&M-EST. PATIENT-LVL IV: ICD-10-PCS | Mod: PBBFAC,HCNC,, | Performed by: INTERNAL MEDICINE

## 2022-01-20 RX ORDER — GUAIFENESIN AND DEXTROMETHORPHAN HYDROBROMIDE 600; 30 MG/1; MG/1
1 TABLET, EXTENDED RELEASE ORAL 2 TIMES DAILY
Qty: 20 TABLET | Refills: 0
Start: 2022-01-20 | End: 2022-01-30

## 2022-01-20 NOTE — TELEPHONE ENCOUNTER
""Please let patient know stool testing was negative for blood"    Attempted to call patient with the above information. No answer on either number dialed    "

## 2022-01-20 NOTE — PROGRESS NOTES
Subjective:    Patient ID:  Julio Benites is a 87 y.o. male who presents for follow-up of hospital admit 1/1/22    HPI     Julio Benites is an 87-year-old male with a past history of gastric carcinoma, he had 2/3 of his stomach removed 7 years ago and states he had no chemotherapy or radiation therapy, current smoker of 71 years, current use of alcohol, HTN, HLD, DM, GERD who presented to the emergency department 1/1/22 with shortness of breath and a productive cough for the past 2 months. He did not report chest pain. Two EKGs were done but are not in MUSE. His troponin peaked at 0.556 and BNP 27. . CXR unremarkable accept calcification related to known asbestosis. CT chest 8/24/21 revealed coronary calcifications. He denies chest pain. He continues with cough occasionally productive of grayish sputum. No fever.  Today's EKG is normal. No evidence of a NSTEMI  Summary1/3/22    · The left ventricle is normal in size with concentric remodeling and mildly decreased systolic function.  · The estimated ejection fraction is 40%.  · There are segmental left ventricular wall motion abnormalities.  · Grade I left ventricular diastolic dysfunction.  · With normal right ventricular systolic function.  · The estimated PA systolic pressure is 30 mmHg.  · Normal central venous pressure (3 mmHg).       Lab Results   Component Value Date     01/03/2022    K 3.9 01/03/2022     01/03/2022    CO2 22 (L) 01/03/2022    BUN 21 01/03/2022    CREATININE 0.7 01/03/2022     01/03/2022    HGBA1C 6.4 (H) 01/02/2022    MG 1.8 01/03/2022    AST 19 01/01/2022    ALT 14 01/01/2022    ALBUMIN 3.5 01/01/2022    PROT 6.6 01/01/2022    BILITOT 0.3 01/01/2022    WBC 9.80 01/03/2022    HGB 11.4 (L) 01/03/2022    HCT 34.7 (L) 01/03/2022    HCT 29 (L) 11/12/2012    MCV 78 (L) 01/03/2022     01/03/2022    INR 1.0 12/17/2012    PSA 5.53 (H) 12/26/2012    TSH 1.267 08/24/2021         Lab Results   Component Value Date    CHOL  154 08/24/2021    HDL 66 08/24/2021    TRIG 108 08/24/2021       Lab Results   Component Value Date    LDLCALC 66.4 08/24/2021       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    Cataract     Cellulitis of right forearm 8/25/2012    Overview:  dx update    Diabetes mellitus type II     Diabetic retinopathy     Elevated PSA     GERD (gastroesophageal reflux disease)     Gout attack     Hyperlipidemia     Hypertension     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/2013    NSTEMI (non-ST elevated myocardial infarction) 1/1/2022       Current Outpatient Medications:     aspirin (ECOTRIN) 81 MG EC tablet, Take 1 tablet (81 mg total) by mouth once daily., Disp: 30 tablet, Rfl: 5    colchicine (COLCRYS) 0.6 mg tablet, Take 0.6 mg by mouth 2 (two) times daily as needed (gout attack)., Disp: , Rfl:     folic acid (FOLVITE) 1 MG tablet, Take 1 tablet (1 mg total) by mouth once daily., Disp: 30 tablet, Rfl: 0    losartan (COZAAR) 100 MG tablet, TAKE 1 TABLET BY MOUTH EVERY DAY (Patient taking differently: Take 100 mg by mouth once daily.), Disp: 90 tablet, Rfl: 3    metoprolol succinate (TOPROL-XL) 25 MG 24 hr tablet, Take 0.5 tablets (12.5 mg total) by mouth once daily., Disp: 15 tablet, Rfl: 11    NIFEdipine (ADALAT CC) 90 MG TbSR, TAKE 1 TABLET (90 MG TOTAL) BY MOUTH ONCE DAILY., Disp: 90 tablet, Rfl: 3    pantoprazole (PROTONIX) 40 MG tablet, Take 1 tablet (40 mg total) by mouth once daily., Disp: 90 tablet, Rfl: 3    pravastatin (PRAVACHOL) 20 MG tablet, Take 1 tablet (20 mg total) by mouth nightly., Disp: 90 tablet, Rfl: 3    tiotropium-olodateroL (STIOLTO RESPIMAT) 2.5-2.5 mcg/actuation Mist, Inhale 2 puffs into the lungs once daily. Controller, Disp: 4 g, Rfl: 5          Review of Systems   Constitutional: Negative for decreased appetite, diaphoresis, fever, malaise/fatigue, weight gain and weight loss.   HENT: Negative for  "congestion, ear discharge, ear pain and nosebleeds.    Eyes: Negative for blurred vision, double vision and visual disturbance.   Cardiovascular: Negative for chest pain, claudication, cyanosis, dyspnea on exertion, irregular heartbeat, leg swelling, near-syncope, orthopnea, palpitations, paroxysmal nocturnal dyspnea and syncope.   Respiratory: Positive for cough, shortness of breath and sputum production. Negative for hemoptysis, sleep disturbances due to breathing, snoring and wheezing.    Endocrine: Negative for polydipsia, polyphagia and polyuria.   Hematologic/Lymphatic: Negative for adenopathy and bleeding problem. Does not bruise/bleed easily.   Skin: Negative for color change, nail changes, poor wound healing and rash.   Musculoskeletal: Negative for muscle cramps and muscle weakness.   Gastrointestinal: Negative for abdominal pain, anorexia, change in bowel habit, hematochezia, nausea and vomiting.   Genitourinary: Negative for dysuria, frequency and hematuria.   Neurological: Negative for brief paralysis, difficulty with concentration, excessive daytime sleepiness, dizziness, focal weakness, headaches, light-headedness, seizures, vertigo and weakness.   Psychiatric/Behavioral: Negative for altered mental status and depression.   Allergic/Immunologic: Negative for persistent infections.        Objective:/70   Pulse 78   Ht 5' 9" (1.753 m)   Wt 41.9 kg (92 lb 6 oz)   SpO2 98%   BMI 13.64 kg/m²             Physical Exam  Constitutional:       Appearance: He is well-developed and well-nourished. He is ill-appearing.   HENT:      Head: Normocephalic.      Right Ear: External ear normal.      Left Ear: External ear normal.      Nose: Nose normal.        Comments: Inspection of lips, teeth and gums normalEyes:      General: No scleral icterus.     Extraocular Movements: EOM normal.      Pupils: Pupils are equal, round, and reactive to light.   Neck:      Thyroid: No thyromegaly.      Vascular: No JVD. "      Trachea: No tracheal deviation.   Cardiovascular:      Rate and Rhythm: Normal rate and regular rhythm.      Pulses: Intact distal pulses.           Carotid pulses are 2+ on the right side and 2+ on the left side.       Femoral pulses are 2+ on the right side and 0 on the left side.       Dorsalis pedis pulses are 0 on the right side and 0 on the left side.        Posterior tibial pulses are 0 on the right side.      Heart sounds: No murmur heard.  No friction rub. No gallop.    Pulmonary:      Effort: Pulmonary effort is normal.      Breath sounds: Normal breath sounds.   Abdominal:      General: Bowel sounds are normal. There is no distension.      Palpations: There is no hepatosplenomegaly.      Tenderness: There is no abdominal tenderness. There is no guarding.   Musculoskeletal:         General: No tenderness or edema. Normal range of motion.      Cervical back: Normal range of motion and neck supple.   Lymphadenopathy:      Comments: Palpation of neck and groin lymph nodes normal   Skin:     General: Skin is dry.      Comments: Palpation of skin normal   Neurological:      Mental Status: He is alert and oriented to person, place, and time.      Cranial Nerves: No cranial nerve deficit.      Motor: No abnormal muscle tone.      Coordination: Coordination normal.   Psychiatric:         Behavior: Behavior normal.         Thought Content: Thought content normal.         Judgment: Judgment normal.           Assessment:       1. Coronary artery disease involving native coronary artery of native heart without angina pectoris no evidence of a recentl NSTEMI   2. Elevated troponin    3. SOB (shortness of breath)    4. Type 2 diabetes mellitus without complication, without long-term current use of insulin    5. ASCVD (arteriosclerotic cardiovascular disease)    6. Bronchitis    7. Smoker    8. Ectatic thoracic aorta    9. Essential hypertension, benign    10. Mixed hyperlipidemia         Plan:       Julio was seen  today for coronary artery disease.    Diagnoses and all orders for this visit:    Coronary artery disease involving native coronary artery of native heart without angina pectoris    Elevated troponin    SOB (shortness of breath)    Type 2 diabetes mellitus without complication, without long-term current use of insulin    ASCVD (arteriosclerotic cardiovascular disease)    Bronchitis    Smoker    Ectatic thoracic aorta    Essential hypertension, benign    Mixed hyperlipidemia

## 2022-01-21 ENCOUNTER — TELEPHONE (OUTPATIENT)
Dept: INTERNAL MEDICINE | Facility: CLINIC | Age: 87
End: 2022-01-21
Payer: MEDICARE

## 2022-01-21 ENCOUNTER — EXTERNAL HOME HEALTH (OUTPATIENT)
Dept: HOME HEALTH SERVICES | Facility: HOSPITAL | Age: 87
End: 2022-01-21
Payer: MEDICARE

## 2022-01-21 NOTE — TELEPHONE ENCOUNTER
""Please let patient know stool testing was negative for blood"    Informed Natali of the above results. States Mr. Benites does not answer his number due to spam calls.    "

## 2022-02-21 ENCOUNTER — LAB VISIT (OUTPATIENT)
Dept: LAB | Facility: OTHER | Age: 87
End: 2022-02-21
Attending: INTERNAL MEDICINE
Payer: MEDICARE

## 2022-02-21 ENCOUNTER — TELEPHONE (OUTPATIENT)
Dept: INTERNAL MEDICINE | Facility: CLINIC | Age: 87
End: 2022-02-21
Payer: MEDICARE

## 2022-02-21 DIAGNOSIS — E11.9 TYPE 2 DIABETES MELLITUS WITHOUT COMPLICATION, WITHOUT LONG-TERM CURRENT USE OF INSULIN: ICD-10-CM

## 2022-02-21 DIAGNOSIS — D50.9 MICROCYTIC ANEMIA: Primary | ICD-10-CM

## 2022-02-21 DIAGNOSIS — I10 ESSENTIAL HYPERTENSION, BENIGN: ICD-10-CM

## 2022-02-21 LAB
ALBUMIN SERPL BCP-MCNC: 3.3 G/DL (ref 3.5–5.2)
ALP SERPL-CCNC: 59 U/L (ref 55–135)
ALT SERPL W/O P-5'-P-CCNC: 12 U/L (ref 10–44)
ANION GAP SERPL CALC-SCNC: 13 MMOL/L (ref 8–16)
AST SERPL-CCNC: 18 U/L (ref 10–40)
BASOPHILS # BLD AUTO: 0.03 K/UL (ref 0–0.2)
BASOPHILS NFR BLD: 0.6 % (ref 0–1.9)
BILIRUB SERPL-MCNC: 0.2 MG/DL (ref 0.1–1)
BUN SERPL-MCNC: 19 MG/DL (ref 8–23)
CALCIUM SERPL-MCNC: 9.3 MG/DL (ref 8.7–10.5)
CHLORIDE SERPL-SCNC: 107 MMOL/L (ref 95–110)
CO2 SERPL-SCNC: 22 MMOL/L (ref 23–29)
CREAT SERPL-MCNC: 0.7 MG/DL (ref 0.5–1.4)
DIFFERENTIAL METHOD: ABNORMAL
EOSINOPHIL # BLD AUTO: 0.2 K/UL (ref 0–0.5)
EOSINOPHIL NFR BLD: 3.7 % (ref 0–8)
ERYTHROCYTE [DISTWIDTH] IN BLOOD BY AUTOMATED COUNT: 15.2 % (ref 11.5–14.5)
EST. GFR  (AFRICAN AMERICAN): >60 ML/MIN/1.73 M^2
EST. GFR  (NON AFRICAN AMERICAN): >60 ML/MIN/1.73 M^2
ESTIMATED AVG GLUCOSE: 146 MG/DL (ref 68–131)
GLUCOSE SERPL-MCNC: 145 MG/DL (ref 70–110)
HBA1C MFR BLD: 6.7 % (ref 4–5.6)
HCT VFR BLD AUTO: 31.8 % (ref 40–54)
HGB BLD-MCNC: 10.2 G/DL (ref 14–18)
IMM GRANULOCYTES # BLD AUTO: 0.04 K/UL (ref 0–0.04)
IMM GRANULOCYTES NFR BLD AUTO: 0.8 % (ref 0–0.5)
LYMPHOCYTES # BLD AUTO: 1.7 K/UL (ref 1–4.8)
LYMPHOCYTES NFR BLD: 33.1 % (ref 18–48)
MCH RBC QN AUTO: 25.9 PG (ref 27–31)
MCHC RBC AUTO-ENTMCNC: 32.1 G/DL (ref 32–36)
MCV RBC AUTO: 81 FL (ref 82–98)
MONOCYTES # BLD AUTO: 0.8 K/UL (ref 0.3–1)
MONOCYTES NFR BLD: 14.8 % (ref 4–15)
NEUTROPHILS # BLD AUTO: 2.4 K/UL (ref 1.8–7.7)
NEUTROPHILS NFR BLD: 47 % (ref 38–73)
NRBC BLD-RTO: 0 /100 WBC
PLATELET # BLD AUTO: 189 K/UL (ref 150–450)
PMV BLD AUTO: 10.7 FL (ref 9.2–12.9)
POTASSIUM SERPL-SCNC: 3.6 MMOL/L (ref 3.5–5.1)
PROT SERPL-MCNC: 6.7 G/DL (ref 6–8.4)
RBC # BLD AUTO: 3.94 M/UL (ref 4.6–6.2)
SODIUM SERPL-SCNC: 142 MMOL/L (ref 136–145)
WBC # BLD AUTO: 5.13 K/UL (ref 3.9–12.7)

## 2022-02-21 PROCEDURE — 82728 ASSAY OF FERRITIN: CPT | Mod: HCNC | Performed by: INTERNAL MEDICINE

## 2022-02-21 PROCEDURE — 80053 COMPREHEN METABOLIC PANEL: CPT | Mod: HCNC | Performed by: INTERNAL MEDICINE

## 2022-02-21 PROCEDURE — 85025 COMPLETE CBC W/AUTO DIFF WBC: CPT | Mod: HCNC | Performed by: INTERNAL MEDICINE

## 2022-02-21 PROCEDURE — 84466 ASSAY OF TRANSFERRIN: CPT | Mod: HCNC | Performed by: INTERNAL MEDICINE

## 2022-02-21 PROCEDURE — 36415 COLL VENOUS BLD VENIPUNCTURE: CPT | Mod: HCNC | Performed by: INTERNAL MEDICINE

## 2022-02-21 PROCEDURE — 83036 HEMOGLOBIN GLYCOSYLATED A1C: CPT | Mod: HCNC | Performed by: INTERNAL MEDICINE

## 2022-02-21 NOTE — TELEPHONE ENCOUNTER
""Please let patient know stool testing was negative for blood"    The listed message was given to the patient.  "

## 2022-02-21 NOTE — TELEPHONE ENCOUNTER
Spoke w/ [facility]  She states that they are trying to reschedule a discharge visit w/ pt and just needed a verbal that we are okay with them rescheduling the appt in accordance w/ pt's schedule  I said that it's ok, but if she needs doctor's orders he will be back in office tomorrow. She said it's fine for me to give verbal  She verbalized understanding and had no further concerns or questions.

## 2022-02-21 NOTE — TELEPHONE ENCOUNTER
----- Message from Adriana Adamson MA sent at 2/21/2022  1:24 PM CST -----  Regarding: returning call  Vicki with [facility] returned naeem 's call please contact her at [phone] in regards to pt discharged orders

## 2022-02-22 ENCOUNTER — TELEPHONE (OUTPATIENT)
Dept: INTERNAL MEDICINE | Facility: CLINIC | Age: 87
End: 2022-02-22
Payer: MEDICARE

## 2022-02-22 LAB
FERRITIN SERPL-MCNC: 332 NG/ML (ref 20–300)
IRON SERPL-MCNC: 46 UG/DL (ref 45–160)
SATURATED IRON: 17 % (ref 20–50)
TOTAL IRON BINDING CAPACITY: 263 UG/DL (ref 250–450)
TRANSFERRIN SERPL-MCNC: 178 MG/DL (ref 200–375)

## 2022-02-22 NOTE — TELEPHONE ENCOUNTER
Called pt to schedule additional labs  Home phone number did not have option for voicemail, just kept ringing    Tried mobile as second attempt  No option for voicemail  Routing to self for third attempt

## 2022-02-22 NOTE — TELEPHONE ENCOUNTER
In addition to scheduling labs,          Inform pt that labs look ok. He is due for f/u with me. Please schedule.

## 2022-02-22 NOTE — TELEPHONE ENCOUNTER
Received fax from [facility]  w/ form for Dr. Jensen to sign since pt was discharged from     Dr. Jensen signed  I faxed back and sent to be scanned to chart

## 2022-02-22 NOTE — TELEPHONE ENCOUNTER
Called lab 02.21.2022 twice and was on hold for several minutes. Had to leave for the day.  Called again 02.22.2022  Spoke to lab and they were able to add iron and TIBC, and ferritin    Routing to provider to advise if pt needs to come in for second blood draw

## 2022-02-22 NOTE — TELEPHONE ENCOUNTER
Stearns back from lab and they said they were unable to link bc it was main campus I had been transferred to and the sample was still at [facility].   I called [facility] to see if they can add iron and tibc  They said yes    I still need to reach pt to schedule two other labs they are not able to link

## 2022-02-24 NOTE — TELEPHONE ENCOUNTER
Spoke to Natali from pt's chart  Let her know as per Dr. Jensen labs look okay.  Scheduled f/u and labs  She verbalized understanding and had no further concerns or questions.

## 2022-03-03 ENCOUNTER — DOCUMENT SCAN (OUTPATIENT)
Dept: HOME HEALTH SERVICES | Facility: HOSPITAL | Age: 87
End: 2022-03-03
Payer: MEDICARE

## 2022-03-10 NOTE — PROGRESS NOTES
Subjective:       Patient ID: Julio Benites is a 87 y.o. male.    Chief Complaint: Hypertension    Pt here for f/u. He was hospitalized earlier this year for NSTEMI. Doing well now and has appropriate f/u with cardiology. Record reviewed. He was also found to have EF of 40% on ECHO c/w systolic CHF. He is currently well compensated and denies c/p/sob/angina/edema/wt gain.     Pt's BP is well controlled. Tolerating meds well. Pt denies cp/sob/ha/vision or neuro changes. Home readings are controlled.      DM2 is well controlled with diet. Was previously on metformin but was stopped as A1c was at goal. Last A1c was 6.7. He is not checking sugars. 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 No low sugar symptoms.     HLD is tx with pravastatin which he tolerates well. Aortic atherosclerosis was noted on CXR 2012. Also found to have ectatic aorta on imaging incidentally. He is on appropriate risk mitigation meds and given lifestyle recommendations. This is stable.     He has seen heme-onc h/o gastric CA. He has chronic mild anemia that is not iron deficient and is being followed by them. His weight is chronically low as he reports appetite isn't great. Denies abd pain/dysphagia/n/v/changes in BMs or bloody/black stool. Oncology said he did not need further f/u with them. He saw GI for EGD/c-scope but no concerning issues found except a few small colon polyps and a few small non-bleeding nodules in stomach (was biopsied but path not available to me).     In relation to w/u for wt loss, we did CT which showed findings of probable aspiration and asbestosis (former occupation as ship builder). We tx with augmentin and he felt this helped. Swallow study showed some aspiration so he has been setup with speech therapy and GI. He also saw pulm recently.    GERD is well controlled with protonix.    Still smoking and has been to cessation program; not interested in further intervention at this  time.       Gout has been quiescent without attacks for a while. Dietary discretion has helped.     He has h/o elevated PSA, last done in 2012 with value of 5.5. We discussed r/b of retesting and he opts not to do so.       Hypertension  Pertinent negatives include no chest pain, headaches or shortness of breath.   Diabetes  Pertinent negatives for hypoglycemia include no headaches. Pertinent negatives for diabetes include no chest pain and no polyuria.     Review of Systems   Constitutional: Negative for appetite change, fever and unexpected weight change.   Eyes: Negative for visual disturbance.   Respiratory: Negative for shortness of breath.    Cardiovascular: Negative for chest pain.   Gastrointestinal: Negative for abdominal pain, blood in stool, constipation and diarrhea.   Endocrine: Negative for polyuria.   Genitourinary: Negative for difficulty urinating.   Neurological: Negative for light-headedness and headaches.   Psychiatric/Behavioral: Negative for dysphoric mood.       Objective:          Assessment:       1. Essential hypertension, benign    2. Mixed hyperlipidemia    3. History of gastric cancer    4. Tobacco abuse    5. Atherosclerosis of aorta    6. Type 2 diabetes mellitus with microalbuminuria, without long-term current use of insulin    7. Gastroesophageal reflux disease without esophagitis    8. Coronary artery disease involving native coronary artery of native heart without angina pectoris    9. Asbestosis    10. Physical debility    11. Ectatic thoracic aorta    12. Idiopathic chronic gout of multiple sites without tophus    13. Elevated PSA    14. Alcohol use    15. Anemia, unspecified type    16. Chronic systolic (congestive) heart failure        Plan:       1. Recent labs reviewed     2. Home BP and BS monitoring   3. Keep f/u with specialists   4. Home weight monitoring and let us know if losing wt  5. Ensure supplement bid  6. F/u cardiology      Physical Exam  Vitals reviewed.    Constitutional:       Appearance: He is cachectic.   HENT:      Head: Normocephalic and atraumatic.   Eyes:      Extraocular Movements: Extraocular movements intact.      Pupils: Pupils are equal, round, and reactive to light.   Neck:      Thyroid: No thyromegaly.      Vascular: No carotid bruit.   Cardiovascular:      Rate and Rhythm: Normal rate and regular rhythm.      Heart sounds: Normal heart sounds, S1 normal and S2 normal. No murmur heard.  Pulmonary:      Effort: Pulmonary effort is normal.      Breath sounds: Normal breath sounds. No wheezing.   Abdominal:      General: Bowel sounds are normal.      Palpations: Abdomen is soft. There is no mass.      Tenderness: There is no abdominal tenderness.   Musculoskeletal:      Cervical back: Neck supple.      Right lower leg: No edema.      Left lower leg: No edema.   Lymphadenopathy:      Cervical: No cervical adenopathy.   Neurological:      Mental Status: He is alert and oriented to person, place, and time.      Cranial Nerves: No cranial nerve deficit.   Psychiatric:         Mood and Affect: Mood normal.         Behavior: Behavior normal.

## 2022-03-11 ENCOUNTER — TELEPHONE (OUTPATIENT)
Dept: OPTOMETRY | Facility: CLINIC | Age: 87
End: 2022-03-11
Payer: MEDICARE

## 2022-03-11 ENCOUNTER — LAB VISIT (OUTPATIENT)
Dept: LAB | Facility: OTHER | Age: 87
End: 2022-03-11
Attending: INTERNAL MEDICINE
Payer: MEDICARE

## 2022-03-11 ENCOUNTER — OFFICE VISIT (OUTPATIENT)
Dept: INTERNAL MEDICINE | Facility: CLINIC | Age: 87
End: 2022-03-11
Payer: MEDICARE

## 2022-03-11 VITALS
WEIGHT: 92.5 LBS | BODY MASS INDEX: 13.7 KG/M2 | HEART RATE: 70 BPM | SYSTOLIC BLOOD PRESSURE: 132 MMHG | OXYGEN SATURATION: 99 % | DIASTOLIC BLOOD PRESSURE: 74 MMHG | HEIGHT: 69 IN

## 2022-03-11 DIAGNOSIS — I10 ESSENTIAL HYPERTENSION, BENIGN: Primary | ICD-10-CM

## 2022-03-11 DIAGNOSIS — I77.810 ECTATIC THORACIC AORTA: ICD-10-CM

## 2022-03-11 DIAGNOSIS — R97.20 ELEVATED PSA: ICD-10-CM

## 2022-03-11 DIAGNOSIS — I50.22 CHRONIC SYSTOLIC (CONGESTIVE) HEART FAILURE: ICD-10-CM

## 2022-03-11 DIAGNOSIS — I25.10 CORONARY ARTERY DISEASE INVOLVING NATIVE CORONARY ARTERY OF NATIVE HEART WITHOUT ANGINA PECTORIS: ICD-10-CM

## 2022-03-11 DIAGNOSIS — D64.9 ANEMIA, UNSPECIFIED TYPE: ICD-10-CM

## 2022-03-11 DIAGNOSIS — I70.0 ATHEROSCLEROSIS OF AORTA: ICD-10-CM

## 2022-03-11 DIAGNOSIS — E11.29 TYPE 2 DIABETES MELLITUS WITH MICROALBUMINURIA, WITHOUT LONG-TERM CURRENT USE OF INSULIN: ICD-10-CM

## 2022-03-11 DIAGNOSIS — H26.9 CATARACT, UNSPECIFIED CATARACT TYPE, UNSPECIFIED LATERALITY: ICD-10-CM

## 2022-03-11 DIAGNOSIS — Z72.0 TOBACCO ABUSE: ICD-10-CM

## 2022-03-11 DIAGNOSIS — Z78.9 ALCOHOL USE: ICD-10-CM

## 2022-03-11 DIAGNOSIS — D50.9 MICROCYTIC ANEMIA: ICD-10-CM

## 2022-03-11 DIAGNOSIS — J61 ASBESTOSIS: ICD-10-CM

## 2022-03-11 DIAGNOSIS — Z85.028 HISTORY OF GASTRIC CANCER: ICD-10-CM

## 2022-03-11 DIAGNOSIS — E78.2 MIXED HYPERLIPIDEMIA: ICD-10-CM

## 2022-03-11 DIAGNOSIS — R53.81 PHYSICAL DEBILITY: ICD-10-CM

## 2022-03-11 DIAGNOSIS — R80.9 TYPE 2 DIABETES MELLITUS WITH MICROALBUMINURIA, WITHOUT LONG-TERM CURRENT USE OF INSULIN: ICD-10-CM

## 2022-03-11 DIAGNOSIS — M1A.09X0 IDIOPATHIC CHRONIC GOUT OF MULTIPLE SITES WITHOUT TOPHUS: ICD-10-CM

## 2022-03-11 DIAGNOSIS — K21.9 GASTROESOPHAGEAL REFLUX DISEASE WITHOUT ESOPHAGITIS: ICD-10-CM

## 2022-03-11 LAB
LDH SERPL L TO P-CCNC: 208 U/L (ref 110–260)
RETICS/RBC NFR AUTO: 1.2 % (ref 0.4–2)

## 2022-03-11 PROCEDURE — 83615 LACTATE (LD) (LDH) ENZYME: CPT | Mod: HCNC | Performed by: INTERNAL MEDICINE

## 2022-03-11 PROCEDURE — 1126F PR PAIN SEVERITY QUANTIFIED, NO PAIN PRESENT: ICD-10-PCS | Mod: CPTII,S$GLB,, | Performed by: INTERNAL MEDICINE

## 2022-03-11 PROCEDURE — 1101F PR PT FALLS ASSESS DOC 0-1 FALLS W/OUT INJ PAST YR: ICD-10-PCS | Mod: CPTII,S$GLB,, | Performed by: INTERNAL MEDICINE

## 2022-03-11 PROCEDURE — 1126F AMNT PAIN NOTED NONE PRSNT: CPT | Mod: CPTII,S$GLB,, | Performed by: INTERNAL MEDICINE

## 2022-03-11 PROCEDURE — 1159F MED LIST DOCD IN RCRD: CPT | Mod: CPTII,S$GLB,, | Performed by: INTERNAL MEDICINE

## 2022-03-11 PROCEDURE — 1101F PT FALLS ASSESS-DOCD LE1/YR: CPT | Mod: CPTII,S$GLB,, | Performed by: INTERNAL MEDICINE

## 2022-03-11 PROCEDURE — 36415 COLL VENOUS BLD VENIPUNCTURE: CPT | Mod: HCNC | Performed by: INTERNAL MEDICINE

## 2022-03-11 PROCEDURE — 3288F PR FALLS RISK ASSESSMENT DOCUMENTED: ICD-10-PCS | Mod: CPTII,S$GLB,, | Performed by: INTERNAL MEDICINE

## 2022-03-11 PROCEDURE — 99214 PR OFFICE/OUTPT VISIT, EST, LEVL IV, 30-39 MIN: ICD-10-PCS | Mod: S$GLB,,, | Performed by: INTERNAL MEDICINE

## 2022-03-11 PROCEDURE — 1160F RVW MEDS BY RX/DR IN RCRD: CPT | Mod: CPTII,S$GLB,, | Performed by: INTERNAL MEDICINE

## 2022-03-11 PROCEDURE — 99499 UNLISTED E&M SERVICE: CPT | Mod: HCNC,S$GLB,, | Performed by: INTERNAL MEDICINE

## 2022-03-11 PROCEDURE — 99999 PR PBB SHADOW E&M-EST. PATIENT-LVL IV: CPT | Mod: PBBFAC,HCNC,, | Performed by: INTERNAL MEDICINE

## 2022-03-11 PROCEDURE — 99999 PR PBB SHADOW E&M-EST. PATIENT-LVL IV: ICD-10-PCS | Mod: PBBFAC,HCNC,, | Performed by: INTERNAL MEDICINE

## 2022-03-11 PROCEDURE — 1159F PR MEDICATION LIST DOCUMENTED IN MEDICAL RECORD: ICD-10-PCS | Mod: CPTII,S$GLB,, | Performed by: INTERNAL MEDICINE

## 2022-03-11 PROCEDURE — 99499 RISK ADDL DX/OHS AUDIT: ICD-10-PCS | Mod: HCNC,S$GLB,, | Performed by: INTERNAL MEDICINE

## 2022-03-11 PROCEDURE — 85045 AUTOMATED RETICULOCYTE COUNT: CPT | Mod: HCNC | Performed by: INTERNAL MEDICINE

## 2022-03-11 PROCEDURE — 1160F PR REVIEW ALL MEDS BY PRESCRIBER/CLIN PHARMACIST DOCUMENTED: ICD-10-PCS | Mod: CPTII,S$GLB,, | Performed by: INTERNAL MEDICINE

## 2022-03-11 PROCEDURE — 99214 OFFICE O/P EST MOD 30 MIN: CPT | Mod: S$GLB,,, | Performed by: INTERNAL MEDICINE

## 2022-03-11 PROCEDURE — 3288F FALL RISK ASSESSMENT DOCD: CPT | Mod: CPTII,S$GLB,, | Performed by: INTERNAL MEDICINE

## 2022-03-11 RX ORDER — METOPROLOL SUCCINATE 25 MG/1
12.5 TABLET, EXTENDED RELEASE ORAL DAILY
Qty: 45 TABLET | Refills: 3 | Status: ON HOLD | OUTPATIENT
Start: 2022-03-11 | End: 2023-07-14

## 2022-03-11 RX ORDER — ASPIRIN 81 MG/1
81 TABLET ORAL DAILY
Qty: 90 TABLET | Refills: 3 | Status: SHIPPED | OUTPATIENT
Start: 2022-03-11 | End: 2023-04-14

## 2022-03-16 ENCOUNTER — TELEPHONE (OUTPATIENT)
Dept: INTERNAL MEDICINE | Facility: CLINIC | Age: 87
End: 2022-03-16
Payer: MEDICARE

## 2022-03-18 ENCOUNTER — OFFICE VISIT (OUTPATIENT)
Dept: CARDIOLOGY | Facility: CLINIC | Age: 87
End: 2022-03-18
Payer: MEDICARE

## 2022-03-18 VITALS
BODY MASS INDEX: 13.54 KG/M2 | OXYGEN SATURATION: 98 % | SYSTOLIC BLOOD PRESSURE: 144 MMHG | DIASTOLIC BLOOD PRESSURE: 92 MMHG | HEART RATE: 64 BPM | WEIGHT: 91.69 LBS

## 2022-03-18 DIAGNOSIS — J61 ASBESTOSIS: ICD-10-CM

## 2022-03-18 DIAGNOSIS — I50.22 CHRONIC SYSTOLIC HEART FAILURE: ICD-10-CM

## 2022-03-18 DIAGNOSIS — E11.29 TYPE 2 DIABETES MELLITUS WITH MICROALBUMINURIA, WITHOUT LONG-TERM CURRENT USE OF INSULIN: Chronic | ICD-10-CM

## 2022-03-18 DIAGNOSIS — R80.9 TYPE 2 DIABETES MELLITUS WITH MICROALBUMINURIA, WITHOUT LONG-TERM CURRENT USE OF INSULIN: Chronic | ICD-10-CM

## 2022-03-18 DIAGNOSIS — I25.118 ATHEROSCLEROSIS OF NATIVE CORONARY ARTERY OF NATIVE HEART WITH STABLE ANGINA PECTORIS: Primary | ICD-10-CM

## 2022-03-18 DIAGNOSIS — I70.0 ATHEROSCLEROSIS OF AORTA: ICD-10-CM

## 2022-03-18 PROBLEM — J41.0 SIMPLE CHRONIC BRONCHITIS: Status: ACTIVE | Noted: 2022-03-18

## 2022-03-18 PROBLEM — R06.02 SHORTNESS OF BREATH: Status: RESOLVED | Noted: 2022-01-05 | Resolved: 2022-03-18

## 2022-03-18 PROCEDURE — 1159F MED LIST DOCD IN RCRD: CPT | Mod: CPTII,S$GLB,, | Performed by: INTERNAL MEDICINE

## 2022-03-18 PROCEDURE — 93010 ELECTROCARDIOGRAM REPORT: CPT | Mod: S$GLB,,, | Performed by: INTERNAL MEDICINE

## 2022-03-18 PROCEDURE — 1126F AMNT PAIN NOTED NONE PRSNT: CPT | Mod: CPTII,S$GLB,, | Performed by: INTERNAL MEDICINE

## 2022-03-18 PROCEDURE — 93010 EKG 12-LEAD: ICD-10-PCS | Mod: S$GLB,,, | Performed by: INTERNAL MEDICINE

## 2022-03-18 PROCEDURE — 3288F FALL RISK ASSESSMENT DOCD: CPT | Mod: CPTII,S$GLB,, | Performed by: INTERNAL MEDICINE

## 2022-03-18 PROCEDURE — 93005 ELECTROCARDIOGRAM TRACING: CPT

## 2022-03-18 PROCEDURE — 1101F PR PT FALLS ASSESS DOC 0-1 FALLS W/OUT INJ PAST YR: ICD-10-PCS | Mod: CPTII,S$GLB,, | Performed by: INTERNAL MEDICINE

## 2022-03-18 PROCEDURE — 99999 PR PBB SHADOW E&M-EST. PATIENT-LVL III: CPT | Mod: PBBFAC,,, | Performed by: INTERNAL MEDICINE

## 2022-03-18 PROCEDURE — 99499 RISK ADDL DX/OHS AUDIT: ICD-10-PCS | Mod: HCNC,S$GLB,, | Performed by: INTERNAL MEDICINE

## 2022-03-18 PROCEDURE — 1160F PR REVIEW ALL MEDS BY PRESCRIBER/CLIN PHARMACIST DOCUMENTED: ICD-10-PCS | Mod: CPTII,S$GLB,, | Performed by: INTERNAL MEDICINE

## 2022-03-18 PROCEDURE — 3288F PR FALLS RISK ASSESSMENT DOCUMENTED: ICD-10-PCS | Mod: CPTII,S$GLB,, | Performed by: INTERNAL MEDICINE

## 2022-03-18 PROCEDURE — 99999 PR PBB SHADOW E&M-EST. PATIENT-LVL III: ICD-10-PCS | Mod: PBBFAC,,, | Performed by: INTERNAL MEDICINE

## 2022-03-18 PROCEDURE — 99215 OFFICE O/P EST HI 40 MIN: CPT | Mod: S$GLB,,, | Performed by: INTERNAL MEDICINE

## 2022-03-18 PROCEDURE — 99215 PR OFFICE/OUTPT VISIT, EST, LEVL V, 40-54 MIN: ICD-10-PCS | Mod: S$GLB,,, | Performed by: INTERNAL MEDICINE

## 2022-03-18 PROCEDURE — 1160F RVW MEDS BY RX/DR IN RCRD: CPT | Mod: CPTII,S$GLB,, | Performed by: INTERNAL MEDICINE

## 2022-03-18 PROCEDURE — 1126F PR PAIN SEVERITY QUANTIFIED, NO PAIN PRESENT: ICD-10-PCS | Mod: CPTII,S$GLB,, | Performed by: INTERNAL MEDICINE

## 2022-03-18 PROCEDURE — 99499 UNLISTED E&M SERVICE: CPT | Mod: HCNC,S$GLB,, | Performed by: INTERNAL MEDICINE

## 2022-03-18 PROCEDURE — 1159F PR MEDICATION LIST DOCUMENTED IN MEDICAL RECORD: ICD-10-PCS | Mod: CPTII,S$GLB,, | Performed by: INTERNAL MEDICINE

## 2022-03-18 PROCEDURE — 1101F PT FALLS ASSESS-DOCD LE1/YR: CPT | Mod: CPTII,S$GLB,, | Performed by: INTERNAL MEDICINE

## 2022-03-18 NOTE — PROGRESS NOTES
[facility]    Chief Complaint  Chief Complaint   Patient presents with    Congestive Heart Failure       HPI:    Patient presents to establish cardiac follow-up.  In January he was hospitalized in [place] for exertional dyspnea.  Cardiac enzymes were elevated.  Echocardiogram showed mild left ventricular systolic dysfunction.  He was treated for heart failure and acute coronary syndrome.  He improved quickly and was sent home on medical therapy.  Since discharge, he has not had a recurrence of his symptoms.  He is not very active.  Compliant with medications.  No chest pain.  No cardiac history prior to his January events.    Medications  Current Outpatient Medications   Medication Sig Dispense Refill    aspirin (ECOTRIN) 81 MG EC tablet Take 1 tablet (81 mg total) by mouth once daily. 90 tablet 3    colchicine (COLCRYS) 0.6 mg tablet Take 0.6 mg by mouth 2 (two) times daily as needed (gout attack).      losartan (COZAAR) 100 MG tablet TAKE 1 TABLET BY MOUTH EVERY DAY 90 tablet 3    metoprolol succinate (TOPROL-XL) 25 MG 24 hr tablet Take 0.5 tablets (12.5 mg total) by mouth once daily. 45 tablet 3    NIFEdipine (ADALAT CC) 90 MG TbSR TAKE 1 TABLET (90 MG TOTAL) BY MOUTH ONCE DAILY. 90 tablet 3    pantoprazole (PROTONIX) 40 MG tablet Take 1 tablet (40 mg total) by mouth once daily. 90 tablet 3    pravastatin (PRAVACHOL) 20 MG tablet Take 1 tablet (20 mg total) by mouth nightly. 90 tablet 3    tiotropium-olodateroL (STIOLTO RESPIMAT) 2.5-2.5 mcg/actuation Mist Inhale 2 puffs into the lungs once daily. Controller 4 g 5     No current facility-administered medications for this visit.        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    Cataract     Cellulitis of right forearm 8/25/2012    Overview:  dx update    Chronic systolic heart failure 3/18/2022    Diabetes mellitus type II     Diabetic retinopathy     Elevated PSA     GERD  (gastroesophageal reflux disease)     Gout attack     Hyperlipidemia     Hypertension     Iron deficiency anemia secondary to blood loss (chronic) 2013    NSTEMI (non-ST elevated myocardial infarction) 2022     Past Surgical History:   Procedure Laterality Date    CATARACT EXTRACTION Right     EYE SURGERY      INFECTED SKIN DEBRIDEMENT  2012    spider bite with surgery to right forearm    MS EVAL,SWALLOW FUNCTION,CINE/VIDEO RECORD  2021         subtotal gastrectomy  2013     Social History     Socioeconomic History    Marital status:    Occupational History    Occupation: Retired     Comment: Insulator   Tobacco Use    Smoking status: Former Smoker     Packs/day: 0.25     Types: Cigarettes     Quit date: 2022     Years since quittin.2    Smokeless tobacco: Never Used    Tobacco comment: patient states that he started smoking cigarettes again: half a pack of cigarettes over 4 or 5 days   Substance and Sexual Activity    Alcohol use: Yes     Comment: drinks scotch 2X weekly    Drug use: No    Sexual activity: Not Currently     Partners: Female     Family History   Problem Relation Age of Onset    Lung cancer Mother     Breast cancer Sister     No Known Problems Father     Breast cancer Daughter     No Known Problems Son     No Known Problems Maternal Grandmother     No Known Problems Maternal Grandfather     No Known Problems Paternal Grandmother     No Known Problems Paternal Grandfather         Allergies  Review of patient's allergies indicates:   Allergen Reactions    Lisinopril Rash     Patient reports history of rash with previous lisinopril use.     Nicoderm     Nicoderm cq [nicotine] Rash       Review of Systems   Review of Systems   Constitutional: Negative for malaise/fatigue, weight gain and weight loss.   Eyes: Negative for visual disturbance.   Cardiovascular: Positive for dyspnea on exertion. Negative for chest pain, claudication, cyanosis,  irregular heartbeat, leg swelling, near-syncope, orthopnea, palpitations, paroxysmal nocturnal dyspnea and syncope.   Respiratory: Negative for cough, hemoptysis, shortness of breath, sleep disturbances due to breathing and wheezing.    Hematologic/Lymphatic: Negative for bleeding problem. Does not bruise/bleed easily.   Skin: Negative for poor wound healing.   Musculoskeletal: Negative for muscle cramps and myalgias.   Gastrointestinal: Negative for abdominal pain, anorexia, diarrhea, heartburn, hematemesis, hematochezia, melena, nausea and vomiting.   Genitourinary: Negative for hematuria and nocturia.   Neurological: Negative for excessive daytime sleepiness, dizziness, focal weakness, light-headedness and weakness.       Physical Exam  Vitals:    03/18/22 1318   BP: (!) 144/92   Pulse: 64     Wt Readings from Last 1 Encounters:   03/18/22 41.6 kg (91 lb 11.2 oz)     Physical Exam  Vitals and nursing note reviewed.   Constitutional:       General: He is not in acute distress.     Appearance: He is cachectic. He is not toxic-appearing or diaphoretic.   HENT:      Head: Normocephalic and atraumatic.      Mouth/Throat:      Lips: Pink.      Mouth: Mucous membranes are moist.   Eyes:      General: No scleral icterus.     Conjunctiva/sclera: Conjunctivae normal.   Neck:      Thyroid: No thyromegaly.      Vascular: No carotid bruit, hepatojugular reflux or JVD.      Trachea: Trachea normal.   Cardiovascular:      Rate and Rhythm: Normal rate and regular rhythm.  No extrasystoles are present.     Chest Wall: PMI is not displaced.      Pulses:           Carotid pulses are 2+ on the right side and 2+ on the left side.       Radial pulses are 2+ on the right side and 2+ on the left side.      Heart sounds: S1 normal and S2 normal. Murmur heard.    Systolic murmur is present with a grade of 2/6 at the lower left sternal border.    No friction rub. No S3 or S4 sounds.   Pulmonary:      Effort: Pulmonary effort is normal. No  tachypnea, bradypnea, accessory muscle usage or respiratory distress.      Breath sounds: Normal breath sounds and air entry. No decreased breath sounds, wheezing, rhonchi or rales.   Abdominal:      General: Bowel sounds are normal. There is no distension or abdominal bruit.      Palpations: Abdomen is soft. There is no hepatomegaly, splenomegaly or pulsatile mass.      Tenderness: There is no abdominal tenderness.   Musculoskeletal:         General: No tenderness or deformity.      Right lower leg: No edema.      Left lower leg: No edema.   Skin:     General: Skin is warm and dry.      Capillary Refill: Capillary refill takes less than 2 seconds.      Coloration: Skin is not cyanotic or pale.      Nails: There is no clubbing.   Neurological:      General: No focal deficit present.      Mental Status: He is alert and oriented to person, place, and time.   Psychiatric:         Attention and Perception: Attention normal.         Mood and Affect: Mood normal.         Speech: Speech normal.         Behavior: Behavior normal. Behavior is cooperative.         Labs  Lab Visit on 03/11/2022   Component Date Value Ref Range Status    Retic 03/11/2022 1.2  0.4 - 2.0 % Final    LD 03/11/2022 208  110 - 260 U/L Final    Results are increased in hemolyzed samples.   Lab Visit on 02/21/2022   Component Date Value Ref Range Status    Sodium 02/21/2022 142  136 - 145 mmol/L Final    Potassium 02/21/2022 3.6  3.5 - 5.1 mmol/L Final    Chloride 02/21/2022 107  95 - 110 mmol/L Final    CO2 02/21/2022 22 (A) 23 - 29 mmol/L Final    Glucose 02/21/2022 145 (A) 70 - 110 mg/dL Final    BUN 02/21/2022 19  8 - 23 mg/dL Final    Creatinine 02/21/2022 0.7  0.5 - 1.4 mg/dL Final    Calcium 02/21/2022 9.3  8.7 - 10.5 mg/dL Final    Total Protein 02/21/2022 6.7  6.0 - 8.4 g/dL Final    Albumin 02/21/2022 3.3 (A) 3.5 - 5.2 g/dL Final    Total Bilirubin 02/21/2022 0.2  0.1 - 1.0 mg/dL Final    Comment: For infants and newborns,  interpretation of results should be based  on gestational age, weight and in agreement with clinical  observations.    Premature Infant recommended reference ranges:  Up to 24 hours.............<8.0 mg/dL  Up to 48 hours............<12.0 mg/dL  3-5 days..................<15.0 mg/dL  6-29 days.................<15.0 mg/dL      Alkaline Phosphatase 02/21/2022 59  55 - 135 U/L Final    AST 02/21/2022 18  10 - 40 U/L Final    ALT 02/21/2022 12  10 - 44 U/L Final    Anion Gap 02/21/2022 13  8 - 16 mmol/L Final    eGFR if African American 02/21/2022 >60  >60 mL/min/1.73 m^2 Final    eGFR if non African American 02/21/2022 >60  >60 mL/min/1.73 m^2 Final    Comment: Calculation used to obtain the estimated glomerular filtration  rate (eGFR) is the CKD-EPI equation.       WBC 02/21/2022 5.13  3.90 - 12.70 K/uL Final    RBC 02/21/2022 3.94 (A) 4.60 - 6.20 M/uL Final    Hemoglobin 02/21/2022 10.2 (A) 14.0 - 18.0 g/dL Final    Hematocrit 02/21/2022 31.8 (A) 40.0 - 54.0 % Final    MCV 02/21/2022 81 (A) 82 - 98 fL Final    MCH 02/21/2022 25.9 (A) 27.0 - 31.0 pg Final    MCHC 02/21/2022 32.1  32.0 - 36.0 g/dL Final    RDW 02/21/2022 15.2 (A) 11.5 - 14.5 % Final    Platelets 02/21/2022 189  150 - 450 K/uL Final    MPV 02/21/2022 10.7  9.2 - 12.9 fL Final    Immature Granulocytes 02/21/2022 0.8 (A) 0.0 - 0.5 % Final    Gran # (ANC) 02/21/2022 2.4  1.8 - 7.7 K/uL Final    Immature Grans (Abs) 02/21/2022 0.04  0.00 - 0.04 K/uL Final    Comment: Mild elevation in immature granulocytes is non specific and   can be seen in a variety of conditions including stress response,   acute inflammation, trauma and pregnancy. Correlation with other   laboratory and clinical findings is essential.      Lymph # 02/21/2022 1.7  1.0 - 4.8 K/uL Final    Mono # 02/21/2022 0.8  0.3 - 1.0 K/uL Final    Eos # 02/21/2022 0.2  0.0 - 0.5 K/uL Final    Baso # 02/21/2022 0.03  0.00 - 0.20 K/uL Final    nRBC 02/21/2022 0  0 /100 WBC Final     Gran % 02/21/2022 47.0  38.0 - 73.0 % Final    Lymph % 02/21/2022 33.1  18.0 - 48.0 % Final    Mono % 02/21/2022 14.8  4.0 - 15.0 % Final    Eosinophil % 02/21/2022 3.7  0.0 - 8.0 % Final    Basophil % 02/21/2022 0.6  0.0 - 1.9 % Final    Differential Method 02/21/2022 Automated   Final    Hemoglobin A1C 02/21/2022 6.7 (A) 4.0 - 5.6 % Final    Comment: ADA Screening Guidelines:  5.7-6.4%  Consistent with prediabetes  >or=6.5%  Consistent with diabetes    High levels of fetal hemoglobin interfere with the HbA1C  assay. Heterozygous hemoglobin variants (HbS, HgC, etc)do  not significantly interfere with this assay.   However, presence of multiple variants may affect accuracy.      Estimated Avg Glucose 02/21/2022 146 (A) 68 - 131 mg/dL Final    Ferritin 02/22/2022 332 (A) 20.0 - 300.0 ng/mL Final    Iron 02/22/2022 46  45 - 160 ug/dL Final    Transferrin 02/22/2022 178 (A) 200 - 375 mg/dL Final    TIBC 02/22/2022 263  250 - 450 ug/dL Final    Saturated Iron 02/22/2022 17 (A) 20 - 50 % Final   Lab Visit on 01/17/2022   Component Date Value Ref Range Status    Fecal Immunochemical Test (iFOBT) 01/18/2022 Negative  Negative Final   Admission on 01/01/2022, Discharged on 01/03/2022   Component Date Value Ref Range Status    BNP 01/01/2022 27  0 - 99 pg/mL Final    Values of less than 100 pg/ml are consistent with non-CHF populations.    WBC 01/01/2022 6.48  3.90 - 12.70 K/uL Final    RBC 01/01/2022 4.25 (A) 4.60 - 6.20 M/uL Final    Hemoglobin 01/01/2022 11.0 (A) 14.0 - 18.0 g/dL Final    Hematocrit 01/01/2022 34.1 (A) 40.0 - 54.0 % Final    MCV 01/01/2022 80 (A) 82 - 98 fL Final    MCH 01/01/2022 25.9 (A) 27.0 - 31.0 pg Final    MCHC 01/01/2022 32.3  32.0 - 36.0 g/dL Final    RDW 01/01/2022 14.8 (A) 11.5 - 14.5 % Final    Platelets 01/01/2022 181  150 - 450 K/uL Final    MPV 01/01/2022 11.1  9.2 - 12.9 fL Final    Immature Granulocytes 01/01/2022 0.3  0.0 - 0.5 % Final    Gran # (ANC)  01/01/2022 4.8  1.8 - 7.7 K/uL Final    Immature Grans (Abs) 01/01/2022 0.02  0.00 - 0.04 K/uL Final    Comment: Mild elevation in immature granulocytes is non specific and   can be seen in a variety of conditions including stress response,   acute inflammation, trauma and pregnancy. Correlation with other   laboratory and clinical findings is essential.      Lymph # 01/01/2022 0.8 (A) 1.0 - 4.8 K/uL Final    Mono # 01/01/2022 0.7  0.3 - 1.0 K/uL Final    Eos # 01/01/2022 0.1  0.0 - 0.5 K/uL Final    Baso # 01/01/2022 0.02  0.00 - 0.20 K/uL Final    nRBC 01/01/2022 0  0 /100 WBC Final    Gran % 01/01/2022 74.2 (A) 38.0 - 73.0 % Final    Lymph % 01/01/2022 13.0 (A) 18.0 - 48.0 % Final    Mono % 01/01/2022 11.0  4.0 - 15.0 % Final    Eosinophil % 01/01/2022 1.2  0.0 - 8.0 % Final    Basophil % 01/01/2022 0.3  0.0 - 1.9 % Final    Differential Method 01/01/2022 Automated   Final    Sodium 01/01/2022 146 (A) 136 - 145 mmol/L Final    Potassium 01/01/2022 4.2  3.5 - 5.1 mmol/L Final    Chloride 01/01/2022 112 (A) 95 - 110 mmol/L Final    CO2 01/01/2022 19 (A) 23 - 29 mmol/L Final    Glucose 01/01/2022 87  70 - 110 mg/dL Final    BUN 01/01/2022 36 (A) 8 - 23 mg/dL Final    Creatinine 01/01/2022 0.8  0.5 - 1.4 mg/dL Final    Calcium 01/01/2022 9.3  8.7 - 10.5 mg/dL Final    Total Protein 01/01/2022 6.6  6.0 - 8.4 g/dL Final    Albumin 01/01/2022 3.5  3.5 - 5.2 g/dL Final    Total Bilirubin 01/01/2022 0.3  0.1 - 1.0 mg/dL Final    Comment: For infants and newborns, interpretation of results should be based  on gestational age, weight and in agreement with clinical  observations.    Premature Infant recommended reference ranges:  Up to 24 hours.............<8.0 mg/dL  Up to 48 hours............<12.0 mg/dL  3-5 days..................<15.0 mg/dL  6-29 days.................<15.0 mg/dL      Alkaline Phosphatase 01/01/2022 59  55 - 135 U/L Final    AST 01/01/2022 19  10 - 40 U/L Final    ALT 01/01/2022  14  10 - 44 U/L Final    Anion Gap 01/01/2022 15  8 - 16 mmol/L Final    eGFR if African American 01/01/2022 >60  >60 mL/min/1.73 m^2 Final    eGFR if non African American 01/01/2022 >60  >60 mL/min/1.73 m^2 Final    Comment: Calculation used to obtain the estimated glomerular filtration  rate (eGFR) is the CKD-EPI equation.       Troponin I 01/01/2022 0.036 (A) 0.000 - 0.026 ng/mL Final    Comment: The reference interval for Troponin I represents the 99th percentile   cutoff   for our facility and is consistent with 3rd generation assay   performance.      SARS-CoV-2 RNA, Amplification, Qual 01/01/2022 Negative  Negative Final    Comment: This test utilizes isothermal nucleic acid amplification   technology to detect the SARS-CoV-2 RdRp nucleic acid segment.   The analytical sensitivity (limit of detection) is 125 genome   equivalents/mL.     A POSITIVE result implies infection with the SARS-CoV-2 virus;  the patient is presumed to be contagious.    A NEGATIVE result means that SARS-CoV-2 nucleic acids are not  present above the limit of detection. A NEGATIVE result should be   treated as presumptive. It does not rule out the possibility of   COVID-19 and should not be the sole basis for treatment decisions.   If COVID-19 is strongly suspected based on clinical and exposure   history, re-testing using an alternate molecular assay should be   considered.       This test is only for use under the Food and Drug   Administration s Emergency Use Authorization (EUA).   Commercial kits are provided by [employer].   Performance characteristics of the EUA have been independently  verified by [facility]                           ment of  Pathology and Laboratory Medicine.   _________________________________________________________________  The ID NOW COVID-19 Letter of Authorization, along with the   authorized Fact Sheet for Healthcare Providers, the authorized Fact  Sheet for Patients, and  authorized labeling are available on the FDA   website:  www.fda.gov/MedicalDevices/Safety/EmergencySituations/lwf323763.htm      Specimen UA 01/01/2022 Urine, Clean Catch   Final    Color, UA 01/01/2022 Yellow  Yellow, Straw, Jaquelin Final    Appearance, UA 01/01/2022 Clear  Clear Final    pH, UA 01/01/2022 5.0  5.0 - 8.0 Final    Specific Gravity, UA 01/01/2022 1.025  1.005 - 1.030 Final    Protein, UA 01/01/2022 Trace (A) Negative Final    Comment: Recommend a 24 hour urine protein or a urine   protein/creatinine ratio if globulin induced proteinuria is  clinically suspected.      Glucose, UA 01/01/2022 Negative  Negative Final    Ketones, UA 01/01/2022 Negative  Negative Final    Bilirubin (UA) 01/01/2022 Negative  Negative Final    Occult Blood UA 01/01/2022 Negative  Negative Final    Nitrite, UA 01/01/2022 Negative  Negative Final    Urobilinogen, UA 01/01/2022 2.0-3.0 (A) <2.0 EU/dL Final    Leukocytes, UA 01/01/2022 2+ (A) Negative Final    RBC, UA 01/01/2022 1  0 - 4 /hpf Final    WBC, UA 01/01/2022 13 (A) 0 - 5 /hpf Final    Hyaline Casts, UA 01/01/2022 1  0-1/lpf /lpf Final    Microscopic Comment 01/01/2022 SEE COMMENT   Final    Comment: Other formed elements not mentioned in the report are not   present in the microscopic examination.       Urine Culture, Routine 01/01/2022 No growth   Final    Troponin I 01/02/2022 0.029 (A) 0.000 - 0.026 ng/mL Final    Comment: The reference interval for Troponin I represents the 99th percentile   cutoff   for our facility and is consistent with 3rd generation assay   performance.      BSA 01/03/2022 1.34  m2 Final    TDI SEPTAL 01/03/2022 0.04  m/s Final    LV LATERAL E/E' RATIO 01/03/2022 17.75  m/s Final    LV SEPTAL E/E' RATIO 01/03/2022 17.75  m/s Final    TDI LATERAL 01/03/2022 0.04  m/s Final    PV PEAK VELOCITY 01/03/2022 0.87  cm/s Final    LVIDd 01/03/2022 3.47 (A) 3.5 - 6.0 cm Final    IVS 01/03/2022 0.85  0.6 - 1.1 cm Final     "Posterior Wall 01/03/2022 0.84  0.6 - 1.1 cm Final    LVIDs 01/03/2022 2.95  2.1 - 4.0 cm Final    FS 01/03/2022 15  28 - 44 % Final    LV mass 01/03/2022 80.14  g Final    LA size 01/03/2022 1.80  cm Final    RVDD 01/03/2022 2.07  cm Final    Left Ventricle Relative Wall Thick* 01/03/2022 0.48  cm Final    AV mean gradient 01/03/2022 6  mmHg Final    AV valve area 01/03/2022 1.53  cm2 Final    AV Velocity Ratio 01/03/2022 0.51   Final    AV index (prosthetic) 01/03/2022 0.50   Final    MV mean gradient 01/03/2022 0  mmHg Final    MV valve area p 1/2 method 01/03/2022 3.18  cm2 Final    MV valve area by continuity eq 01/03/2022 1.45  cm2 Final    E/A ratio 01/03/2022 0.87   Final    Mean e' 01/03/2022 0.04  m/s Final    E wave deceleration time 01/03/2022 238.40  msec Final    MV "A" wave duration 01/03/2022 14.08  msec Final    Pulm vein S/D ratio 01/03/2022 1.73   Final    LVOT diameter 01/03/2022 1.97  cm Final    LVOT area 01/03/2022 3.0  cm2 Final    LVOT peak florencio 01/03/2022 0.90  m/s Final    LVOT peak VTI 01/03/2022 15.27  cm Final    Ao peak florencio 01/03/2022 1.77  m/s Final    Ao VTI 01/03/2022 30.49  cm Final    LVOT stroke volume 01/03/2022 46.52  cm3 Final    AV peak gradient 01/03/2022 13  mmHg Final    MV peak gradient 01/03/2022 3  mmHg Final    E/E' ratio 01/03/2022 17.75  m/s Final    MV Peak E Florencio 01/03/2022 0.71  m/s Final    TR Max Florencio 01/03/2022 2.60  m/s Final    MV VTI 01/03/2022 32.09  cm Final    MV stenosis pressure 1/2 time 01/03/2022 69.14  ms Final    MV Peak A Florencio 01/03/2022 0.82  m/s Final    PV Peak S Florencio 01/03/2022 0.45  m/s Final    PV Peak D Florencio 01/03/2022 0.26  m/s Final    LV Systolic Volume 01/03/2022 33.68  mL Final    LV Systolic Volume Index 01/03/2022 23.9  mL/m2 Final    LV Diastolic Volume 01/03/2022 49.68  mL Final    LV Diastolic Volume Index 01/03/2022 35.23  mL/m2 Final    LV Mass Index 01/03/2022 57  g/m2 Final    Triscuspid Valve " Regurgitation Pea* 01/03/2022 27  mmHg Final    Right Atrial Pressure (from IVC) 01/03/2022 3  mmHg Final    EF 01/03/2022 40  % Final    TV rest pulmonary artery pressure 01/03/2022 30  mmHg Final    Sodium 01/02/2022 147 (A) 136 - 145 mmol/L Final    Potassium 01/02/2022 4.5  3.5 - 5.1 mmol/L Final    Chloride 01/02/2022 111 (A) 95 - 110 mmol/L Final    CO2 01/02/2022 21 (A) 23 - 29 mmol/L Final    Glucose 01/02/2022 108  70 - 110 mg/dL Final    BUN 01/02/2022 31 (A) 8 - 23 mg/dL Final    Creatinine 01/02/2022 0.8  0.5 - 1.4 mg/dL Final    Calcium 01/02/2022 9.7  8.7 - 10.5 mg/dL Final    Anion Gap 01/02/2022 15  8 - 16 mmol/L Final    eGFR if African American 01/02/2022 >60  >60 mL/min/1.73 m^2 Final    eGFR if non African American 01/02/2022 >60  >60 mL/min/1.73 m^2 Final    Comment: Calculation used to obtain the estimated glomerular filtration  rate (eGFR) is the CKD-EPI equation.       Magnesium 01/02/2022 1.9  1.6 - 2.6 mg/dL Final    Troponin I 01/02/2022 0.051 (A) 0.000 - 0.026 ng/mL Final    Comment: The reference interval for Troponin I represents the 99th percentile   cutoff   for our facility and is consistent with 3rd generation assay   performance.      WBC 01/02/2022 5.81  3.90 - 12.70 K/uL Final    RBC 01/02/2022 4.73  4.60 - 6.20 M/uL Final    Hemoglobin 01/02/2022 12.2 (A) 14.0 - 18.0 g/dL Final    Hematocrit 01/02/2022 37.9 (A) 40.0 - 54.0 % Final    MCV 01/02/2022 80 (A) 82 - 98 fL Final    MCH 01/02/2022 25.8 (A) 27.0 - 31.0 pg Final    MCHC 01/02/2022 32.2  32.0 - 36.0 g/dL Final    RDW 01/02/2022 14.6 (A) 11.5 - 14.5 % Final    Platelets 01/02/2022 196  150 - 450 K/uL Final    MPV 01/02/2022 10.9  9.2 - 12.9 fL Final    Immature Granulocytes 01/02/2022 0.3  0.0 - 0.5 % Final    Gran # (ANC) 01/02/2022 4.0  1.8 - 7.7 K/uL Final    Immature Grans (Abs) 01/02/2022 0.02  0.00 - 0.04 K/uL Final    Comment: Mild elevation in immature granulocytes is non specific and    can be seen in a variety of conditions including stress response,   acute inflammation, trauma and pregnancy. Correlation with other   laboratory and clinical findings is essential.      Lymph # 01/02/2022 1.1  1.0 - 4.8 K/uL Final    Mono # 01/02/2022 0.6  0.3 - 1.0 K/uL Final    Eos # 01/02/2022 0.1  0.0 - 0.5 K/uL Final    Baso # 01/02/2022 0.01  0.00 - 0.20 K/uL Final    nRBC 01/02/2022 0  0 /100 WBC Final    Gran % 01/02/2022 69.0  38.0 - 73.0 % Final    Lymph % 01/02/2022 18.1  18.0 - 48.0 % Final    Mono % 01/02/2022 10.5  4.0 - 15.0 % Final    Eosinophil % 01/02/2022 1.9  0.0 - 8.0 % Final    Basophil % 01/02/2022 0.2  0.0 - 1.9 % Final    Differential Method 01/02/2022 Automated   Final    Hemoglobin A1C 01/02/2022 6.4 (A) 4.0 - 5.6 % Final    Comment: ADA Screening Guidelines:  5.7-6.4%  Consistent with prediabetes  >or=6.5%  Consistent with diabetes    High levels of fetal hemoglobin interfere with the HbA1C  assay. Heterozygous hemoglobin variants (HbS, HgC, etc)do  not significantly interfere with this assay.   However, presence of multiple variants may affect accuracy.      Estimated Avg Glucose 01/02/2022 137 (A) 68 - 131 mg/dL Final    Troponin I 01/02/2022 0.381 (A) 0.000 - 0.026 ng/mL Final    Comment: The reference interval for Troponin I represents the 99th percentile   cutoff   for our facility and is consistent with 3rd generation assay   performance.      Troponin I 01/02/2022 0.556 (A) 0.000 - 0.026 ng/mL Final    Comment: The reference interval for Troponin I represents the 99th percentile   cutoff   for our facility and is consistent with 3rd generation assay   performance.      Troponin I 01/03/2022 0.415 (A) 0.000 - 0.026 ng/mL Final    Comment: The reference interval for Troponin I represents the 99th percentile   cutoff   for our facility and is consistent with 3rd generation assay   performance.      Sodium 01/03/2022 144  136 - 145 mmol/L Final    Potassium  01/03/2022 3.9  3.5 - 5.1 mmol/L Final    Chloride 01/03/2022 110  95 - 110 mmol/L Final    CO2 01/03/2022 22 (A) 23 - 29 mmol/L Final    Glucose 01/03/2022 109  70 - 110 mg/dL Final    BUN 01/03/2022 21  8 - 23 mg/dL Final    Creatinine 01/03/2022 0.7  0.5 - 1.4 mg/dL Final    Calcium 01/03/2022 9.4  8.7 - 10.5 mg/dL Final    Anion Gap 01/03/2022 12  8 - 16 mmol/L Final    eGFR if African American 01/03/2022 >60  >60 mL/min/1.73 m^2 Final    eGFR if non African American 01/03/2022 >60  >60 mL/min/1.73 m^2 Final    Comment: Calculation used to obtain the estimated glomerular filtration  rate (eGFR) is the CKD-EPI equation.       Magnesium 01/03/2022 1.8  1.6 - 2.6 mg/dL Final    WBC 01/03/2022 9.80  3.90 - 12.70 K/uL Final    RBC 01/03/2022 4.44 (A) 4.60 - 6.20 M/uL Final    Hemoglobin 01/03/2022 11.4 (A) 14.0 - 18.0 g/dL Final    Hematocrit 01/03/2022 34.7 (A) 40.0 - 54.0 % Final    MCV 01/03/2022 78 (A) 82 - 98 fL Final    MCH 01/03/2022 25.7 (A) 27.0 - 31.0 pg Final    MCHC 01/03/2022 32.9  32.0 - 36.0 g/dL Final    RDW 01/03/2022 14.6 (A) 11.5 - 14.5 % Final    Platelets 01/03/2022 217  150 - 450 K/uL Final    MPV 01/03/2022 11.8  9.2 - 12.9 fL Final    Immature Granulocytes 01/03/2022 0.3  0.0 - 0.5 % Final    Gran # (ANC) 01/03/2022 8.1 (A) 1.8 - 7.7 K/uL Final    Immature Grans (Abs) 01/03/2022 0.03  0.00 - 0.04 K/uL Final    Comment: Mild elevation in immature granulocytes is non specific and   can be seen in a variety of conditions including stress response,   acute inflammation, trauma and pregnancy. Correlation with other   laboratory and clinical findings is essential.      Lymph # 01/03/2022 0.8 (A) 1.0 - 4.8 K/uL Final    Mono # 01/03/2022 0.9  0.3 - 1.0 K/uL Final    Eos # 01/03/2022 0.0  0.0 - 0.5 K/uL Final    Baso # 01/03/2022 0.03  0.00 - 0.20 K/uL Final    nRBC 01/03/2022 0  0 /100 WBC Final    Gran % 01/03/2022 82.6 (A) 38.0 - 73.0 % Final    Lymph % 01/03/2022 7.9  (A) 18.0 - 48.0 % Final    Mono % 01/03/2022 8.8  4.0 - 15.0 % Final    Eosinophil % 01/03/2022 0.1  0.0 - 8.0 % Final    Basophil % 01/03/2022 0.3  0.0 - 1.9 % Final    Differential Method 01/03/2022 Automated   Final    POCT Glucose 01/02/2022 124 (A) 70 - 110 mg/dL Final    POCT Glucose 01/03/2022 101  70 - 110 mg/dL Final       Imaging  No results found.    Assessment  1. Atherosclerosis of native coronary artery of native heart with stable angina pectoris  I would favor medical therapy and not an ischemic workup    2. Chronic systolic heart failure  Compensated  - Ambulatory referral/consult to Cardiology    3. Atherosclerosis of aorta  Risk factor modification    4. Asbestosis  Contributes at least some to his dyspnea    5. Type 2 diabetes mellitus with microalbuminuria, without long-term current use of insulin  One of his multiple risk factors      Plan and Discussion    Given his multiple risk factors and presentation in January, I suspect he has underlying coronary artery disease.  I would opt for medical therapy at this point.  He seems to have done well with that over the past 2 months.  Blood pressure is high today but I am hesitant to increase his medications since he says he has not taken them yet today and it looks like it has been well controlled on the current regimen in the past.  If his blood pressure needs additional treatment, would increase metoprolol.  Continue secondary prevention efforts.  Plan to reassess lipids at next visit.    Follow Up  Follow up in about 3 months (around 6/18/2022).      Damaso Del Castillo MD

## 2022-03-18 NOTE — ASSESSMENT & PLAN NOTE
Needs PFTs when recovered fromt his acute illness.     - Cont LABA/LAMA  - RTC in 3mn with pfts and 6 MWT at that time

## 2022-04-20 ENCOUNTER — TELEPHONE (OUTPATIENT)
Dept: SMOKING CESSATION | Facility: CLINIC | Age: 87
End: 2022-04-20
Payer: MEDICARE

## 2022-07-14 ENCOUNTER — TELEPHONE (OUTPATIENT)
Dept: SMOKING CESSATION | Facility: CLINIC | Age: 87
End: 2022-07-14
Payer: MEDICARE

## 2022-07-25 ENCOUNTER — PES CALL (OUTPATIENT)
Dept: ADMINISTRATIVE | Facility: CLINIC | Age: 87
End: 2022-07-25
Payer: MEDICARE

## 2022-07-25 DIAGNOSIS — E78.2 MIXED HYPERLIPIDEMIA: ICD-10-CM

## 2022-07-25 NOTE — TELEPHONE ENCOUNTER
Care Due:                  Date            Visit Type   Department     Provider  --------------------------------------------------------------------------------                                EP -                              PRIMARY      [facility] INTERNAL  Last Visit: 03-      CARE ([facility])   VIJAY Jensen                              EP -                              PRIMARY      [facility] INTERNAL  Next Visit: 09-      CARE ([facility])   VIJAY Jensen                                                            Last  Test          Frequency    Reason                     Performed    Due Date  --------------------------------------------------------------------------------    Lipid Panel.  12 months..  pravastatin..............  08- 08-    [facility] Embedded Care Gaps. Reference number: [id number]. 7/25/2022   12:31:37 AM CDT

## 2022-07-26 RX ORDER — PRAVASTATIN SODIUM 20 MG/1
TABLET ORAL
Qty: 90 TABLET | Refills: 0 | Status: SHIPPED | OUTPATIENT
Start: 2022-07-26 | End: 2022-10-28

## 2022-07-26 NOTE — TELEPHONE ENCOUNTER
Refill Decision Note   Julio Kamari  is requesting a refill authorization.  Brief Assessment and Rationale for Refill:  Approve    -Medication-Related Problems Identified: Requires labs  Medication Therapy Plan:       Medication Reconciliation Completed: No   Comments:     No Care Gaps recommended.     Note composed:10:06 AM 07/26/2022

## 2022-07-27 ENCOUNTER — TELEPHONE (OUTPATIENT)
Dept: SMOKING CESSATION | Facility: CLINIC | Age: 87
End: 2022-07-27
Payer: MEDICARE

## 2022-07-27 NOTE — TELEPHONE ENCOUNTER
Third attempt  regarding smoking cessation quit 2 episode, will call back at a later time.        
rash

## 2022-08-25 ENCOUNTER — TELEPHONE (OUTPATIENT)
Dept: INTERNAL MEDICINE | Facility: CLINIC | Age: 87
End: 2022-08-25
Payer: MEDICARE

## 2022-08-25 NOTE — TELEPHONE ENCOUNTER
"Call pt to let him know "I'm so sorry for the inconvenience, but Dr. Jensen has been called out of the office for a meeting on your appointment date 09.15.2022. I have you moved you to our closest opening just in case you want it. Please check the details under the "visits" section of your portal and let us know if you need anything adjusted. I am happy to find you another appointment if that one doesn't work!  Thanks!  Nafisa"  "

## 2022-08-30 NOTE — TELEPHONE ENCOUNTER
Called pt's home number as second attempt  Didn't work  Called mobile, which did not work either  Called pt's daughter  I reviewed pt's upcoming appts w/ her and she said she will let him know

## 2022-09-13 ENCOUNTER — LAB VISIT (OUTPATIENT)
Dept: LAB | Facility: OTHER | Age: 87
End: 2022-09-13
Attending: INTERNAL MEDICINE
Payer: MEDICARE

## 2022-09-13 DIAGNOSIS — R80.9 TYPE 2 DIABETES MELLITUS WITH MICROALBUMINURIA, WITHOUT LONG-TERM CURRENT USE OF INSULIN: ICD-10-CM

## 2022-09-13 DIAGNOSIS — E11.29 TYPE 2 DIABETES MELLITUS WITH MICROALBUMINURIA, WITHOUT LONG-TERM CURRENT USE OF INSULIN: ICD-10-CM

## 2022-09-13 DIAGNOSIS — I10 ESSENTIAL HYPERTENSION, BENIGN: ICD-10-CM

## 2022-09-13 LAB
ALBUMIN SERPL BCP-MCNC: 3.5 G/DL (ref 3.5–5.2)
ALP SERPL-CCNC: 67 U/L (ref 55–135)
ALT SERPL W/O P-5'-P-CCNC: 8 U/L (ref 10–44)
ANION GAP SERPL CALC-SCNC: 11 MMOL/L (ref 8–16)
AST SERPL-CCNC: 18 U/L (ref 10–40)
BASOPHILS # BLD AUTO: 0.04 K/UL (ref 0–0.2)
BASOPHILS NFR BLD: 0.6 % (ref 0–1.9)
BILIRUB SERPL-MCNC: 0.4 MG/DL (ref 0.1–1)
BUN SERPL-MCNC: 24 MG/DL (ref 8–23)
CALCIUM SERPL-MCNC: 10.1 MG/DL (ref 8.7–10.5)
CHLORIDE SERPL-SCNC: 109 MMOL/L (ref 95–110)
CO2 SERPL-SCNC: 24 MMOL/L (ref 23–29)
CREAT SERPL-MCNC: 0.8 MG/DL (ref 0.5–1.4)
DIFFERENTIAL METHOD: ABNORMAL
EOSINOPHIL # BLD AUTO: 1 K/UL (ref 0–0.5)
EOSINOPHIL NFR BLD: 14.7 % (ref 0–8)
ERYTHROCYTE [DISTWIDTH] IN BLOOD BY AUTOMATED COUNT: 15.1 % (ref 11.5–14.5)
EST. GFR  (NO RACE VARIABLE): >60 ML/MIN/1.73 M^2
ESTIMATED AVG GLUCOSE: 131 MG/DL (ref 68–131)
GLUCOSE SERPL-MCNC: 116 MG/DL (ref 70–110)
HBA1C MFR BLD: 6.2 % (ref 4–5.6)
HCT VFR BLD AUTO: 36.5 % (ref 40–54)
HGB BLD-MCNC: 11.7 G/DL (ref 14–18)
IMM GRANULOCYTES # BLD AUTO: 0.03 K/UL (ref 0–0.04)
IMM GRANULOCYTES NFR BLD AUTO: 0.5 % (ref 0–0.5)
LYMPHOCYTES # BLD AUTO: 1.7 K/UL (ref 1–4.8)
LYMPHOCYTES NFR BLD: 25.4 % (ref 18–48)
MCH RBC QN AUTO: 25.7 PG (ref 27–31)
MCHC RBC AUTO-ENTMCNC: 32.1 G/DL (ref 32–36)
MCV RBC AUTO: 80 FL (ref 82–98)
MONOCYTES # BLD AUTO: 0.8 K/UL (ref 0.3–1)
MONOCYTES NFR BLD: 11.5 % (ref 4–15)
NEUTROPHILS # BLD AUTO: 3.1 K/UL (ref 1.8–7.7)
NEUTROPHILS NFR BLD: 47.3 % (ref 38–73)
NRBC BLD-RTO: 0 /100 WBC
PLATELET # BLD AUTO: 253 K/UL (ref 150–450)
PMV BLD AUTO: 11.3 FL (ref 9.2–12.9)
POTASSIUM SERPL-SCNC: 4 MMOL/L (ref 3.5–5.1)
PROT SERPL-MCNC: 7.3 G/DL (ref 6–8.4)
RBC # BLD AUTO: 4.56 M/UL (ref 4.6–6.2)
SODIUM SERPL-SCNC: 144 MMOL/L (ref 136–145)
WBC # BLD AUTO: 6.62 K/UL (ref 3.9–12.7)

## 2022-09-13 PROCEDURE — 85025 COMPLETE CBC W/AUTO DIFF WBC: CPT | Performed by: INTERNAL MEDICINE

## 2022-09-13 PROCEDURE — 36415 COLL VENOUS BLD VENIPUNCTURE: CPT | Performed by: INTERNAL MEDICINE

## 2022-09-13 PROCEDURE — 83036 HEMOGLOBIN GLYCOSYLATED A1C: CPT | Performed by: INTERNAL MEDICINE

## 2022-09-13 PROCEDURE — 80053 COMPREHEN METABOLIC PANEL: CPT | Performed by: INTERNAL MEDICINE

## 2022-09-23 NOTE — PROGRESS NOTES
Subjective:       Patient ID: Julio Benites is a 87 y.o. male.    Chief Complaint: Diabetes    Pt here for f/u.     He has h/o NSTEMI. Also with chronic systolic CHF with last EF 40%. This is currently well compensated and denies c/p/sob/angina/edema/wt gain. On appropriate meds. Due for cardiology f/u.    Pt's BP is well controlled. Tolerating meds well. Pt denies cp/sob/ha/vision or neuro changes. Home readings are controlled.      DM2 is well controlled with diet. Was previously on metformin but was stopped as A1c was at goal. Last A1c was 6.2. He is not checking sugars. No low sugar symptoms. 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 He also has microalbuminuria and on maximally tolerated ARB.     HLD is tx with pravastatin which he tolerates well. Aortic atherosclerosis was noted on CXR 2012. Also found to have ectatic aorta on imaging incidentally. He is on appropriate risk mitigation meds and given lifestyle recommendations. This is stable.     He has seen heme-onc h/o gastric CA. He has chronic mild anemia that is not iron deficient and is being followed by them. His weight is chronically low as he reports appetite isn't great. Denies abd pain/dysphagia/n/v/changes in BMs or bloody/black stool. Oncology said he did not need further f/u with them. He saw GI for EGD/c-scope but no concerning issues found except a few small colon polyps and a few small non-bleeding nodules in stomach--per pt he was told he did not need f/u.     In relation to w/u for wt loss, we did CT which showed findings of probable aspiration and asbestosis (former occupation as ship builder). He was tx for acute illness with abx at that time with recovery. He was referred to pulmonary/GI and speech therapy due to aspiration. Pulmonary wanted him to return for PFTs after acute illness but he never did so; he is not interested in going back as he feels well; he understands r/b of his decision.     GERD  is well controlled with protonix.    He quit smoking 1/2022 and doing well.     Gout has been quiescent without attacks for a while. Dietary discretion has helped.     He has h/o elevated PSA, last done in 2012 with value of 5.5. We discussed r/b of retesting and he opts not to do so.       Hypertension  Pertinent negatives include no chest pain, headaches or shortness of breath.   Diabetes  Pertinent negatives for hypoglycemia include no headaches. Pertinent negatives for diabetes include no chest pain and no polyuria.   Review of Systems   Constitutional:  Negative for appetite change, fever and unexpected weight change.   Eyes:  Negative for visual disturbance.   Respiratory:  Negative for shortness of breath.    Cardiovascular:  Negative for chest pain.   Gastrointestinal:  Negative for abdominal pain, blood in stool, constipation and diarrhea.   Endocrine: Negative for polyuria.   Genitourinary:  Negative for difficulty urinating.   Neurological:  Negative for light-headedness and headaches.   Psychiatric/Behavioral:  Negative for dysphoric mood.      Objective:          Assessment:       1. Essential hypertension, benign    2. Mixed hyperlipidemia    3. Type 2 diabetes mellitus with microalbuminuria, without long-term current use of insulin    4. Gastroesophageal reflux disease without esophagitis    5. Atherosclerosis of native coronary artery of native heart with stable angina pectoris    6. Simple chronic bronchitis    7. Atherosclerosis of aorta    8. Tobacco abuse    9. Idiopathic chronic gout of multiple sites without tophus    10. Elevated PSA    11. History of gastric cancer    12. Alcohol use    13. Asbestosis    14. Physical debility    15. Chronic systolic heart failure    16. Ectatic thoracic aorta    17. Anemia, unspecified type        Plan:       1. Recent labs reviewed     2. Home BP and BS monitoring   3. Keep f/u with specialists   4. Home weight monitoring and let us know if losing wt  5.  Continue with boost supplement bid  6. F/u specialists--assisted with appt      Physical Exam  Vitals reviewed.   Constitutional:       Appearance: He is cachectic.   HENT:      Head: Normocephalic and atraumatic.   Eyes:      Extraocular Movements: Extraocular movements intact.      Pupils: Pupils are equal, round, and reactive to light.   Neck:      Thyroid: No thyromegaly.      Vascular: No carotid bruit.   Cardiovascular:      Rate and Rhythm: Normal rate and regular rhythm.      Heart sounds: Normal heart sounds, S1 normal and S2 normal. No murmur heard.  Pulmonary:      Effort: Pulmonary effort is normal.      Breath sounds: Normal breath sounds. No wheezing.   Abdominal:      General: Bowel sounds are normal.      Palpations: Abdomen is soft. There is no mass.      Tenderness: There is no abdominal tenderness.   Musculoskeletal:      Cervical back: Neck supple.      Right lower leg: No edema.      Left lower leg: No edema.   Lymphadenopathy:      Cervical: No cervical adenopathy.   Neurological:      Mental Status: He is alert and oriented to person, place, and time.      Cranial Nerves: No cranial nerve deficit.   Psychiatric:         Mood and Affect: Mood normal.         Behavior: Behavior normal.

## 2022-09-27 ENCOUNTER — OFFICE VISIT (OUTPATIENT)
Dept: INTERNAL MEDICINE | Facility: CLINIC | Age: 87
End: 2022-09-27
Payer: MEDICARE

## 2022-09-27 ENCOUNTER — LAB VISIT (OUTPATIENT)
Dept: LAB | Facility: OTHER | Age: 87
End: 2022-09-27
Attending: INTERNAL MEDICINE
Payer: MEDICARE

## 2022-09-27 VITALS
HEART RATE: 71 BPM | DIASTOLIC BLOOD PRESSURE: 82 MMHG | SYSTOLIC BLOOD PRESSURE: 138 MMHG | HEIGHT: 69 IN | WEIGHT: 89.31 LBS | OXYGEN SATURATION: 100 % | BODY MASS INDEX: 13.23 KG/M2

## 2022-09-27 DIAGNOSIS — D64.9 ANEMIA, UNSPECIFIED TYPE: ICD-10-CM

## 2022-09-27 DIAGNOSIS — R80.9 TYPE 2 DIABETES MELLITUS WITH MICROALBUMINURIA, WITHOUT LONG-TERM CURRENT USE OF INSULIN: Chronic | ICD-10-CM

## 2022-09-27 DIAGNOSIS — E78.2 MIXED HYPERLIPIDEMIA: Chronic | ICD-10-CM

## 2022-09-27 DIAGNOSIS — E11.29 TYPE 2 DIABETES MELLITUS WITH MICROALBUMINURIA, WITHOUT LONG-TERM CURRENT USE OF INSULIN: Chronic | ICD-10-CM

## 2022-09-27 DIAGNOSIS — J41.0 SIMPLE CHRONIC BRONCHITIS: ICD-10-CM

## 2022-09-27 DIAGNOSIS — I50.22 CHRONIC SYSTOLIC HEART FAILURE: ICD-10-CM

## 2022-09-27 DIAGNOSIS — I10 ESSENTIAL HYPERTENSION, BENIGN: Primary | Chronic | ICD-10-CM

## 2022-09-27 DIAGNOSIS — I25.118 ATHEROSCLEROSIS OF NATIVE CORONARY ARTERY OF NATIVE HEART WITH STABLE ANGINA PECTORIS: ICD-10-CM

## 2022-09-27 DIAGNOSIS — R53.81 PHYSICAL DEBILITY: ICD-10-CM

## 2022-09-27 DIAGNOSIS — M1A.09X0 IDIOPATHIC CHRONIC GOUT OF MULTIPLE SITES WITHOUT TOPHUS: ICD-10-CM

## 2022-09-27 DIAGNOSIS — I77.810 ECTATIC THORACIC AORTA: ICD-10-CM

## 2022-09-27 DIAGNOSIS — K21.9 GASTROESOPHAGEAL REFLUX DISEASE WITHOUT ESOPHAGITIS: ICD-10-CM

## 2022-09-27 DIAGNOSIS — Z85.028 HISTORY OF GASTRIC CANCER: ICD-10-CM

## 2022-09-27 DIAGNOSIS — Z78.9 ALCOHOL USE: Chronic | ICD-10-CM

## 2022-09-27 DIAGNOSIS — Z72.0 TOBACCO ABUSE: Chronic | ICD-10-CM

## 2022-09-27 DIAGNOSIS — J61 ASBESTOSIS: ICD-10-CM

## 2022-09-27 DIAGNOSIS — I70.0 ATHEROSCLEROSIS OF AORTA: ICD-10-CM

## 2022-09-27 DIAGNOSIS — R97.20 ELEVATED PSA: ICD-10-CM

## 2022-09-27 LAB
ALBUMIN/CREAT UR: 149 UG/MG (ref 0–30)
CREAT UR-MCNC: 92.6 MG/DL (ref 23–375)
MICROALBUMIN UR DL<=1MG/L-MCNC: 138 UG/ML

## 2022-09-27 PROCEDURE — 1101F PT FALLS ASSESS-DOCD LE1/YR: CPT | Mod: CPTII,S$GLB,, | Performed by: INTERNAL MEDICINE

## 2022-09-27 PROCEDURE — 3288F FALL RISK ASSESSMENT DOCD: CPT | Mod: CPTII,S$GLB,, | Performed by: INTERNAL MEDICINE

## 2022-09-27 PROCEDURE — 82570 ASSAY OF URINE CREATININE: CPT | Performed by: INTERNAL MEDICINE

## 2022-09-27 PROCEDURE — 99214 PR OFFICE/OUTPT VISIT, EST, LEVL IV, 30-39 MIN: ICD-10-PCS | Mod: S$GLB,,, | Performed by: INTERNAL MEDICINE

## 2022-09-27 PROCEDURE — 3288F PR FALLS RISK ASSESSMENT DOCUMENTED: ICD-10-PCS | Mod: CPTII,S$GLB,, | Performed by: INTERNAL MEDICINE

## 2022-09-27 PROCEDURE — 1160F RVW MEDS BY RX/DR IN RCRD: CPT | Mod: CPTII,S$GLB,, | Performed by: INTERNAL MEDICINE

## 2022-09-27 PROCEDURE — 1101F PR PT FALLS ASSESS DOC 0-1 FALLS W/OUT INJ PAST YR: ICD-10-PCS | Mod: CPTII,S$GLB,, | Performed by: INTERNAL MEDICINE

## 2022-09-27 PROCEDURE — 99999 PR PBB SHADOW E&M-EST. PATIENT-LVL IV: CPT | Mod: PBBFAC,,, | Performed by: INTERNAL MEDICINE

## 2022-09-27 PROCEDURE — 99214 OFFICE O/P EST MOD 30 MIN: CPT | Mod: S$GLB,,, | Performed by: INTERNAL MEDICINE

## 2022-09-27 PROCEDURE — 99999 PR PBB SHADOW E&M-EST. PATIENT-LVL IV: ICD-10-PCS | Mod: PBBFAC,,, | Performed by: INTERNAL MEDICINE

## 2022-09-27 PROCEDURE — 1126F PR PAIN SEVERITY QUANTIFIED, NO PAIN PRESENT: ICD-10-PCS | Mod: CPTII,S$GLB,, | Performed by: INTERNAL MEDICINE

## 2022-09-27 PROCEDURE — 1159F MED LIST DOCD IN RCRD: CPT | Mod: CPTII,S$GLB,, | Performed by: INTERNAL MEDICINE

## 2022-09-27 PROCEDURE — 1159F PR MEDICATION LIST DOCUMENTED IN MEDICAL RECORD: ICD-10-PCS | Mod: CPTII,S$GLB,, | Performed by: INTERNAL MEDICINE

## 2022-09-27 PROCEDURE — 1126F AMNT PAIN NOTED NONE PRSNT: CPT | Mod: CPTII,S$GLB,, | Performed by: INTERNAL MEDICINE

## 2022-09-27 PROCEDURE — 1160F PR REVIEW ALL MEDS BY PRESCRIBER/CLIN PHARMACIST DOCUMENTED: ICD-10-PCS | Mod: CPTII,S$GLB,, | Performed by: INTERNAL MEDICINE

## 2022-09-28 ENCOUNTER — IMMUNIZATION (OUTPATIENT)
Dept: PHARMACY | Facility: CLINIC | Age: 87
End: 2022-09-28
Payer: MEDICARE

## 2022-10-28 DIAGNOSIS — E78.2 MIXED HYPERLIPIDEMIA: ICD-10-CM

## 2022-10-28 RX ORDER — PRAVASTATIN SODIUM 20 MG/1
TABLET ORAL
Qty: 90 TABLET | Refills: 0 | Status: ON HOLD | OUTPATIENT
Start: 2022-10-28 | End: 2023-07-14 | Stop reason: SDUPTHER

## 2022-10-28 NOTE — TELEPHONE ENCOUNTER
Refill Routing Note   Medication(s) are not appropriate for processing by [facility] for the following reason(s):      - Required laboratory values are outdated    [facility] action(s):  Defer Medication-related problems identified: Requires labs        Medication reconciliation completed: No     Appointments  past 12m or future 3m with PCP    Date Provider   Last Visit   9/27/2022 Gregorio Jensen MD   Next Visit   3/24/2023 Gregorio Jensen MD   ED visits in past 90 days: 0        Note composed:11:37 AM 10/28/2022

## 2022-10-28 NOTE — TELEPHONE ENCOUNTER
No new care gaps identified.  [facility] Embedded Care Gaps. Reference number: [id number]. 10/28/2022   12:30:25 AM CDT

## 2022-11-16 ENCOUNTER — PES CALL (OUTPATIENT)
Dept: ADMINISTRATIVE | Facility: OTHER | Age: 87
End: 2022-11-16
Payer: MEDICARE

## 2023-01-09 ENCOUNTER — CLINICAL SUPPORT (OUTPATIENT)
Dept: SMOKING CESSATION | Facility: CLINIC | Age: 88
End: 2023-01-09
Payer: COMMERCIAL

## 2023-01-09 DIAGNOSIS — F17.200 NICOTINE DEPENDENCE: Primary | ICD-10-CM

## 2023-01-09 PROCEDURE — 99407 PR TOBACCO USE CESSATION INTENSIVE >10 MINUTES: ICD-10-PCS | Mod: S$GLB,,,

## 2023-01-09 PROCEDURE — 99407 BEHAV CHNG SMOKING > 10 MIN: CPT | Mod: S$GLB,,,

## 2023-01-09 NOTE — PROGRESS NOTES
Spoke with patient today in regard to smoking cessation progress for 12-month telephone follow up.  Patient states that he is tobacco free and has been for over 1 year.  Commended patient on their quit.  Informed patient of benefit period, future follow up, and contact information if any further help or support is needed.  Will complete smart form for 3/6/12 month follow up and resolve Quit attempt #2.

## 2023-03-23 PROBLEM — R64 CACHEXIA: Status: ACTIVE | Noted: 2023-03-23

## 2023-04-13 ENCOUNTER — PES CALL (OUTPATIENT)
Dept: ADMINISTRATIVE | Facility: CLINIC | Age: 88
End: 2023-04-13
Payer: MEDICARE

## 2023-04-17 ENCOUNTER — PES CALL (OUTPATIENT)
Dept: ADMINISTRATIVE | Facility: CLINIC | Age: 88
End: 2023-04-17
Payer: MEDICARE

## 2023-05-11 ENCOUNTER — HOSPITAL ENCOUNTER (OUTPATIENT)
Dept: RADIOLOGY | Facility: OTHER | Age: 88
Discharge: HOME | End: 2023-05-11
Attending: PHYSICIAN ASSISTANT
Payer: MEDICARE

## 2023-05-11 ENCOUNTER — OFFICE VISIT (OUTPATIENT)
Dept: INTERNAL MEDICINE | Facility: CLINIC | Age: 88
End: 2023-05-11
Payer: MEDICARE

## 2023-05-11 VITALS
SYSTOLIC BLOOD PRESSURE: 164 MMHG | OXYGEN SATURATION: 99 % | DIASTOLIC BLOOD PRESSURE: 107 MMHG | BODY MASS INDEX: 13.14 KG/M2 | HEART RATE: 77 BPM | WEIGHT: 89 LBS

## 2023-05-11 DIAGNOSIS — R53.83 FATIGUE, UNSPECIFIED TYPE: ICD-10-CM

## 2023-05-11 DIAGNOSIS — R63.4 WEIGHT LOSS: Primary | ICD-10-CM

## 2023-05-11 DIAGNOSIS — R79.9 ABNORMAL FINDING OF BLOOD CHEMISTRY, UNSPECIFIED: ICD-10-CM

## 2023-05-11 DIAGNOSIS — Z12.5 ENCOUNTER FOR SCREENING FOR MALIGNANT NEOPLASM OF PROSTATE: ICD-10-CM

## 2023-05-11 DIAGNOSIS — R63.4 WEIGHT LOSS: ICD-10-CM

## 2023-05-11 PROCEDURE — 1159F MED LIST DOCD IN RCRD: CPT | Mod: HCNC,CPTII,S$GLB, | Performed by: PHYSICIAN ASSISTANT

## 2023-05-11 PROCEDURE — 71046 X-RAY EXAM CHEST 2 VIEWS: CPT | Mod: TC,HCNC,FY

## 2023-05-11 PROCEDURE — 1101F PT FALLS ASSESS-DOCD LE1/YR: CPT | Mod: HCNC,CPTII,S$GLB, | Performed by: PHYSICIAN ASSISTANT

## 2023-05-11 PROCEDURE — 1101F PR PT FALLS ASSESS DOC 0-1 FALLS W/OUT INJ PAST YR: ICD-10-PCS | Mod: HCNC,CPTII,S$GLB, | Performed by: PHYSICIAN ASSISTANT

## 2023-05-11 PROCEDURE — 71046 X-RAY EXAM CHEST 2 VIEWS: CPT | Mod: 26,HCNC,, | Performed by: RADIOLOGY

## 2023-05-11 PROCEDURE — 3288F PR FALLS RISK ASSESSMENT DOCUMENTED: ICD-10-PCS | Mod: HCNC,CPTII,S$GLB, | Performed by: PHYSICIAN ASSISTANT

## 2023-05-11 PROCEDURE — 99999 PR PBB SHADOW E&M-EST. PATIENT-LVL III: CPT | Mod: PBBFAC,HCNC,, | Performed by: PHYSICIAN ASSISTANT

## 2023-05-11 PROCEDURE — 1126F AMNT PAIN NOTED NONE PRSNT: CPT | Mod: HCNC,CPTII,S$GLB, | Performed by: PHYSICIAN ASSISTANT

## 2023-05-11 PROCEDURE — 3288F FALL RISK ASSESSMENT DOCD: CPT | Mod: HCNC,CPTII,S$GLB, | Performed by: PHYSICIAN ASSISTANT

## 2023-05-11 PROCEDURE — 71046 XR CHEST PA AND LATERAL: ICD-10-PCS | Mod: 26,HCNC,, | Performed by: RADIOLOGY

## 2023-05-11 PROCEDURE — 99214 OFFICE O/P EST MOD 30 MIN: CPT | Mod: HCNC,S$GLB,, | Performed by: PHYSICIAN ASSISTANT

## 2023-05-11 PROCEDURE — 99999 PR PBB SHADOW E&M-EST. PATIENT-LVL III: ICD-10-PCS | Mod: PBBFAC,HCNC,, | Performed by: PHYSICIAN ASSISTANT

## 2023-05-11 PROCEDURE — 1126F PR PAIN SEVERITY QUANTIFIED, NO PAIN PRESENT: ICD-10-PCS | Mod: HCNC,CPTII,S$GLB, | Performed by: PHYSICIAN ASSISTANT

## 2023-05-11 PROCEDURE — 99214 PR OFFICE/OUTPT VISIT, EST, LEVL IV, 30-39 MIN: ICD-10-PCS | Mod: HCNC,S$GLB,, | Performed by: PHYSICIAN ASSISTANT

## 2023-05-11 PROCEDURE — 1159F PR MEDICATION LIST DOCUMENTED IN MEDICAL RECORD: ICD-10-PCS | Mod: HCNC,CPTII,S$GLB, | Performed by: PHYSICIAN ASSISTANT

## 2023-05-11 NOTE — PROGRESS NOTES
INTERNAL MEDICINE URGENT VISIT NOTE    CHIEF COMPLAINT     Chief Complaint   Patient presents with    Cough    Nasal Congestion    Chest Congestion    trouble walking        HPI     Julio Benites is a 88 y.o. male who presents for an urgent visit today.    PCP is Gregorio Jensen MD, patient is new to me.     Patient presents with complaints of nausea coughing sob and weight loss. Significant weakness. He denies chest pain or muscle pain. No HA, vision changes or confusion. He sometimes forgets medications but admits this is rarely. He is accompanied by his son today in the office.     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 Authorizing Provider   aspirin (ECOTRIN) 81 MG EC tablet TAKE 1 TABLET BY MOUTH EVERY DAY 4/14/23   Gregorio Jensen MD   colchicine (COLCRYS) 0.6 mg tablet Take 0.6 mg by mouth 2 (two) times daily as needed (gout attack).    Historical Provider   losartan (COZAAR) 100 MG tablet TAKE 1 TABLET BY MOUTH EVERY DAY 10/12/22   Gregorio Jensen MD    metoprolol succinate (TOPROL-XL) 25 MG 24 hr tablet Take 0.5 tablets (12.5 mg total) by mouth once daily. 3/11/22 3/11/23  Gregorio Jensen MD   NIFEdipine (ADALAT CC) 90 MG TbSR TAKE 1 TABLET (90 MG TOTAL) BY MOUTH ONCE DAILY. 9/11/21   Gregorio Jensen MD   pantoprazole (PROTONIX) 40 MG tablet TAKE 1 TABLET BY MOUTH EVERY DAY 2/2/23   Gregorio Jensen MD   pravastatin (PRAVACHOL) 20 MG tablet TAKE 1 TABLET BY MOUTH EVERY DAY AT NIGHT 10/28/22   Alice Sam MD       Review of Systems:  Review of Systems   Constitutional:  Negative for chills and fever.   HENT:  Negative for sore throat and trouble swallowing.    Eyes:  Negative for visual disturbance.   Respiratory:  Negative for cough and shortness of breath.    Cardiovascular:  Negative for chest pain.   Gastrointestinal:  Negative for abdominal pain, constipation, diarrhea, nausea and vomiting.   Genitourinary:  Negative for dysuria and flank pain.   Musculoskeletal:  Negative for back pain, neck pain and neck stiffness.   Skin:  Negative for rash.   Neurological:  Negative for dizziness, syncope, weakness and headaches.   Psychiatric/Behavioral:  Negative for confusion.      Health Maintainence:   Immunizations:  Health Maintenance         Date Due Completion Date    Shingles Vaccine (1 of 2) Never done ---    COVID-19 Vaccine (4 - Booster for Pfizer series) 11/23/2021 9/28/2021    TETANUS VACCINE 03/21/2022 3/21/2012    Lipid Panel 08/24/2022 8/24/2021    Hemoglobin A1c 03/13/2023 9/13/2022    Diabetes Urine Screening 09/27/2023 9/27/2022    Aspirin/Antiplatelet Therapy 04/14/2024 4/14/2023             PHYSICAL EXAM     BP (!) 164/107   Pulse 77   Wt 40.4 kg (89 lb)   SpO2 99%   BMI 13.14 kg/m²     Physical Exam  Vitals and nursing note reviewed.   Constitutional:       Appearance: Normal appearance.      Comments: Elderly male in NAD or apparent pain. He makes good eye contact, speaks in clear full sentences and does not ambulate on  this exam. He is cachetic.      HENT:      Head: Normocephalic and atraumatic.      Nose: Nose normal.      Mouth/Throat:      Pharynx: Oropharynx is clear.   Eyes:      Conjunctiva/sclera: Conjunctivae normal.   Cardiovascular:      Rate and Rhythm: Normal rate and regular rhythm.      Pulses: Normal pulses.   Pulmonary:      Effort: No respiratory distress.   Abdominal:      Tenderness: There is no abdominal tenderness.   Musculoskeletal:         General: Normal range of motion.      Cervical back: No rigidity.   Skin:     General: Skin is warm and dry.      Capillary Refill: Capillary refill takes less than 2 seconds.      Findings: No rash.   Neurological:      General: No focal deficit present.      Mental Status: He is alert.      Gait: Gait normal.   Psychiatric:         Mood and Affect: Mood normal.       LABS     Lab Results   Component Value Date    HGBA1C 6.2 (H) 09/13/2022     CMP  Sodium   Date Value Ref Range Status   09/13/2022 144 136 - 145 mmol/L Final     Potassium   Date Value Ref Range Status   09/13/2022 4.0 3.5 - 5.1 mmol/L Final     Chloride   Date Value Ref Range Status   09/13/2022 109 95 - 110 mmol/L Final     CO2   Date Value Ref Range Status   09/13/2022 24 23 - 29 mmol/L Final     Glucose   Date Value Ref Range Status   09/13/2022 116 (H) 70 - 110 mg/dL Final     BUN   Date Value Ref Range Status   09/13/2022 24 (H) 8 - 23 mg/dL Final     Creatinine   Date Value Ref Range Status   09/13/2022 0.8 0.5 - 1.4 mg/dL Final     Calcium   Date Value Ref Range Status   09/13/2022 10.1 8.7 - 10.5 mg/dL Final     Total Protein   Date Value Ref Range Status   09/13/2022 7.3 6.0 - 8.4 g/dL Final     Albumin   Date Value Ref Range Status   09/13/2022 3.5 3.5 - 5.2 g/dL Final     Total Bilirubin   Date Value Ref Range Status   09/13/2022 0.4 0.1 - 1.0 mg/dL Final     Comment:     For infants and newborns, interpretation of results should be based  on gestational age, weight and in agreement with  clinical  observations.    Premature Infant recommended reference ranges:  Up to 24 hours.............<8.0 mg/dL  Up to 48 hours............<12.0 mg/dL  3-5 days..................<15.0 mg/dL  6-29 days.................<15.0 mg/dL       Alkaline Phosphatase   Date Value Ref Range Status   09/13/2022 67 55 - 135 U/L Final     AST   Date Value Ref Range Status   09/13/2022 18 10 - 40 U/L Final     ALT   Date Value Ref Range Status   09/13/2022 8 (L) 10 - 44 U/L Final     Anion Gap   Date Value Ref Range Status   09/13/2022 11 8 - 16 mmol/L Final     eGFR if    Date Value Ref Range Status   02/21/2022 >60 >60 mL/min/1.73 m^2 Final     eGFR if non    Date Value Ref Range Status   02/21/2022 >60 >60 mL/min/1.73 m^2 Final     Comment:     Calculation used to obtain the estimated glomerular filtration  rate (eGFR) is the CKD-EPI equation.        Lab Results   Component Value Date    WBC 6.62 09/13/2022    HGB 11.7 (L) 09/13/2022    HCT 36.5 (L) 09/13/2022    MCV 80 (L) 09/13/2022     09/13/2022     Lab Results   Component Value Date    CHOL 154 08/24/2021    CHOL 172 05/07/2019    CHOL 138 07/02/2018     Lab Results   Component Value Date    HDL 66 08/24/2021    HDL 74 05/07/2019    HDL 52 07/02/2018     Lab Results   Component Value Date    LDLCALC 66.4 08/24/2021    LDLCALC 83.6 05/07/2019    LDLCALC 56.8 (L) 07/02/2018     Lab Results   Component Value Date    TRIG 108 08/24/2021    TRIG 72 05/07/2019    TRIG 146 07/02/2018     Lab Results   Component Value Date    CHOLHDL 42.9 08/24/2021    CHOLHDL 43.0 05/07/2019    CHOLHDL 37.7 07/02/2018     Lab Results   Component Value Date    TSH 1.267 08/24/2021       ASSESSMENT/PLAN     Juliolucy Benites is a 88 y.o. male     Julio was seen today for cough, nasal congestion, chest congestion and trouble walking. Pt is amenable to hospitalization if warranted based on outpatient lab results. Clinically he does not warrant emergent  evaluation at this time. Stable for outpatient work-up with ED prompts discussed.     Diagnoses and all orders for this visit:    Weight loss  -     CBC Auto Differential; Future  -     Comprehensive Metabolic Panel; Future  -     Hemoglobin A1C; Future  -     Lipid Panel; Future  -     Urine culture; Future  -     TSH; Future  -     PSA, Screening; Future  -     CT Chest Abdomen Pelvis With Contrast (xpd); Future  -     EKG 12-lead; Future  -     B-TYPE NATRIURETIC PEPTIDE; Future  -     X-Ray Chest PA And Lateral; Future  -     Urinalysis; Future    Fatigue, unspecified type  -     CBC Auto Differential; Future  -     Comprehensive Metabolic Panel; Future  -     Hemoglobin A1C; Future  -     Lipid Panel; Future  -     Urine culture; Future  -     TSH; Future  -     PSA, Screening; Future  -     CT Chest Abdomen Pelvis With Contrast (xpd); Future  -     EKG 12-lead; Future  -     B-TYPE NATRIURETIC PEPTIDE; Future  -     X-Ray Chest PA And Lateral; Future  -     Urinalysis; Future    Abnormal finding of blood chemistry, unspecified  -     Hemoglobin A1C; Future  -     Lipid Panel; Future    Encounter for screening for malignant neoplasm of prostate  -     PSA, Screening; Future             Patient was counseled on when and how to seek emergent care.       Whitney Menchaca PA-C   Department of Internal Medicine - [facility]   12:41 PM

## 2023-05-12 ENCOUNTER — HOSPITAL ENCOUNTER (OUTPATIENT)
Dept: CARDIOLOGY | Facility: OTHER | Age: 88
Discharge: HOME | End: 2023-05-12
Attending: INTERNAL MEDICINE
Payer: MEDICARE

## 2023-05-12 DIAGNOSIS — R53.83 FATIGUE, UNSPECIFIED TYPE: ICD-10-CM

## 2023-05-12 DIAGNOSIS — R63.4 WEIGHT LOSS: ICD-10-CM

## 2023-05-12 PROCEDURE — 93005 ELECTROCARDIOGRAM TRACING: CPT | Mod: HCNC

## 2023-05-12 PROCEDURE — 93010 EKG 12-LEAD: ICD-10-PCS | Mod: HCNC,,, | Performed by: INTERNAL MEDICINE

## 2023-05-12 PROCEDURE — 93010 ELECTROCARDIOGRAM REPORT: CPT | Mod: HCNC,,, | Performed by: INTERNAL MEDICINE

## 2023-05-15 ENCOUNTER — TELEPHONE (OUTPATIENT)
Dept: INTERNAL MEDICINE | Facility: CLINIC | Age: 88
End: 2023-05-15
Payer: MEDICARE

## 2023-05-15 DIAGNOSIS — R97.20 ELEVATED PSA: Primary | ICD-10-CM

## 2023-05-15 DIAGNOSIS — I25.118 ATHEROSCLEROSIS OF NATIVE CORONARY ARTERY OF NATIVE HEART WITH STABLE ANGINA PECTORIS: ICD-10-CM

## 2023-05-15 DIAGNOSIS — R53.83 FATIGUE, UNSPECIFIED TYPE: Primary | ICD-10-CM

## 2023-05-15 DIAGNOSIS — I10 ESSENTIAL HYPERTENSION, BENIGN: Chronic | ICD-10-CM

## 2023-05-15 DIAGNOSIS — Z74.09 MOBILITY IMPAIRED: ICD-10-CM

## 2023-05-15 DIAGNOSIS — I50.22 CHRONIC SYSTOLIC HEART FAILURE: ICD-10-CM

## 2023-05-15 DIAGNOSIS — R54 ADVANCED AGE: ICD-10-CM

## 2023-05-15 NOTE — TELEPHONE ENCOUNTER
FARHAD Ramirez Staff 8 minutes ago (3:32 PM)       Please help patient with scheduling Urology apt. Thanks ACM    Called pt to hao Juares w/ sister   Scheduled pt for tomorrow   Routing to Whitney LLAMAS to advise if we already have BP f/u plan in place or if I should f/u w/ pt since last reading is elevated

## 2023-05-16 ENCOUNTER — OFFICE VISIT (OUTPATIENT)
Dept: UROLOGY | Facility: CLINIC | Age: 88
End: 2023-05-16
Payer: MEDICARE

## 2023-05-16 VITALS — HEART RATE: 88 BPM | SYSTOLIC BLOOD PRESSURE: 191 MMHG | DIASTOLIC BLOOD PRESSURE: 114 MMHG

## 2023-05-16 DIAGNOSIS — R97.20 ELEVATED PSA: ICD-10-CM

## 2023-05-16 PROCEDURE — 1159F MED LIST DOCD IN RCRD: CPT | Mod: HCNC,CPTII,S$GLB, | Performed by: NURSE PRACTITIONER

## 2023-05-16 PROCEDURE — 1126F AMNT PAIN NOTED NONE PRSNT: CPT | Mod: HCNC,CPTII,S$GLB, | Performed by: NURSE PRACTITIONER

## 2023-05-16 PROCEDURE — 87086 URINE CULTURE/COLONY COUNT: CPT | Mod: HCNC | Performed by: NURSE PRACTITIONER

## 2023-05-16 PROCEDURE — 3288F PR FALLS RISK ASSESSMENT DOCUMENTED: ICD-10-PCS | Mod: HCNC,CPTII,S$GLB, | Performed by: NURSE PRACTITIONER

## 2023-05-16 PROCEDURE — 1126F PR PAIN SEVERITY QUANTIFIED, NO PAIN PRESENT: ICD-10-PCS | Mod: HCNC,CPTII,S$GLB, | Performed by: NURSE PRACTITIONER

## 2023-05-16 PROCEDURE — 1101F PT FALLS ASSESS-DOCD LE1/YR: CPT | Mod: HCNC,CPTII,S$GLB, | Performed by: NURSE PRACTITIONER

## 2023-05-16 PROCEDURE — 3288F FALL RISK ASSESSMENT DOCD: CPT | Mod: HCNC,CPTII,S$GLB, | Performed by: NURSE PRACTITIONER

## 2023-05-16 PROCEDURE — 1159F PR MEDICATION LIST DOCUMENTED IN MEDICAL RECORD: ICD-10-PCS | Mod: HCNC,CPTII,S$GLB, | Performed by: NURSE PRACTITIONER

## 2023-05-16 PROCEDURE — 1160F PR REVIEW ALL MEDS BY PRESCRIBER/CLIN PHARMACIST DOCUMENTED: ICD-10-PCS | Mod: HCNC,CPTII,S$GLB, | Performed by: NURSE PRACTITIONER

## 2023-05-16 PROCEDURE — 99203 OFFICE O/P NEW LOW 30 MIN: CPT | Mod: HCNC,S$GLB,, | Performed by: NURSE PRACTITIONER

## 2023-05-16 PROCEDURE — 99203 PR OFFICE/OUTPT VISIT, NEW, LEVL III, 30-44 MIN: ICD-10-PCS | Mod: HCNC,S$GLB,, | Performed by: NURSE PRACTITIONER

## 2023-05-16 PROCEDURE — 1160F RVW MEDS BY RX/DR IN RCRD: CPT | Mod: HCNC,CPTII,S$GLB, | Performed by: NURSE PRACTITIONER

## 2023-05-16 PROCEDURE — 1101F PR PT FALLS ASSESS DOC 0-1 FALLS W/OUT INJ PAST YR: ICD-10-PCS | Mod: HCNC,CPTII,S$GLB, | Performed by: NURSE PRACTITIONER

## 2023-05-16 NOTE — PROGRESS NOTES
Subjective:      Julio Benites is a 88 y.o. male who was referred by Whitney MILLER for evaluation of his elevated PSA.      He presents today with his son.    The patient has a long history of elevated PSA. Negative TRUSb/bx by Dr. Rios in 2009- PSA was 4.4 at the time of the biopsy.     Elevated PSA  Patient is here with an elevated PSA. He has no personal history and no family history of prostate cancer. He denies bothersome urinary symptoms. He has no prior genitourinary history of hematuria, hematospermia, prostatitis, UTI, erectile dysfunction, urolithiasis, epididymal orchitis, previous  surgery.  Previous PSA values are :  Component PSA, Screen   Latest Ref Rng & Units 0.00 - 4.00 ng/mL   5/11/2023 12.5 (H)   12/26/2012 5.53 (H)   10/20/2010 4.7 (H)   5/20/2009 4.4 (H)     Reports significant weight loss over the last several months.  Denies new bone/joint pain.     The following portions of the patient's history were reviewed and updated as appropriate: allergies, current medications, past family history, past medical history, past social history, past surgical history and problem list.    Review of Systems  Constitutional: no fever or chills  ENT: no nasal congestion or sore throat  Respiratory: no cough or shortness of breath  Cardiovascular: no chest pain or palpitations  Gastrointestinal: no nausea or vomiting, tolerating diet  Genitourinary: as per HPI  Hematologic/Lymphatic: no easy bruising or lymphadenopathy  Musculoskeletal: no arthralgias or myalgias  Neurological: no seizures or tremors  Behavioral/Psych: no auditory or visual hallucinations     Objective:   Vitals:   Vitals:    05/16/23 0716   BP: (!) 191/114   Pulse: 88     Physical Exam   General: cachetic and alert and oriented, no acute distress  Head: normocephalic, atraumatic  Neck: supple, normal ROM  Respiratory: Symmetric expansion, non-labored breathing  Cardiovascular: regular rate and rhythm  Abdomen: soft, non tender, non  distended  Genitourinary:   Prostate: non tender, no specific nodules, size: > 50 gm- firm ; seminal vesicles not palpated  Rectum: normal rectal tone, no rectal mass, normal perineum  Skin: normal coloration and turgor, no rashes, no suspicious skin lesions noted  Neuro: alert and oriented x3, no gross deficits  Psych: normal judgment and insight, normal mood/affect, and non-anxious    Lab Review   Urinalysis demonstrates negative for all components  Lab Results   Component Value Date    WBC 6.35 05/11/2023    HGB 11.1 (L) 05/11/2023    HCT 35.8 (L) 05/11/2023    HCT 29 (L) 11/12/2012    MCV 80 (L) 05/11/2023     05/11/2023     Lab Results   Component Value Date    CREATININE 0.8 05/11/2023    BUN 23 05/11/2023     Lab Results   Component Value Date    PSA 12.5 (H) 05/11/2023     Imaging   None    Assessment:     1. Elevated PSA      Plan:   Diagnoses and all orders for this visit:    Elevated PSA  -     Ambulatory referral/consult to Urology  -     Urine culture  -     MRI Prostate W W/O Contrast; Future    Plan:  --We discussed this significance of his elevated PSA, various benign etiologies including BPH and infection, and the associated risk of prostate cancer. KATHERIN concerning with associated weight loss.   --Recommend proceeding with TRUS/bx asap. If MRI can be completed within the next week will proceed with MRI first, otherwise will proceed with biopsy   --Urine culture today

## 2023-05-16 NOTE — PROGRESS NOTES
Abn EKG, pt is overdue for cards follow-up. Will ask staff to help with scheduling follow-up apt. SOLITARIOM

## 2023-05-16 NOTE — Clinical Note
MRI of the prostate- ASAP order placed. Please let me know when this can be scheduled. If it cannot be done within the next week-2 weeks will proceed with biopsy without the mri. Thanks!

## 2023-05-17 ENCOUNTER — TELEPHONE (OUTPATIENT)
Dept: PRIMARY CARE CLINIC | Facility: CLINIC | Age: 88
End: 2023-05-17
Payer: MEDICARE

## 2023-05-17 LAB — BACTERIA UR CULT: NORMAL

## 2023-05-17 NOTE — TELEPHONE ENCOUNTER
Spoke w/ pt's daughter and she said that she received forms from a nursing home that need to be filled out and tests that need to be done  I asked for contact info for [facility] so I can get more info:      Dana Sharon- [phone]      I also attempted to sched nurse BP check for pt but nurse schedule is closed Friday due to low coverage  I did see that pt is scheduled w/ cardio Friday  Routing to bryce to see if we still need to do BP f/u even tho pt is seeing cardio on Friday, and if so can it wait until next week when nurse schedule is open?    Called Dana at [facility]  Spoke with someone else who said she will give her the message that I called

## 2023-05-17 NOTE — TELEPHONE ENCOUNTER
----- Message from Sudha Navarro sent at 5/17/2023 10:48 AM CDT -----  Regarding: nursing home  Name of Who is Calling: Daughter/ Natali  [phone]           What is the request in detail: Pt's daughter is trying to get her dad in a nursing  home and needs some information. Please c/b daughter to assist           Can the clinic reply by [facility]:          What Number to Call Back if not in [facility]:

## 2023-05-19 ENCOUNTER — OFFICE VISIT (OUTPATIENT)
Dept: CARDIOLOGY | Facility: CLINIC | Age: 88
End: 2023-05-19
Payer: MEDICARE

## 2023-05-19 ENCOUNTER — HOSPITAL ENCOUNTER (OUTPATIENT)
Dept: RADIOLOGY | Facility: HOSPITAL | Age: 88
Discharge: HOME | End: 2023-05-19
Attending: PHYSICIAN ASSISTANT
Payer: MEDICARE

## 2023-05-19 VITALS
DIASTOLIC BLOOD PRESSURE: 104 MMHG | OXYGEN SATURATION: 98 % | WEIGHT: 85.81 LBS | SYSTOLIC BLOOD PRESSURE: 162 MMHG | HEART RATE: 81 BPM | BODY MASS INDEX: 12.67 KG/M2

## 2023-05-19 DIAGNOSIS — R63.4 WEIGHT LOSS: ICD-10-CM

## 2023-05-19 DIAGNOSIS — R64 CACHEXIA: ICD-10-CM

## 2023-05-19 DIAGNOSIS — J44.9 CHRONIC OBSTRUCTIVE PULMONARY DISEASE, UNSPECIFIED COPD TYPE: ICD-10-CM

## 2023-05-19 DIAGNOSIS — R53.83 FATIGUE, UNSPECIFIED TYPE: ICD-10-CM

## 2023-05-19 DIAGNOSIS — I10 ESSENTIAL HYPERTENSION, BENIGN: Chronic | ICD-10-CM

## 2023-05-19 DIAGNOSIS — J61 ASBESTOSIS: ICD-10-CM

## 2023-05-19 DIAGNOSIS — E11.59 TYPE 2 DIABETES MELLITUS WITH OTHER CIRCULATORY COMPLICATION, WITHOUT LONG-TERM CURRENT USE OF INSULIN: ICD-10-CM

## 2023-05-19 DIAGNOSIS — I70.0 ATHEROSCLEROSIS OF AORTA: ICD-10-CM

## 2023-05-19 DIAGNOSIS — I50.22 CHRONIC SYSTOLIC HEART FAILURE: ICD-10-CM

## 2023-05-19 DIAGNOSIS — I25.118 ATHEROSCLEROSIS OF NATIVE CORONARY ARTERY OF NATIVE HEART WITH STABLE ANGINA PECTORIS: Primary | ICD-10-CM

## 2023-05-19 PROCEDURE — 1126F PR PAIN SEVERITY QUANTIFIED, NO PAIN PRESENT: ICD-10-PCS | Mod: HCNC,CPTII,, | Performed by: INTERNAL MEDICINE

## 2023-05-19 PROCEDURE — 3288F PR FALLS RISK ASSESSMENT DOCUMENTED: ICD-10-PCS | Mod: HCNC,CPTII,, | Performed by: INTERNAL MEDICINE

## 2023-05-19 PROCEDURE — A9698 NON-RAD CONTRAST MATERIALNOC: HCPCS | Mod: HCNC | Performed by: PHYSICIAN ASSISTANT

## 2023-05-19 PROCEDURE — 99215 OFFICE O/P EST HI 40 MIN: CPT | Mod: HCNC,,, | Performed by: INTERNAL MEDICINE

## 2023-05-19 PROCEDURE — 71260 CT THORAX DX C+: CPT | Mod: 26,HCNC,, | Performed by: RADIOLOGY

## 2023-05-19 PROCEDURE — 74177 CT CHEST ABDOMEN PELVIS WITH CONTRAST (XPD): ICD-10-PCS | Mod: 26,HCNC,, | Performed by: RADIOLOGY

## 2023-05-19 PROCEDURE — 74177 CT ABD & PELVIS W/CONTRAST: CPT | Mod: 26,HCNC,, | Performed by: RADIOLOGY

## 2023-05-19 PROCEDURE — 99213 OFFICE O/P EST LOW 20 MIN: CPT | Mod: HCNC,25 | Performed by: INTERNAL MEDICINE

## 2023-05-19 PROCEDURE — 1126F AMNT PAIN NOTED NONE PRSNT: CPT | Mod: HCNC,CPTII,, | Performed by: INTERNAL MEDICINE

## 2023-05-19 PROCEDURE — 3288F FALL RISK ASSESSMENT DOCD: CPT | Mod: HCNC,CPTII,, | Performed by: INTERNAL MEDICINE

## 2023-05-19 PROCEDURE — 99499 UNLISTED E&M SERVICE: CPT | Mod: S$GLB,,, | Performed by: INTERNAL MEDICINE

## 2023-05-19 PROCEDURE — 74177 CT ABD & PELVIS W/CONTRAST: CPT | Mod: TC,HCNC

## 2023-05-19 PROCEDURE — 25500020 PHARM REV CODE 255: Mod: HCNC | Performed by: PHYSICIAN ASSISTANT

## 2023-05-19 PROCEDURE — 1160F PR REVIEW ALL MEDS BY PRESCRIBER/CLIN PHARMACIST DOCUMENTED: ICD-10-PCS | Mod: HCNC,CPTII,, | Performed by: INTERNAL MEDICINE

## 2023-05-19 PROCEDURE — 71260 CT CHEST ABDOMEN PELVIS WITH CONTRAST (XPD): ICD-10-PCS | Mod: 26,HCNC,, | Performed by: RADIOLOGY

## 2023-05-19 PROCEDURE — 1159F PR MEDICATION LIST DOCUMENTED IN MEDICAL RECORD: ICD-10-PCS | Mod: HCNC,CPTII,, | Performed by: INTERNAL MEDICINE

## 2023-05-19 PROCEDURE — 99999 PR PBB SHADOW E&M-EST. PATIENT-LVL III: CPT | Mod: PBBFAC,HCNC,, | Performed by: INTERNAL MEDICINE

## 2023-05-19 PROCEDURE — 1160F RVW MEDS BY RX/DR IN RCRD: CPT | Mod: HCNC,CPTII,, | Performed by: INTERNAL MEDICINE

## 2023-05-19 PROCEDURE — 1159F MED LIST DOCD IN RCRD: CPT | Mod: HCNC,CPTII,, | Performed by: INTERNAL MEDICINE

## 2023-05-19 PROCEDURE — 99999 PR PBB SHADOW E&M-EST. PATIENT-LVL III: ICD-10-PCS | Mod: PBBFAC,HCNC,, | Performed by: INTERNAL MEDICINE

## 2023-05-19 PROCEDURE — 1101F PT FALLS ASSESS-DOCD LE1/YR: CPT | Mod: HCNC,CPTII,, | Performed by: INTERNAL MEDICINE

## 2023-05-19 PROCEDURE — 99215 PR OFFICE/OUTPT VISIT, EST, LEVL V, 40-54 MIN: ICD-10-PCS | Mod: HCNC,,, | Performed by: INTERNAL MEDICINE

## 2023-05-19 PROCEDURE — 1101F PR PT FALLS ASSESS DOC 0-1 FALLS W/OUT INJ PAST YR: ICD-10-PCS | Mod: HCNC,CPTII,, | Performed by: INTERNAL MEDICINE

## 2023-05-19 PROCEDURE — 99499 RISK ADDL DX/OHS AUDIT: ICD-10-PCS | Mod: S$GLB,,, | Performed by: INTERNAL MEDICINE

## 2023-05-19 PROCEDURE — 71260 CT THORAX DX C+: CPT | Mod: TC,HCNC

## 2023-05-19 RX ADMIN — Medication 450 ML: at 06:05

## 2023-05-19 RX ADMIN — IOHEXOL 75 ML: 350 INJECTION, SOLUTION INTRAVENOUS at 05:05

## 2023-05-19 NOTE — PROGRESS NOTES
[facility]    Chief Complaint  Chief Complaint   Patient presents with    Congestive Heart Failure       HPI:    Sent to reestablish cardiac follow-up.  Last seen 14 months ago.  Had been hospitalized with a non ST segment elevation myocardial infarction.  Had mild left ventricular dysfunction.  Plan was for medical management.  He returns today because of shortness of breath for the past week.  Dyspneic with exertion.  Occasionally dyspneic when he is sitting.  No paroxysmal nocturnal dyspnea or orthopnea.  No dependent edema.  Blood pressure is high.  Unable to properly reconcile his medications.  He only reports being on 4 medications but his Epic med list contains more than 4 medications.    Medications  Current Outpatient Medications   Medication Sig Dispense Refill    losartan (COZAAR) 100 MG tablet TAKE 1 TABLET BY MOUTH EVERY DAY 90 tablet 3    NIFEdipine (ADALAT CC) 90 MG TbSR TAKE 1 TABLET (90 MG TOTAL) BY MOUTH ONCE DAILY. 90 tablet 3    pantoprazole (PROTONIX) 40 MG tablet TAKE 1 TABLET BY MOUTH EVERY DAY 90 tablet 2    pravastatin (PRAVACHOL) 20 MG tablet TAKE 1 TABLET BY MOUTH EVERY DAY AT NIGHT 90 tablet 0    aspirin (ECOTRIN) 81 MG EC tablet TAKE 1 TABLET BY MOUTH EVERY DAY (Patient not taking: Reported on 5/19/2023) 90 tablet 3    colchicine (COLCRYS) 0.6 mg tablet Take 0.6 mg by mouth 2 (two) times daily as needed (gout attack).      metoprolol succinate (TOPROL-XL) 25 MG 24 hr tablet Take 0.5 tablets (12.5 mg total) by mouth once daily. 45 tablet 3     No current facility-administered medications for this visit.        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    Cataract     Cellulitis of right forearm 8/25/2012    Overview:  dx update    Chronic systolic heart failure 3/18/2022    Diabetes mellitus type II     Diabetic retinopathy     Elevated PSA     GERD (gastroesophageal reflux disease)     Gout attack      Hyperlipidemia     Hypertension     Iron deficiency anemia secondary to blood loss (chronic) 2013    NSTEMI (non-ST elevated myocardial infarction) 2022     Past Surgical History:   Procedure Laterality Date    CATARACT EXTRACTION Right     EYE SURGERY      INFECTED SKIN DEBRIDEMENT  2012    spider bite with surgery to right forearm    LA KAVIN,SWALLOW FUNCTION,CINE/VIDEO RECORD  2021         subtotal gastrectomy  2013     Social History     Socioeconomic History    Marital status:    Occupational History    Occupation: Retired     Comment: Insulator   Tobacco Use    Smoking status: Former     Packs/day: 0.25     Types: Cigarettes     Quit date: 2022     Years since quittin.3     Passive exposure: Never    Smokeless tobacco: Never    Tobacco comments:     patient states that he started smoking cigarettes again: half a pack of cigarettes over 4 or 5 days   Substance and Sexual Activity    Alcohol use: Yes     Comment: drinks scotch 2X weekly    Drug use: No    Sexual activity: Not Currently     Partners: Female     Family History   Problem Relation Age of Onset    Lung cancer Mother     Breast cancer Sister     No Known Problems Father     Breast cancer Daughter     No Known Problems Son     No Known Problems Maternal Grandmother     No Known Problems Maternal Grandfather     No Known Problems Paternal Grandmother     No Known Problems Paternal Grandfather         Allergies  Review of patient's allergies indicates:   Allergen Reactions    Lisinopril Rash     Patient reports history of rash with previous lisinopril use.     Nicoderm     Nicoderm cq [nicotine] Rash       Review of Systems   Review of Systems   Constitutional: Positive for malaise/fatigue and weight loss. Negative for weight gain.   Eyes:  Negative for visual disturbance.   Cardiovascular:  Positive for dyspnea on exertion. Negative for chest pain, claudication, cyanosis, irregular heartbeat, leg swelling, near-syncope,  orthopnea, palpitations, paroxysmal nocturnal dyspnea and syncope.   Respiratory:  Positive for cough and shortness of breath. Negative for hemoptysis, sleep disturbances due to breathing and wheezing.    Hematologic/Lymphatic: Negative for bleeding problem. Does not bruise/bleed easily.   Skin:  Negative for poor wound healing.   Musculoskeletal:  Negative for muscle cramps and myalgias.   Gastrointestinal:  Positive for anorexia. Negative for abdominal pain, diarrhea, heartburn, hematemesis, hematochezia, melena, nausea and vomiting.   Genitourinary:  Negative for hematuria and nocturia.   Neurological:  Positive for weakness. Negative for excessive daytime sleepiness, dizziness, focal weakness and light-headedness.     Physical Exam  Vitals:    05/19/23 1134   BP: (!) 162/104   Pulse: 81     Wt Readings from Last 1 Encounters:   05/19/23 38.9 kg (85 lb 12.8 oz)     Physical Exam  Vitals and nursing note reviewed.   Constitutional:       Appearance: He is cachectic. He is ill-appearing (chronically). He is not toxic-appearing or diaphoretic.   HENT:      Head: Normocephalic and atraumatic.      Mouth/Throat:      Lips: Pink.      Mouth: Mucous membranes are moist.   Eyes:      General: No scleral icterus.     Conjunctiva/sclera: Conjunctivae normal.   Neck:      Thyroid: No thyromegaly.      Vascular: No carotid bruit, hepatojugular reflux or JVD.      Trachea: Trachea normal.   Cardiovascular:      Rate and Rhythm: Normal rate and regular rhythm. No extrasystoles are present.     Chest Wall: PMI is not displaced.      Pulses:           Carotid pulses are 2+ on the right side and 2+ on the left side.       Radial pulses are 2+ on the right side and 2+ on the left side.      Heart sounds: S1 normal and S2 normal. Murmur heard.   Systolic murmur is present with a grade of 2/6 at the lower left sternal border.     No friction rub. No S3 or S4 sounds.   Pulmonary:      Effort: Pulmonary effort is normal. No tachypnea,  bradypnea, accessory muscle usage or respiratory distress.      Breath sounds: Normal air entry. Transmitted upper airway sounds present. Rhonchi present. No decreased breath sounds, wheezing or rales.   Abdominal:      General: Bowel sounds are normal. There is no distension or abdominal bruit.      Palpations: Abdomen is soft. There is no hepatomegaly, splenomegaly or pulsatile mass.      Tenderness: There is no abdominal tenderness.   Musculoskeletal:         General: No tenderness or deformity.      Right lower leg: No edema.      Left lower leg: No edema.   Skin:     General: Skin is warm and dry.   Neurological:      Mental Status: He is alert and oriented to person, place, and time.   Psychiatric:         Attention and Perception: Attention normal.         Mood and Affect: Mood normal.         Speech: Speech normal.         Behavior: Behavior normal. Behavior is cooperative.       Labs  Office Visit on 05/16/2023   Component Date Value Ref Range Status    Urine Culture, Routine 05/16/2023 No significant growth   Final   Lab Visit on 05/11/2023   Component Date Value Ref Range Status    WBC 05/11/2023 6.35  3.90 - 12.70 K/uL Final    RBC 05/11/2023 4.46 (L)  4.60 - 6.20 M/uL Final    Hemoglobin 05/11/2023 11.1 (L)  14.0 - 18.0 g/dL Final    Hematocrit 05/11/2023 35.8 (L)  40.0 - 54.0 % Final    MCV 05/11/2023 80 (L)  82 - 98 fL Final    MCH 05/11/2023 24.9 (L)  27.0 - 31.0 pg Final    MCHC 05/11/2023 31.0 (L)  32.0 - 36.0 g/dL Final    RDW 05/11/2023 15.8 (H)  11.5 - 14.5 % Final    Platelets 05/11/2023 225  150 - 450 K/uL Final    MPV 05/11/2023 11.2  9.2 - 12.9 fL Final    Immature Granulocytes 05/11/2023 0.6 (H)  0.0 - 0.5 % Final    Gran # (ANC) 05/11/2023 5.2  1.8 - 7.7 K/uL Final    Immature Grans (Abs) 05/11/2023 0.04  0.00 - 0.04 K/uL Final    Comment: Mild elevation in immature granulocytes is non specific and   can be seen in a variety of conditions including stress response,   acute inflammation,  trauma and pregnancy. Correlation with other   laboratory and clinical findings is essential.      Lymph # 05/11/2023 0.5 (L)  1.0 - 4.8 K/uL Final    Mono # 05/11/2023 0.6  0.3 - 1.0 K/uL Final    Eos # 05/11/2023 0.1  0.0 - 0.5 K/uL Final    Baso # 05/11/2023 0.01  0.00 - 0.20 K/uL Final    nRBC 05/11/2023 0  0 /100 WBC Final    Gran % 05/11/2023 82.2 (H)  38.0 - 73.0 % Final    Lymph % 05/11/2023 7.2 (L)  18.0 - 48.0 % Final    Mono % 05/11/2023 9.0  4.0 - 15.0 % Final    Eosinophil % 05/11/2023 0.8  0.0 - 8.0 % Final    Basophil % 05/11/2023 0.2  0.0 - 1.9 % Final    Differential Method 05/11/2023 Automated   Final    Sodium 05/11/2023 146 (H)  136 - 145 mmol/L Final    Potassium 05/11/2023 3.3 (L)  3.5 - 5.1 mmol/L Final    Chloride 05/11/2023 109  95 - 110 mmol/L Final    CO2 05/11/2023 26  23 - 29 mmol/L Final    Glucose 05/11/2023 201 (H)  70 - 110 mg/dL Final    BUN 05/11/2023 23  8 - 23 mg/dL Final    Creatinine 05/11/2023 0.8  0.5 - 1.4 mg/dL Final    Calcium 05/11/2023 9.3  8.7 - 10.5 mg/dL Final    Total Protein 05/11/2023 6.5  6.0 - 8.4 g/dL Final    Albumin 05/11/2023 3.0 (L)  3.5 - 5.2 g/dL Final    Total Bilirubin 05/11/2023 0.3  0.1 - 1.0 mg/dL Final    Comment: For infants and newborns, interpretation of results should be based  on gestational age, weight and in agreement with clinical  observations.    Premature Infant recommended reference ranges:  Up to 24 hours.............<8.0 mg/dL  Up to 48 hours............<12.0 mg/dL  3-5 days..................<15.0 mg/dL  6-29 days.................<15.0 mg/dL      Alkaline Phosphatase 05/11/2023 56  55 - 135 U/L Final    AST 05/11/2023 20  10 - 40 U/L Final    ALT 05/11/2023 12  10 - 44 U/L Final    Anion Gap 05/11/2023 11  8 - 16 mmol/L Final    eGFR 05/11/2023 >60  >60 mL/min/1.73 m^2 Final    Hemoglobin A1C 05/11/2023 6.7 (H)  4.0 - 5.6 % Final    Comment: ADA Screening Guidelines:  5.7-6.4%  Consistent with prediabetes  >or=6.5%  Consistent with  diabetes    High levels of fetal hemoglobin interfere with the HbA1C  assay. Heterozygous hemoglobin variants (HbS, HgC, etc)do  not significantly interfere with this assay.   However, presence of multiple variants may affect accuracy.      Estimated Avg Glucose 05/11/2023 146 (H)  68 - 131 mg/dL Final    Cholesterol 05/11/2023 176  120 - 199 mg/dL Final    Comment: The National Cholesterol Education Program (NCEP) has set the  following guidelines (reference ranges) for Cholesterol:  Optimal.....................<200 mg/dL  Borderline High.............200-239 mg/dL  High........................> or = 240 mg/dL      Triglycerides 05/11/2023 123  30 - 150 mg/dL Final    Comment: The National Cholesterol Education Program (NCEP) has set the  following guidelines (reference values) for triglycerides:  Normal......................<150 mg/dL  Borderline High.............150-199 mg/dL  High........................200-499 mg/dL      HDL 05/11/2023 62  40 - 75 mg/dL Final    Comment: The National Cholesterol Education Program (NCEP) has set the  following guidelines (reference values) for HDL Cholesterol:  Low...............<40 mg/dL  Optimal...........>60 mg/dL      LDL Cholesterol 05/11/2023 89.4  63.0 - 159.0 mg/dL Final    Comment: The National Cholesterol Education Program (NCEP) has set the  following guidelines (reference values) for LDL Cholesterol:  Optimal.......................<130 mg/dL  Borderline High...............130-159 mg/dL  High..........................160-189 mg/dL  Very High.....................>190 mg/dL      HDL/Cholesterol Ratio 05/11/2023 35.2  20.0 - 50.0 % Final    Total Cholesterol/HDL Ratio 05/11/2023 2.8  2.0 - 5.0 Final    Non-HDL Cholesterol 05/11/2023 114  mg/dL Final    Comment: Risk category and Non-HDL cholesterol goals:  Coronary heart disease (CHD)or equivalent (10-year risk of CHD >20%):  Non-HDL cholesterol goal     <130 mg/dL  Two or more CHD risk factors and 10-year risk of CHD <=  20%:  Non-HDL cholesterol goal     <160 mg/dL  0 to 1 CHD risk factor:  Non-HDL cholesterol goal     <190 mg/dL      Urine Culture, Routine 05/11/2023 No growth   Final    TSH 05/11/2023 2.024  0.400 - 4.000 uIU/mL Final    PSA, Screen 05/11/2023 12.5 (H)  0.00 - 4.00 ng/mL Final    Comment: The testing method is a chemiluminescent microparticle immunoassay   manufactured by [employer] and performed on the [employer]   or   [employer] system. Values obtained with different assay manufacturers   for   methods may be different and cannot be used interchangeably.  PSA Expected levels:  Hormonal Therapy: <0.05 ng/ml  Prostatectomy: <0.01 ng/ml  Radiation Therapy: <1.00 ng/ml      BNP 05/11/2023 126 (H)  0 - 99 pg/mL Final    Values of less than 100 pg/ml are consistent with non-CHF populations.    Specimen UA 05/11/2023 Urine, Clean Catch   Final    Color, UA 05/11/2023 Yellow  Yellow, Straw, Jaquelin Final    Appearance, UA 05/11/2023 Clear  Clear Final    pH, UA 05/11/2023 6.0  5.0 - 8.0 Final    Specific Gravity, UA 05/11/2023 1.025  1.005 - 1.030 Final    Protein, UA 05/11/2023 2+ (A)  Negative Final    Comment: Recommend a 24 hour urine protein or a urine   protein/creatinine ratio if globulin induced proteinuria is  clinically suspected.      Glucose, UA 05/11/2023 Trace (A)  Negative Final    Ketones, UA 05/11/2023 Negative  Negative Final    Bilirubin (UA) 05/11/2023 Negative  Negative Final    Occult Blood UA 05/11/2023 Negative  Negative Final    Nitrite, UA 05/11/2023 Negative  Negative Final    Urobilinogen, UA 05/11/2023 2.0-3.0 (A)  <2.0 EU/dL Final    Leukocytes, UA 05/11/2023 Negative  Negative Final    RBC, UA 05/11/2023 2  0 - 4 /hpf Final    WBC, UA 05/11/2023 2  0 - 5 /hpf Final    Bacteria 05/11/2023 Rare  None-Occ /hpf Final    Squam Epithel, UA 05/11/2023 0  /hpf Final    Hyaline Casts, UA 05/11/2023 18 (A)  0-1/lpf /lpf Final    Microscopic Comment 05/11/2023 SEE COMMENT   Final    Comment:  Other formed elements not mentioned in the report are not   present in the microscopic examination.          Imaging  X-Ray Chest PA And Lateral    Result Date: 5/11/2023  EXAMINATION: XR CHEST PA AND LATERAL CLINICAL HISTORY: Abnormal weight loss TECHNIQUE: PA and lateral views of the chest were performed. COMPARISON: 01/01/2022 FINDINGS: Lungs are mildly hyperinflated.  No acute consolidation, pleural effusion, or pneumothorax seen.  Multiple calcified pleural plaques.  Persistent biapical pleural thickening, right greater than left. Cardiac silhouette is normal in size.  Aorta appears ectatic.  Calcified plaque lines arch.     No acute cardiopulmonary process seen.  Stable chronic findings as above. Electronically signed by: Brittney Markham Date:    05/11/2023 Time:    15:11      Assessment  1. Atherosclerosis of native coronary artery of native heart with stable angina pectoris  Continue with medical management  - Ambulatory referral/consult to Cardiology    2. Chronic systolic heart failure  Compensated without signs of volume overload  - Ambulatory referral/consult to Cardiology    3. Essential hypertension, benign  Elevated but probably not on medications as prescribed  - Ambulatory referral/consult to Cardiology    4. Atherosclerosis of aorta  Noted on prior imaging    5. Type 2 diabetes mellitus with other circulatory complication, without long-term current use of insulin  Not on medications    6. Cachexia  Continuing to lose weight    7. Asbestosis  Noted on chest x-ray.    8. Chronic obstructive pulmonary disease, unspecified COPD type  Seems advanced      Plan and Discussion    Multiple potential etiologies for his dyspnea.  But certainly no evidence of volume overload on exam.  Getting a CT scan later today which may help elucidate the problem.  Sent home with a list of his prescribed medications to reconcile with the bottles at home.      The ASCVD Risk score ([place] DK, et al., 2019) failed to  calculate for the following reasons:    The 2019 ASCVD risk score is only valid for ages 40 to 79    The patient has a prior MI or stroke diagnosis     Follow Up  Follow up for test results.      Damaso Del Castillo MD

## 2023-05-22 ENCOUNTER — TELEPHONE (OUTPATIENT)
Dept: INTERNAL MEDICINE | Facility: CLINIC | Age: 88
End: 2023-05-22
Payer: MEDICARE

## 2023-05-22 RX ORDER — AZITHROMYCIN 250 MG/1
TABLET, FILM COATED ORAL
Qty: 6 TABLET | Refills: 0 | Status: SHIPPED | OUTPATIENT
Start: 2023-05-22 | End: 2023-05-27

## 2023-05-22 NOTE — TELEPHONE ENCOUNTER
"Called her  She said she had a msg Wednesday evening but didn't see it until Saturday  She said she is following up on nursing home for her father  I checked pt's chart and saw this from previous encounter:    "Spoke w/ pt's daughter and she said that she received forms from a nursing home that need to be filled out and tests that need to be done  I asked for contact info for alf so I can get more info:        Dana Sharon- [phone]        I also attempted to sched nurse BP check for pt but nurse schedule is closed Friday due to low coverage  I did see that pt is scheduled w/ cardio Friday  Routing to bryce to see if we still need to do BP f/u even tho pt is seeing cardio on Friday, and if so can it wait until next week when nurse schedule is open?     Called Dana at alf  Spoke with someone else who said she will give her the message that I called"     I let pt's daughter know I will try reaching Dana again at the nursing home.   Pt's daughter verbalized understanding and had no further concerns or questions.    I called Dana at the nursing home  She said she will fax over all the info to us     Called pt's daughter back and let her know we are working on it  She  verbalized understanding and had no further concerns or questions.      "

## 2023-05-22 NOTE — TELEPHONE ENCOUNTER
----- Message from Darlene Jenkins sent at 5/22/2023  9:24 AM CDT -----  Type:  Patient Returning Call    Who Called:     Who Left Message for Patient:     Does the patient know what this is regarding?: missed call     Best Call Back Number:  [phone] / Ashley / daughter     Additional Information:  in reference to home health

## 2023-05-22 NOTE — TELEPHONE ENCOUNTER
Please call the patient, let him know that there are signs of possible pneumonia on his CT. Because of his SOB I would like to start him on an antibiotic to make sure we are treating this possible pneumonia. I have sent in an abx to his local pharmacy. [facility]  [facility] but I would like for him to start taking this medication today - rather than wait for delivery time. Please let me know if he has any questions or concerns. -ACM         Called pt  Spoke to his daughter Natali.   She said she lives out of town but will get someone to get it for him.  I told her to  pls call back if she's unable to find someone to get the meds to him so I can inform provider  She said she will take care of it  She  verbalized understanding and had no further concerns or questions.

## 2023-05-23 ENCOUNTER — PES CALL (OUTPATIENT)
Dept: ADMINISTRATIVE | Facility: CLINIC | Age: 88
End: 2023-05-23
Payer: MEDICARE

## 2023-05-24 ENCOUNTER — TELEPHONE (OUTPATIENT)
Dept: INTERNAL MEDICINE | Facility: CLINIC | Age: 88
End: 2023-05-24
Payer: MEDICARE

## 2023-05-24 NOTE — TELEPHONE ENCOUNTER
----- Message from Timothy Encarnacion sent at 5/24/2023 12:31 PM CDT -----  Name of Who is Calling: DALLIN BLACKMON [[id number]]            What is the request in detail: Patient is requesting call back anything he should be taking for cough ? And how to get hospital bed              Can the clinic reply by [facility]: yes              What Number to Call Back [facility] not [facility]: [phone]

## 2023-05-25 ENCOUNTER — TELEPHONE (OUTPATIENT)
Dept: INTERNAL MEDICINE | Facility: CLINIC | Age: 88
End: 2023-05-25
Payer: MEDICARE

## 2023-05-25 ENCOUNTER — PATIENT MESSAGE (OUTPATIENT)
Dept: INTERNAL MEDICINE | Facility: CLINIC | Age: 88
End: 2023-05-25
Payer: MEDICARE

## 2023-05-25 DIAGNOSIS — Z11.1 SCREENING-PULMONARY TB: ICD-10-CM

## 2023-05-25 DIAGNOSIS — Z20.822 ENCOUNTER FOR LABORATORY TESTING FOR COVID-19 VIRUS: Primary | ICD-10-CM

## 2023-05-25 NOTE — TELEPHONE ENCOUNTER
Received paperwork via fax from [facility]  Dr. Jensen filled it out and asked me to pend some orders  Pended/routing  Then will f/u with nursing home and pt's daughter about pt getting these tests completed  Will also need to print LOV note to fax to facility w/ forms

## 2023-05-26 NOTE — TELEPHONE ENCOUNTER
Dana called me back from [facility]  She said we need to schedule pt for CXR, one PCR covid test, and quant gold lab, then fax them all those results along w/ last two appt notes, the forms Dr. Latif filled out, and recent labs       I've already printed labs and LOV notes and have them at my desk along w/ paperwork filled out/signed by Dr. Latif.      Called pt's daughter Natali to schedule labs, CXR and covid test    She said she scheduled the CXR already due to his cough, but wants to wait on other tests bc there's a legal issue she needs to clear before her father can go to nursing facility  She said she will contact us to move forward  She said she msged Bryce Menchaca today and is waiting to hear back  I checked and let her know the msg was sent to bryce and she will respond as soon as she gets a chance.  She verbalized understanding and had no further concerns or questions.    Keeping papers in dr latif's black folder box

## 2023-05-26 NOTE — TELEPHONE ENCOUNTER
Received paperwork via fax from [facility]  Dr. Jensen filled it out and asked me to pend some orders  Pended/routing  Then will f/u with nursing home and pt's daughter about pt getting these tests completed  Will also need to print LOV note to fax to facility w/ forms       Orders are signed      Called Dana Herrera w/ [facility] to check in with her about the above info  She wasn't in yet  Left message w/ staff  I set a reminder for Monday to f/u about this if I haven't heard back

## 2023-05-29 ENCOUNTER — TELEPHONE (OUTPATIENT)
Dept: INTERNAL MEDICINE | Facility: CLINIC | Age: 88
End: 2023-05-29
Payer: MEDICARE

## 2023-05-29 ENCOUNTER — HOSPITAL ENCOUNTER (OUTPATIENT)
Dept: RADIOLOGY | Facility: HOSPITAL | Age: 88
Discharge: HOME | End: 2023-05-29
Attending: INTERNAL MEDICINE
Payer: MEDICARE

## 2023-05-29 DIAGNOSIS — Z11.1 SCREENING-PULMONARY TB: ICD-10-CM

## 2023-05-29 PROCEDURE — 71046 XR CHEST PA AND LATERAL: ICD-10-PCS | Mod: 26,HCNC,, | Performed by: RADIOLOGY

## 2023-05-29 PROCEDURE — 71046 X-RAY EXAM CHEST 2 VIEWS: CPT | Mod: TC,HCNC

## 2023-05-29 PROCEDURE — 71046 X-RAY EXAM CHEST 2 VIEWS: CPT | Mod: 26,HCNC,, | Performed by: RADIOLOGY

## 2023-05-29 NOTE — TELEPHONE ENCOUNTER
Inform pt that cxr still shows pneumonia but appears to be improving. To ED if having increased sob, confusion or other concerning symptoms.

## 2023-05-29 NOTE — TELEPHONE ENCOUNTER
----- Message from Rupesh Tadeo sent at 5/26/2023 10:45 AM CDT -----  Regarding: CAll BAck  Name of Who is Calling:  Ashley Daughter              What is the request in detail: Ashley requesting a call back. No details was given Please assist              Can the clinic reply by [facility]: No              [facility]: [phone]

## 2023-05-29 NOTE — TELEPHONE ENCOUNTER
"Called pt to review per Whitney "Please offer patient and/or family a virtual visit for medlist review/clarification. Thanks AC "  And to review Whitney's msg to pt  Spoke to Natali  Reviewed everything Whitney said    She said he's really congested and said he was out of breath.   I told her she recommends ER if having any trouble breathing or SOB  She said he hasnt told her he's having any trouble yesterday or today. I told her to check with him, and if so, to have him go to ER  She said ok  I asked if they are able to get mucinex for him and she said yes.  I was able to sched same day audio only appt and she gave me the sitter's number to call and I put that in appt notes    Sitter- [phone]  Pt's daughter verbalized understanding and had no further concerns or questions.    I saw pt came in for CXR today as well    Routing to provider as FYI/in case further Advising needed        "

## 2023-05-30 ENCOUNTER — TELEPHONE (OUTPATIENT)
Dept: INTERNAL MEDICINE | Facility: CLINIC | Age: 88
End: 2023-05-30
Payer: MEDICARE

## 2023-05-30 NOTE — TELEPHONE ENCOUNTER
Pt was Rx azithromycin on 5/22 which he should have completed. This was based on a CT scan he had at that time that showed pneumonia. The pneumonia can linger on imaging such as CXR for up to 6 weeks even though antibiotics are completed. I would not recommend another round of antibiotics unless pt is worsening--sob, cough, confusion, etc. It is best if he f/u with one of us this week to eval.

## 2023-05-30 NOTE — TELEPHONE ENCOUNTER
----- Message from Johnny Velazco sent at 5/30/2023  9:06 AM CDT -----  Regarding: Medictiton Request  Name of Who is Calling: Natali Ahn on behalf of DALLIN BLACKMON [[id number]]           What is the request in detail: Patient's daughter is requesting a call back for medication to treat pneumonia.  Please assist.       Pharmacy Location: [facility]/pharmacy #[id number] - [place] [address]      Can the clinic reply by [facility]: No           What Number to [facility] Back [facility] not [facility]: [phone]

## 2023-05-30 NOTE — TELEPHONE ENCOUNTER
I called and spoke to Pt's daughter. She is amenable to plan. Will get patient into the office to be reevaluated this week. She reports that there has been a delay in pt's admission to a nursing home. At this time, will rely on home health and sitters to help with coordination of care.   Daughter verbalizes understanding and is amenable to plan. -SIS LLAMAS

## 2023-05-30 NOTE — TELEPHONE ENCOUNTER
Daughter states that father had and x ray yesterday at the imaging center and was informed that father has pneumonia. She states that information was given but medication was not prescribed for treatment.      Please review Xray that was completed on yesterday and inform. Routed to Dr. Jensen for further instructions.       Pharmacy:    [facility] [address]

## 2023-05-31 NOTE — TELEPHONE ENCOUNTER
"Called pt per Whitney ALEGRIA's request "Please schedule an in person apt on Friday of this week. Ok to override/double book if needed. -SIS LLAMAS"      Spoke to pt's daughter   Scheduled appt  She verbalized understanding and had no further concerns or questions.    Routing to provider as FYI of doublebook  "

## 2023-06-02 ENCOUNTER — OFFICE VISIT (OUTPATIENT)
Dept: INTERNAL MEDICINE | Facility: CLINIC | Age: 88
End: 2023-06-02
Payer: MEDICARE

## 2023-06-02 VITALS
WEIGHT: 82 LBS | HEART RATE: 87 BPM | HEIGHT: 69 IN | BODY MASS INDEX: 12.14 KG/M2 | DIASTOLIC BLOOD PRESSURE: 102 MMHG | OXYGEN SATURATION: 98 % | SYSTOLIC BLOOD PRESSURE: 148 MMHG

## 2023-06-02 DIAGNOSIS — I10 ESSENTIAL HYPERTENSION, BENIGN: Chronic | ICD-10-CM

## 2023-06-02 DIAGNOSIS — R97.20 ELEVATED PSA: ICD-10-CM

## 2023-06-02 DIAGNOSIS — R80.9 TYPE 2 DIABETES MELLITUS WITH MICROALBUMINURIA, WITHOUT LONG-TERM CURRENT USE OF INSULIN: ICD-10-CM

## 2023-06-02 DIAGNOSIS — Z72.0 TOBACCO ABUSE: Chronic | ICD-10-CM

## 2023-06-02 DIAGNOSIS — E11.29 TYPE 2 DIABETES MELLITUS WITH MICROALBUMINURIA, WITHOUT LONG-TERM CURRENT USE OF INSULIN: ICD-10-CM

## 2023-06-02 DIAGNOSIS — Z87.01 H/O: PNEUMONIA: Primary | ICD-10-CM

## 2023-06-02 DIAGNOSIS — E78.2 MIXED HYPERLIPIDEMIA: Chronic | ICD-10-CM

## 2023-06-02 PROCEDURE — 99999 PR PBB SHADOW E&M-EST. PATIENT-LVL III: ICD-10-PCS | Mod: PBBFAC,HCNC,, | Performed by: PHYSICIAN ASSISTANT

## 2023-06-02 PROCEDURE — 99213 OFFICE O/P EST LOW 20 MIN: CPT | Mod: HCNC,S$GLB,, | Performed by: PHYSICIAN ASSISTANT

## 2023-06-02 PROCEDURE — 1101F PR PT FALLS ASSESS DOC 0-1 FALLS W/OUT INJ PAST YR: ICD-10-PCS | Mod: HCNC,CPTII,S$GLB, | Performed by: PHYSICIAN ASSISTANT

## 2023-06-02 PROCEDURE — 3288F FALL RISK ASSESSMENT DOCD: CPT | Mod: HCNC,CPTII,S$GLB, | Performed by: PHYSICIAN ASSISTANT

## 2023-06-02 PROCEDURE — 1126F PR PAIN SEVERITY QUANTIFIED, NO PAIN PRESENT: ICD-10-PCS | Mod: HCNC,CPTII,S$GLB, | Performed by: PHYSICIAN ASSISTANT

## 2023-06-02 PROCEDURE — 1126F AMNT PAIN NOTED NONE PRSNT: CPT | Mod: HCNC,CPTII,S$GLB, | Performed by: PHYSICIAN ASSISTANT

## 2023-06-02 PROCEDURE — 99213 PR OFFICE/OUTPT VISIT, EST, LEVL III, 20-29 MIN: ICD-10-PCS | Mod: HCNC,S$GLB,, | Performed by: PHYSICIAN ASSISTANT

## 2023-06-02 PROCEDURE — 99999 PR PBB SHADOW E&M-EST. PATIENT-LVL III: CPT | Mod: PBBFAC,HCNC,, | Performed by: PHYSICIAN ASSISTANT

## 2023-06-02 PROCEDURE — 3288F PR FALLS RISK ASSESSMENT DOCUMENTED: ICD-10-PCS | Mod: HCNC,CPTII,S$GLB, | Performed by: PHYSICIAN ASSISTANT

## 2023-06-02 PROCEDURE — 1101F PT FALLS ASSESS-DOCD LE1/YR: CPT | Mod: HCNC,CPTII,S$GLB, | Performed by: PHYSICIAN ASSISTANT

## 2023-06-02 NOTE — PROGRESS NOTES
INTERNAL MEDICINE PROGRESS NOTE    CHIEF COMPLAINT     Chief Complaint   Patient presents with    Nasal Congestion     Cold nose    Shortness of Breath    Pneumonia     F/u       HPI     Julio Benites is a 88 y.o. male who presents for a follow-up visit today.    PCP is Gregorio Jensen MD, patient is known to me.     Patient presents for symptom re-check. Was recently treated for PNA with azithromycin. Repeat imaging showed interval improvement but not resolution of infection. Here to day for clinical re-check, feeling well. Coughing improved.       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 Authorizing Provider   aspirin (ECOTRIN) 81 MG EC tablet TAKE 1 TABLET BY MOUTH EVERY DAY 4/14/23  Yes Gregorio Jensen MD   colchicine (COLCRYS) 0.6 mg tablet Take 0.6 mg by mouth 2 (two) times daily as needed (gout attack).   Yes Historical Provider   losartan (COZAAR) 100 MG tablet TAKE 1 TABLET BY MOUTH EVERY DAY 10/12/22  Yes Gregorio Jensen MD   NIFEdipine (ADALAT CC) 90 MG TbSR TAKE 1 TABLET (90 MG TOTAL) BY MOUTH ONCE DAILY. 9/11/21  Yes Gregorio Jensen MD   pantoprazole (PROTONIX) 40 MG tablet TAKE 1 TABLET BY MOUTH EVERY DAY 2/2/23  Yes Gregorio Jensen MD   pravastatin (PRAVACHOL) 20 MG tablet TAKE 1 TABLET BY MOUTH EVERY DAY AT NIGHT 10/28/22  Yes Alice Sam MD   metoprolol succinate (TOPROL-XL) 25 MG 24  hr tablet Take 0.5 tablets (12.5 mg total) by mouth once daily. 3/11/22 5/16/23  Gregorio Jensen MD       Review of Systems:  Review of Systems   Constitutional:  Negative for chills and fever.   HENT:  Negative for sore throat and trouble swallowing.    Eyes:  Negative for visual disturbance.   Respiratory:  Negative for cough and shortness of breath.    Cardiovascular:  Negative for chest pain.   Gastrointestinal:  Negative for abdominal pain, constipation, diarrhea, nausea and vomiting.   Genitourinary:  Negative for dysuria and flank pain.   Musculoskeletal:  Negative for back pain, neck pain and neck stiffness.   Skin:  Negative for rash.   Neurological:  Negative for dizziness, syncope, weakness and headaches.   Psychiatric/Behavioral:  Negative for confusion.      Health Maintainence:   Immunizations:  Health Maintenance         Date Due Completion Date    Shingles Vaccine (1 of 2) Never done ---    COVID-19 Vaccine (4 - Pfizer series) 11/23/2021 9/28/2021    TETANUS VACCINE 03/21/2022 3/21/2012    Diabetes Urine Screening 09/27/2023 9/27/2022    Hemoglobin A1c 11/11/2023 5/11/2023    Lipid Panel 05/11/2024 5/11/2023    Aspirin/Antiplatelet Therapy 05/16/2024 5/16/2023             PHYSICAL EXAM     There were no vitals taken for this visit.    Physical Exam  Vitals and nursing note reviewed.   Constitutional:       Appearance: Normal appearance.      Comments: Frail appearing elderly male in NAD or apparent pain. He makes good eye contact, speaks in clear full sentences and does not ambulate on this exam.     HENT:      Head: Normocephalic and atraumatic.      Nose: Nose normal.      Mouth/Throat:      Pharynx: Oropharynx is clear.   Eyes:      Conjunctiva/sclera: Conjunctivae normal.   Cardiovascular:      Rate and Rhythm: Normal rate and regular rhythm.      Pulses: Normal pulses.   Pulmonary:      Effort: No respiratory distress.   Abdominal:      Tenderness: There is no abdominal tenderness.    Musculoskeletal:         General: Normal range of motion.      Cervical back: No rigidity.   Skin:     General: Skin is warm and dry.      Capillary Refill: Capillary refill takes less than 2 seconds.      Findings: No rash.   Neurological:      General: No focal deficit present.      Mental Status: He is alert.      Gait: Gait normal.   Psychiatric:         Mood and Affect: Mood normal.       LABS     Lab Results   Component Value Date    HGBA1C 6.7 (H) 05/11/2023     CMP  Sodium   Date Value Ref Range Status   05/11/2023 146 (H) 136 - 145 mmol/L Final     Potassium   Date Value Ref Range Status   05/11/2023 3.3 (L) 3.5 - 5.1 mmol/L Final     Chloride   Date Value Ref Range Status   05/11/2023 109 95 - 110 mmol/L Final     CO2   Date Value Ref Range Status   05/11/2023 26 23 - 29 mmol/L Final     Glucose   Date Value Ref Range Status   05/11/2023 201 (H) 70 - 110 mg/dL Final     BUN   Date Value Ref Range Status   05/11/2023 23 8 - 23 mg/dL Final     Creatinine   Date Value Ref Range Status   05/11/2023 0.8 0.5 - 1.4 mg/dL Final     Calcium   Date Value Ref Range Status   05/11/2023 9.3 8.7 - 10.5 mg/dL Final     Total Protein   Date Value Ref Range Status   05/11/2023 6.5 6.0 - 8.4 g/dL Final     Albumin   Date Value Ref Range Status   05/11/2023 3.0 (L) 3.5 - 5.2 g/dL Final     Total Bilirubin   Date Value Ref Range Status   05/11/2023 0.3 0.1 - 1.0 mg/dL Final     Comment:     For infants and newborns, interpretation of results should be based  on gestational age, weight and in agreement with clinical  observations.    Premature Infant recommended reference ranges:  Up to 24 hours.............<8.0 mg/dL  Up to 48 hours............<12.0 mg/dL  3-5 days..................<15.0 mg/dL  6-29 days.................<15.0 mg/dL       Alkaline Phosphatase   Date Value Ref Range Status   05/11/2023 56 55 - 135 U/L Final     AST   Date Value Ref Range Status   05/11/2023 20 10 - 40 U/L Final     ALT   Date Value Ref Range  Status   05/11/2023 12 10 - 44 U/L Final     Anion Gap   Date Value Ref Range Status   05/11/2023 11 8 - 16 mmol/L Final     eGFR if    Date Value Ref Range Status   02/21/2022 >60 >60 mL/min/1.73 m^2 Final     eGFR if non    Date Value Ref Range Status   02/21/2022 >60 >60 mL/min/1.73 m^2 Final     Comment:     Calculation used to obtain the estimated glomerular filtration  rate (eGFR) is the CKD-EPI equation.        Lab Results   Component Value Date    WBC 6.35 05/11/2023    HGB 11.1 (L) 05/11/2023    HCT 35.8 (L) 05/11/2023    MCV 80 (L) 05/11/2023     05/11/2023     Lab Results   Component Value Date    CHOL 176 05/11/2023    CHOL 154 08/24/2021    CHOL 172 05/07/2019     Lab Results   Component Value Date    HDL 62 05/11/2023    HDL 66 08/24/2021    HDL 74 05/07/2019     Lab Results   Component Value Date    LDLCALC 89.4 05/11/2023    LDLCALC 66.4 08/24/2021    LDLCALC 83.6 05/07/2019     Lab Results   Component Value Date    TRIG 123 05/11/2023    TRIG 108 08/24/2021    TRIG 72 05/07/2019     Lab Results   Component Value Date    CHOLHDL 35.2 05/11/2023    CHOLHDL 42.9 08/24/2021    CHOLHDL 43.0 05/07/2019     Lab Results   Component Value Date    TSH 2.024 05/11/2023       ASSESSMENT/PLAN     Julio Benites is a 88 y.o. male     Julio was seen today for nasal congestion, shortness of breath and pneumonia. Lung sounds improved - no need for additional abx at this time, will continue to monitor.     Diagnoses and all orders for this visit:    H/O: pneumonia    Essential hypertension, benign    Mixed hyperlipidemia    Tobacco abuse    Elevated PSA    Type 2 diabetes mellitus with microalbuminuria, without long-term current use of insulin          Whitney Menchaca PA-C   Department of Internal Medicine - [facility]   12:50 PM

## 2023-06-08 ENCOUNTER — HOSPITAL ENCOUNTER (OUTPATIENT)
Dept: RADIOLOGY | Facility: HOSPITAL | Age: 88
Discharge: HOME | End: 2023-06-08
Attending: NURSE PRACTITIONER
Payer: MEDICARE

## 2023-06-08 DIAGNOSIS — R97.20 ELEVATED PSA: ICD-10-CM

## 2023-06-08 PROCEDURE — A9585 GADOBUTROL INJECTION: HCPCS | Mod: HCNC | Performed by: NURSE PRACTITIONER

## 2023-06-08 PROCEDURE — 72197 MRI PROSTATE W W/O CONTRAST: ICD-10-PCS | Mod: 26,HCNC,, | Performed by: RADIOLOGY

## 2023-06-08 PROCEDURE — 72197 MRI PELVIS W/O & W/DYE: CPT | Mod: 26,HCNC,, | Performed by: RADIOLOGY

## 2023-06-08 PROCEDURE — 25500020 PHARM REV CODE 255: Mod: HCNC | Performed by: NURSE PRACTITIONER

## 2023-06-08 PROCEDURE — 72197 MRI PELVIS W/O & W/DYE: CPT | Mod: TC,HCNC

## 2023-06-08 RX ORDER — GADOBUTROL 604.72 MG/ML
10 INJECTION INTRAVENOUS
Status: COMPLETED | OUTPATIENT
Start: 2023-06-08 | End: 2023-06-08

## 2023-06-08 RX ADMIN — GADOBUTROL 10 ML: 604.72 INJECTION INTRAVENOUS at 06:06

## 2023-06-13 DIAGNOSIS — R97.20 ELEVATED PSA: Primary | ICD-10-CM

## 2023-06-13 RX ORDER — CIPROFLOXACIN 500 MG/1
500 TABLET ORAL 2 TIMES DAILY
Qty: 4 TABLET | Refills: 0 | Status: ON HOLD | OUTPATIENT
Start: 2023-06-13 | End: 2023-06-26 | Stop reason: HOSPADM

## 2023-06-16 ENCOUNTER — PATIENT MESSAGE (OUTPATIENT)
Dept: INTERNAL MEDICINE | Facility: CLINIC | Age: 88
End: 2023-06-16
Payer: MEDICARE

## 2023-06-16 ENCOUNTER — HOSPITAL ENCOUNTER (INPATIENT)
Facility: OTHER | Age: 88
LOS: 10 days | Discharge: NURSING, REHAB OR LONG-TERM CARE | DRG: 391 | End: 2023-06-26
Attending: EMERGENCY MEDICINE | Admitting: HOSPITALIST
Payer: MEDICARE

## 2023-06-16 DIAGNOSIS — J18.9 PNEUMONIA: ICD-10-CM

## 2023-06-16 DIAGNOSIS — R07.9 ACUTE CHEST PAIN: ICD-10-CM

## 2023-06-16 DIAGNOSIS — K52.9 COLITIS: ICD-10-CM

## 2023-06-16 DIAGNOSIS — D53.8 OTHER SPECIFIED NUTRITIONAL ANEMIAS: ICD-10-CM

## 2023-06-16 DIAGNOSIS — R07.9 CHEST PAIN: ICD-10-CM

## 2023-06-16 DIAGNOSIS — R10.9 ABDOMINAL PAIN, UNSPECIFIED ABDOMINAL LOCATION: Primary | ICD-10-CM

## 2023-06-16 PROBLEM — J96.21 ACUTE ON CHRONIC RESPIRATORY FAILURE WITH HYPOXIA: Status: ACTIVE | Noted: 2023-06-16

## 2023-06-16 PROBLEM — J96.01 ACUTE HYPOXEMIC RESPIRATORY FAILURE: Status: ACTIVE | Noted: 2023-06-16

## 2023-06-16 PROBLEM — R79.89 ELEVATED TROPONIN: Status: ACTIVE | Noted: 2023-06-16

## 2023-06-16 LAB
ALBUMIN SERPL BCP-MCNC: 3.3 G/DL (ref 3.5–5.2)
ALP SERPL-CCNC: 61 U/L (ref 55–135)
ALT SERPL W/O P-5'-P-CCNC: 11 U/L (ref 10–44)
ANION GAP SERPL CALC-SCNC: 15 MMOL/L (ref 8–16)
AST SERPL-CCNC: 19 U/L (ref 10–40)
BACTERIA #/AREA URNS HPF: ABNORMAL /HPF
BASOPHILS # BLD AUTO: 0.01 K/UL (ref 0–0.2)
BASOPHILS NFR BLD: 0.1 % (ref 0–1.9)
BILIRUB SERPL-MCNC: 0.6 MG/DL (ref 0.1–1)
BILIRUB UR QL STRIP: NEGATIVE
BNP SERPL-MCNC: 62 PG/ML (ref 0–99)
BUN SERPL-MCNC: 33 MG/DL (ref 8–23)
CALCIUM SERPL-MCNC: 10.4 MG/DL (ref 8.7–10.5)
CHLORIDE SERPL-SCNC: 99 MMOL/L (ref 95–110)
CLARITY UR: CLEAR
CO2 SERPL-SCNC: 25 MMOL/L (ref 23–29)
COLOR UR: YELLOW
CREAT SERPL-MCNC: 0.8 MG/DL (ref 0.5–1.4)
DIFFERENTIAL METHOD: ABNORMAL
EOSINOPHIL # BLD AUTO: 0 K/UL (ref 0–0.5)
EOSINOPHIL NFR BLD: 0 % (ref 0–8)
ERYTHROCYTE [DISTWIDTH] IN BLOOD BY AUTOMATED COUNT: 16.5 % (ref 11.5–14.5)
EST. GFR  (NO RACE VARIABLE): >60 ML/MIN/1.73 M^2
GLUCOSE SERPL-MCNC: 172 MG/DL (ref 70–110)
GLUCOSE UR QL STRIP: NEGATIVE
HCT VFR BLD AUTO: 42.6 % (ref 40–54)
HGB BLD-MCNC: 13.4 G/DL (ref 14–18)
HGB UR QL STRIP: NEGATIVE
HYALINE CASTS #/AREA URNS LPF: 3 /LPF
IMM GRANULOCYTES # BLD AUTO: 0.04 K/UL (ref 0–0.04)
IMM GRANULOCYTES NFR BLD AUTO: 0.4 % (ref 0–0.5)
KETONES UR QL STRIP: NEGATIVE
LACTATE SERPL-SCNC: 2.4 MMOL/L (ref 0.5–2.2)
LDH SERPL L TO P-CCNC: 2.67 MMOL/L (ref 0.5–2.2)
LEUKOCYTE ESTERASE UR QL STRIP: NEGATIVE
LIPASE SERPL-CCNC: 11 U/L (ref 4–60)
LYMPHOCYTES # BLD AUTO: 0.5 K/UL (ref 1–4.8)
LYMPHOCYTES NFR BLD: 5.8 % (ref 18–48)
MCH RBC QN AUTO: 25.6 PG (ref 27–31)
MCHC RBC AUTO-ENTMCNC: 31.5 G/DL (ref 32–36)
MCV RBC AUTO: 81 FL (ref 82–98)
MICROSCOPIC COMMENT: ABNORMAL
MONOCYTES # BLD AUTO: 0.7 K/UL (ref 0.3–1)
MONOCYTES NFR BLD: 7.2 % (ref 4–15)
NEUTROPHILS # BLD AUTO: 7.8 K/UL (ref 1.8–7.7)
NEUTROPHILS NFR BLD: 86.5 % (ref 38–73)
NITRITE UR QL STRIP: NEGATIVE
NRBC BLD-RTO: 0 /100 WBC
PH UR STRIP: 6 [PH] (ref 5–8)
PLATELET # BLD AUTO: 261 K/UL (ref 150–450)
PMV BLD AUTO: 10.7 FL (ref 9.2–12.9)
POTASSIUM SERPL-SCNC: 4.5 MMOL/L (ref 3.5–5.1)
PROCALCITONIN SERPL IA-MCNC: 0.18 NG/ML
PROT SERPL-MCNC: 7.6 G/DL (ref 6–8.4)
PROT UR QL STRIP: ABNORMAL
RBC # BLD AUTO: 5.24 M/UL (ref 4.6–6.2)
RBC #/AREA URNS HPF: 0 /HPF (ref 0–4)
SAMPLE: ABNORMAL
SODIUM SERPL-SCNC: 139 MMOL/L (ref 136–145)
SP GR UR STRIP: >1.03 (ref 1–1.03)
TROPONIN I SERPL DL<=0.01 NG/ML-MCNC: 0.04 NG/ML (ref 0–0.03)
TROPONIN I SERPL DL<=0.01 NG/ML-MCNC: 0.04 NG/ML (ref 0–0.03)
URN SPEC COLLECT METH UR: ABNORMAL
UROBILINOGEN UR STRIP-ACNC: NEGATIVE EU/DL
WBC # BLD AUTO: 9.06 K/UL (ref 3.9–12.7)
WBC #/AREA URNS HPF: 4 /HPF (ref 0–5)

## 2023-06-16 PROCEDURE — 80053 COMPREHEN METABOLIC PANEL: CPT | Mod: HCNC | Performed by: PHYSICIAN ASSISTANT

## 2023-06-16 PROCEDURE — 25000242 PHARM REV CODE 250 ALT 637 W/ HCPCS: Mod: HCNC

## 2023-06-16 PROCEDURE — 84484 ASSAY OF TROPONIN QUANT: CPT | Mod: HCNC | Performed by: PHYSICIAN ASSISTANT

## 2023-06-16 PROCEDURE — 84484 ASSAY OF TROPONIN QUANT: CPT | Mod: 91,HCNC

## 2023-06-16 PROCEDURE — 99900035 HC TECH TIME PER 15 MIN (STAT): Mod: HCNC

## 2023-06-16 PROCEDURE — 87040 BLOOD CULTURE FOR BACTERIA: CPT | Mod: 59,HCNC

## 2023-06-16 PROCEDURE — 96365 THER/PROPH/DIAG IV INF INIT: CPT | Mod: HCNC

## 2023-06-16 PROCEDURE — 84145 PROCALCITONIN (PCT): CPT | Mod: HCNC

## 2023-06-16 PROCEDURE — 93010 EKG 12-LEAD: ICD-10-PCS | Mod: HCNC,,, | Performed by: INTERNAL MEDICINE

## 2023-06-16 PROCEDURE — 63600175 PHARM REV CODE 636 W HCPCS: Mod: HCNC

## 2023-06-16 PROCEDURE — 27100171 HC OXYGEN HIGH FLOW UP TO 24 HOURS: Mod: HCNC

## 2023-06-16 PROCEDURE — 93010 ELECTROCARDIOGRAM REPORT: CPT | Mod: HCNC,,, | Performed by: INTERNAL MEDICINE

## 2023-06-16 PROCEDURE — 83690 ASSAY OF LIPASE: CPT | Mod: HCNC | Performed by: PHYSICIAN ASSISTANT

## 2023-06-16 PROCEDURE — 36415 COLL VENOUS BLD VENIPUNCTURE: CPT | Mod: HCNC | Performed by: PHYSICIAN ASSISTANT

## 2023-06-16 PROCEDURE — 25000003 PHARM REV CODE 250: Mod: HCNC | Performed by: EMERGENCY MEDICINE

## 2023-06-16 PROCEDURE — 85025 COMPLETE CBC W/AUTO DIFF WBC: CPT | Mod: HCNC | Performed by: PHYSICIAN ASSISTANT

## 2023-06-16 PROCEDURE — 99223 PR INITIAL HOSPITAL CARE,LEVL III: ICD-10-PCS | Mod: HCNC,,, | Performed by: NURSE PRACTITIONER

## 2023-06-16 PROCEDURE — 83880 ASSAY OF NATRIURETIC PEPTIDE: CPT | Mod: HCNC | Performed by: PHYSICIAN ASSISTANT

## 2023-06-16 PROCEDURE — 99285 EMERGENCY DEPT VISIT HI MDM: CPT | Mod: 25,HCNC

## 2023-06-16 PROCEDURE — 93005 ELECTROCARDIOGRAM TRACING: CPT | Mod: HCNC

## 2023-06-16 PROCEDURE — 25500020 PHARM REV CODE 255: Mod: HCNC | Performed by: HOSPITALIST

## 2023-06-16 PROCEDURE — 25000003 PHARM REV CODE 250: Mod: HCNC | Performed by: NURSE PRACTITIONER

## 2023-06-16 PROCEDURE — 11000001 HC ACUTE MED/SURG PRIVATE ROOM: Mod: HCNC

## 2023-06-16 PROCEDURE — 83605 ASSAY OF LACTIC ACID: CPT | Mod: HCNC | Performed by: NURSE PRACTITIONER

## 2023-06-16 PROCEDURE — 94761 N-INVAS EAR/PLS OXIMETRY MLT: CPT | Mod: HCNC

## 2023-06-16 PROCEDURE — 83605 ASSAY OF LACTIC ACID: CPT | Mod: HCNC

## 2023-06-16 PROCEDURE — 99223 1ST HOSP IP/OBS HIGH 75: CPT | Mod: HCNC,,, | Performed by: NURSE PRACTITIONER

## 2023-06-16 PROCEDURE — 99223 1ST HOSP IP/OBS HIGH 75: CPT | Mod: HCNC,,, | Performed by: SURGERY

## 2023-06-16 PROCEDURE — 99223 PR INITIAL HOSPITAL CARE,LEVL III: ICD-10-PCS | Mod: HCNC,,, | Performed by: SURGERY

## 2023-06-16 PROCEDURE — 81000 URINALYSIS NONAUTO W/SCOPE: CPT | Mod: HCNC | Performed by: NURSE PRACTITIONER

## 2023-06-16 PROCEDURE — 94640 AIRWAY INHALATION TREATMENT: CPT | Mod: HCNC

## 2023-06-16 PROCEDURE — 25000003 PHARM REV CODE 250: Mod: HCNC

## 2023-06-16 RX ORDER — AZITHROMYCIN 250 MG/1
TABLET, FILM COATED ORAL
Qty: 6 TABLET | Refills: 0 | OUTPATIENT
Start: 2023-06-16 | End: 2023-06-21

## 2023-06-16 RX ORDER — NALOXONE HCL 0.4 MG/ML
0.02 VIAL (ML) INJECTION
Status: DISCONTINUED | OUTPATIENT
Start: 2023-06-16 | End: 2023-06-26 | Stop reason: HOSPADM

## 2023-06-16 RX ORDER — ONDANSETRON 2 MG/ML
4 INJECTION INTRAMUSCULAR; INTRAVENOUS EVERY 8 HOURS PRN
Status: DISCONTINUED | OUTPATIENT
Start: 2023-06-16 | End: 2023-06-26 | Stop reason: HOSPADM

## 2023-06-16 RX ORDER — NIFEDIPINE 30 MG/1
90 TABLET, EXTENDED RELEASE ORAL
Status: COMPLETED | OUTPATIENT
Start: 2023-06-16 | End: 2023-06-16

## 2023-06-16 RX ORDER — AMOXICILLIN 250 MG
1 CAPSULE ORAL 2 TIMES DAILY
Status: DISCONTINUED | OUTPATIENT
Start: 2023-06-16 | End: 2023-06-26 | Stop reason: HOSPADM

## 2023-06-16 RX ORDER — NIFEDIPINE 30 MG/1
90 TABLET, EXTENDED RELEASE ORAL DAILY
Status: DISCONTINUED | OUTPATIENT
Start: 2023-06-17 | End: 2023-06-22

## 2023-06-16 RX ORDER — CLONIDINE HYDROCHLORIDE 0.1 MG/1
0.1 TABLET ORAL
Status: COMPLETED | OUTPATIENT
Start: 2023-06-16 | End: 2023-06-16

## 2023-06-16 RX ORDER — IPRATROPIUM BROMIDE AND ALBUTEROL SULFATE 2.5; .5 MG/3ML; MG/3ML
3 SOLUTION RESPIRATORY (INHALATION)
Status: COMPLETED | OUTPATIENT
Start: 2023-06-16 | End: 2023-06-16

## 2023-06-16 RX ORDER — SODIUM CHLORIDE 0.9 % (FLUSH) 0.9 %
10 SYRINGE (ML) INJECTION EVERY 8 HOURS PRN
Status: DISCONTINUED | OUTPATIENT
Start: 2023-06-16 | End: 2023-06-22

## 2023-06-16 RX ORDER — ACETAMINOPHEN 325 MG/1
650 TABLET ORAL EVERY 4 HOURS PRN
Status: DISCONTINUED | OUTPATIENT
Start: 2023-06-16 | End: 2023-06-26 | Stop reason: HOSPADM

## 2023-06-16 RX ORDER — LOSARTAN POTASSIUM 50 MG/1
100 TABLET ORAL
Status: COMPLETED | OUTPATIENT
Start: 2023-06-16 | End: 2023-06-16

## 2023-06-16 RX ORDER — IPRATROPIUM BROMIDE AND ALBUTEROL SULFATE 2.5; .5 MG/3ML; MG/3ML
3 SOLUTION RESPIRATORY (INHALATION) EVERY 4 HOURS PRN
Status: DISCONTINUED | OUTPATIENT
Start: 2023-06-16 | End: 2023-06-26 | Stop reason: HOSPADM

## 2023-06-16 RX ORDER — BISACODYL 10 MG
10 SUPPOSITORY, RECTAL RECTAL DAILY PRN
Status: DISCONTINUED | OUTPATIENT
Start: 2023-06-16 | End: 2023-06-26 | Stop reason: HOSPADM

## 2023-06-16 RX ORDER — LOSARTAN POTASSIUM 50 MG/1
100 TABLET ORAL DAILY
Status: DISCONTINUED | OUTPATIENT
Start: 2023-06-17 | End: 2023-06-26 | Stop reason: HOSPADM

## 2023-06-16 RX ORDER — PRAVASTATIN SODIUM 20 MG/1
20 TABLET ORAL NIGHTLY
Status: DISCONTINUED | OUTPATIENT
Start: 2023-06-16 | End: 2023-06-26 | Stop reason: HOSPADM

## 2023-06-16 RX ADMIN — IPRATROPIUM BROMIDE AND ALBUTEROL SULFATE 3 ML: .5; 3 SOLUTION RESPIRATORY (INHALATION) at 03:06

## 2023-06-16 RX ADMIN — CEFEPIME 1 G: 1 INJECTION, POWDER, FOR SOLUTION INTRAMUSCULAR; INTRAVENOUS at 04:06

## 2023-06-16 RX ADMIN — IOHEXOL 75 ML: 350 INJECTION, SOLUTION INTRAVENOUS at 05:06

## 2023-06-16 RX ADMIN — SODIUM CHLORIDE, POTASSIUM CHLORIDE, SODIUM LACTATE AND CALCIUM CHLORIDE 250 ML: 600; 310; 30; 20 INJECTION, SOLUTION INTRAVENOUS at 06:06

## 2023-06-16 RX ADMIN — NIFEDIPINE 90 MG: 30 TABLET, FILM COATED, EXTENDED RELEASE ORAL at 06:06

## 2023-06-16 RX ADMIN — METOPROLOL SUCCINATE 12.5 MG: 25 TABLET, EXTENDED RELEASE ORAL at 04:06

## 2023-06-16 RX ADMIN — SENNOSIDES AND DOCUSATE SODIUM 1 TABLET: 50; 8.6 TABLET ORAL at 06:06

## 2023-06-16 RX ADMIN — LOSARTAN POTASSIUM 100 MG: 50 TABLET, FILM COATED ORAL at 04:06

## 2023-06-16 RX ADMIN — CLONIDINE HYDROCHLORIDE 0.1 MG: 0.1 TABLET ORAL at 06:06

## 2023-06-16 RX ADMIN — PRAVASTATIN SODIUM 20 MG: 20 TABLET ORAL at 09:06

## 2023-06-16 NOTE — HPI
The patient is a 88 y.o. male with a past medical history of DM, NSTEMI, HTN, HLD, and systolic CHF who was recently treated for CAP  with azithromycin who presents with left upper quadrant abdominal pain.  Patient reports worsening left upper quadrant abdominal pain over the past 4-5 days.  He states it is positional and worse when he sits up or stands.  He rates the pain at a 10/10 during these times but 0/10 currently.  Patient also endorses about 1-1/2 months of shortness of breath.  He is also endorsed a significant weight loss during this time.  He denies chest pain, nausea, vomiting, diarrhea, fevers, chills.

## 2023-06-16 NOTE — ASSESSMENT & PLAN NOTE
Patient with Hypoxic Respiratory failure which is Acute on chronic.  he is not on home oxygen. Supplemental oxygen was provided and noted-      .   Signs/symptoms of respiratory failure include- tachypnea. Contributing diagnoses includes - Interstitial lung disease Labs and images were reviewed. Patient Has not had a recent ABG. Will treat underlying causes and adjust management of respiratory failure as follows- Duoneb PRN, Oxygen

## 2023-06-16 NOTE — FIRST PROVIDER EVALUATION
Emergency Department TeleTriage Encounter Note      CHIEF COMPLAINT    Chief Complaint   Patient presents with    Abdominal Pain     C/o LUQ pain 10/10 x 3 days with no improvement. Also c/o SOB x 1 month. O2 90-92% on RA. Speaking in complete sentences. PMH Stomach Ca. -CP. -n/v/d. /111 all other VSS       VITAL SIGNS   Initial Vitals [06/16/23 1313]   BP Pulse Resp Temp SpO2   (!) 164/111 92 18 97.4 °F (36.3 °C) (!) 92 %      MAP       --            ALLERGIES    Review of patient's allergies indicates:   Allergen Reactions    Lisinopril Rash     Patient reports history of rash with previous lisinopril use.     Nicoderm     Nicoderm cq [nicotine] Rash       PROVIDER TRIAGE NOTE  Patient presents with complaint of abdominal pain, shortness of breath and chest pain for about a week. No N/V. No change in bowel movements. He reports productive cough. No fever. No urinary symptoms.      Phy:   Constitutional: old and frail   HEENT: NCAT, symmetrical lids, No obvious facial deformity.  Normal phonation. Normal Conjunctiva   Neck: NAROM   Respiratory: Normal effort.  No obvious use of accessory muscles   Musculoskeletal: Moved upper extremities well   Neuro: Alert, answers questions appropriately    Psych: appropriate mood and affect      Initial orders will be placed and care will be transferred to an alternate provider when patient is roomed for a full evaluation. Any additional orders and the final disposition will be determined by that provider.        ORDERS  Labs Reviewed   CBC W/ AUTO DIFFERENTIAL   COMPREHENSIVE METABOLIC PANEL   TROPONIN I   LIPASE   URINALYSIS, REFLEX TO URINE CULTURE       ED Orders (720h ago, onward)      Start Ordered     Status Ordering Provider    06/16/23 1326 06/16/23 1325  Urinalysis, Reflex to Urine Culture Urine, Clean Catch  STAT         Ordered ROSA MARIA GONZALEZ    06/16/23 1325 06/16/23 1325  Vital signs  Every 15 min         Ordered ROSA MARIA GONZALEZ     06/16/23 1325 06/16/23 1325  Cardiac Monitoring - Adult  Continuous        Comments: Notify Physician If:    Ordered NEGELENATO ELA, ROSA MARIA    06/16/23 1325 06/16/23 1325  Pulse Oximetry Continuous  Continuous         Ordered NEGROTTO ELA, ROSA MARIA    06/16/23 1325 06/16/23 1325  Diet NPO  Diet effective now         Ordered NEGELENATO ELA, ROSA MARIA    06/16/23 1325 06/16/23 1325  Saline lock IV  Once         Ordered NEGROTTO ELA, ROSA MARIA    06/16/23 1325 06/16/23 1325  EKG 12-lead  Once        Comments: Do not perform if previously done during this visit/ triage    Ordered NEGROTTO ELA, ROSA MARIA    06/16/23 1325 06/16/23 1325  CBC auto differential  STAT         Ordered NEGELENATO ELA, ROSA MARIA    06/16/23 1325 06/16/23 1325  Comprehensive metabolic panel  STAT         Ordered NEGROTTO ELA, ROSA MARIA    06/16/23 1325 06/16/23 1325  Troponin I #1  STAT         Ordered NEGROTTO ELA, ROSA MARIA    06/16/23 1325 06/16/23 1325  X-Ray Chest AP Portable  1 time imaging         Ordered YAHAIRA MAHMOOD, ROSA MARIA    06/16/23 1325 06/16/23 1325  Lipase  STAT         Ordered NEGELENATO ROSA MARIA MAHMOOD              Virtual Visit Note: The provider triage portion of this emergency department evaluation and documentation was performed via [employer], a HIPAA-compliant telemedicine application, in concert with a tele-presenter in the room. A face to face patient evaluation with one of my colleagues will occur once the patient is placed in an emergency department room.      DISCLAIMER: This note was prepared with [employer] voice recognition transcription software. Garbled syntax, mangled pronouns, and other bizarre constructions may be attributed to that software system.

## 2023-06-16 NOTE — ED TRIAGE NOTES
Pt arrived POV with his son c/o midline abd pain and sob x5 days. Pain is only present when standing. Pt has been experiencing a loss of appetite and weight loss over the last month as well. Denies nvd, fever,dysuria, chest pain

## 2023-06-16 NOTE — ED PROVIDER NOTES
Source of History:   Patient, patient's son, and medical record, without language barrier or      Chief complaint:  Abdominal Pain (C/o LUQ pain 10/10 x 3 days with no improvement. Also c/o SOB x 1 month. O2 90-92% on RA. Speaking in complete sentences. PMH Stomach Ca. -CP. -n/v/d. /111 all other VSS)    HPI:  Julio Benites is a 88 y.o. male with history of recent CAP treated outpatient with azithromycin, remote gastric cancer remission, HTN, DM 2, CHF presenting with chief complaint of left upper quadrant abdominal pain.  Patient reports worsening left upper quadrant abdominal pain over the past 4-5 days.  He states it is positional and worse when he sits up or stands.  He rates the pain at a 10/10 during these times but 0/10.  Patient also endorses about 1-1/2 months of shortness of breath.  He is also endorsed a significant weight loss during this time.  He denies chest pain, nausea, vomiting, diarrhea, fevers, chills.  Patient has had recent outpatient workup for enlarged prostate    This is the extent to the patients complaints today here in the emergency department.    ROS: As per HPI and below:  ROS    Review of patient's allergies indicates:   Allergen Reactions    Lisinopril Rash     Patient reports history of rash with previous lisinopril use.     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    NSTEMI (non-ST elevated myocardial infarction) 1/1/2022     Past Surgical History:   Procedure Laterality Date    CATARACT  EXTRACTION Right     EYE SURGERY      INFECTED SKIN DEBRIDEMENT  2012    spider bite with surgery to right forearm    FL KAVIN,SWALLOW FUNCTION,CINE/VIDEO RECORD  2021         subtotal gastrectomy  2013     Social History     Tobacco Use    Smoking status: Former     Packs/day: 0.25     Types: Cigarettes     Quit date: 2022     Years since quittin.4     Passive exposure: Never    Smokeless tobacco: Never    Tobacco comments:     patient states that he started smoking cigarettes again: half a pack of cigarettes over 4 or 5 days   Substance Use Topics    Alcohol use: Yes     Comment: drinks scotch 2X weekly    Drug use: No     Vitals:    BP (!) 195/93   Pulse 80   Temp 97.4 °F (36.3 °C) (Oral)   Resp 18   Wt 38.6 kg (85 lb)   SpO2 100%   BMI 12.55 kg/m²     Physical Exam  Vitals and nursing note reviewed.   Constitutional:       General: He is not in acute distress.     Appearance: He is not toxic-appearing.      Interventions: Nasal cannula in place.   HENT:      Head: Normocephalic and atraumatic.      Mouth/Throat:      Mouth: Mucous membranes are moist.   Eyes:      General: No scleral icterus.  Cardiovascular:      Rate and Rhythm: Normal rate and regular rhythm.      Pulses: Normal pulses.      Heart sounds: Normal heart sounds. No murmur heard.    No friction rub. No gallop.   Pulmonary:      Effort: Pulmonary effort is normal. No respiratory distress.      Breath sounds: No stridor. No wheezing, rhonchi or rales.      Comments: Diffusely diminished breath sounds  Abdominal:      General: Abdomen is flat. There is no distension.      Palpations: Abdomen is soft.      Tenderness: There is no abdominal tenderness. There is no guarding.   Musculoskeletal:         General: No swelling or deformity.      Cervical back: Normal range of motion and neck supple.   Skin:     General: Skin is warm and dry.      Capillary Refill: Capillary refill takes less than 2 seconds.      Coloration: Skin is not  jaundiced.      Findings: No rash.   Neurological:      Mental Status: He is alert. Mental status is at baseline.     Procedures    Laboratory Studies:  Labs that have been ordered have been independently reviewed and interpreted by myself.  Labs Reviewed   CBC W/ AUTO DIFFERENTIAL - Abnormal; Notable for the following components:       Result Value    Hemoglobin 13.4 (*)     MCV 81 (*)     MCH 25.6 (*)     MCHC 31.5 (*)     RDW 16.5 (*)     Gran # (ANC) 7.8 (*)     Lymph # 0.5 (*)     Gran % 86.5 (*)     Lymph % 5.8 (*)     All other components within normal limits   COMPREHENSIVE METABOLIC PANEL - Abnormal; Notable for the following components:    Glucose 172 (*)     BUN 33 (*)     Albumin 3.3 (*)     All other components within normal limits   TROPONIN I - Abnormal; Notable for the following components:    Troponin I 0.040 (*)     All other components within normal limits   ISTAT LACTATE - Abnormal; Notable for the following components:    POC Lactate 2.67 (*)     All other components within normal limits   CULTURE, BLOOD   CULTURE, BLOOD   LIPASE   B-TYPE NATRIURETIC PEPTIDE   B-TYPE NATRIURETIC PEPTIDE   URINALYSIS, REFLEX TO URINE CULTURE   PROCALCITONIN   LACTIC ACID, PLASMA   TROPONIN I     Imaging Results               X-Ray Chest AP Portable (Final result)  Result time 06/16/23 15:47:53      Final result by Je Mathur MD (06/16/23 15:47:53)                   Impression:      Apparent increase in the degree of airspace consolidation at the left lower lobe.  Stable calcified pleural plaques.    This report was flagged in Epic as abnormal.      Electronically signed by: Je Mathur MD  Date:    06/16/2023  Time:    15:47               Narrative:    EXAMINATION:  XR CHEST AP PORTABLE    CLINICAL HISTORY:  Chest Pain;    TECHNIQUE:  Single frontal view of the chest was performed.    COMPARISON:  05/29/2023 and 05/11/2023    FINDINGS:  Cardiac silhouette is magnified by portable AP technique.  The heart appears  to be at the upper limits of normal size and unchanged.  Tortuous thoracic aorta with atherosclerotic calcification in the wall of the aortic arch.  Pulmonary vascular pattern does not appear congested.  Lungs appear hyperinflated.  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  Accentuation of the interstitial pattern bilaterally along with what appears to be some patchy airspace consolidation at the left lower lobe.  The consolidation at the left lower lobe appears more pronounced as compared to the 2 most recent exams.  No pleural fluid or pneumothorax.  Skeletal structures appear intact.                                    EKG (independently interpreted by me): Rhythm: NSR, rate of  95 BPM, no ST elevations or depressions, QTc 477, T-wave inversions    Imaging (independently interpreted by me):  Left lower lobe infiltrate    I decided to obtain the patient's medical records.  Summary of Medical Records:    Medications   ceFEPIme (MAXIPIME) 1 g in dextrose 5 % in water (D5W) 5 % 100 mL IVPB (MB+) (1 g Intravenous New Bag 6/16/23 1636)   lactated ringers bolus 250 mL (has no administration in time range)   albuterol-ipratropium 2.5 mg-0.5 mg/3 mL nebulizer solution 3 mL (3 mLs Nebulization Given 6/16/23 1542)   losartan tablet 100 mg (100 mg Oral Given 6/16/23 1640)   metoprolol succinate (TOPROL-XL) 24 hr split tablet 12.5 mg (12.5 mg Oral Given 6/16/23 1640)     MDM:    88 y.o. male with left upper quadrant abdominal pain and shortness of breath    Differential Dx:  Differential includes but is not limited to pneumonia, recurrent stomach cancer, pneumothorax    ED Management:  Abdominal pain/septic workup started for an acute presentation of an emergent condition with multiple comorbidities.  Patient's home blood pressure medications ordered as his pressure has climbed up.  CT chest abdomen pelvis ordered as patient has had significant weight loss over the past few  months with new abdominal pain.  BNP and lipase within normal limits.  Chest x-ray demonstrating left lower lobe pneumonia in the same area of patient's recently treated pneumonia, this is likely the cause of patient's left upper quadrant abdominal pain.  Will treat with cefepime as he has failed outpatient management and has a new oxygen requirement.  Troponin mildly elevated, will repeat, patient is still denying chest pain at this time.  Lactic acid mildly elevated, will judiciously fluid resuscitate as patient has congestive heart failure. Patient admitted to hospital medicine for further treatment of his CAP    Discussed with:  [facility] medicine     ED Course as of 06/16/23 1704 Fri Jun 16, 2023   1548 Troponin I(!): 0.040 [AW]   1548 Lipase: 11 [AW]   1548 Creatinine: 0.8 [AW]   1548 BUN(!): 33 [AW]   1655 POC Lactate(!): 2.67 [AW]      ED Course User Index  [AW] Jorge Palmer MD     Diagnostic Impression:    Final diagnoses:  [R07.9] Chest pain  [J18.9] Pneumonia  [R10.9] Abdominal pain, unspecified abdominal location (Primary)     ED Disposition Condition    Admit Stable                   Jorge Palmer MD  Resident  06/16/23 1704

## 2023-06-16 NOTE — TELEPHONE ENCOUNTER
----- Message from Taras oRy sent at 6/16/2023 10:31 AM CDT -----  Regarding: Refill Request    Who Called: Natali Patient Daughter        New Prescription or Refill : Refill    RX Name and Strength:   azithromycin (Z-NOMI) 250 MG tablet      RX Name and Strength:         RX Name and Strength:      30 day or 90 day RX:     Preferred Pharmacy:[facility] #[id number] - [place], [place] - [address]    Would the patient rather a call back or a response via [facility]?    Best Call Back Number:  [phone]    Additional Information: Patient never picked up Rx

## 2023-06-16 NOTE — ASSESSMENT & PLAN NOTE
CT- Persistent changes of small bowel ileus and constipation with rectal fecal impaction and early signs of stercoral colitis.  Thickening at the anal rectal verge either due to inflammation.  Mass is difficult to exclude.    Clear liquid diet, consider IVF if decreased intake  Consult General Surgery  Senna BID  Dulcolax  Hold on Abx       Never

## 2023-06-16 NOTE — ASSESSMENT & PLAN NOTE
Troponin- .040, .038; stable. Likely due to worsening respiratory function at baseline    Complete trend

## 2023-06-16 NOTE — Clinical Note
Diagnosis: Chest pain [010458]   Admitting Provider:: HEMANTH FOREMAN [[id number]]   Future Attending Provider: HEMANTH FOREMAN [[id number]]   Reason for IP Medical Treatment  (Clinical interventions that can only be accomplished in the IP setting? ) :: CAP   Estimated Length of Stay:: 2 midnights   I certify that Inpatient services for greater than or equal to 2 midnights are medically necessary:: Yes   Plans for Post-Acute care--if anticipated (pick the single best option):: A. No post acute care anticipated at this time   Special Needs:: No Special Needs [1]

## 2023-06-17 LAB
ALBUMIN SERPL BCP-MCNC: 2.8 G/DL (ref 3.5–5.2)
ALP SERPL-CCNC: 56 U/L (ref 55–135)
ALT SERPL W/O P-5'-P-CCNC: 11 U/L (ref 10–44)
ANION GAP SERPL CALC-SCNC: 10 MMOL/L (ref 8–16)
AST SERPL-CCNC: 19 U/L (ref 10–40)
BASOPHILS # BLD AUTO: 0.01 K/UL (ref 0–0.2)
BASOPHILS NFR BLD: 0.1 % (ref 0–1.9)
BILIRUB SERPL-MCNC: 0.5 MG/DL (ref 0.1–1)
BUN SERPL-MCNC: 32 MG/DL (ref 8–23)
CALCIUM SERPL-MCNC: 10 MG/DL (ref 8.7–10.5)
CHLORIDE SERPL-SCNC: 102 MMOL/L (ref 95–110)
CO2 SERPL-SCNC: 29 MMOL/L (ref 23–29)
CREAT SERPL-MCNC: 0.7 MG/DL (ref 0.5–1.4)
DIFFERENTIAL METHOD: ABNORMAL
EOSINOPHIL # BLD AUTO: 0.1 K/UL (ref 0–0.5)
EOSINOPHIL NFR BLD: 1.3 % (ref 0–8)
ERYTHROCYTE [DISTWIDTH] IN BLOOD BY AUTOMATED COUNT: 16.4 % (ref 11.5–14.5)
EST. GFR  (NO RACE VARIABLE): >60 ML/MIN/1.73 M^2
GLUCOSE SERPL-MCNC: 103 MG/DL (ref 70–110)
HCT VFR BLD AUTO: 34 % (ref 40–54)
HGB BLD-MCNC: 10.8 G/DL (ref 14–18)
IMM GRANULOCYTES # BLD AUTO: 0.05 K/UL (ref 0–0.04)
IMM GRANULOCYTES NFR BLD AUTO: 0.7 % (ref 0–0.5)
LYMPHOCYTES # BLD AUTO: 0.5 K/UL (ref 1–4.8)
LYMPHOCYTES NFR BLD: 7.9 % (ref 18–48)
MAGNESIUM SERPL-MCNC: 2.4 MG/DL (ref 1.6–2.6)
MCH RBC QN AUTO: 25.4 PG (ref 27–31)
MCHC RBC AUTO-ENTMCNC: 31.8 G/DL (ref 32–36)
MCV RBC AUTO: 80 FL (ref 82–98)
MONOCYTES # BLD AUTO: 0.6 K/UL (ref 0.3–1)
MONOCYTES NFR BLD: 8.9 % (ref 4–15)
NEUTROPHILS # BLD AUTO: 5.6 K/UL (ref 1.8–7.7)
NEUTROPHILS NFR BLD: 81.1 % (ref 38–73)
NRBC BLD-RTO: 0 /100 WBC
PHOSPHATE SERPL-MCNC: 3.3 MG/DL (ref 2.7–4.5)
PLATELET # BLD AUTO: 151 K/UL (ref 150–450)
PLATELET BLD QL SMEAR: ABNORMAL
PMV BLD AUTO: 11.2 FL (ref 9.2–12.9)
POTASSIUM SERPL-SCNC: 4.9 MMOL/L (ref 3.5–5.1)
PROT SERPL-MCNC: 6.4 G/DL (ref 6–8.4)
RBC # BLD AUTO: 4.25 M/UL (ref 4.6–6.2)
SODIUM SERPL-SCNC: 141 MMOL/L (ref 136–145)
TROPONIN I SERPL DL<=0.01 NG/ML-MCNC: 0.05 NG/ML (ref 0–0.03)
WBC # BLD AUTO: 6.86 K/UL (ref 3.9–12.7)

## 2023-06-17 PROCEDURE — 99233 PR SUBSEQUENT HOSPITAL CARE,LEVL III: ICD-10-PCS | Mod: HCNC,,, | Performed by: HOSPITALIST

## 2023-06-17 PROCEDURE — 25000003 PHARM REV CODE 250: Mod: HCNC | Performed by: SURGERY

## 2023-06-17 PROCEDURE — 84484 ASSAY OF TROPONIN QUANT: CPT | Mod: HCNC | Performed by: NURSE PRACTITIONER

## 2023-06-17 PROCEDURE — 84100 ASSAY OF PHOSPHORUS: CPT | Mod: HCNC | Performed by: NURSE PRACTITIONER

## 2023-06-17 PROCEDURE — 83735 ASSAY OF MAGNESIUM: CPT | Mod: HCNC | Performed by: NURSE PRACTITIONER

## 2023-06-17 PROCEDURE — 80053 COMPREHEN METABOLIC PANEL: CPT | Mod: HCNC | Performed by: NURSE PRACTITIONER

## 2023-06-17 PROCEDURE — 25000003 PHARM REV CODE 250: Mod: HCNC | Performed by: INTERNAL MEDICINE

## 2023-06-17 PROCEDURE — 25000003 PHARM REV CODE 250: Mod: HCNC | Performed by: NURSE PRACTITIONER

## 2023-06-17 PROCEDURE — 36415 COLL VENOUS BLD VENIPUNCTURE: CPT | Mod: HCNC | Performed by: NURSE PRACTITIONER

## 2023-06-17 PROCEDURE — 99233 SBSQ HOSP IP/OBS HIGH 50: CPT | Mod: HCNC,,, | Performed by: HOSPITALIST

## 2023-06-17 PROCEDURE — 11000001 HC ACUTE MED/SURG PRIVATE ROOM: Mod: HCNC

## 2023-06-17 PROCEDURE — 63600175 PHARM REV CODE 636 W HCPCS: Mod: HCNC | Performed by: NURSE PRACTITIONER

## 2023-06-17 PROCEDURE — 85025 COMPLETE CBC W/AUTO DIFF WBC: CPT | Mod: HCNC | Performed by: NURSE PRACTITIONER

## 2023-06-17 RX ORDER — POLYETHYLENE GLYCOL 3350 17 G/17G
17 POWDER, FOR SOLUTION ORAL 2 TIMES DAILY
Status: DISCONTINUED | OUTPATIENT
Start: 2023-06-17 | End: 2023-06-17

## 2023-06-17 RX ORDER — POLYETHYLENE GLYCOL 3350 17 G/17G
17 POWDER, FOR SOLUTION ORAL
Status: DISCONTINUED | OUTPATIENT
Start: 2023-06-17 | End: 2023-06-19

## 2023-06-17 RX ADMIN — POLYETHYLENE GLYCOL 3350 17 G: 17 POWDER, FOR SOLUTION ORAL at 06:06

## 2023-06-17 RX ADMIN — ONDANSETRON 4 MG: 2 INJECTION INTRAMUSCULAR; INTRAVENOUS at 02:06

## 2023-06-17 RX ADMIN — LOSARTAN POTASSIUM 100 MG: 50 TABLET, FILM COATED ORAL at 11:06

## 2023-06-17 RX ADMIN — PRAVASTATIN SODIUM 20 MG: 20 TABLET ORAL at 08:06

## 2023-06-17 RX ADMIN — NIFEDIPINE 90 MG: 30 TABLET, FILM COATED, EXTENDED RELEASE ORAL at 11:06

## 2023-06-17 RX ADMIN — SENNOSIDES AND DOCUSATE SODIUM 1 TABLET: 50; 8.6 TABLET ORAL at 08:06

## 2023-06-17 RX ADMIN — POLYETHYLENE GLYCOL 3350 17 G: 17 POWDER, FOR SOLUTION ORAL at 10:06

## 2023-06-17 RX ADMIN — POLYETHYLENE GLYCOL 3350 17 G: 17 POWDER, FOR SOLUTION ORAL at 08:06

## 2023-06-17 RX ADMIN — SENNOSIDES AND DOCUSATE SODIUM 1 TABLET: 50; 8.6 TABLET ORAL at 10:06

## 2023-06-17 RX ADMIN — Medication 1 ENEMA: at 08:06

## 2023-06-17 RX ADMIN — POLYETHYLENE GLYCOL 3350 17 G: 17 POWDER, FOR SOLUTION ORAL at 04:06

## 2023-06-17 RX ADMIN — Medication 1 ENEMA: at 02:06

## 2023-06-17 NOTE — PLAN OF CARE
Initial Discharge Planning Assessment:  Patient admitted on: 6/16/23     Chart reviewed, Care plan discussed with treatment team,  attending Dr. Manzanares      PCP updated in Epic: Dr. Romero   Pharmacy, updated in Epic: [facility]     DME at home: Walker & Hospital bed       Current dispo: Home with private sitter       Transportation: Family will provide      Power of  or Living Will: Family      Anticipated DC needs from  perspective:     06/17/23 0905   Discharge Assessment   Assessment Type Discharge Planning Assessment   Confirmed/corrected address, phone number and insurance Yes   Confirmed Demographics Correct on Facesheet   Source of Information patient;health record;family   People in Home alone   Do you expect to return to your current living situation? Yes   Do you have help at home or someone to help you manage your care at home? Yes   Who are your caregiver(s) and their phone number(s)? Private sitter during the day   Prior to hospitilization cognitive status: Alert/Oriented   Current cognitive status: Alert/Oriented   Equipment Currently Used at Home hospital bed;walker, rolling   Readmission within 30 days? No   Patient currently being followed by outpatient case management? No   Do you currently have service(s) that help you manage your care at home? Yes   Name and Contact number of agency Private sitter   Do you take prescription medications? Yes   Do you have prescription coverage? Yes   Do you have any problems affording any of your prescribed medications? No   Is the patient taking medications as prescribed? yes   How do you get to doctors appointments? family or friend will provide   Are you on dialysis? No   Do you take coumadin? No   Discharge Plan A Home   Discharge Plan B Home Health   DME Needed Upon Discharge  none   Discharge Plan discussed with: Patient;Adult children

## 2023-06-17 NOTE — CONSULTS
Surgery Consult Note      SUBJECTIVE:     Chief Complaint: abdominal pain     History of Present Illness:  Julio Benites is a 88 y.o. male presents with abdominal pain. Has a history of gastric cancer s/p  subtotal gastrectomy in 2012 by Dr. Florentino.  Report significant left upper quadrant pain and is worried about an ulcer.  He also reports a history of significant constipation.  Recently took an enema at home with some relief, but otherwise has not had a bowel movement in quite some time.       Review of patient's allergies indicates:   Allergen Reactions    Lisinopril Rash     Patient reports history of rash with previous lisinopril use.     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    NSTEMI (non-ST elevated myocardial infarction) 1/1/2022     Past Surgical History:   Procedure Laterality Date    CATARACT EXTRACTION Right     EYE SURGERY      INFECTED SKIN DEBRIDEMENT  8/2012    spider bite with surgery to right forearm    VT EVAL,SWALLOW FUNCTION,CINE/VIDEO RECORD  9/22/2021         subtotal gastrectomy  2013     Family History   Problem Relation Age of Onset    Lung cancer Mother     Breast cancer Sister     No Known Problems Father     Breast cancer Daughter     No Known Problems Son     No Known Problems Maternal Grandmother     No Known Problems Maternal Grandfather     No Known Problems Paternal Grandmother     No Known Problems Paternal Grandfather      Social History     Tobacco Use    Smoking status: Former     Packs/day: 0.25     Types: Cigarettes      Quit date: 2022     Years since quittin.4     Passive exposure: Never    Smokeless tobacco: Never    Tobacco comments:     patient states that he started smoking cigarettes again: half a pack of cigarettes over 4 or 5 days   Substance Use Topics    Alcohol use: Yes     Comment: drinks scotch 2X weekly    Drug use: No        Review of Systems:  Review of Systems   All other systems reviewed and are negative.    OBJECTIVE:     Vital Signs (Most Recent)  /79   Pulse 65   Temp 97.4 °F (36.3 °C) (Oral)   Resp 16   Wt 38.6 kg (85 lb)   SpO2 100%   BMI 12.55 kg/m²     Physical Exam:  General: 88 y.o. male, cachectic  Neuro: alert and oriented x 4.  Moves all extremities.     HEENT: normocephalic, atraumatic, PERRL, EOMI   Respiratory: respirations are even and unlabored  Cardiac: regular rate and rhythm  Abdomen: scaphoid abdomen with mild distention   Extremities: Warm dry and intact  Skin: no rashes  Anorectal: rectal vault full of pasty stool    Labs:   Recent Results (from the past 336 hour(s))   CBC auto differential    Collection Time: 23  3:14 PM   Result Value Ref Range    WBC 9.06 3.90 - 12.70 K/uL    Hemoglobin 13.4 (L) 14.0 - 18.0 g/dL    Hematocrit 42.6 40.0 - 54.0 %    Platelets 261 150 - 450 K/uL          Imaging:   FINDINGS:  The thoracic and abdominal aorta appear stable with ectasia of the ascending arch at 4.1 cm.  The descending thoracic aorta measuring 3.9 cm as well.  The abdominal aorta is normal in caliber and its proximal segment but dilated to 3.3 by 3 cm just before the aortic bifurcation, stable.     The consolidated changes in the left lung base appear stable.  Pleural plaques are again noted.  The heart pericardium and great vessels otherwise appear stable.  No new lung opacities or definitive lung nodule is evident.  Secretions are noted throughout the trachea and right left bronchus.  No obstruction is evident.  No new pleural effusion is seen.     Base of the neck  and axillary regions appear stable.  Nonocclusive plaque at the origin of the subclavian artery on the left.     The liver, spleen, adrenal glands, kidneys, gastrectomy changes and distension of loops of bowel with marked waste material throughout the colon and moderate rectal fecal impaction with probable stone Coral colitis again noted and stable.  Hepatic cyst remains is in the right lower quadrant..  Thickening at the anal rectal verge raises the question of inflammation versus mass.     There is no ascites.  Multilevel spondylosis without fracture the patient is cachectic.     :  Stable infiltrate in the left lower lobe.  Correlate for pneumonia versus pneumonitis.  Follow-up until clearing.     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     Persistent changes of small bowel ileus and constipation with rectal fecal impaction and early signs of stercoral colitis.     Thickening at the anal rectal verge either due to inflammation.  Mass is difficult to exclude.     Cachexia.     No new signs of metastatic disease.        Electronically signed by: Jovani Brothers  Date:                                            06/16/2023  Time:                                           18:07      ASSESSMENT/PLAN:       88 y.o. male with LUQ abdominal pain and stercoral coliitis     - labs and imaging personally reviewed  - recommend GI consult for possible upper endoscopy given LUQ pain and prior subtotal gastrectomy with lavell en y reconstruction   - patient partially disimpacted on exam   - enemas ordered for tonight and tomorrow AM to clear rectal vault.  Recommend bowel regimen once abdominal pain improved.     Mame Mathur MD  Staff Surgeon  Colon & Rectal Surgery

## 2023-06-17 NOTE — PROGRESS NOTES
Nurses Note -- 4 Eyes      6/16/2023  10:00 PM      Skin assessed during: Admit      [x] No Altered Skin Integrity Present    [x]Prevention Measures Documented      [] Yes- Altered Skin Integrity Present or Discovered   [] LDA Added if Not in Epic (Describe Wound)   [] New Altered Skin Integrity was Present on Admit and Documented in LDA   [] Wound Image Taken    Wound Care Consulted? No    Attending Nurse:  Jessica Rivera LPN     Second RN/Staff Member:  HARSHA Escobar

## 2023-06-17 NOTE — H&P
[address] Medicine  History & Physical    Patient Name: Julio Benites  MRN: [id number]  Patient Class: IP- Inpatient  Admission Date: 6/16/2023  Attending Physician: Carol Manzanares MD   Primary Care Provider: Gregorio Jensen MD         Patient information was obtained from patient, past medical records and ER records.     Subjective:     Principal Problem:Colitis    Chief Complaint:   Chief Complaint   Patient presents with    Abdominal Pain     C/o LUQ pain 10/10 x 3 days with no improvement. Also c/o SOB x 1 month. O2 90-92% on RA. Speaking in complete sentences. PMH Stomach Ca. -CP. -n/v/d. /111 all other VSS        HPI: The patient is a 88 y.o. male with a past medical history of DM, NSTEMI, HTN, HLD, and systolic CHF who was recently treated for CAP  with azithromycin who presents with left upper quadrant abdominal pain.  Patient reports worsening left upper quadrant abdominal pain over the past 4-5 days.  He states it is positional and worse when he sits up or stands.  He rates the pain at a 10/10 during these times but 0/10 currently.  Patient also endorses about 1-1/2 months of shortness of breath.  He is also endorsed a significant weight loss during this time.  He denies chest pain, nausea, vomiting, diarrhea, fevers, chills.        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     Nicoderm     Nicoderm cq [nicotine] Rash       No current facility-administered medications on file prior to encounter.     Current Outpatient Medications on File Prior to Encounter   Medication Sig    aspirin (ECOTRIN) 81 MG EC tablet TAKE 1 TABLET BY MOUTH EVERY DAY    colchicine (COLCRYS) 0.6 mg tablet Take 0.6 mg by mouth 2 (two) times daily as needed (gout attack).    losartan (COZAAR) 100 MG tablet TAKE 1 TABLET BY MOUTH EVERY DAY    metoprolol succinate (TOPROL-XL) 25 MG 24 hr tablet Take 0.5 tablets (12.5 mg total) by mouth once daily.    NIFEdipine (ADALAT CC) 90 MG TbSR TAKE 1 TABLET (90 MG TOTAL) BY MOUTH ONCE DAILY.    pantoprazole (PROTONIX) 40 MG tablet TAKE 1 TABLET BY MOUTH EVERY DAY    pravastatin (PRAVACHOL) 20 MG tablet TAKE 1 TABLET BY MOUTH EVERY DAY AT NIGHT     Family History       Problem Relation (Age of Onset)    Breast cancer Sister, Daughter    Lung cancer Mother    No Known Problems Father, Son, Maternal Grandmother, Maternal Grandfather, Paternal Grandmother, Paternal Grandfather          Tobacco Use    Smoking status: Former     Packs/day: 0.25     Types: Cigarettes     Quit date: 2022     Years since quittin.4     Passive exposure: Never    Smokeless tobacco: Never    Tobacco comments:     patient states that he started smoking cigarettes again: half a pack of cigarettes over 4 or 5 days   Substance and Sexual Activity    Alcohol use: Yes     Comment: drinks scotch 2X weekly    Drug use: No    Sexual activity: Not Currently     Partners: Female     Review of Systems   Constitutional:  Positive for activity change, appetite change and fatigue.  Negative for fever.   HENT:  Negative for congestion, ear pain and postnasal drip.    Eyes:  Negative for discharge.   Respiratory:  Negative for apnea, shortness of breath and wheezing.    Cardiovascular:  Negative for chest pain and leg swelling.   Gastrointestinal:  Positive for abdominal pain. Negative for abdominal distention, nausea and vomiting.   Endocrine: Negative for polydipsia, polyphagia and polyuria.   Genitourinary:  Negative for difficulty urinating, flank pain, frequency, hematuria and urgency.   Musculoskeletal:  Negative for arthralgias and joint swelling.   Skin:  Negative for pallor and rash.   Allergic/Immunologic: Negative for environmental allergies and food allergies.   Neurological:  Negative for dizziness, speech difficulty, weakness, light-headedness and headaches.   Hematological:  Does not bruise/bleed easily.   Psychiatric/Behavioral:  Negative for agitation.    Objective:     Vital Signs (Most Recent):  Temp: 97.7 °F (36.5 °C) (06/17/23 0347)  Pulse: 67 (06/17/23 0347)  Resp: 18 (06/16/23 2301)  BP: 111/67 (06/17/23 0347)  SpO2: 96 % (06/17/23 0347) Vital Signs (24h Range):  Temp:  [97.4 °F (36.3 °C)-98.6 °F (37 °C)] 97.7 °F (36.5 °C)  Pulse:  [64-92] 67  Resp:  [16-20] 18  SpO2:  [92 %-100 %] 96 %  BP: (111-212)/() 111/67     Weight: 32.5 kg (71 lb 10.4 oz)  Body mass index is 10.58 kg/m².     Physical Exam  Constitutional:       Appearance: Normal appearance. He is well-developed.   HENT:      Head: Normocephalic.   Eyes:      Conjunctiva/sclera: Conjunctivae normal.   Cardiovascular:      Rate and Rhythm: Normal rate and regular rhythm.      Pulses:           Radial pulses are 2+ on the right side and 2+ on the left side.      Heart sounds: Normal heart sounds.   Pulmonary:      Effort: Pulmonary effort is normal.      Breath sounds: Examination of the right-lower field reveals decreased breath sounds. Examination of the left-lower field reveals decreased breath sounds.  Decreased breath sounds present.   Abdominal:      General: Bowel sounds are increased. There is no distension.      Palpations: Abdomen is soft.      Tenderness: There is generalized abdominal tenderness and tenderness in the right lower quadrant and suprapubic area.   Musculoskeletal:         General: Normal range of motion.      Cervical back: Normal range of motion and neck supple.   Skin:     General: Skin is warm and dry.      Capillary Refill: Capillary refill takes 2 to 3 seconds.   Neurological:      Mental Status: He is alert and oriented to person, place, and time.      GCS: GCS eye subscore is 4. GCS verbal subscore is 5. GCS motor subscore is 6.      Motor: Motor function is intact.   Psychiatric:         Mood and Affect: Mood normal.         Speech: Speech normal.         Behavior: Behavior normal.              Significant Labs: All pertinent labs within the past 24 hours have been reviewed.  CBC:   Recent Labs   Lab 06/16/23  1514   WBC 9.06   HGB 13.4*   HCT 42.6        CMP:   Recent Labs   Lab 06/16/23  1514      K 4.5   CL 99   CO2 25   *   BUN 33*   CREATININE 0.8   CALCIUM 10.4   PROT 7.6   ALBUMIN 3.3*   BILITOT 0.6   ALKPHOS 61   AST 19   ALT 11   ANIONGAP 15       Significant Imaging: I have reviewed all pertinent imaging results/findings within the past 24 hours.    Assessment/Plan:     * Colitis  CT- Persistent changes of small bowel ileus and constipation with rectal fecal impaction and early signs of stercoral colitis.  Thickening at the anal rectal verge either due to inflammation.  Mass is difficult to exclude.    Clear liquid diet, consider IVF if decreased intake  Consult General Surgery  Senna BID  Dulcolax  Hold on Abx        Acute on chronic respiratory failure with hypoxia  Patient with Hypoxic Respiratory failure which is Acute on chronic.  he is not on home oxygen. Supplemental oxygen was provided and noted-      .   Signs/symptoms of respiratory failure  include- tachypnea. Contributing diagnoses includes - Interstitial lung disease Labs and images were reviewed. Patient Has not had a recent ABG. Will treat underlying causes and adjust management of respiratory failure as follows- Duoneb PRN, Oxygen    Elevated troponin  Troponin- .040, .038; stable. Likely due to worsening respiratory function at baseline    Complete trend      Chronic systolic heart failure  BNP- 62, no signs of acute decompensation    Monitor for acute decompensation      Mixed hyperlipidemia  Continue pravastatin      Essential hypertension, benign  Hypertensive currently    Continue nifedipine, losartan        VTE Risk Mitigation (From admission, onward)         Ordered     IP VTE HIGH RISK PATIENT  Once         06/16/23 1829     Place sequential compression device  Until discontinued         06/16/23 1829     Reason for No Pharmacological VTE Prophylaxis  Once        Question:  Reasons:  Answer:  Risk of Bleeding    06/16/23 1829                           uSman Julio NP  Department of Hospital Medicine  [facility] ([address])

## 2023-06-17 NOTE — PROGRESS NOTES
Surgery Inpatient Progress Note    Date: 06/17/2023    Overnight events: Ongoing LUQ abdominal pain. Given enema last night without much output.     O:   Vitals:    06/17/23 0818   BP: 111/73   Pulse: 74   Resp: 16   Temp: 97.6 °F (36.4 °C)       Physical Exam   General: 88 y.o. male, cachectic  Neuro: alert and oriented x 4.  Moves all extremities.     HEENT: normocephalic, atraumatic, PERRL, EOMI   Respiratory: respirations are even and unlabored  Cardiac: regular rate and rhythm  Abdomen: scaphoid abdomen with mild distention   Extremities: Warm dry and intact  Skin: no rashes    Assessment and plan:   Julio Benites is a 88 y.o. male who presents with LUQ abdominal pain in the setting of prior subtotal gastrectomy and lavell en y anastomosis and severe constipation with concern for stercoral colitis    - patient disimpacted at bedside yesterday.  Given enemas.  Recommend oral bowel regimen as well with BID miralax until regular bowel function  - recommend GI consult to consider possible endoscopic evaluation of anastomosis if pain continues       Mame Mathur MD  Staff Surgeon   Colon & Rectal Surgery

## 2023-06-17 NOTE — CONSULTS
.[facility] ([address])  Gastroenterology  Consult Note    Patient Name: Julio Benites  MRN: [id number]  Admission Date: 2023  Hospital Length of Stay: 1 days  Code Status: Full Code   Primary Care Physician: Gregorio Jensen MD  Principal Problem:Colitis    Consults  Subjective:     Chief complaint: abdominal pain    HPI: 87 yo man with hx of abdominal pain and constipation is admitted for evaluation. Patient had hx of stomach cancer 20years back had surgery was doing well until recently developed abdominal pin and constipation. Apparently had colonoscopy 5 years back. No melena or rectal bleeding.    Past medical history:  Past Medical History:   Diagnosis Date    Alcoholism     Cancer 2012    gastric adenoca    Cataract     Cellulitis of right forearm 2012    Overview:  dx update    Chronic systolic heart failure 3/18/2022    Diabetes mellitus type II     Diabetic retinopathy     Elevated PSA     GERD (gastroesophageal reflux disease)     Gout attack     Hyperlipidemia     Hypertension     Iron deficiency anemia secondary to blood loss (chronic) 2013    NSTEMI (non-ST elevated myocardial infarction) 2022       Past surgical history:  Past Surgical History:   Procedure Laterality Date    CATARACT EXTRACTION Right     EYE SURGERY      INFECTED SKIN DEBRIDEMENT  2012    spider bite with surgery to right forearm    CO EVAL,SWALLOW FUNCTION,CINE/VIDEO RECORD  2021         subtotal gastrectomy  2013       Social history:  Social History     Socioeconomic History    Marital status:    Occupational History    Occupation: Retired     Comment: Insulator   Tobacco Use    Smoking status: Former     Packs/day: 0.25     Types: Cigarettes     Quit date: 2022     Years since quittin.4     Passive exposure: Never    Smokeless tobacco: Never    Tobacco comments:     patient states that he started smoking cigarettes again: half a pack of cigarettes over 4 or 5 days    Substance and Sexual Activity    Alcohol use: Yes     Comment: drinks scotch 2X weekly    Drug use: No    Sexual activity: Not Currently     Partners: Female       Family history:  Family History   Problem Relation Age of Onset    Lung cancer Mother     Breast cancer Sister     No Known Problems Father     Breast cancer Daughter     No Known Problems Son     No Known Problems Maternal Grandmother     No Known Problems Maternal Grandfather     No Known Problems Paternal Grandmother     No Known Problems Paternal Grandfather        Medications:  Medications Prior to Admission   Medication Sig Dispense Refill Last Dose    aspirin (ECOTRIN) 81 MG EC tablet TAKE 1 TABLET BY MOUTH EVERY DAY 90 tablet 3     [] ciprofloxacin HCl (CIPRO) 500 MG tablet Take 1 tablet (500 mg total) by mouth 2 (two) times daily. for 4 doses 4 tablet 0     colchicine (COLCRYS) 0.6 mg tablet Take 0.6 mg by mouth 2 (two) times daily as needed (gout attack).       losartan (COZAAR) 100 MG tablet TAKE 1 TABLET BY MOUTH EVERY DAY 90 tablet 3     metoprolol succinate (TOPROL-XL) 25 MG 24 hr tablet Take 0.5 tablets (12.5 mg total) by mouth once daily. 45 tablet 3     NIFEdipine (ADALAT CC) 90 MG TbSR TAKE 1 TABLET (90 MG TOTAL) BY MOUTH ONCE DAILY. 90 tablet 3     pantoprazole (PROTONIX) 40 MG tablet TAKE 1 TABLET BY MOUTH EVERY DAY 90 tablet 2     pravastatin (PRAVACHOL) 20 MG tablet TAKE 1 TABLET BY MOUTH EVERY DAY AT NIGHT 90 tablet 0        Allergies:  Review of patient's allergies indicates:   Allergen Reactions    Lisinopril Rash     Patient reports history of rash with previous lisinopril use.     Nicoderm     Nicoderm cq [nicotine] Rash       Review of systems:  CONSTITUTIONAL: Negative for fever, chills, weakness, weight loss, weight gain.  HEENT: Negative for blurred vision, hearing loss, nasal congestion, dry mouth, sore throat.  CARDIOVASCULAR: Negative for chest pain or palpitations.  RESPIRATORY: Negative for SOB or  cough.  GASTROINTESTINAL: See HPI  GENITOURINARY: Negative for dysuria or hematuria.  MUSCULOSKELETAL: Negative for osteoarthritis or muscle pain.  SKIN: Negative for rashes/lesions.  NEUROLOGIC: Negative for headaches, numbness/tingling.  ENDOCRINE: Negative for diabetes or thyroid abnormalities.  HEMATOLOGIC: Negative for anemia or blood dyscrasias.  Aside from above positives, complete 10 point review of systems negative.    Objective:     Vital Signs (Most Recent):  Temp: 97.6 °F (36.4 °C) (06/17/23 1147)  Pulse: 76 (06/17/23 1147)  Resp: 18 (06/17/23 1147)  BP: 116/73 (06/17/23 1147)  SpO2: 98 % (06/17/23 1147) Vital Signs (24h Range):  Temp:  [97.6 °F (36.4 °C)-98.6 °F (37 °C)] 97.6 °F (36.4 °C)  Pulse:  [64-87] 76  Resp:  [16-20] 18  SpO2:  [96 %-100 %] 98 %  BP: (110-212)/() 116/73     Physical examination:  General: cachectic male no apparent distress  HENT: NCAT, hearing grossly intact, no palpable or visible thyroid mass  Eyes: PERRL, EOMI, anicteric sclera  LYMH: No cervical, supraclavicular or axillary lymphadenopathy   Cardiovascular: Regular rate and rhythm. No murmurs appreciated.  Lungs: Non-labored respirations. Breath sounds equal.   Abdomen: soft, NTND, normoactive BS  Extremities: No C/C/E, 2+ dorsalis pedis pulses bilaterally  Neuro: AA&O x 3, no asterixes or tremors  Psych: Appropriate mood and affect. No SI.  Skin: No jaundice, rashes or lesions  Musculoskeletal: 5/5 strength bilaterally    Labs;  Component Ref Range & Units 04:06  (6/17/23) 1 d ago  (6/16/23) 1 mo ago  (5/11/23) 9 mo ago  (9/13/22) 1 yr ago  (2/21/22) 1 yr ago  (1/3/22) 1 yr ago  (1/2/22)   WBC 3.90 - 12.70 K/uL 6.86  9.06  6.35  6.62  5.13  9.80  5.81    RBC 4.60 - 6.20 M/uL 4.25 Low   5.24  4.46 Low   4.56 Low   3.94 Low   4.44 Low   4.73    Hemoglobin 14.0 - 18.0 g/dL 10.8 Low   13.4 Low   11.1 Low   11.7 Low   10.2 Low   11.4 Low   12.2 Low     Hematocrit 40.0 - 54.0 % 34.0 Low   42.6  35.8 Low   36.5 Low   31.8  Low   34.7 Low   37.9 Low     MCV 82 - 98 fL 80 Low   81 Low   80 Low   80 Low   81 Low   78 Low   80 Low     MCH 27.0 - 31.0 pg 25.4 Low   25.6 Low   24.9 Low   25.7 Low   25.9 Low   25.7 Low   25.8 Low     MCHC 32.0 - 36.0 g/dL 31.8 Low   31.5 Low   31.0 Low   32.1  32.1  32.9  32.2    RDW 11.5 - 14.5 % 16.4 High   16.5 High   15.8 High   15.1 High   15.2 High   14.6 High   14.6 High     Platelets 150 - 450 K/uL 151  261  225  253  189          Imagin Result Notes             Component Ref Range & Units 04:06  (23) 1 d ago  (23) 1 mo ago  (23) 9 mo ago  (22) 1 yr ago  (22) 1 yr ago  (1/3/22) 1 yr ago  (22)   Sodium 136 - 145 mmol/L 141  139  146 High   144  142  144  147 High     Potassium 3.5 - 5.1 mmol/L 4.9  4.5  3.3 Low   4.0  3.6  3.9  4.5    Chloride 95 - 110 mmol/L 102  99  109  109  107  110  111 High     CO2 23 - 29 mmol/L 29  25  26  24  22 Low   22 Low   21 Low     Glucose 70 - 110 mg/dL 103  172 High   201 High   116 High   145 High   109  108    BUN 8 - 23 mg/dL 32 High   33 High   23  24 High   19  21  31 High     Creatinine 0.5 - 1.4 mg/dL 0.7  0.8  0.8  0.8  0.7  0.7  0.8    Calcium 8.7 - 10.5 mg/dL 10.0  10.4  9.3  10.1  9.3  9.4  9.7    Total Protein 6.0 - 8.4 g/dL 6.4  7.6  6.5  7.3  6.7      Albumin 3.5 - 5.2 g/dL 2.8 Low   3.3 Low   3.0 Low   3.5  3.3 Low       Total Bilirubin 0.1 - 1.0 mg/dL 0.5  0.6 CM  0.3 CM  0.4 CM  0.2 CM           CT scan:    Stable thoracic and abdominal aortic ectasia and aneurysmal dilatation as described with no interval detrimental change.     Persistent changes of small bowel ileus and constipation with rectal fecal impaction and early signs of stercoral colitis.     Thickening at the anal rectal verge either due to inflammation.  Mass is difficult to exclude.     Cachexia.     No new signs of metastatic disease.  Assessment:   Abdominal pain  2. Anemia  3. Constipation  4. Fecal impaction:disimpacted  Plan:   Will give miralax 17g  in glass of water q2h until good BM's.may need colonoscopy as out patient.        Thank you for your consult.     Isidro Seay MD  Gastroenterology  [facility] ([address])

## 2023-06-17 NOTE — SUBJECTIVE & OBJECTIVE
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     Nicoderm     Nicoderm cq [nicotine] Rash       No current facility-administered medications on file prior to encounter.     Current Outpatient Medications on File Prior to Encounter   Medication Sig    aspirin (ECOTRIN) 81 MG EC tablet TAKE 1 TABLET BY MOUTH EVERY DAY    colchicine (COLCRYS) 0.6 mg tablet Take 0.6 mg by mouth 2 (two) times daily as needed (gout attack).    losartan (COZAAR) 100 MG tablet TAKE 1 TABLET BY MOUTH EVERY DAY    metoprolol succinate (TOPROL-XL) 25 MG 24 hr tablet Take 0.5 tablets (12.5 mg total) by mouth once daily.    NIFEdipine (ADALAT CC) 90 MG TbSR TAKE 1 TABLET (90 MG TOTAL) BY MOUTH ONCE DAILY.    pantoprazole (PROTONIX) 40 MG tablet TAKE 1 TABLET BY MOUTH EVERY DAY    pravastatin (PRAVACHOL) 20 MG tablet TAKE 1 TABLET BY MOUTH EVERY DAY AT NIGHT     Family History       Problem Relation (Age of Onset)    Breast cancer Sister, Daughter    Lung cancer Mother    No Known Problems Father, Son, Maternal Grandmother, Maternal Grandfather, Paternal Grandmother,  Paternal Grandfather          Tobacco Use    Smoking status: Former     Packs/day: 0.25     Types: Cigarettes     Quit date: 2022     Years since quittin.4     Passive exposure: Never    Smokeless tobacco: Never    Tobacco comments:     patient states that he started smoking cigarettes again: half a pack of cigarettes over 4 or 5 days   Substance and Sexual Activity    Alcohol use: Yes     Comment: drinks scotch 2X weekly    Drug use: No    Sexual activity: Not Currently     Partners: Female     Review of Systems   Constitutional:  Positive for activity change, appetite change and fatigue. Negative for fever.   HENT:  Negative for congestion, ear pain and postnasal drip.    Eyes:  Negative for discharge.   Respiratory:  Negative for apnea, shortness of breath and wheezing.    Cardiovascular:  Negative for chest pain and leg swelling.   Gastrointestinal:  Positive for abdominal pain. Negative for abdominal distention, nausea and vomiting.   Endocrine: Negative for polydipsia, polyphagia and polyuria.   Genitourinary:  Negative for difficulty urinating, flank pain, frequency, hematuria and urgency.   Musculoskeletal:  Negative for arthralgias and joint swelling.   Skin:  Negative for pallor and rash.   Allergic/Immunologic: Negative for environmental allergies and food allergies.   Neurological:  Negative for dizziness, speech difficulty, weakness, light-headedness and headaches.   Hematological:  Does not bruise/bleed easily.   Psychiatric/Behavioral:  Negative for agitation.    Objective:     Vital Signs (Most Recent):  Temp: 97.7 °F (36.5 °C) (23 0347)  Pulse: 67 (23 0347)  Resp: 18 (23 2301)  BP: 111/67 (23 0347)  SpO2: 96 % (23 0347) Vital Signs (24h Range):  Temp:  [97.4 °F (36.3 °C)-98.6 °F (37 °C)] 97.7 °F (36.5 °C)  Pulse:  [64-92] 67  Resp:  [16-20] 18  SpO2:  [92 %-100 %] 96 %  BP: (111-212)/() 111/     Weight: 32.5 kg (71 lb 10.4 oz)  Body mass index is 10.58  kg/m².     Physical Exam  Constitutional:       Appearance: Normal appearance. He is well-developed.   HENT:      Head: Normocephalic.   Eyes:      Conjunctiva/sclera: Conjunctivae normal.   Cardiovascular:      Rate and Rhythm: Normal rate and regular rhythm.      Pulses:           Radial pulses are 2+ on the right side and 2+ on the left side.      Heart sounds: Normal heart sounds.   Pulmonary:      Effort: Pulmonary effort is normal.      Breath sounds: Examination of the right-lower field reveals decreased breath sounds. Examination of the left-lower field reveals decreased breath sounds. Decreased breath sounds present.   Abdominal:      General: Bowel sounds are increased. There is no distension.      Palpations: Abdomen is soft.      Tenderness: There is generalized abdominal tenderness and tenderness in the right lower quadrant and suprapubic area.   Musculoskeletal:         General: Normal range of motion.      Cervical back: Normal range of motion and neck supple.   Skin:     General: Skin is warm and dry.      Capillary Refill: Capillary refill takes 2 to 3 seconds.   Neurological:      Mental Status: He is alert and oriented to person, place, and time.      GCS: GCS eye subscore is 4. GCS verbal subscore is 5. GCS motor subscore is 6.      Motor: Motor function is intact.   Psychiatric:         Mood and Affect: Mood normal.         Speech: Speech normal.         Behavior: Behavior normal.              Significant Labs: All pertinent labs within the past 24 hours have been reviewed.  CBC:   Recent Labs   Lab 06/16/23  1514   WBC 9.06   HGB 13.4*   HCT 42.6        CMP:   Recent Labs   Lab 06/16/23  1514      K 4.5   CL 99   CO2 25   *   BUN 33*   CREATININE 0.8   CALCIUM 10.4   PROT 7.6   ALBUMIN 3.3*   BILITOT 0.6   ALKPHOS 61   AST 19   ALT 11   ANIONGAP 15       Significant Imaging: I have reviewed all pertinent imaging results/findings within the past 24 hours.

## 2023-06-18 LAB
ALBUMIN SERPL BCP-MCNC: 3 G/DL (ref 3.5–5.2)
ALP SERPL-CCNC: 54 U/L (ref 55–135)
ALT SERPL W/O P-5'-P-CCNC: 12 U/L (ref 10–44)
ANION GAP SERPL CALC-SCNC: 12 MMOL/L (ref 8–16)
AST SERPL-CCNC: 18 U/L (ref 10–40)
BASOPHILS # BLD AUTO: 0.02 K/UL (ref 0–0.2)
BASOPHILS NFR BLD: 0.3 % (ref 0–1.9)
BILIRUB SERPL-MCNC: 0.5 MG/DL (ref 0.1–1)
BUN SERPL-MCNC: 34 MG/DL (ref 8–23)
CALCIUM SERPL-MCNC: 10.2 MG/DL (ref 8.7–10.5)
CHLORIDE SERPL-SCNC: 101 MMOL/L (ref 95–110)
CO2 SERPL-SCNC: 30 MMOL/L (ref 23–29)
CREAT SERPL-MCNC: 0.7 MG/DL (ref 0.5–1.4)
DIFFERENTIAL METHOD: ABNORMAL
EOSINOPHIL # BLD AUTO: 0 K/UL (ref 0–0.5)
EOSINOPHIL NFR BLD: 0.1 % (ref 0–8)
ERYTHROCYTE [DISTWIDTH] IN BLOOD BY AUTOMATED COUNT: 16.9 % (ref 11.5–14.5)
EST. GFR  (NO RACE VARIABLE): >60 ML/MIN/1.73 M^2
GLUCOSE SERPL-MCNC: 109 MG/DL (ref 70–110)
HCT VFR BLD AUTO: 38.5 % (ref 40–54)
HGB BLD-MCNC: 11.9 G/DL (ref 14–18)
IMM GRANULOCYTES # BLD AUTO: 0.04 K/UL (ref 0–0.04)
IMM GRANULOCYTES NFR BLD AUTO: 0.5 % (ref 0–0.5)
LYMPHOCYTES # BLD AUTO: 0.6 K/UL (ref 1–4.8)
LYMPHOCYTES NFR BLD: 7.5 % (ref 18–48)
MAGNESIUM SERPL-MCNC: 2.4 MG/DL (ref 1.6–2.6)
MCH RBC QN AUTO: 25.7 PG (ref 27–31)
MCHC RBC AUTO-ENTMCNC: 30.9 G/DL (ref 32–36)
MCV RBC AUTO: 83 FL (ref 82–98)
MONOCYTES # BLD AUTO: 0.7 K/UL (ref 0.3–1)
MONOCYTES NFR BLD: 9.2 % (ref 4–15)
NEUTROPHILS # BLD AUTO: 6.1 K/UL (ref 1.8–7.7)
NEUTROPHILS NFR BLD: 82.4 % (ref 38–73)
NRBC BLD-RTO: 0 /100 WBC
PHOSPHATE SERPL-MCNC: 4.9 MG/DL (ref 2.7–4.5)
PLATELET # BLD AUTO: 243 K/UL (ref 150–450)
PMV BLD AUTO: 11.4 FL (ref 9.2–12.9)
POTASSIUM SERPL-SCNC: 5.1 MMOL/L (ref 3.5–5.1)
PROT SERPL-MCNC: 6.8 G/DL (ref 6–8.4)
RBC # BLD AUTO: 4.63 M/UL (ref 4.6–6.2)
SODIUM SERPL-SCNC: 143 MMOL/L (ref 136–145)
WBC # BLD AUTO: 7.38 K/UL (ref 3.9–12.7)

## 2023-06-18 PROCEDURE — 84100 ASSAY OF PHOSPHORUS: CPT | Mod: HCNC | Performed by: NURSE PRACTITIONER

## 2023-06-18 PROCEDURE — 99233 SBSQ HOSP IP/OBS HIGH 50: CPT | Mod: HCNC,,, | Performed by: HOSPITALIST

## 2023-06-18 PROCEDURE — 36415 COLL VENOUS BLD VENIPUNCTURE: CPT | Mod: HCNC | Performed by: NURSE PRACTITIONER

## 2023-06-18 PROCEDURE — 99233 PR SUBSEQUENT HOSPITAL CARE,LEVL III: ICD-10-PCS | Mod: HCNC,,, | Performed by: HOSPITALIST

## 2023-06-18 PROCEDURE — 97530 THERAPEUTIC ACTIVITIES: CPT | Mod: HCNC

## 2023-06-18 PROCEDURE — 97110 THERAPEUTIC EXERCISES: CPT | Mod: HCNC

## 2023-06-18 PROCEDURE — 99900035 HC TECH TIME PER 15 MIN (STAT): Mod: HCNC

## 2023-06-18 PROCEDURE — C9113 INJ PANTOPRAZOLE SODIUM, VIA: HCPCS | Mod: HCNC | Performed by: HOSPITALIST

## 2023-06-18 PROCEDURE — 97162 PT EVAL MOD COMPLEX 30 MIN: CPT | Mod: HCNC

## 2023-06-18 PROCEDURE — 99231 SBSQ HOSP IP/OBS SF/LOW 25: CPT | Mod: HCNC,,, | Performed by: SURGERY

## 2023-06-18 PROCEDURE — 97166 OT EVAL MOD COMPLEX 45 MIN: CPT | Mod: HCNC

## 2023-06-18 PROCEDURE — 25000003 PHARM REV CODE 250: Mod: HCNC | Performed by: HOSPITALIST

## 2023-06-18 PROCEDURE — 25000003 PHARM REV CODE 250: Mod: HCNC | Performed by: NURSE PRACTITIONER

## 2023-06-18 PROCEDURE — 11000001 HC ACUTE MED/SURG PRIVATE ROOM: Mod: HCNC

## 2023-06-18 PROCEDURE — 83735 ASSAY OF MAGNESIUM: CPT | Mod: HCNC | Performed by: NURSE PRACTITIONER

## 2023-06-18 PROCEDURE — 94761 N-INVAS EAR/PLS OXIMETRY MLT: CPT | Mod: HCNC

## 2023-06-18 PROCEDURE — 97535 SELF CARE MNGMENT TRAINING: CPT | Mod: HCNC

## 2023-06-18 PROCEDURE — 63600175 PHARM REV CODE 636 W HCPCS: Mod: HCNC | Performed by: HOSPITALIST

## 2023-06-18 PROCEDURE — 99231 PR SUBSEQUENT HOSPITAL CARE,LEVL I: ICD-10-PCS | Mod: HCNC,,, | Performed by: SURGERY

## 2023-06-18 PROCEDURE — 85025 COMPLETE CBC W/AUTO DIFF WBC: CPT | Mod: HCNC | Performed by: NURSE PRACTITIONER

## 2023-06-18 PROCEDURE — 80053 COMPREHEN METABOLIC PANEL: CPT | Mod: HCNC | Performed by: NURSE PRACTITIONER

## 2023-06-18 PROCEDURE — 25000003 PHARM REV CODE 250: Mod: HCNC | Performed by: INTERNAL MEDICINE

## 2023-06-18 PROCEDURE — 92610 EVALUATE SWALLOWING FUNCTION: CPT | Mod: HCNC

## 2023-06-18 RX ORDER — PANTOPRAZOLE SODIUM 40 MG/10ML
40 INJECTION, POWDER, LYOPHILIZED, FOR SOLUTION INTRAVENOUS EVERY 12 HOURS
Status: DISCONTINUED | OUTPATIENT
Start: 2023-06-18 | End: 2023-06-22

## 2023-06-18 RX ADMIN — PANTOPRAZOLE SODIUM 40 MG: 40 INJECTION, POWDER, LYOPHILIZED, FOR SOLUTION INTRAVENOUS at 08:06

## 2023-06-18 RX ADMIN — SENNOSIDES AND DOCUSATE SODIUM 1 TABLET: 50; 8.6 TABLET ORAL at 09:06

## 2023-06-18 RX ADMIN — POLYETHYLENE GLYCOL 3350 17 G: 17 POWDER, FOR SOLUTION ORAL at 10:06

## 2023-06-18 RX ADMIN — POLYETHYLENE GLYCOL 3350 17 G: 17 POWDER, FOR SOLUTION ORAL at 12:06

## 2023-06-18 RX ADMIN — POLYETHYLENE GLYCOL 3350 17 G: 17 POWDER, FOR SOLUTION ORAL at 09:06

## 2023-06-18 RX ADMIN — LOSARTAN POTASSIUM 100 MG: 50 TABLET, FILM COATED ORAL at 09:06

## 2023-06-18 RX ADMIN — POLYETHYLENE GLYCOL 3350 17 G: 17 POWDER, FOR SOLUTION ORAL at 04:06

## 2023-06-18 RX ADMIN — SENNOSIDES AND DOCUSATE SODIUM 1 TABLET: 50; 8.6 TABLET ORAL at 08:06

## 2023-06-18 RX ADMIN — POLYETHYLENE GLYCOL 3350 17 G: 17 POWDER, FOR SOLUTION ORAL at 08:06

## 2023-06-18 RX ADMIN — PRAVASTATIN SODIUM 20 MG: 20 TABLET ORAL at 08:06

## 2023-06-18 RX ADMIN — SODIUM PHOSPHATE, DIBASIC AND SODIUM PHOSPHATE, MONOBASIC 1 ENEMA: 7; 19 ENEMA RECTAL at 04:06

## 2023-06-18 RX ADMIN — PANTOPRAZOLE SODIUM 40 MG: 40 INJECTION, POWDER, LYOPHILIZED, FOR SOLUTION INTRAVENOUS at 09:06

## 2023-06-18 RX ADMIN — POLYETHYLENE GLYCOL 3350 17 G: 17 POWDER, FOR SOLUTION ORAL at 05:06

## 2023-06-18 RX ADMIN — POLYETHYLENE GLYCOL 3350 17 G: 17 POWDER, FOR SOLUTION ORAL at 06:06

## 2023-06-18 RX ADMIN — NIFEDIPINE 90 MG: 30 TABLET, FILM COATED, EXTENDED RELEASE ORAL at 09:06

## 2023-06-18 RX ADMIN — POLYETHYLENE GLYCOL 3350 17 G: 17 POWDER, FOR SOLUTION ORAL at 02:06

## 2023-06-18 NOTE — PROGRESS NOTES
Chief Complaint / Reason for Consult: abdominal pain    87 yo man was admitted for anemia and abdominal pian. Patient was disimpacted yesterday and had large BM last night feeling better today.     ROS: no chest pain or cough or SOB.    Patient Vitals for the past 24 hrs:   BP Temp Temp src Pulse Resp SpO2   06/18/23 1140 108/62 97.8 °F (36.6 °C) Oral 88 18 (!) 93 %   06/18/23 0727 (!) 141/83 97.8 °F (36.6 °C) Oral 72 15 97 %   06/18/23 0438 120/75 98.2 °F (36.8 °C) -- 78 18 95 %   06/17/23 2323 128/77 98.2 °F (36.8 °C) -- 84 18 97 %   06/17/23 1945 115/77 97.5 °F (36.4 °C) Oral 93 18 95 %   06/17/23 1628 116/67 97.5 °F (36.4 °C) Oral 73 18 99 %       Physical Exam:  Gen - Well developed, well nourished, no apparent distress  HEENT - Anicteric  CV - S1, S2, no murmurs/rubs  Lungs - CTA-B, normal excursion  Abd - Soft, NT, ND, normal BS's, no HSM.  Ext - No c/c/e  Neuro - No asterixis    Labs:  Recent Labs   Lab 06/18/23  0337   WBC 7.38   RBC 4.63   HGB 11.9*   HCT 38.5*      MCV 83   MCH 25.7*   MCHC 30.9*     Recent Labs   Lab 06/18/23  0337   CALCIUM 10.2   PROT 6.8      K 5.1   CO2 30*      BUN 34*   CREATININE 0.7   ALKPHOS 54*   ALT 12   AST 18   BILITOT 0.5     No results for input(s): PT, INR, APTT in the last 24 hours.    Imaging:      Assessment:  This patient is a 88 y.o. male with:   1. Anemia  2.Fecal imp[action: resolved  3.Abdominal pain    Recommendations:  1. Will do EGd tomorrow. Continue miralax.

## 2023-06-18 NOTE — PLAN OF CARE
Problem: Occupational Therapy  Goal: Occupational Therapy Goal  Description: Goals to be met by: 7/2/2023     Patient will increase functional independence with ADLs by performing:    UE Dressing with Set-up Assistance.  LE Dressing Supervision, increased time, and 2 rest breaks.  Grooming while standing at sink with Supervision.  Toileting from bedside commode with Supervision for hygiene and clothing management.   Toilet transfer to bedside commode with Supervision.    Outcome: Ongoing, Progressing     Initial OT eval/treat complete.  Has shower chair, rollator used for last 2 weeks PTA, raised toilet seat with attached handrails, and a stair lift to access into home.  No DME needs anticipated though will defer to next level of care.  Recommend post acute OT in SNF though will continue to assess needs pending progress.  Pt. Lives alone and has friends that visit occasionally.  His son lives in [place] and his daughter lives in [place]; son works offshore and visits when off from work.  To benefit from continued acute care OT services to increase independence in self-care/functional transfers.  OT to follow.

## 2023-06-18 NOTE — PLAN OF CARE
Problem: Adult Inpatient Plan of Care  Goal: Plan of Care Review  Outcome: Ongoing, Progressing  Goal: Patient-Specific Goal (Individualized)  Outcome: Ongoing, Progressing  Goal: Absence of Hospital-Acquired Illness or Injury  Outcome: Ongoing, Progressing  Goal: Optimal Comfort and Wellbeing  Outcome: Ongoing, Progressing  POC reviewed with pt. A&Ox4. Room air. Pt tolerating diet. BM after enema. Safety checks performed. Bed in lowest position. Wheels locked. Call light in reach. WCTM.

## 2023-06-18 NOTE — PROGRESS NOTES
[facility] [address] Medicine  Progress Note    Patient Name: Julio Benites  MRN: [id number]  Patient Class: IP- Inpatient   Admission Date: 6/16/2023  Length of Stay: 1 days  Attending Physician: Carol Manzanares MD  Primary Care Provider: Gregorio Jensen MD        Subjective:     Principal Problem:Colitis        HPI:  The patient is a 88 y.o. male with a past medical history of DM, NSTEMI, HTN, HLD, and systolic CHF who was recently treated for CAP  with azithromycin who presents with left upper quadrant abdominal pain.  Patient reports worsening left upper quadrant abdominal pain over the past 4-5 days.  He states it is positional and worse when he sits up or stands.  He rates the pain at a 10/10 during these times but 0/10 currently.  Patient also endorses about 1-1/2 months of shortness of breath.  He is also endorsed a significant weight loss during this time.  He denies chest pain, nausea, vomiting, diarrhea, fevers, chills.          Interval History: Report constipation and abdominal pain still, no appetite and cough after swallowing but no reported difficulty swallowing. And weak.    Review of Systems   Constitutional:  Positive for activity change, appetite change and unexpected weight change. Negative for chills and fever.   Respiratory:  Negative for shortness of breath.    Gastrointestinal:  Positive for abdominal pain and constipation. Negative for nausea and vomiting.   Neurological:  Positive for weakness.   Objective:     Vital Signs (Most Recent):  Temp: 97.5 °F (36.4 °C) (06/17/23 1628)  Pulse: 73 (06/17/23 1628)  Resp: 18 (06/17/23 1628)  BP: 116/67 (06/17/23 1628)  SpO2: 99 % (06/17/23 1628) Vital Signs (24h Range):  Temp:  [97.5 °F (36.4 °C)-98.6 °F (37 °C)] 97.5 °F (36.4 °C)  Pulse:  [64-85] 73  Resp:  [16-18] 18  SpO2:  [96 %-100 %] 99 %  BP: (110-168)/() 116/67     Weight: 32.5 kg (71 lb 10.4 oz)  Body mass index is 10.58 kg/m².    Intake/Output Summary (Last  24 hours) at 6/17/2023 1959  Last data filed at 6/17/2023 1659  Gross per 24 hour   Intake 360 ml   Output 125 ml   Net 235 ml         Physical Exam  Constitutional:       Appearance: He is cachectic. He is ill-appearing.   HENT:      Head:      Comments: +Temporal wasting, enophthalmoas     Nose: Nose normal.   Eyes:      Extraocular Movements: Extraocular movements intact.      Conjunctiva/sclera: Conjunctivae normal.      Pupils: Pupils are equal, round, and reactive to light.   Neck:      Comments: Pronounced collar bone  Cardiovascular:      Rate and Rhythm: Normal rate and regular rhythm.      Pulses:           Radial pulses are 2+ on the right side and 2+ on the left side.      Heart sounds: Normal heart sounds.   Pulmonary:      Effort: Pulmonary effort is normal.      Breath sounds: Examination of the right-lower field reveals decreased breath sounds. Examination of the left-lower field reveals decreased breath sounds. Decreased breath sounds present.   Abdominal:      General: Bowel sounds are normal. There is no distension.      Palpations: Abdomen is soft.      Tenderness: There is generalized abdominal tenderness and tenderness in the right lower quadrant and suprapubic area. There is no guarding or rebound.      Comments: Fullness noted   Musculoskeletal:         General: Normal range of motion.      Cervical back: Normal range of motion and neck supple.      Comments: Muscular atrophy to all extremities   Skin:     General: Skin is warm and dry.      Capillary Refill: Capillary refill takes 2 to 3 seconds.   Neurological:      Mental Status: He is alert and oriented to person, place, and time.      GCS: GCS eye subscore is 4. GCS verbal subscore is 5. GCS motor subscore is 6.      Motor: Motor function is intact.   Psychiatric:         Mood and Affect: Mood normal.         Speech: Speech normal.         Behavior: Behavior normal.         Thought Content: Thought content normal.           Significant  "Labs: All pertinent labs within the past 24 hours have been reviewed.    Significant Imaging: I have reviewed all pertinent imaging results/findings within the past 24 hours.      Assessment/Plan:      * Colitis  Constipation  Fecal impaction  Abdominal pain  Cachexia   Debility  - CT of abd/pelvis with "persistent changes of small bowel ileus and constipation with rectal fecal impaction and early signs of stercoral colitis.  Thickening at the anal rectal verge either due to inflammation.  Mass is difficult to exclude.  - General surgery following, manually disimpacted with partial relief on 6/16  - s/p enema and on Senna, miralax regimen  - Continue clear liquid diet, consider IVF if decreased intake  - Hold on antibiotics for now  - Consult placed to GI  - Add PT/OT and speech therapy    Elevated troponin  - Trended troponins, overall flat and stable  - Monitor on telemetry    Chronic systolic heart failure  - BNP- 62, no signs of acute decompensation  - Monitor for acute decompensation    Mixed hyperlipidemia  - Continue pravastatin    Essential hypertension, benign  - Continue nifedipine, losartan      VTE Risk Mitigation (From admission, onward)         Ordered     IP VTE HIGH RISK PATIENT  Once         06/16/23 1829     Place sequential compression device  Until discontinued         06/16/23 1829     Reason for No Pharmacological VTE Prophylaxis  Once        Question:  Reasons:  Answer:  Risk of Bleeding    06/16/23 1829                      Carol Manzanares MD  Department of Hospital Medicine   [facility] ([address])  "

## 2023-06-18 NOTE — SUBJECTIVE & OBJECTIVE
Interval History: Report constipation and abdominal pain still, no appetite and cough after swallowing but no reported difficulty swallowing. And weak.    Review of Systems   Constitutional:  Positive for activity change, appetite change and unexpected weight change. Negative for chills and fever.   Respiratory:  Negative for shortness of breath.    Gastrointestinal:  Positive for abdominal pain and constipation. Negative for nausea and vomiting.   Neurological:  Positive for weakness.   Objective:     Vital Signs (Most Recent):  Temp: 97.5 °F (36.4 °C) (06/17/23 1628)  Pulse: 73 (06/17/23 1628)  Resp: 18 (06/17/23 1628)  BP: 116/67 (06/17/23 1628)  SpO2: 99 % (06/17/23 1628) Vital Signs (24h Range):  Temp:  [97.5 °F (36.4 °C)-98.6 °F (37 °C)] 97.5 °F (36.4 °C)  Pulse:  [64-85] 73  Resp:  [16-18] 18  SpO2:  [96 %-100 %] 99 %  BP: (110-168)/() 116/67     Weight: 32.5 kg (71 lb 10.4 oz)  Body mass index is 10.58 kg/m².    Intake/Output Summary (Last 24 hours) at 6/17/2023 1959  Last data filed at 6/17/2023 1659  Gross per 24 hour   Intake 360 ml   Output 125 ml   Net 235 ml         Physical Exam  Constitutional:       Appearance: He is cachectic. He is ill-appearing.   HENT:      Head:      Comments: +Temporal wasting, enophthalmoas     Nose: Nose normal.   Eyes:      Extraocular Movements: Extraocular movements intact.      Conjunctiva/sclera: Conjunctivae normal.      Pupils: Pupils are equal, round, and reactive to light.   Neck:      Comments: Pronounced collar bone  Cardiovascular:      Rate and Rhythm: Normal rate and regular rhythm.      Pulses:           Radial pulses are 2+ on the right side and 2+ on the left side.      Heart sounds: Normal heart sounds.   Pulmonary:      Effort: Pulmonary effort is normal.      Breath sounds: Examination of the right-lower field reveals decreased breath sounds. Examination of the left-lower field reveals decreased breath sounds. Decreased breath sounds present.    Abdominal:      General: Bowel sounds are normal. There is no distension.      Palpations: Abdomen is soft.      Tenderness: There is generalized abdominal tenderness and tenderness in the right lower quadrant and suprapubic area. There is no guarding or rebound.      Comments: Fullness noted   Musculoskeletal:         General: Normal range of motion.      Cervical back: Normal range of motion and neck supple.      Comments: Muscular atrophy to all extremities   Skin:     General: Skin is warm and dry.      Capillary Refill: Capillary refill takes 2 to 3 seconds.   Neurological:      Mental Status: He is alert and oriented to person, place, and time.      GCS: GCS eye subscore is 4. GCS verbal subscore is 5. GCS motor subscore is 6.      Motor: Motor function is intact.   Psychiatric:         Mood and Affect: Mood normal.         Speech: Speech normal.         Behavior: Behavior normal.         Thought Content: Thought content normal.           Significant Labs: All pertinent labs within the past 24 hours have been reviewed.    Significant Imaging: I have reviewed all pertinent imaging results/findings within the past 24 hours.

## 2023-06-18 NOTE — PT/OT/SLP EVAL
Speech Language Pathology Evaluation  Bedside Swallow    Patient Name:  Julio Benites   MRN:  [id number]  Admitting Diagnosis: Colitis    Recommendations:                 General Recommendations:    A MBS is recommended to assess oral, pharyngeal and upper esophageal phases of swallow    Diet recommendations:    Pt is currently on clear liquid diet due to GI precautions  2. MBS recommended: goals and recs to follow completion of a MBS    Aspiration Precautions:    To be determined      Assessment:     Julio Benites is a 88 y.o. male with an SLP diagnosis of  pt complaint of coughing after eating.  He presents with severe baseline wet vocal quality, difficulty mobilizing secretions. Unable to rule out aspiration on bedside evaluation. A MBS is recommended.    History:     AS PER MD H&P: The patient is a 88 y.o. male with a past medical history of DM, NSTEMI, HTN, HLD, and systolic CHF who was recently treated for CAP  with azithromycin who presents with left upper quadrant abdominal pain.  Patient reports worsening left upper quadrant abdominal pain over the past 4-5 days.  He states it is positional and worse when he sits up or stands.  He rates the pain at a 10/10 during these times but 0/10 currently.  Patient also endorses about 1-1/2 months of shortness of breath.  He is also endorsed a significant weight loss during this time.  He denies chest pain, nausea, vomiting, diarrhea, fevers, chills.        Interval History: Report constipation and abdominal pain still, no appetite and cough after swallowing but no reported difficulty swallowing. And weak.    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    NSTEMI (non-ST elevated myocardial infarction) 1/1/2022       Past Surgical History:   Procedure Laterality Date    CATARACT EXTRACTION Right     EYE SURGERY      INFECTED SKIN DEBRIDEMENT  8/2012    spider bite with surgery to right forearm    [place] EVAL,SWALLOW FUNCTION,CINE/VIDEO RECORD  9/22/2021         subtotal gastrectomy  2013     Subjective    Pt reports coughing after eating    Respiratory Status: Room air    Objective:     Oral Musculature Evaluation  Oral Musculature: general weakness  Secretion Management: coughing on secretions  Oral Labial Strength and Mobility:  (generalized weakness)  Lingual Strength and Mobility:  (generalized weakness)  Volitional Cough: wet, weak, unproductive cough, difficulty clearing secretions  Volitional Swallow: delay, weak with dcr laryngeal elevations  Voice Prior to PO Intake: wet, muffled, dcr ability to clear secretions  Oral Musculature Comments: Generalized oral motor weakness. Face is symmetrical at rest and during smile.  Labial and lingual ROM are WFL however, movements are slow. Vocal quality is wet with secretions at baseliness. Cough and spit minimally clearing vocal quality. Speech is 100% intelligible at the simple conversational level    Bedside Swallow Eval:   Consistencies Assessed:  Thin liquids water via cup    Clear liquid diet: jello    Oral Phase:   WFL for lip seal, formation of a cohesive bolus and a-p transport of small sips of liquids and spoonfuls of jello    Pharyngeal Phase:   Generalized weakness and deconditioning  Trigger of the swallow reflex appears delay  Dcr laryngeal elevation on subjective palpation  Baseline wet vocal quality, baseline weak cough  Able to consume small sips of thin liquids and small spoonfuls of jello with no immediate cough after swallow in 8/8 trials. However, delay cough throughout exam. Baseline wet vocal quality, concern for  increase in wet vocal quality with oral intake  Unable to reliably assess airway threat and for signs of aspiration on bedside exam  Recommend instrumental evaluation of swallowing: MBS        Goals:   Multidisciplinary Problems       SLP Goals          Problem: SLP    Goal Priority Disciplines Outcome   SLP Goal     SLP Ongoing, Progressing   Description: A MBS is recommended. Goals and recs to follow completion of a MBS                       Plan:     Patient to be seen:  3 x/week, 5 x/week   Plan of Care expires:     Plan of Care reviewed with:  patient   SLP Follow-Up:          Discharge recommendations:      Barriers to Discharge:      Time Tracking:     SLP Treatment Date:   06/18/23  Speech Start Time:  1016  Speech Stop Time:  1032     Speech Total Time (min):  16 min    Billable Minutes: Eval Swallow and Oral Function 16 min    06/18/2023

## 2023-06-18 NOTE — ASSESSMENT & PLAN NOTE
"Constipation  Fecal impaction  Abdominal pain  Cachexia   Debility  - CT of abd/pelvis with "persistent changes of small bowel ileus and constipation with rectal fecal impaction and early signs of stercoral colitis.  Thickening at the anal rectal verge either due to inflammation.  Mass is difficult to exclude.  - General surgery following, manually disimpacted with partial relief on 6/16  - s/p enema and on Senna, miralax regimen with some improvement  - KUB done today still with heavy stool burden  - Continue clear liquid diet, consider IVF if decreased intake  - No indication for antibiotics at this time  - Consult placed to GI, continue bowel regimen and diet to stay at clears for now  - Add PT/OT and speech therapy  "

## 2023-06-18 NOTE — HOSPITAL COURSE
Mr. Benites presented with abdominal pain. Admitted with colitis with fecal impaction/constipation. General surgery and GI consulted. Manually disimpacted with partial relief on 6/17. Bowel regimen initiated.

## 2023-06-18 NOTE — SUBJECTIVE & OBJECTIVE
Interval History: Report constipation and abdominal pain still, report he had some BM with some improvement but still feeling pain. Cough after swallowing improved and he reports no difficulty swallowing. Still weak and belly feels hard.    Review of Systems   Constitutional:  Positive for activity change, appetite change and unexpected weight change. Negative for chills and fever.   Respiratory:  Negative for shortness of breath.    Gastrointestinal:  Positive for abdominal pain and constipation. Negative for nausea and vomiting.   Neurological:  Positive for weakness.   Objective:     Vital Signs (Most Recent):  Temp: 97.8 °F (36.6 °C) (06/18/23 1140)  Pulse: 88 (06/18/23 1140)  Resp: 18 (06/18/23 1140)  BP: 108/62 (06/18/23 1140)  SpO2: (!) 93 % (06/18/23 1140) Vital Signs (24h Range):  Temp:  [97.5 °F (36.4 °C)-98.2 °F (36.8 °C)] 97.8 °F (36.6 °C)  Pulse:  [72-93] 88  Resp:  [15-18] 18  SpO2:  [93 %-99 %] 93 %  BP: (108-141)/(62-83) 108/62     Weight: 32.5 kg (71 lb 10.4 oz)  Body mass index is 10.58 kg/m².    Intake/Output Summary (Last 24 hours) at 6/18/2023 1314  Last data filed at 6/18/2023 0230  Gross per 24 hour   Intake 660 ml   Output --   Net 660 ml           Physical Exam  Constitutional:       Appearance: He is cachectic. He is ill-appearing.   HENT:      Head:      Comments: +Temporal wasting, enophthalmoas     Right Ear: Decreased hearing noted.      Ears:      Comments: Uses hearing aids     Nose: Nose normal.   Eyes:      Extraocular Movements: Extraocular movements intact.      Conjunctiva/sclera: Conjunctivae normal.      Pupils: Pupils are equal, round, and reactive to light.   Neck:      Comments: Pronounced collar bone  Cardiovascular:      Rate and Rhythm: Normal rate and regular rhythm.      Pulses:           Radial pulses are 2+ on the right side and 2+ on the left side.      Heart sounds: Normal heart sounds.   Pulmonary:      Effort: Pulmonary effort is normal.      Breath sounds:  Decreased breath sounds present.   Abdominal:      General: Bowel sounds are normal. There is no distension.      Palpations: Abdomen is soft.      Tenderness: There is generalized abdominal tenderness and tenderness in the right lower quadrant and suprapubic area. There is no guarding or rebound.      Comments: Fullness noted   Musculoskeletal:         General: Normal range of motion.      Cervical back: Normal range of motion and neck supple.      Comments: Muscular atrophy to all extremities   Skin:     General: Skin is warm and dry.      Capillary Refill: Capillary refill takes 2 to 3 seconds.   Neurological:      Mental Status: He is alert and oriented to person, place, and time.      GCS: GCS eye subscore is 4. GCS verbal subscore is 5. GCS motor subscore is 6.      Motor: Motor function is intact.   Psychiatric:         Mood and Affect: Mood normal.         Speech: Speech normal.         Behavior: Behavior normal.         Thought Content: Thought content normal.           Significant Labs: All pertinent labs within the past 24 hours have been reviewed.    Significant Imaging: I have reviewed all pertinent imaging results/findings within the past 24 hours.

## 2023-06-18 NOTE — PT/OT/SLP EVAL
Physical Therapy Evaluation    Patient Name:  Julio Benites   MRN:  [id number]    Recommendations:     Discharge Recommendations: home health PT, home with home health   Discharge Equipment Recommendations: none   Barriers to discharge:  Limited assistance at home; need to assess patient has equipment he stated he has during session    Assessment:     Julio Benites is a 88 y.o. male admitted with a medical diagnosis of Colitis.  He presents with the following impairments/functional limitations: weakness, impaired endurance, impaired functional mobility, impaired self care skills, gait instability, impaired balance, decreased lower extremity function, pain, decreased ROM. These impairments inhibit the patient from independently and safely participating in desired functional tasks such as transfers, ambulating within home, self-care, and community ambulation.     Evaluation complete; goals established. Patient presented to PT in [facility] with fairly good tolerance to therapy. Patient demonstrating significant endurance and strength deficits; however, patient reports having necessary medical equipment at home such as rollator, HB and chair to bring patient up stairs to live at home safely, especially since grandson lives with him. Patient mildly diaphoretic with minimal therapeutic exercise. Patient could benefit from continued therapy services while in-patient as well as when d/c'ed due to significant weight loss and overall weakness affecting tolerance to ADLs. D/c rec: HHPT vs SNF, pending home equipment and help at home.    Rehab Prognosis: Good; patient would benefit from acute skilled PT services to address these deficits and reach maximum level of function.    Recent Surgery: * No surgery found *      Plan:     During this hospitalization, patient to be seen 5 x/week to address the identified rehab impairments via therapeutic activities, gait training, therapeutic exercises, neuromuscular  re-education and progress toward the following goals:    Plan of Care Expires:  07/18/23    Subjective     Chief Complaint: Lower abdominal pain  Patient/Family Comments/goals: Agreeable to work with therapist despite just getting back from XR  Pain/Comfort:  Pain Rating 1: 3/10  Location - Side 1: Bilateral  Location - Orientation 1: lower  Location 1: abdomen  Pain Addressed 1: Reposition, Distraction  Pain Rating Post-Intervention 1: 3/10    Patients cultural, spiritual, Muslim conflicts given the current situation: no    Living Environment:  Lives in [facility] with grandson who is not always available to help; 7 MUNA with BHR but patient has chair to negotiate steps  Patient independent with all ADLS and IADLS  Uses rollator and HB  No falls within last year  Has someone to drive him to appointments  Equipment used at home: rollator, hospital bed (chair to go up stairs).      Objective:     Communicated with CRYSTAL Corona prior to session.  Patient found supine with peripheral IV  upon PT entry to room.    General Precautions: Standard, fall  Orthopedic Precautions:    Braces: N/A  Respiratory Status: Room air    Exams:  Cognition:   Patient is oriented to AOx4.  Pt follows approximately 100% of multi-step commands.    Mood: Pleasant and cooperative.   Musculoskeletal:  Posture:    -       Rounded shoulders  -       Forward head  LE ROM/Strength: gross weakness noted    Left  Muscle group Right   3+/5 Hip flexion 3+/5   4-/5 Knee extension 4-/5   4-/5 Ankle dorsiflexion 4-/5     Neuromuscular:  Sensation: Intact to light touch bilateral LEs.   Coordination: intact  Tone/Reflexes: No impairments identified with functional mobility. No formal testing performed.   Balance: SBA/CGA with standing balance activities. SBA with static/dynamic sitting  Visual-vestibular: No impairments identified with functional mobility. No formal testing performed.  Integument: Visible skin intact  Cardiopulmonary:  Edema: (-)  Vital signs:    SpO2 on RA: 98%   Heart rate: 83 bpm      Functional Mobility:  Bed Mobility:     Scooting: stand by assistance and verbal cues  Bridging: stand by assistance and verbal cues  Sit to Supine: stand by assistance  Transfers:     Sit <> Stand:  minimum assistance with rolling walker  Gait:  ambulated 50' with CGA using RW with step-through gait. Narrow MEJIA. Decreased gait speed  Balance:  Static sitting: SBA  Dynamic sitting: SBA  Static standing: SBA  Dynamic standing: CGA      AM-PAC 6 CLICK MOBILITY  Total Score:17       Treatment & Education:  Pt performed supine therapeutic exercises for strengthening and to promote circulation, pressure relief, and functional ROM with verbal, visual and tactile cues for correct performance including:   Bilateral ankle pumps x 20 reps  Tactile stimulation provided by PT  Bilateral quad sets x 10 reps  Tactile stimulation provided by PT  Bilateral SLR x10  Required manual assistance to initiate task  Bilateral hip ABD x 10 reps  Rest break needed after 5x to LLE; able to complete after exercise  Patient required intermittent rest breaks due to diaphoretic state  Vitals:  BP: 132/82  HR: 83-94 bpm  spO2: 98-99%      Patient left supine with all lines intact, call button in reach, RN notified, and RN present.    GOALS:   Multidisciplinary Problems       Physical Therapy Goals          Problem: Physical Therapy    Goal Priority Disciplines Outcome Goal Variances Interventions   Physical Therapy Goal     PT, PT/OT Ongoing, Progressing     Description: Goals to be met by:2023    Patient will increase functional independence with mobility by performin. Patient will perform supine <> EOB with SBA with or without use of HB features during two consecutive sessions   2. Patient will perform sit <> stand with RW without verbal cues for hand placement requiring SBA  3. Patient will ambulate > 150' with RW while using step-through gait pattern with no LOB requiring SBA for  safety  4. 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

## 2023-06-18 NOTE — PLAN OF CARE
AAOx4. VSS. Poc reviewed with pt and questions answered. Repositioning with minimal assist. No increase in appetite, continues to have a poor appetite this shift. Abd soft and non tender; active bowel sounds hear. Encouraged taking frequent sips of Miralax to help promote BM. Pt had x2 large incontinent episodes of the bowels; cleaned and linen changed, and new foam applied to protect sacral area. Pt denies abd pain or discomfort this shift. All needs and concerns met,  purposeful rounding done, and safety maintained. Call light in reach. Will continue to monitor.

## 2023-06-18 NOTE — PROGRESS NOTES
Surgery Inpatient Progress Note    Date: 06/18/2023    Overnight events: still having LUQ pain despite BM     O:   Vitals:    06/18/23 0727   BP: (!) 141/83   Pulse: 72   Resp: 15   Temp: 97.8 °F (36.6 °C)       Physical Exam     General: 88 y.o. male, cachectic  Neuro: alert and oriented x 4.  Moves all extremities.     HEENT: normocephalic, atraumatic, PERRL, EOMI   Respiratory: respirations are even and unlabored  Cardiac: regular rate and rhythm  Abdomen: scaphoid abdomen with mild distention   Extremities: Warm dry and intact  Skin: no rashes  Component Ref Range & Units 03:37  (6/18/23) 1 d ago  (6/17/23) 2 d ago  (6/16/23) 1 mo ago  (5/11/23) 9 mo ago  (9/13/22) 1 yr ago  (2/21/22) 1 yr ago  (1/3/22)   WBC 3.90 - 12.70 K/uL 7.38  6.86  9.06  6.35  6.62  5.13  9.80    RBC 4.60 - 6.20 M/uL 4.63  4.25 Low   5.24  4.46 Low   4.56 Low   3.94 Low   4.44 Low     Hemoglobin 14.0 - 18.0 g/dL 11.9 Low   10.8 Low   13.4 Low   11.1 Low   11.7 Low   10.2 Low   11.4 Low     Hematocrit 40.0 - 54.0 % 38.5 Low   34.0 Low   42.6  35.8 Low   36.5 Low   31.8 Low   34.7 Low     MCV 82 - 98 fL 83  80 Low   81 Low   80 Low   80 Low   81 Low   78 Low     MCH 27.0 - 31.0 pg 25.7 Low   25.4 Low   25.6 Low   24.9 Low   25.7 Low   25.9 Low   25.7 Low     MCHC 32.0 - 36.0 g/dL 30.9 Low   31.8 Low   31.5 Low   31.0 Low   32.1  32.1  32.9    RDW 11.5 - 14.5 % 16.9 High   16.4 High   16.5 High   15.8 High   15.1 High   15.2 High   14.6 High     Platelets 150 - 450                 Assessment and plan:   Julio Benites is a 88 y.o. male who presents with LUQ abdominal pain in the setting of prior subtotal gastrectomy and lavell en y reconstruction as well as stercoral colitis     - concern for possible marginal ulcer at anastomosis given lavell en y reconstruction, GI consulted to assess for EGD.  Consider addition of PPI   - KUB to assess stool burden after disimpaction and enemas, continue miralax BID, consider additional enemas if high  residual stool burden   - diet as tolerated       Mame Mathur MD  Staff Surgeon   Colon & Rectal Surgery

## 2023-06-18 NOTE — ASSESSMENT & PLAN NOTE
"Constipation  Fecal impaction  Abdominal pain  Cachexia   Debility  - CT of abd/pelvis with "persistent changes of small bowel ileus and constipation with rectal fecal impaction and early signs of stercoral colitis.  Thickening at the anal rectal verge either due to inflammation.  Mass is difficult to exclude.  - General surgery following, manually disimpacted with partial relief on 6/16  - s/p enema and on Senna, miralax regimen  - Continue clear liquid diet, consider IVF if decreased intake  - Hold on antibiotics for now  - Consult placed to GI  - Add PT/OT and speech therapy  "

## 2023-06-18 NOTE — PLAN OF CARE
Problem: Physical Therapy  Goal: Physical Therapy Goal  Description: Goals to be met by:2023    Patient will increase functional independence with mobility by performin. Patient will perform supine <> EOB with SBA with or without use of HB features during two consecutive sessions   2. Patient will perform sit <> stand with RW without verbal cues for hand placement requiring SBA  3. Patient will ambulate > 150' with RW while using step-through gait pattern with no LOB requiring SBA for safety  4. Patient will negotiate 3 steps 2x (or 7 consecutive steps) with BHR with SBA from therapist twice during session       Outcome: Ongoing, Progressing     Evaluation complete; goals established. Patient presented to PT in [facility] with fairly good tolerance to therapy. Patient demonstrating significant endurance and strength deficits; however, patient reports having necessary medical equipment at home such as rollator, HB and chair to bring patient up stairs to live at home safely, especially since grandson lives with him. Patient mildly diaphoretic with minimal therapeutic exercise. Patient could benefit from continued therapy services while in-patient as well as when d/c'ed due to significant weight loss and overall weakness affecting tolerance to ADLs. D/c rec: HHPT vs SNF, pending home equipment and help at home.

## 2023-06-18 NOTE — PROGRESS NOTES
[facility] Surg [address] Medicine  Progress Note    Patient Name: Julio Benites  MRN: [id number]  Patient Class: IP- Inpatient   Admission Date: 6/16/2023  Length of Stay: 2 days  Attending Physician: Carol Manzanares MD  Primary Care Provider: Gregorio Jensen MD        Subjective:     Principal Problem:Colitis        HPI:  The patient is a 88 y.o. male with a past medical history of DM, NSTEMI, HTN, HLD, and systolic CHF who was recently treated for CAP  with azithromycin who presents with left upper quadrant abdominal pain.  Patient reports worsening left upper quadrant abdominal pain over the past 4-5 days.  He states it is positional and worse when he sits up or stands.  He rates the pain at a 10/10 during these times but 0/10 currently.  Patient also endorses about 1-1/2 months of shortness of breath.  He is also endorsed a significant weight loss during this time.  He denies chest pain, nausea, vomiting, diarrhea, fevers, chills.        Overview/Hospital Course:  Mr. Benites presented with abdominal pain. Admitted with colitis with fecal impaction/constipation. General surgery and GI consulted. Manually disimpacted with partial relief on 6/17. Bowel regimen initiated.      Interval History: Report constipation and abdominal pain still, report he had some BM with some improvement but still feeling pain. Cough after swallowing improved and he reports no difficulty swallowing. Still weak and belly feels hard.    Review of Systems   Constitutional:  Positive for activity change, appetite change and unexpected weight change. Negative for chills and fever.   Respiratory:  Negative for shortness of breath.    Gastrointestinal:  Positive for abdominal pain and constipation. Negative for nausea and vomiting.   Neurological:  Positive for weakness.   Objective:     Vital Signs (Most Recent):  Temp: 97.8 °F (36.6 °C) (06/18/23 1140)  Pulse: 88 (06/18/23 1140)  Resp: 18 (06/18/23 1140)  BP: 108/62  (06/18/23 1140)  SpO2: (!) 93 % (06/18/23 1140) Vital Signs (24h Range):  Temp:  [97.5 °F (36.4 °C)-98.2 °F (36.8 °C)] 97.8 °F (36.6 °C)  Pulse:  [72-93] 88  Resp:  [15-18] 18  SpO2:  [93 %-99 %] 93 %  BP: (108-141)/(62-83) 108/62     Weight: 32.5 kg (71 lb 10.4 oz)  Body mass index is 10.58 kg/m².    Intake/Output Summary (Last 24 hours) at 6/18/2023 1314  Last data filed at 6/18/2023 0230  Gross per 24 hour   Intake 660 ml   Output --   Net 660 ml           Physical Exam  Constitutional:       Appearance: He is cachectic. He is ill-appearing.   HENT:      Head:      Comments: +Temporal wasting, enophthalmoas     Right Ear: Decreased hearing noted.      Ears:      Comments: Uses hearing aids     Nose: Nose normal.   Eyes:      Extraocular Movements: Extraocular movements intact.      Conjunctiva/sclera: Conjunctivae normal.      Pupils: Pupils are equal, round, and reactive to light.   Neck:      Comments: Pronounced collar bone  Cardiovascular:      Rate and Rhythm: Normal rate and regular rhythm.      Pulses:           Radial pulses are 2+ on the right side and 2+ on the left side.      Heart sounds: Normal heart sounds.   Pulmonary:      Effort: Pulmonary effort is normal.      Breath sounds: Decreased breath sounds present.   Abdominal:      General: Bowel sounds are normal. There is no distension.      Palpations: Abdomen is soft.      Tenderness: There is generalized abdominal tenderness and tenderness in the right lower quadrant and suprapubic area. There is no guarding or rebound.      Comments: Fullness noted   Musculoskeletal:         General: Normal range of motion.      Cervical back: Normal range of motion and neck supple.      Comments: Muscular atrophy to all extremities   Skin:     General: Skin is warm and dry.      Capillary Refill: Capillary refill takes 2 to 3 seconds.   Neurological:      Mental Status: He is alert and oriented to person, place, and time.      GCS: GCS eye subscore is 4. GCS  "verbal subscore is 5. GCS motor subscore is 6.      Motor: Motor function is intact.   Psychiatric:         Mood and Affect: Mood normal.         Speech: Speech normal.         Behavior: Behavior normal.         Thought Content: Thought content normal.           Significant Labs: All pertinent labs within the past 24 hours have been reviewed.    Significant Imaging: I have reviewed all pertinent imaging results/findings within the past 24 hours.      Assessment/Plan:      * Colitis  Constipation  Fecal impaction  Abdominal pain  Cachexia   Debility  - CT of abd/pelvis with "persistent changes of small bowel ileus and constipation with rectal fecal impaction and early signs of stercoral colitis.  Thickening at the anal rectal verge either due to inflammation.  Mass is difficult to exclude.  - General surgery following, manually disimpacted with partial relief on 6/16  - s/p enema and on Senna, miralax regimen with some improvement  - KUB done today still with heavy stool burden  - Continue clear liquid diet, consider IVF if decreased intake  - No indication for antibiotics at this time  - Consult placed to GI, continue bowel regimen and diet to stay at clears for now  - Add PT/OT and speech therapy    Elevated troponin  - Trended troponins, overall flat and stable  - Monitor on telemetry, no further workup needed    Chronic systolic heart failure  - BNP- 62, no signs of acute decompensation  - Monitor for acute decompensation    Mixed hyperlipidemia  - Continue pravastatin    Essential hypertension, benign  - Continue nifedipine, losartan      VTE Risk Mitigation (From admission, onward)         Ordered     IP VTE HIGH RISK PATIENT  Once         06/16/23 1829     Place sequential compression device  Until discontinued         06/16/23 1829     Reason for No Pharmacological VTE Prophylaxis  Once        Question:  Reasons:  Answer:  Risk of Bleeding    06/16/23 1829                    Carol Manzanares MD  Department of " [facility] - Med Surg ([address])

## 2023-06-18 NOTE — PT/OT/SLP EVAL
Occupational Therapy   Evaluation and Treatment    Name: Julio Benites  MRN: [id number]  Admitting Diagnosis: Colitis  Recent Surgery: * No surgery found *      Recommendations:     Discharge Recommendations: nursing facility, skilled (will continue to assess needs pending progress)  Discharge Equipment Recommendations:   (defer to next level of care)  Barriers to discharge:  Decreased caregiver support (current functional level)    Assessment:   Initial OT eval/treat complete.  MOD A for supine<>sit.  Doffed socks seated EOB with verbal cuing on sequencing downward reaching as well as cues needed for seated rest during task as Pt. Visibly SOB after task; MOD A to don sock to LLE due to fatigue.  Ambulating short household distance with RW and CGA for steadying/safety and requiring MIN A for lift from toilet even with use of grab bar.  O2 sats checked throughout session and remained 92-95%.  Has shower chair, rollator used for last 2 weeks PTA, raised toilet seat with attached handrails, and also elevator access into home.  No DME needs anticipated though will defer to next level of care.  Recommend post acute OT in SNF though will continue to assess needs pending progress.  Pt. Lives alone and has friends that visit occasionally.  His son lives in [place] and his daughter lives in [place]; son works offshore and visits when off from work.  To benefit from continued acute care OT services to increase independence in self-care/functional transfers.  OT to follow.     Julio Benites is a 88 y.o. male with a medical diagnosis of Colitis.  He presents with below deficits decreasing independence in self-care/functional transfers. Performance deficits affecting function: weakness, impaired endurance, impaired self care skills, impaired functional mobility, gait instability, decreased lower extremity function, decreased upper extremity function, impaired balance, decreased safety awareness, pain, decreased ROM, impaired  cardiopulmonary response to activity.      Rehab Prognosis: Good; patient would benefit from acute skilled OT services to address these deficits and reach maximum level of function.       Plan:     Patient to be seen 4 x/week to address the above listed problems via self-care/home management, therapeutic activities, therapeutic exercises  Plan of Care Expires: 07/02/23  Plan of Care Reviewed with: patient    Subjective     Chief Complaint: With c/o lower abdominal pain.   Patient/Family Comments/goals: No goals stated at this time.     Occupational Profile:  Lives alone in [facility] with 7 MUNA and B-handrails - stair lift.  Bathroom setup as walk-in shower chair with shower chair and standard toilet with raised toilet seat and attached handrails.  Pt. Reports using rollator for last 2 weeks and reports being able to perform ADL PTA though with difficulty.  Pt. Reports that he cooks, cleans, and drives.  Has friends that visit occasionally.  His daughter lives in [place] and his son lives in [place]; son works offshore and visits when he's off.    Equipment Used at Home: rollator, shower chair, raised toilet (stair lift)  Assistance upon Discharge: Pt. Lives alone.  Has friends that visit occasionally.  His daughter lives in [place] and his son lives in [place]; son works offshore and visits when he's off.      Pain/Comfort:  Pain Rating 1: 2/10  Location - Side 1: Bilateral  Location - Orientation 1: lower  Location 1: abdomen  Pain Addressed 1: Distraction, Cessation of Activity, Nurse notified  Pain Rating Post-Intervention 1: 3/10    Patients cultural, spiritual, Holiness conflicts given the current situation:  (None stated.)    Objective:     Communicated with: Chloe Shen RN prior to session.  Patient found HOB elevated with peripheral IV upon OT entry to room.    General Precautions: Standard, fall, diabetic (clear liquid diet)  Orthopedic Precautions: N/A  Braces: N/A  Respiratory Status: Room air    Occupational  Performance:    Bed Mobility:    Supine<>sit MOD A for trunk management when coming into sit and BLE management when returning to supine    Functional Mobility/Transfers:  Sit>stand EOB>RW with CGA for steadying and verbal cuing for safe hand placement during transition  Ambulating bed<>bathroom with RW and CGA for steadying/safety  Step transfer to toilet with grab bar used at RUE and LUE at toilet during controlled descent  Requiring MIN A for lift from toilet and cue to use grab bar during lift    Activities of Daily Living:  Doffed socks with increased time, cuing for seated rest, cuing for task sequencing  Voiding while seated at toilet; denied need to wipe after task    Cognitive/Visual Perceptual:  Cognitive/Psychosocial Skills:  -       Oriented to: Person, Place, Time, and Situation   -       Follows Commands/attention:Follows one-step commands  -       Communication: able to make basic needs known and answer questions appropriately; [place] and wears hearing aides  -       Memory: No Deficits noted  -       Safety awareness/insight to disability: impaired   -       Mood/Affect/Coping skills/emotional control: Cooperative  Visual/Perceptual:  -grossly intact    Physical Exam:  Postural examination/scapula alignment: -       Rounded shoulders  -       Forward head  Sensation: -       Intact  light/touch and denies paraesthesias  Upper Extremity Range of Motion:  -       Right Upper Extremity: WFL except for approx. 50% of active shoulder flexion; 75% of AAROM to shoulder  -       Left Upper Extremity: WFL except for approx. 50% of active shoulder flex; 75% of AAROM to shoulder  Upper Extremity Strength: -       Right Upper Extremity: 3-/5 proximally and 3+/5 to 4-/5 distally  -       Left Upper Extremity: 3-/5 proximally and 3+/5 to 4-/5 distally   Strength: -       Right Upper Extremity: WFL  -       Left Upper Extremity: WFL  Fine Motor Coordination: -       Intact  Left hand thumb/finger opposition skills  and Right hand thumb/finger opposition skills    [facility] 6 Click ADL:  [facility] Total Score: 17    Treatment & Education:  Educated on role of OT and POC  Supine<>sit MOD A for trunk management when coming into sit and BLE management when returning to supine  Sit>stand EOB>RW with CGA for steadying and verbal cuing for safe hand placement during transition  Ambulating bed<>bathroom with RW and CGA for steadying/safety  Step transfer to toilet with grab bar used at RUE and LUE at toilet during controlled descent  Requiring MIN A for lift from toilet and cue to use grab bar during lift  Doffed socks with increased time, cuing for seated rest, cuing for task sequencing  Voiding while seated at toilet; denied need to wipe after task  Received call light review and importance of calling for assist as needed     Patient left HOB elevated with all lines intact, call button in reach, and nursing notified    GOALS:   Multidisciplinary Problems       Occupational Therapy Goals          Problem: Occupational Therapy    Goal Priority Disciplines Outcome Interventions   Occupational Therapy Goal     OT, PT/OT Ongoing, Progressing    Description: Goals to be met by: 7/2/2023     Patient will increase functional independence with ADLs by performing:    UE Dressing with Set-up Assistance.  LE Dressing Supervision, increased time, and 2 rest breaks.  Grooming while standing at sink with Supervision.  Toileting from bedside commode with Supervision for hygiene and clothing management.   Toilet transfer to bedside commode with Supervision.                         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

## 2023-06-18 NOTE — PLAN OF CARE
Problem: SLP  Goal: SLP Goal  Description: A MBS is recommended. Goals and recs to follow completion of a MBS  Outcome: Ongoing, Progressing    Oral motor and oral and pharyngeal swallow evaluation completed on clear liquids. A MBS is recommended

## 2023-06-19 ENCOUNTER — ANESTHESIA (OUTPATIENT)
Dept: ENDOSCOPY | Facility: OTHER | Age: 88
DRG: 391 | End: 2023-06-19
Payer: MEDICARE

## 2023-06-19 ENCOUNTER — ANESTHESIA EVENT (OUTPATIENT)
Dept: ENDOSCOPY | Facility: OTHER | Age: 88
DRG: 391 | End: 2023-06-19
Payer: MEDICARE

## 2023-06-19 PROBLEM — K20.90 ESOPHAGITIS: Status: ACTIVE | Noted: 2023-06-19

## 2023-06-19 LAB
ALBUMIN SERPL BCP-MCNC: 2.7 G/DL (ref 3.5–5.2)
ALP SERPL-CCNC: 53 U/L (ref 55–135)
ALT SERPL W/O P-5'-P-CCNC: 9 U/L (ref 10–44)
ANION GAP SERPL CALC-SCNC: 21 MMOL/L (ref 8–16)
AST SERPL-CCNC: 18 U/L (ref 10–40)
BASOPHILS # BLD AUTO: 0.01 K/UL (ref 0–0.2)
BASOPHILS NFR BLD: 0.1 % (ref 0–1.9)
BILIRUB SERPL-MCNC: 0.5 MG/DL (ref 0.1–1)
BUN SERPL-MCNC: 38 MG/DL (ref 8–23)
CALCIUM SERPL-MCNC: 9.3 MG/DL (ref 8.7–10.5)
CHLORIDE SERPL-SCNC: 101 MMOL/L (ref 95–110)
CO2 SERPL-SCNC: 24 MMOL/L (ref 23–29)
CREAT SERPL-MCNC: 0.7 MG/DL (ref 0.5–1.4)
DIFFERENTIAL METHOD: ABNORMAL
EOSINOPHIL # BLD AUTO: 0 K/UL (ref 0–0.5)
EOSINOPHIL NFR BLD: 0 % (ref 0–8)
ERYTHROCYTE [DISTWIDTH] IN BLOOD BY AUTOMATED COUNT: 16.5 % (ref 11.5–14.5)
EST. GFR  (NO RACE VARIABLE): >60 ML/MIN/1.73 M^2
GLUCOSE SERPL-MCNC: 79 MG/DL (ref 70–110)
HCT VFR BLD AUTO: 34.2 % (ref 40–54)
HGB BLD-MCNC: 10.7 G/DL (ref 14–18)
IMM GRANULOCYTES # BLD AUTO: 0.04 K/UL (ref 0–0.04)
IMM GRANULOCYTES NFR BLD AUTO: 0.4 % (ref 0–0.5)
LYMPHOCYTES # BLD AUTO: 0.5 K/UL (ref 1–4.8)
LYMPHOCYTES NFR BLD: 5.8 % (ref 18–48)
MAGNESIUM SERPL-MCNC: 2.2 MG/DL (ref 1.6–2.6)
MCH RBC QN AUTO: 25.3 PG (ref 27–31)
MCHC RBC AUTO-ENTMCNC: 31.3 G/DL (ref 32–36)
MCV RBC AUTO: 81 FL (ref 82–98)
MONOCYTES # BLD AUTO: 0.6 K/UL (ref 0.3–1)
MONOCYTES NFR BLD: 6.2 % (ref 4–15)
NEUTROPHILS # BLD AUTO: 8.1 K/UL (ref 1.8–7.7)
NEUTROPHILS NFR BLD: 87.5 % (ref 38–73)
NRBC BLD-RTO: 0 /100 WBC
PHOSPHATE SERPL-MCNC: 4.8 MG/DL (ref 2.7–4.5)
PLATELET # BLD AUTO: 231 K/UL (ref 150–450)
PMV BLD AUTO: 10.6 FL (ref 9.2–12.9)
POCT GLUCOSE: 81 MG/DL (ref 70–110)
POTASSIUM SERPL-SCNC: 3.3 MMOL/L (ref 3.5–5.1)
PROT SERPL-MCNC: 6.3 G/DL (ref 6–8.4)
RBC # BLD AUTO: 4.23 M/UL (ref 4.6–6.2)
SODIUM SERPL-SCNC: 146 MMOL/L (ref 136–145)
TROPONIN I SERPL DL<=0.01 NG/ML-MCNC: 0.04 NG/ML (ref 0–0.03)
WBC # BLD AUTO: 9.26 K/UL (ref 3.9–12.7)

## 2023-06-19 PROCEDURE — 93010 ELECTROCARDIOGRAM REPORT: CPT | Mod: HCNC,,, | Performed by: INTERNAL MEDICINE

## 2023-06-19 PROCEDURE — 97530 THERAPEUTIC ACTIVITIES: CPT | Mod: HCNC

## 2023-06-19 PROCEDURE — 25000003 PHARM REV CODE 250: Mod: HCNC | Performed by: INTERNAL MEDICINE

## 2023-06-19 PROCEDURE — 99233 PR SUBSEQUENT HOSPITAL CARE,LEVL III: ICD-10-PCS | Mod: HCNC,,, | Performed by: HOSPITALIST

## 2023-06-19 PROCEDURE — 93010 EKG 12-LEAD: ICD-10-PCS | Mod: HCNC,,, | Performed by: INTERNAL MEDICINE

## 2023-06-19 PROCEDURE — 84100 ASSAY OF PHOSPHORUS: CPT | Mod: HCNC | Performed by: NURSE PRACTITIONER

## 2023-06-19 PROCEDURE — 99900035 HC TECH TIME PER 15 MIN (STAT): Mod: HCNC

## 2023-06-19 PROCEDURE — D9220A PRA ANESTHESIA: Mod: HCNC,ANES,, | Performed by: ANESTHESIOLOGY

## 2023-06-19 PROCEDURE — 25000003 PHARM REV CODE 250: Mod: HCNC | Performed by: NURSE ANESTHETIST, CERTIFIED REGISTERED

## 2023-06-19 PROCEDURE — 84484 ASSAY OF TROPONIN QUANT: CPT | Mod: HCNC | Performed by: NURSE PRACTITIONER

## 2023-06-19 PROCEDURE — 30200315 PPD INTRADERMAL TEST REV CODE 302: Mod: HCNC | Performed by: HOSPITALIST

## 2023-06-19 PROCEDURE — 36415 COLL VENOUS BLD VENIPUNCTURE: CPT | Mod: HCNC | Performed by: NURSE PRACTITIONER

## 2023-06-19 PROCEDURE — C9113 INJ PANTOPRAZOLE SODIUM, VIA: HCPCS | Mod: HCNC | Performed by: INTERNAL MEDICINE

## 2023-06-19 PROCEDURE — 94761 N-INVAS EAR/PLS OXIMETRY MLT: CPT | Mod: HCNC

## 2023-06-19 PROCEDURE — 63600175 PHARM REV CODE 636 W HCPCS: Mod: HCNC | Performed by: INTERNAL MEDICINE

## 2023-06-19 PROCEDURE — 25000003 PHARM REV CODE 250: Mod: HCNC | Performed by: HOSPITALIST

## 2023-06-19 PROCEDURE — 97535 SELF CARE MNGMENT TRAINING: CPT | Mod: HCNC

## 2023-06-19 PROCEDURE — 63600175 PHARM REV CODE 636 W HCPCS: Mod: HCNC | Performed by: NURSE ANESTHETIST, CERTIFIED REGISTERED

## 2023-06-19 PROCEDURE — 25000003 PHARM REV CODE 250: Mod: HCNC | Performed by: NURSE PRACTITIONER

## 2023-06-19 PROCEDURE — C9113 INJ PANTOPRAZOLE SODIUM, VIA: HCPCS | Mod: HCNC | Performed by: HOSPITALIST

## 2023-06-19 PROCEDURE — 83735 ASSAY OF MAGNESIUM: CPT | Mod: HCNC | Performed by: NURSE PRACTITIONER

## 2023-06-19 PROCEDURE — 27000221 HC OXYGEN, UP TO 24 HOURS: Mod: HCNC

## 2023-06-19 PROCEDURE — 11000001 HC ACUTE MED/SURG PRIVATE ROOM: Mod: HCNC

## 2023-06-19 PROCEDURE — 86580 TB INTRADERMAL TEST: CPT | Mod: HCNC | Performed by: HOSPITALIST

## 2023-06-19 PROCEDURE — 37000009 HC ANESTHESIA EA ADD 15 MINS: Mod: HCNC | Performed by: INTERNAL MEDICINE

## 2023-06-19 PROCEDURE — 85025 COMPLETE CBC W/AUTO DIFF WBC: CPT | Mod: HCNC | Performed by: NURSE PRACTITIONER

## 2023-06-19 PROCEDURE — D9220A PRA ANESTHESIA: Mod: HCNC,CRNA,, | Performed by: NURSE ANESTHETIST, CERTIFIED REGISTERED

## 2023-06-19 PROCEDURE — 80053 COMPREHEN METABOLIC PANEL: CPT | Mod: HCNC | Performed by: NURSE PRACTITIONER

## 2023-06-19 PROCEDURE — 43235 EGD DIAGNOSTIC BRUSH WASH: CPT | Mod: HCNC | Performed by: INTERNAL MEDICINE

## 2023-06-19 PROCEDURE — 37000008 HC ANESTHESIA 1ST 15 MINUTES: Mod: HCNC | Performed by: INTERNAL MEDICINE

## 2023-06-19 PROCEDURE — 93005 ELECTROCARDIOGRAM TRACING: CPT | Mod: HCNC

## 2023-06-19 PROCEDURE — D9220A PRA ANESTHESIA: ICD-10-PCS | Mod: HCNC,ANES,, | Performed by: ANESTHESIOLOGY

## 2023-06-19 PROCEDURE — D9220A PRA ANESTHESIA: ICD-10-PCS | Mod: HCNC,CRNA,, | Performed by: NURSE ANESTHETIST, CERTIFIED REGISTERED

## 2023-06-19 PROCEDURE — 63600175 PHARM REV CODE 636 W HCPCS: Mod: HCNC | Performed by: HOSPITALIST

## 2023-06-19 PROCEDURE — 99233 SBSQ HOSP IP/OBS HIGH 50: CPT | Mod: HCNC,,, | Performed by: HOSPITALIST

## 2023-06-19 RX ORDER — OXYCODONE HYDROCHLORIDE 5 MG/1
5 TABLET ORAL
Status: DISCONTINUED | OUTPATIENT
Start: 2023-06-19 | End: 2023-06-26 | Stop reason: HOSPADM

## 2023-06-19 RX ORDER — MEPERIDINE HYDROCHLORIDE 25 MG/ML
12.5 INJECTION INTRAMUSCULAR; INTRAVENOUS; SUBCUTANEOUS ONCE AS NEEDED
Status: ACTIVE | OUTPATIENT
Start: 2023-06-19 | End: 2023-06-20

## 2023-06-19 RX ORDER — LIDOCAINE HYDROCHLORIDE 20 MG/ML
INJECTION INTRAVENOUS
Status: DISCONTINUED | OUTPATIENT
Start: 2023-06-19 | End: 2023-06-19

## 2023-06-19 RX ORDER — PROPOFOL 10 MG/ML
VIAL (ML) INTRAVENOUS
Status: DISCONTINUED | OUTPATIENT
Start: 2023-06-19 | End: 2023-06-19

## 2023-06-19 RX ORDER — POLYETHYLENE GLYCOL 3350 17 G/17G
17 POWDER, FOR SOLUTION ORAL 2 TIMES DAILY
Status: DISCONTINUED | OUTPATIENT
Start: 2023-06-20 | End: 2023-06-26 | Stop reason: HOSPADM

## 2023-06-19 RX ORDER — ONDANSETRON HYDROCHLORIDE 2 MG/ML
INJECTION, SOLUTION INTRAMUSCULAR; INTRAVENOUS
Status: DISCONTINUED | OUTPATIENT
Start: 2023-06-19 | End: 2023-06-19

## 2023-06-19 RX ORDER — SODIUM CHLORIDE 0.9 % (FLUSH) 0.9 %
3 SYRINGE (ML) INJECTION
Status: DISCONTINUED | OUTPATIENT
Start: 2023-06-19 | End: 2023-06-22

## 2023-06-19 RX ORDER — HYDROMORPHONE HYDROCHLORIDE 2 MG/ML
0.4 INJECTION, SOLUTION INTRAMUSCULAR; INTRAVENOUS; SUBCUTANEOUS EVERY 5 MIN PRN
Status: DISCONTINUED | OUTPATIENT
Start: 2023-06-19 | End: 2023-06-26 | Stop reason: HOSPADM

## 2023-06-19 RX ORDER — PROCHLORPERAZINE EDISYLATE 5 MG/ML
5 INJECTION INTRAMUSCULAR; INTRAVENOUS EVERY 30 MIN PRN
Status: DISCONTINUED | OUTPATIENT
Start: 2023-06-19 | End: 2023-06-26 | Stop reason: HOSPADM

## 2023-06-19 RX ADMIN — POLYETHYLENE GLYCOL 3350 17 G: 17 POWDER, FOR SOLUTION ORAL at 12:06

## 2023-06-19 RX ADMIN — SENNOSIDES AND DOCUSATE SODIUM 1 TABLET: 50; 8.6 TABLET ORAL at 09:06

## 2023-06-19 RX ADMIN — SODIUM CHLORIDE, SODIUM LACTATE, POTASSIUM CHLORIDE, AND CALCIUM CHLORIDE: .6; .31; .03; .02 INJECTION, SOLUTION INTRAVENOUS at 07:06

## 2023-06-19 RX ADMIN — LIDOCAINE HYDROCHLORIDE 20 MG: 20 INJECTION, SOLUTION INTRAVENOUS at 07:06

## 2023-06-19 RX ADMIN — PANTOPRAZOLE SODIUM 40 MG: 40 INJECTION, POWDER, LYOPHILIZED, FOR SOLUTION INTRAVENOUS at 09:06

## 2023-06-19 RX ADMIN — PROPOFOL 20 MG: 10 INJECTION, EMULSION INTRAVENOUS at 07:06

## 2023-06-19 RX ADMIN — POTASSIUM BICARBONATE 40 MEQ: 391 TABLET, EFFERVESCENT ORAL at 09:06

## 2023-06-19 RX ADMIN — SENNOSIDES AND DOCUSATE SODIUM 1 TABLET: 50; 8.6 TABLET ORAL at 08:06

## 2023-06-19 RX ADMIN — TUBERCULIN PURIFIED PROTEIN DERIVATIVE 5 UNITS: 5 INJECTION, SOLUTION INTRADERMAL at 01:06

## 2023-06-19 RX ADMIN — LOSARTAN POTASSIUM 100 MG: 50 TABLET, FILM COATED ORAL at 09:06

## 2023-06-19 RX ADMIN — POLYETHYLENE GLYCOL 3350 17 G: 17 POWDER, FOR SOLUTION ORAL at 09:06

## 2023-06-19 RX ADMIN — ONDANSETRON 4 MG: 2 INJECTION INTRAMUSCULAR; INTRAVENOUS at 07:06

## 2023-06-19 RX ADMIN — NIFEDIPINE 90 MG: 30 TABLET, FILM COATED, EXTENDED RELEASE ORAL at 09:06

## 2023-06-19 RX ADMIN — PRAVASTATIN SODIUM 20 MG: 20 TABLET ORAL at 08:06

## 2023-06-19 NOTE — PLAN OF CARE
Problem: Physical Therapy  Goal: Physical Therapy Goal  Description: Goals to be met by:2023    Patient will increase functional independence with mobility by performin. Patient will perform supine <> EOB with SBA with or without use of HB features during two consecutive sessions   2. Patient will perform sit <> stand with RW without verbal cues for hand placement requiring SBA  3. Patient will ambulate > 150' with RW while using step-through gait pattern with no LOB requiring SBA for safety  4. Patient will negotiate 3 steps 2x (or 7 consecutive steps) with BHR with SBA from therapist twice during session       2023 1054 by Emma Marquis, PT  Outcome: Ongoing, Progressing      DC recommendation changed to SNF due to significant weakness and impaired endurance with activity. Discharge DME TBD at next level of care.

## 2023-06-19 NOTE — NURSING
Pt c/o sharp midsternal CP that radiates to left shoulder.  Pt also noted to have intermittent productive cough with white sputum noted.  Afebrile and other VSS.  Team notified.  NP states will enter orders for EKG and troponins.

## 2023-06-19 NOTE — PLAN OF CARE
Problem: Adult Inpatient Plan of Care  Goal: Plan of Care Review  Outcome: Ongoing, Progressing     Problem: Fall Injury Risk  Goal: Absence of Fall and Fall-Related Injury  Outcome: Ongoing, Progressing     POC reviewed with patient this shift.  A/O x4.  Respirations unlabored.  Skin w/d.  Pt very [place].  Bilateral hearing aids in.  Pt very thin, frail.  Miralax given as ordered until several loose BM's noted and pt began to refuse.  Last dose administered at 0019.  NPO status maintained afterwards.  Pt c/o CP x1 this shift.  See previous note.  Troponin and EKG obtained with no issues noted.  Pain resolved without intervention.  VSS.  See flowsheet for full assessment.  Able to verbalize wants/needs.  No s/s of distress.  Fall/safety precautions maintained.

## 2023-06-19 NOTE — ANESTHESIA PREPROCEDURE EVALUATION
06/19/2023  Julio Benites is a 88 y.o., male.      Pre-op Assessment    I have reviewed the Patient Summary Reports.     I have reviewed the Nursing Notes. I have reviewed the NPO Status.   I have reviewed the Medications.     Review of Systems  Anesthesia Hx:  No problems with previous Anesthesia    Social:  Non-Smoker    Hematology/Oncology:         -- Anemia: --  Cancer in past history:    Cardiovascular:   Hypertension, well controlled Past MI CAD      Pulmonary:  Pulmonary Normal    Hepatic/GI:   GERD, well controlled    Endocrine:   Diabetes, well controlled        Physical Exam  General: Cachexia    Airway:  Mallampati: II   Mouth Opening: Normal  TM Distance: Normal  Neck ROM: Normal ROM    Dental:  Edentulous        Anesthesia Plan  Type of Anesthesia, risks & benefits discussed:    Anesthesia Type: Gen Natural Airway  Intra-op Monitoring Plan: Standard ASA Monitors  Post Op Pain Control Plan: multimodal analgesia  Induction:  IV  Informed Consent: Informed consent signed with the Patient and all parties understand the risks and agree with anesthesia plan.  All questions answered.   ASA Score: 3    Ready For Surgery From Anesthesia Perspective.     .

## 2023-06-19 NOTE — PT/OT/SLP PROGRESS
Speech Language Pathology      Julio DUC Haganlexii  MRN: [id number]    Patient not seen today secondary to  (off floor for EGD). Will follow-up 6/20.

## 2023-06-19 NOTE — TRANSFER OF CARE
"Anesthesia Transfer of Care Note    Patient: Julio Benites    Procedure(s) Performed: Procedure(s) (LRB):  EGD (ESOPHAGOGASTRODUODENOSCOPY) (Left)    Patient location: PACU    Anesthesia Type: general    Transport from OR: Transported from OR on 2-3 L/min O2 by NC with adequate spontaneous ventilation    Post pain: adequate analgesia    Post assessment: no apparent anesthetic complications    Post vital signs: stable    Level of consciousness: awake    Nausea/Vomiting: no nausea/vomiting    Complications: none    Transfer of care protocol was followed      Last vitals:   Visit Vitals  /80 (BP Location: Right arm, Patient Position: Lying)   Pulse 85   Temp 36.6 °C (97.8 °F) (Oral)   Resp 18   Ht 5' 9" (1.753 m)   Wt 32.5 kg (71 lb 10.4 oz)   SpO2 100%   BMI 10.58 kg/m²     "

## 2023-06-19 NOTE — PLAN OF CARE
I certify I provided patient choice and a list to the patient/family of CMS rated Home Health, SNF, IRF, LTACH, detention Nursing Homes ,Patient/Family signed Patient's Choice Disclosure Form choosing the following    1.[facility] (SNF)    Xavi Kim and kit Ramirez provided with update     06/19/23 8181   Post-Acute Status   Post-Acute Authorization Placement   Post-Acute Placement Status Referrals Sent   Patient choice form signed by patient/caregiver List with quality metrics by geographic area provided;List from CMS Compare;List from System Post-Acute Care   Discharge Delays (!) Post-Acute Set-up   Discharge Plan   Discharge Plan A Skilled Nursing Facility   Discharge Plan B Home Health        Yes

## 2023-06-19 NOTE — PT/OT/SLP PROGRESS
Occupational Therapy   Treatment    Name: Julio Benites  MRN: [id number]  Admitting Diagnosis:  Colitis  Day of Surgery    Recommendations:     Discharge Recommendations: nursing facility, skilled  Discharge Equipment Recommendations:  to be determined by next level of care  Barriers to discharge:  Decreased caregiver support (current functional level)    Assessment:     Julio Benites is a 88 y.o. male with a medical diagnosis of Colitis.  He presents with fatigue. Performance deficits affecting function are weakness, impaired endurance, impaired self care skills, impaired functional mobility, gait instability, decreased upper extremity function, decreased lower extremity function, decreased safety awareness, impaired cardiopulmonary response to activity, decreased coordination.  Pt agreeable to participating in therapy upon arrival to room.  Pt able to perform sit <> stand transfer with CGA, RW.  Once standing Total A, RW required to perform sarwat care after bowel movement at bed level.     Overall, pt tolerated therapy fair.  After moving from supine <> seated position pt reported feeling SOB.  Pt agreeable to all activity; however, decreased activity tolerance observed with pt requesting to return to supine position after completing one UB exercise.  He would continue to benefit from skilled OT services to address problems listed above and increase independence with ADLs.  SNF is recommended upon d/c from acute care to further address deficits and help pt improve overall functional independence.         Rehab Prognosis:  Fair; patient would benefit from acute skilled OT services to address these deficits and reach maximum level of function.       Plan:     Patient to be seen 4 x/week to address the above listed problems via self-care/home management, therapeutic activities, therapeutic exercises  Plan of Care Expires: 07/02/23  Plan of Care Reviewed with: patient    Subjective     Chief Complaint:  Fatigue  Patient/Family Comments/goals: Increase strength   Pain/Comfort:  Pain Rating 1: 0/10  Pain Rating Post-Intervention 1: 0/10    Objective:     Communicated with: PT (Emma) prior to session.  Patient found HOB elevated with peripheral IV, bed alarm upon OT entry to room.    General Precautions: Standard, fall    Orthopedic Precautions:N/A  Braces: N/A  Respiratory Status: Room air     Occupational Performance:     Bed Mobility:    Supine <> sit: CGA (some difficulty mobilizing legs to side noted)  Scooting: SBA seated towards EOB; SBA supine towards HOB  Sit <> supine: CGA    Functional Mobility/Transfers:  Sit <> Stand: SBA, RW x 1 trial from EOB  Functional Mobility: Pt took one side step to right towards HOB with CGA, RW.    Activities of Daily Living:  Toileting: Total A, RW to perform sarwat care in standing after bowel movement at bed level.  Pt able to maintain standing balance with SBA, RW.      [facility] 6 Click ADL: 14    Treatment & Education:  *Pt educated on role of OT in acute care setting.  *Pt performed sit <> stand transfer from EOB; cues for positioning provided  *Pt performed sarwat care in standing position after bowel movement at bed level.  *Pt performed UB exercise to provide stretch and promote increased endurance needed for ADLs: 1 set x 10 reps chest press; seated at EOB  *Pt performed multiple trials (x6) of scooting while supine towards HOB  *POC reviewed with pt     Patient left HOB elevated with all lines intact, clean pads, call button in reach, and bed alarm on    GOALS:   Multidisciplinary Problems       Occupational Therapy Goals          Problem: Occupational Therapy    Goal Priority Disciplines Outcome Interventions   Occupational Therapy Goal     OT, PT/OT Ongoing, Progressing    Description: Goals to be met by: 7/2/2023     Patient will increase functional independence with ADLs by performing:    UE Dressing with Set-up Assistance.  LE Dressing Supervision, increased time,  and 2 rest breaks.  Grooming while standing at sink with Supervision.  Toileting from bedside commode with Supervision for hygiene and clothing management.   Toilet transfer to bedside commode with Supervision.                         Time Tracking:     OT Date of Treatment: 06/19/23  OT Start Time: 1338  OT Stop Time: 1353  OT Total Time (min): 15 min    Billable Minutes:Self Care/Home Management 15    OT/DEON: OT        ANDREWS Renee  6/19/2023

## 2023-06-19 NOTE — OR NURSING
VSS on room air. No complaints of pain or discomfort at this time. Recovery complete. Report called to [facility] on [address]. Transporting on bed, patient belongings with patient in bed, including dentures, phone & wallet.

## 2023-06-19 NOTE — PLAN OF CARE
Problem: Adult Inpatient Plan of Care  Goal: Plan of Care Review  Outcome: Ongoing, Progressing  Goal: Patient-Specific Goal (Individualized)  Outcome: Ongoing, Progressing  Goal: Absence of Hospital-Acquired Illness or Injury  Outcome: Ongoing, Progressing  Goal: Optimal Comfort and Wellbeing  Outcome: Ongoing, Progressing  POC reviewed with pt. A&Ox4. Room air. No acute changes. Multiple bowel movements. Safety checks performed. Bed in lowest position. Wheels locked. Call light in reach. TM.

## 2023-06-19 NOTE — [employer] PATIENT INSTRUCTIONS
Discharge Summary/Instructions after an Endoscopic Procedure  Patient Name: Julio Benites  Patient MRN: [id number]  Patient YOB: 1934  Monday, June 19, 2023  Kvng Holly MD  RESTRICTIONS:  During your procedure today, you received medications for sedation.  These   medications may affect your judgment, balance and coordination.  Therefore,   for 24 hours, you have the following restrictions:   - DO NOT drive a car, operate machinery, make legal/financial decisions,   sign important papers or drink alcohol.    ACTIVITY:  Today: no heavy lifting, straining or running due to procedural   sedation/anesthesia.  The following day: return to full activity including work.  DIET:  Eat and drink normally unless instructed otherwise.     TREATMENT FOR COMMON SIDE EFFECTS:  - Mild abdominal pain, nausea, belching, bloating or excessive gas:  rest,   eat lightly and use a heating pad.  - Sore Throat: treat with throat lozenges and/or gargle with warm salt   water.  - Because air was used during the procedure, expelling large amounts of air   from your rectum or belching is normal.  - If a bowel prep was taken, you may not have a bowel movement for 1-3 days.    This is normal.  SYMPTOMS TO WATCH FOR AND REPORT TO YOUR PHYSICIAN:  1. Abdominal pain or bloating, other than gas cramps.  2. Chest pain.  3. Back pain.  4. Signs of infection such as: chills or fever occurring within 24 hours   after the procedure.  5. Rectal bleeding, which would show as bright red, maroon, or black stools.   (A tablespoon of blood from the rectum is not serious, especially if   hemorrhoids are present.)  6. Vomiting.  7. Weakness or dizziness.  GO DIRECTLY TO THE NEAREST EMERGENCY ROOM IF YOU HAVE ANY OF THE FOLLOWING:      Difficulty breathing              Chills and/or fever over 101 F   Persistent vomiting and/or vomiting blood   Severe abdominal pain   Severe chest pain   Black, tarry stools   Bleeding- more than one  tablespoon   Any other symptom or condition that you feel may need urgent attention  Your doctor recommends these additional instructions:  If any biopsies were taken, your doctors clinic will contact you in 1 to 2   weeks with any results.  - [facility] to [facility].   - Resume previous diet.   - Continue present medications.   - Perform a modified barium swallow at appointment to be scheduled.  For questions, problems or results please call your physician - Kvng Holly MD at Work:  [phone].  [facility], EMERGENCY ROOM PHONE NUMBER: [phone], [facility]   [phone].  IF A COMPLICATION OR EMERGENCY SITUATION ARISES AND YOU ARE UNABLE TO REACH   YOUR PHYSICIAN - GO DIRECTLY TO THE EMERGENCY ROOM.  MD Kvng Bowman MD  6/19/2023 8:03:33 AM  This report has been verified and signed electronically.  Dear patient,  As a result of recent federal legislation (The Federal Cures Act), you may   receive lab or pathology results from your procedure in your [facility]   account before your physician is able to contact you. Your physician or   their representative will relay the results to you with their   recommendations at their soonest availability.  Thank you,  [employer]

## 2023-06-19 NOTE — PLAN OF CARE
Surgery plan of care:     Patient in pre-procedure for endoscopy today.  Will follow up results of endoscopy.  2 recorded bowel movements overnight.  Continue bowel regimen.  Diet as tolerated after endoscopy. Will continue to follow.     Mame Mathur MD  Staff Surgeon   Colon & Rectal Surgery

## 2023-06-19 NOTE — PT/OT/SLP PROGRESS
Occupational Therapy      Patient Name:  Julio Benites   MRN:  [id number]    Attempted to see pt at 12:31.  Upon arrival to outside of room transportation services reported pt currently being cleaned and about to be transported off floor for x-ray.  Will follow-up as schedule permits.    Adriane Jorgensen, LOTR  6/19/2023

## 2023-06-19 NOTE — ANESTHESIA POSTPROCEDURE EVALUATION
Anesthesia Post Evaluation    Patient: Julio Benites    Procedure(s) Performed: Procedure(s) (LRB):  EGD (ESOPHAGOGASTRODUODENOSCOPY) (Left)    Final Anesthesia Type: general      Patient location during evaluation: PACU  Patient participation: Yes- Able to Participate  Level of consciousness: awake and alert  Post-procedure vital signs: reviewed and stable  Pain management: adequate  Airway patency: patent    PONV status at discharge: No PONV  Anesthetic complications: no      Cardiovascular status: hemodynamically stable  Respiratory status: unassisted  Hydration status: euvolemic  Follow-up not needed.          Vitals Value Taken Time   /75 06/19/23 0946   Temp 36.5 °C (97.7 °F) 06/19/23 0904   Pulse 87 06/19/23 0904   Resp 16 06/19/23 0904   SpO2 98 % 06/19/23 0904         Event Time   Out of Recovery 06/19/2023 08:52:00         Pain/Misty Score: Misty Score: 10 (6/19/2023  8:15 AM)

## 2023-06-19 NOTE — PLAN OF CARE
EGD today.  See Provation for full report.    Findings:  -LA Grade D esophagitis  -Anatomy c/w prior Marija en Y anastomosis, healthy appearing mucosa.  -Normal appearing efferent loop    Plan:  -would continue with aggressive bowel regimen as abdominal imaging with increased stool burden 6/18  -agree with SLP for MBSS when able.

## 2023-06-19 NOTE — PT/OT/SLP PROGRESS
"Physical Therapy Treatment    Patient Name:  Julio Benites   MRN:  [id number]    Recommendations:     Discharge Recommendations: nursing facility, skilled  Discharge Equipment Recommendations: to be determined by next level of care  Barriers to discharge: Decreased caregiver support and current functional status    Assessment:     Julio Benites is a 88 y.o. male admitted with a medical diagnosis of Colitis.  He presents with the following impairments/functional limitations: weakness, impaired endurance, impaired coordination, decreased coordination, impaired self care skills, decreased upper extremity function, impaired functional mobility, decreased lower extremity function, gait instability, decreased safety awareness, impaired balance, impaired cardiopulmonary response to activity .    Patient required minimal assist for supine <> sitting EOB.   Minimal assist required for sit to stand 4x throughout session.   Unable to ambulate due to multiple bowel movements when standing EOB.   DC rec to SNF    Rehab Prognosis: Fair; patient would benefit from acute skilled PT services to address these deficits and reach maximum level of function.    Recent Surgery: Procedure(s) (LRB):  EGD (ESOPHAGOGASTRODUODENOSCOPY) (Left) Day of Surgery    Plan:     During this hospitalization, patient to be seen 5 x/week to address the identified rehab impairments via gait training, therapeutic activities, therapeutic exercises and progress toward the following goals:    Plan of Care Expires:  07/18/23    Subjective     Chief Complaint: "I am so hungry I want to eat real food" -patient educated he is on clear liquid diet. Patient with excessive coughing when drinking water.   Patient/Family Comments/goals: Patient agrees to participate in PT intervention.   Pain/Comfort:  Pain Rating 1: 0/10      Objective:     Communicated with nursing prior to session.  Patient found HOB elevated with peripheral IV, bed alarm upon PT entry to room. "     General Precautions: Standard, fall  Orthopedic Precautions: N/A  Braces: N/A  Respiratory Status: Room air     Functional Mobility:  Bed Mobility:     Rolling Left:  minimum assistance  Rolling Right: minimum assistance  Scooting: total assistance and of two persons  Supine to Sit: minimum assistance  Sit to Supine: minimum assistance  Transfers:     Sit to Stand:  minimum assistance with rolling walker      AM-PAC 6 CLICK MOBILITY  Turning over in bed (including adjusting bedclothes, sheets and blankets)?: 3  Sitting down on and standing up from a chair with arms (e.g., wheelchair, bedside commode, etc.): 3  Moving from lying on back to sitting on the side of the bed?: 3  Moving to and from a bed to a chair (including a wheelchair)?: 3  Need to walk in hospital room?: 2  Climbing 3-5 steps with a railing?: 2  Basic Mobility Total Score: 16       Treatment & Education:    Patient with soiled linens upon therapist arrival to session. Patient stood EOB to be cleaned and have pads on bed changed. Patient performed 4 sit to stand transfers throughout session with minimal assist each to perform sarwat hygiene. Patient requires assistance to fully clean self after bowel movement and urination. Patient with second bowel movement after being cleaned. Patient was then laid supine to allow therapist/ PCT to change linens, clean floor and clean patient thoroughly. Patient stood EOB for a total of about 10 minutes throughout session with RW and SBA.     Patient left supine with all lines intact, call button in reach, and PCT  present and finishing giving patient bed bath.     GOALS:   Multidisciplinary Problems       Physical Therapy Goals          Problem: Physical Therapy    Goal Priority Disciplines Outcome Goal Variances Interventions   Physical Therapy Goal     PT, PT/OT Ongoing, Progressing     Description: Goals to be met by:2023    Patient will increase functional independence with mobility by performin.  Patient will perform supine <> EOB with SBA with or without use of HB features during two consecutive sessions   2. Patient will perform sit <> stand with RW without verbal cues for hand placement requiring SBA  3. Patient will ambulate > 150' with RW while using step-through gait pattern with no LOB requiring SBA for safety  4. Patient will negotiate 3 steps 2x (or 7 consecutive steps) with BHR with SBA from therapist twice during session                            Time Tracking:     PT Received On: 06/19/23  PT Start Time: 0940     PT Stop Time: 1018  PT Total Time (min): 38 min     Billable Minutes: Therapeutic Activity 38    Treatment Type: Treatment  PT/PTA: PT     Number of PTA visits since last PT visit: 0     06/19/2023

## 2023-06-20 PROBLEM — Z51.5 ENCOUNTER FOR PALLIATIVE CARE: Status: ACTIVE | Noted: 2023-06-20

## 2023-06-20 PROBLEM — R13.10 DYSPHAGIA: Status: ACTIVE | Noted: 2023-06-20

## 2023-06-20 PROCEDURE — 99900035 HC TECH TIME PER 15 MIN (STAT): Mod: HCNC

## 2023-06-20 PROCEDURE — 25000003 PHARM REV CODE 250: Mod: HCNC | Performed by: INTERNAL MEDICINE

## 2023-06-20 PROCEDURE — 99222 1ST HOSP IP/OBS MODERATE 55: CPT | Mod: HCNC,25,, | Performed by: FAMILY MEDICINE

## 2023-06-20 PROCEDURE — 99233 SBSQ HOSP IP/OBS HIGH 50: CPT | Mod: HCNC,,, | Performed by: INTERNAL MEDICINE

## 2023-06-20 PROCEDURE — 97110 THERAPEUTIC EXERCISES: CPT | Mod: HCNC,CQ

## 2023-06-20 PROCEDURE — 99222 PR INITIAL HOSPITAL CARE,LEVL II: ICD-10-PCS | Mod: HCNC,25,, | Performed by: FAMILY MEDICINE

## 2023-06-20 PROCEDURE — 99231 SBSQ HOSP IP/OBS SF/LOW 25: CPT | Mod: HCNC,,, | Performed by: SURGERY

## 2023-06-20 PROCEDURE — 99497 ADVNCD CARE PLAN 30 MIN: CPT | Mod: HCNC,25,, | Performed by: FAMILY MEDICINE

## 2023-06-20 PROCEDURE — 11000001 HC ACUTE MED/SURG PRIVATE ROOM: Mod: HCNC

## 2023-06-20 PROCEDURE — 27000221 HC OXYGEN, UP TO 24 HOURS: Mod: HCNC

## 2023-06-20 PROCEDURE — 99233 PR SUBSEQUENT HOSPITAL CARE,LEVL III: ICD-10-PCS | Mod: HCNC,,, | Performed by: INTERNAL MEDICINE

## 2023-06-20 PROCEDURE — 92611 MOTION FLUOROSCOPY/SWALLOW: CPT | Mod: HCNC

## 2023-06-20 PROCEDURE — 99497 PR ADVNCD CARE PLAN 30 MIN: ICD-10-PCS | Mod: HCNC,25,, | Performed by: FAMILY MEDICINE

## 2023-06-20 PROCEDURE — C9113 INJ PANTOPRAZOLE SODIUM, VIA: HCPCS | Mod: HCNC | Performed by: INTERNAL MEDICINE

## 2023-06-20 PROCEDURE — 63600175 PHARM REV CODE 636 W HCPCS: Mod: HCNC | Performed by: INTERNAL MEDICINE

## 2023-06-20 PROCEDURE — 94761 N-INVAS EAR/PLS OXIMETRY MLT: CPT | Mod: HCNC

## 2023-06-20 PROCEDURE — 99231 PR SUBSEQUENT HOSPITAL CARE,LEVL I: ICD-10-PCS | Mod: HCNC,,, | Performed by: SURGERY

## 2023-06-20 PROCEDURE — 97116 GAIT TRAINING THERAPY: CPT | Mod: HCNC,CQ

## 2023-06-20 RX ADMIN — PANTOPRAZOLE SODIUM 40 MG: 40 INJECTION, POWDER, LYOPHILIZED, FOR SOLUTION INTRAVENOUS at 10:06

## 2023-06-20 RX ADMIN — PANTOPRAZOLE SODIUM 40 MG: 40 INJECTION, POWDER, LYOPHILIZED, FOR SOLUTION INTRAVENOUS at 09:06

## 2023-06-20 RX ADMIN — NIFEDIPINE 90 MG: 30 TABLET, FILM COATED, EXTENDED RELEASE ORAL at 08:06

## 2023-06-20 RX ADMIN — LOSARTAN POTASSIUM 100 MG: 50 TABLET, FILM COATED ORAL at 08:06

## 2023-06-20 RX ADMIN — PRAVASTATIN SODIUM 20 MG: 20 TABLET ORAL at 09:06

## 2023-06-20 NOTE — ASSESSMENT & PLAN NOTE
- Echo 1/1/22   The left ventricle is normal in size with concentric remodeling and mildly decreased systolic function.   The estimated ejection fraction is 40%.   There are segmental left ventricular wall motion abnormalities.   Grade I left ventricular diastolic dysfunction.   With normal right ventricular systolic function.   The estimated PA systolic pressure is 30 mmHg.   Normal central venous pressure (3 mmHg).

## 2023-06-20 NOTE — PROGRESS NOTES
"Gastroenterology Progress Note    Reason for Consult: fecal impaction, LUQ pain    Subjective:  S/p EGD yesterday w/ esophagitis but no ulcer at RnY anastomsosis. Denies abdominal pain.  No N/V.  Says he's not having BMs, but per I/Os, he had 5 yesterday and 1 today.    ROS:  Gen: no F/C  CV: no CP/palpitations  Resp: no SOB/wheezing    Physical Exam  /73 (BP Location: Right arm, Patient Position: Lying)   Pulse 79   Temp 97.9 °F (36.6 °C) (Oral)   Resp 18   Ht 5' 9" (1.753 m)   Wt 32.5 kg (71 lb 10.4 oz)   SpO2 96%   BMI 10.58 kg/m²   General: Awake, alert male in no apparent distress, cachectic  HEENT: NC/AT, PERRL, EOMI, MMM  CV: RRR, without murmur, gallop, or rubs  Pulm: CTAB, no wheezing, rales, or rhonchi  GI: soft, NT/ND, +BS  MSK: no edema and normal ROM, muscle wasting  Neuro: A&Ox3, moves all extremities equally, sensation grossly intact  Skin: no rashes or lesions  Psych: normal mood and affect    Medications/Infusions:  Current Facility-Administered Medications   Medication Dose Route Frequency Provider Last Rate Last Admin    acetaminophen tablet 650 mg  650 mg Oral Q4H PRN Kvng Holly MD        albuterol-ipratropium 2.5 mg-0.5 mg/3 mL nebulizer solution 3 mL  3 mL Nebulization Q4H PRN Kvng Holly MD        bisacodyL suppository 10 mg  10 mg Rectal Daily PRN Kvng Holly MD        HYDROmorphone (PF) injection 0.4 mg  0.4 mg Intravenous Q5 Min PRN Isauro Bhandari MD        losartan tablet 100 mg  100 mg Oral Daily Kvng Holly MD   100 mg at 06/19/23 0946    meperidine (PF) injection 12.5 mg  12.5 mg Intravenous Once PRN Isauro Bhandari MD        naloxone 0.4 mg/mL injection 0.02 mg  0.02 mg Intravenous PRN Kvng Holly MD        NIFEdipine 24 hr tablet 90 mg  90 mg Oral Daily Kvng Holly MD   90 mg at 06/19/23 0946    ondansetron injection 4 mg  4 mg Intravenous Q8H PRN Kvng Holly MD   4 mg at 06/17/23 1456    oxyCODONE immediate release " tablet 5 mg  5 mg Oral Q3H PRN Isauro Bhandari MD        pantoprazole injection 40 mg  40 mg Intravenous Q12H Kvng Holly MD   40 mg at 06/19/23 2100    polyethylene glycol packet 17 g  17 g Oral BID Carol Manzanares MD        pravastatin tablet 20 mg  20 mg Oral QHS Kvng Holly MD   20 mg at 06/19/23 2025    prochlorperazine injection Soln 5 mg  5 mg Intravenous Q30 Min PRN Isauro Bhandari MD        senna-docusate 8.6-50 mg per tablet 1 tablet  1 tablet Oral BID Kvng Holly MD   1 tablet at 06/19/23 2025    sodium chloride 0.9% flush 10 mL  10 mL Intravenous Q8H PRN Kvng Holly MD        sodium chloride 0.9% flush 3 mL  3 mL Intravenous PRN Isauro Bhandari MD           Intake and Output:    Intake/Output Summary (Last 24 hours) at 6/20/2023 0749  Last data filed at 6/20/2023 0500  Gross per 24 hour   Intake 300 ml   Output 500 ml   Net -200 ml       Labs:  Lab Results   Component Value Date/Time    WBC 9.26 06/19/2023 04:18 AM    HGB 10.7 (L) 06/19/2023 04:18 AM    HCT 34.2 (L) 06/19/2023 04:18 AM    HCT 29 (L) 11/12/2012 07:24 PM     06/19/2023 04:18 AM    MCV 81 (L) 06/19/2023 04:18 AM     (H) 06/19/2023 04:18 AM    K 3.3 (L) 06/19/2023 04:18 AM     06/19/2023 04:18 AM    CO2 24 06/19/2023 04:18 AM    BUN 38 (H) 06/19/2023 04:18 AM    CREATININE 0.7 06/19/2023 04:18 AM    GLU 79 06/19/2023 04:18 AM    CALCIUM 9.3 06/19/2023 04:18 AM    MG 2.2 06/19/2023 04:18 AM    PHOS 4.8 (H) 06/19/2023 04:18 AM    INR 1.0 12/17/2012 09:34 AM    APTT 22.2 12/17/2012 09:34 AM   ]  Lab Results   Component Value Date/Time    PROT 6.3 06/19/2023 04:18 AM    ALBUMIN 2.7 (L) 06/19/2023 04:18 AM    BILITOT 0.5 06/19/2023 04:18 AM    BILIDIR 0.2 07/10/2013 08:19 AM    AST 18 06/19/2023 04:18 AM    ALT 9 (L) 06/19/2023 04:18 AM    ALKPHOS 53 (L) 06/19/2023 04:18 AM   ]    Imaging and Procedures:  Labs and imaging results were reviewed.  EGD 6/19/23:  - LA Grade D esophagitis with no  bleeding.   - Marija-en-Y gastrojejunostomy with gastrojejunal anastomosis characterized by healthy appearing mucosa.     Assessment:  Julio Benites is a 88 y.o. male with a history of gastric cancer s/p gastrectomy w/ Marija en Y reconstruction, HTN, HLD, DM2, CHF, and CAD admitted with abdominal pain and constipation.    Plan:  - PPI BID x 2 weeks, then daily  - continue miralax BID and senna-docusate BID  - enemas PRN  - clear liquids and advance as tolerated  - consider flex sig once cleaned out to rule out colorectal malignancy given abnormal appearance on CT scan  - speech eval for dysphagia  - anti-reflux measures (elevate HOB, avoid late meals)    Estela Deal MD

## 2023-06-20 NOTE — PROGRESS NOTES
O2 Device/Concentration: Flow (L/min): 2,  ,  , Flow (L/min): 2    Plan of Care: Continue to monitor oxygen sats and treatment is not needed at this time

## 2023-06-20 NOTE — PLAN OF CARE
PASSR/142 uploaded to [facility].    [facility] unable to accept, insurance out of network. Additional referrals have been sent out.

## 2023-06-20 NOTE — NURSING
AM vitals showing O2 saturation at 86%. NC at 3L applied, O2 saturation 91-97. Provider made aware.

## 2023-06-20 NOTE — PROGRESS NOTES
[facility] [address] Medicine  Progress Note    Patient Name: Julio Benites  MRN: [id number]  Patient Class: IP- Inpatient   Admission Date: 6/16/2023  Length of Stay: 4 days  Attending Physician: DEXTER Salmeron MD  Primary Care Provider: Gregorio Jensen MD        Subjective:     Principal Problem:Colitis        HPI:  The patient is a 88 y.o. male with a past medical history of DM, NSTEMI, HTN, HLD, and systolic CHF who was recently treated for CAP  with azithromycin who presents with left upper quadrant abdominal pain.  Patient reports worsening left upper quadrant abdominal pain over the past 4-5 days.  He states it is positional and worse when he sits up or stands.  He rates the pain at a 10/10 during these times but 0/10 currently.  Patient also endorses about 1-1/2 months of shortness of breath.  He is also endorsed a significant weight loss during this time.  He denies chest pain, nausea, vomiting, diarrhea, fevers, chills.        Overview/Hospital Course:  Mr. Benites presented with abdominal pain. Admitted with colitis with fecal impaction/constipation. General surgery and GI consulted. Manually disimpacted with partial relief on 6/17. Bowel regimen initiated.       Interval History: No acute events overnight. To MBSS today. Discussed with patient and palliative at bedside. No other concerns at this time.    Review of Systems   Constitutional:  Negative for chills and fever.   Respiratory:  Negative for cough and shortness of breath.    Cardiovascular:  Negative for chest pain and palpitations.   Gastrointestinal:  Negative for abdominal pain, nausea and vomiting.   Objective:     Vital Signs (Most Recent):  Temp: 97.4 °F (36.3 °C) (06/20/23 1645)  Pulse: 78 (06/20/23 1645)  Resp: 16 (06/20/23 1645)  BP: 132/78 (06/20/23 1645)  SpO2: (!) 94 % (06/20/23 1955) Vital Signs (24h Range):  Temp:  [97.3 °F (36.3 °C)-98.6 °F (37 °C)] 97.4 °F (36.3 °C)  Pulse:  [70-80] 78  Resp:   "[16-18] 16  SpO2:  [86 %-100 %] 94 %  BP: (122-132)/(68-78) 132/78     Weight: 32.5 kg (71 lb 10.4 oz)  Body mass index is 10.58 kg/m².    Intake/Output Summary (Last 24 hours) at 6/20/2023 2053  Last data filed at 6/20/2023 1543  Gross per 24 hour   Intake 480 ml   Output 1000 ml   Net -520 ml         Physical Exam  Vitals and nursing note reviewed.   Constitutional:       General: He is not in acute distress.     Appearance: He is well-developed. He is cachectic. He is ill-appearing.   HENT:      Head: Normocephalic and atraumatic.   Eyes:      General:         Right eye: No discharge.         Left eye: No discharge.      Conjunctiva/sclera: Conjunctivae normal.   Cardiovascular:      Rate and Rhythm: Normal rate.      Pulses: Normal pulses.   Pulmonary:      Effort: Pulmonary effort is normal. No respiratory distress.   Abdominal:      Palpations: Abdomen is soft.      Tenderness: There is no abdominal tenderness.   Musculoskeletal:         General: Normal range of motion.      Right lower leg: No edema.      Left lower leg: No edema.   Skin:     General: Skin is warm and dry.   Neurological:      Mental Status: He is alert and oriented to person, place, and time.         Significant Labs:   CBC:  Recent Labs   Lab 06/18/23  0337 06/19/23 0418   WBC 7.38 9.26   HGB 11.9* 10.7*   HCT 38.5* 34.2*    231   GRAN 82.4*  6.1 87.5*  8.1*   LYMPH 7.5*  0.6* 5.8*  0.5*   MONO 9.2  0.7 6.2  0.6   EOS 0.0 0.0   BASO 0.02 0.01   BMP:  Recent Labs   Lab 06/18/23  0337 06/19/23  0418    146*   K 5.1 3.3*    101   CO2 30* 24   BUN 34* 38*   CREATININE 0.7 0.7    79   CALCIUM 10.2 9.3   MG 2.4 2.2   PHOS 4.9* 4.8*     Significant Imaging:   No new imaging this morning.      Assessment/Plan:      * Colitis  Constipation, fecal impaction, abdominal pain, cachexia, debility, dysphagia  - CT of abd/pelvis with "persistent changes of small bowel ileus and constipation with rectal fecal impaction and " early signs of stercoral colitis.  Thickening at the anal rectal verge either due to inflammation.  Mass is difficult to exclude.  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  - Continue bowel regimen.  - SLP following, to [facility] to further evaluate today.  - Palliative consultation given significant cachexia and dysphagia.  - Agreeable to SNF placement.    Acute on chronic respiratory failure with hypoxia  Patient with Hypoxic Respiratory failure which is Acute on chronic.  he is not on home oxygen. Supplemental oxygen was provided and noted-      .   Signs/symptoms of respiratory failure include- tachypnea. Contributing diagnoses includes - Interstitial lung disease Labs and images were reviewed. Patient Has not had a recent ABG. Will treat underlying causes and adjust management of respiratory failure as follows- Duoneb PRN, Oxygen    Elevated troponin  - Trended troponins, overall flat and stable  - Monitor on telemetry, no further workup needed    Chronic systolic heart failure  - BNP- 62, no signs of acute decompensation  - Monitor for acute decompensation    Mixed hyperlipidemia  - Continue pravastatin    Essential hypertension, benign  - Continue nifedipine, losartan    VTE Risk Mitigation (From admission, onward)         Ordered     IP VTE HIGH RISK PATIENT  Once         06/16/23 1829     Place sequential compression device  Until discontinued         06/16/23 1829     Reason for No Pharmacological VTE Prophylaxis  Once        Question:  Reasons:  Answer:  Risk of Bleeding    06/16/23 1829                Discharge Planning   KYLAH:      Code Status: Full Code    Is the patient medically ready for discharge?:     Reason for patient still in hospital (select all that apply): Treatment  Discharge Plan A: Skilled Nursing Facility   Discharge Delays: (!) Post-Acute Set-up              D Hilton Salmeron MD  Department of Hospital Medicine   [facility] ([address])

## 2023-06-20 NOTE — PLAN OF CARE
I certify I provided patient choice and a list to the patient/family of CMS rated Home Health, SNF, IRF, LTACH, nursing home Nursing Homes  Patient/Family signed Patient's Choice Disclosure Form choosing the following    1.[facility]       06/20/23 1026   Post-Acute Status   Post-Acute Authorization Placement   Post-Acute Placement Status Referrals Sent   Patient choice form signed by patient/caregiver List with quality metrics by geographic area provided;List from CMS Compare;List from System Post-Acute Care   Discharge Delays (!) Post-Acute Set-up   Discharge Plan   Discharge Plan A Skilled Nursing Facility   Discharge Plan B Home Health

## 2023-06-20 NOTE — SUBJECTIVE & OBJECTIVE
Interval History: Report constipation and abdominal pain resolved and belly feel much better. He still reports coughing after swallowing but improved. Feels weak still and not much appetite although improved.    Review of Systems   Constitutional:  Positive for activity change, appetite change and unexpected weight change. Negative for chills and fever.   Respiratory:  Negative for shortness of breath.    Gastrointestinal:  Negative for abdominal pain, constipation, nausea and vomiting.   Neurological:  Positive for weakness.   Objective:     Vital Signs (Most Recent):  Temp: 97.6 °F (36.4 °C) (06/19/23 2028)  Pulse: 76 (06/19/23 2028)  Resp: 15 (06/19/23 2028)  BP: 117/66 (06/19/23 2028)  SpO2: 98 % (06/19/23 2028) Vital Signs (24h Range):  Temp:  [97.5 °F (36.4 °C)-97.9 °F (36.6 °C)] 97.6 °F (36.4 °C)  Pulse:  [76-87] 76  Resp:  [15-18] 15  SpO2:  [95 %-100 %] 98 %  BP: (116-137)/(66-81) 117/66     Weight: 32.5 kg (71 lb 10.4 oz)  Body mass index is 10.58 kg/m².    Intake/Output Summary (Last 24 hours) at 6/19/2023 2347  Last data filed at 6/19/2023 0751  Gross per 24 hour   Intake 420 ml   Output --   Net 420 ml           Physical Exam  Constitutional:       Appearance: He is cachectic. He is ill-appearing.   HENT:      Head:      Comments: +Temporal wasting, enophthalmoas     Right Ear: Decreased hearing noted.      Ears:      Comments: Uses hearing aids     Nose: Nose normal.   Eyes:      Extraocular Movements: Extraocular movements intact.      Conjunctiva/sclera: Conjunctivae normal.      Pupils: Pupils are equal, round, and reactive to light.   Neck:      Comments: Pronounced collar bone  Cardiovascular:      Rate and Rhythm: Normal rate and regular rhythm.      Pulses:           Radial pulses are 2+ on the right side and 2+ on the left side.      Heart sounds: Normal heart sounds.   Pulmonary:      Effort: Pulmonary effort is normal.      Breath sounds: Decreased breath sounds present.   Abdominal:       General: Bowel sounds are normal. There is no distension.      Palpations: Abdomen is soft.      Tenderness: There is no abdominal tenderness. There is no guarding or rebound.      Comments: Scaphoid, no more fullness   Musculoskeletal:         General: Normal range of motion.      Cervical back: Normal range of motion and neck supple.      Comments: Muscular atrophy to all extremities   Skin:     General: Skin is warm and dry.      Capillary Refill: Capillary refill takes 2 to 3 seconds.   Neurological:      Mental Status: He is alert and oriented to person, place, and time.      GCS: GCS eye subscore is 4. GCS verbal subscore is 5. GCS motor subscore is 6.      Motor: Motor function is intact.   Psychiatric:         Mood and Affect: Mood normal.         Speech: Speech normal.         Behavior: Behavior normal.         Thought Content: Thought content normal.           Significant Labs: All pertinent labs within the past 24 hours have been reviewed.    Significant Imaging: I have reviewed all pertinent imaging results/findings within the past 24 hours.

## 2023-06-20 NOTE — PT/OT/SLP PROGRESS
Physical Therapy Treatment    Patient Name:  Julio Benites   MRN:  [id number]    Recommendations:     Discharge Recommendations: nursing facility, skilled  Discharge Equipment Recommendations: to be determined by next level of care  Barriers to discharge: Decreased caregiver support (pt lives alone)    Assessment:     Julio Benites is a 88 y.o. male admitted with a medical diagnosis of Colitis.  He presents with the following impairments/functional limitations: gait instability, weakness, impaired endurance, impaired self care skills, impaired functional mobility, impaired balance, decreased coordination, decreased lower extremity function, decreased safety awareness, impaired cardiopulmonary response to activity ;pt with improved mobility today, able to amb short distance in room w/ RW and min.A, O2@2L, no issues w/ bowels today (wearing a brief for precautions).    Rehab Prognosis: Good; patient would benefit from acute skilled PT services to address these deficits and reach maximum level of function.    Recent Surgery: Procedure(s) (LRB):  EGD (ESOPHAGOGASTRODUODENOSCOPY) (Left) 1 Day Post-Op    Plan:     During this hospitalization, patient to be seen 5 x/week to address the identified rehab impairments via gait training, therapeutic activities, therapeutic exercises and progress toward the following goals:    Plan of Care Expires:  07/18/23    Subjective     Chief Complaint: none stated  Patient/Family Comments/goals: pt agreeable to session, pleasant.   Pain/Comfort:  Pain Rating 1: 0/10  Pain Rating Post-Intervention 1: 0/10      Objective:     Communicated with nurse prior to session.  Patient found HOB elevated with peripheral IV, oxygen upon PT entry to room.     General Precautions: Standard, fall, hearing impaired  Orthopedic Precautions: N/A  Braces: N/A  Respiratory Status: Nasal cannula, flow 2 L/min     Functional Mobility:  Bed Mobility:     Supine to Sit: minimum assistance, moderate  assistance, and with HOB partially up  Transfers:     Sit to Stand:  minimum assistance with rolling walker  Gait: amb'd 12' w/ RW and min.A, O2@2L, some assistance w/ RW mgmt.      AM-PAC 6 CLICK MOBILITY  Turning over in bed (including adjusting bedclothes, sheets and blankets)?: 3  Sitting down on and standing up from a chair with arms (e.g., wheelchair, bedside commode, etc.): 3  Moving from lying on back to sitting on the side of the bed?: 3  Moving to and from a bed to a chair (including a wheelchair)?: 3  Need to walk in hospital room?: 3  Climbing 3-5 steps with a railing?: 2  Basic Mobility Total Score: 17       Treatment & Education:  Pt perf'd seated LE ex's of heelraises, LAQ's, hip flex (AAROM) x 10 ea.   Pt educated on safety, call nsg. For assistance getting up.    Patient left up in chair with all lines intact, call button in reach, nurse notified, and door open and tray table in front of pt ..    GOALS:   Multidisciplinary Problems       Physical Therapy Goals          Problem: Physical Therapy    Goal Priority Disciplines Outcome Goal Variances Interventions   Physical Therapy Goal     PT, PT/OT Ongoing, Progressing     Description: Goals to be met by:2023    Patient will increase functional independence with mobility by performin. Patient will perform supine <> EOB with SBA with or without use of HB features during two consecutive sessions   2. Patient will perform sit <> stand with RW without verbal cues for hand placement requiring SBA  3. Patient will ambulate > 150' with RW while using step-through gait pattern with no LOB requiring SBA for safety  4. Patient will negotiate 3 steps 2x (or 7 consecutive steps) with BHR with SBA from therapist twice during session                            Time Tracking:     PT Received On: 23  PT Start Time: 1150     PT Stop Time: 1215  PT Total Time (min): 25 min     Billable Minutes: Gait Training 15 and Therapeutic Exercise 10    Treatment  Type: Treatment  PT/PTA: PTA     Number of PTA visits since last PT visit: 1 06/20/2023

## 2023-06-20 NOTE — PLAN OF CARE
MBSS completed this date. Recommend palliative care assessment of patient's goals of cares regarding PO intake, possibility of alternative means of nutrition/hydration. Patient with severely decreased efficiency and safety of oral intake, laryngeal vestibule penetration with subsequent aspiration of material during and following the swallow noted. Severely decreased pharyngeal strength/squeeze resulted in significant amount of stasis post swallow. No strategies served to significantly reduce pharyngeal stasis.    As patient is currently full code, goals will reflect rehabilitative method of tx, though, patient may not build musculature with rehabilitative tx due to significant level of deconditioning, appearance of malnourishment to significantly improve safe swallow outcomes.     Problem: SLP  Goal: SLP Goal  Description: Goals may change based on discussion with palliative care, and assessment of goals of care between patient, medical team, and patient. As patient is currently full code, goals will reflect rehabilitative method of tx, though, patient may not build musculature with rehabilitative tx due to significant level of deconditioning, appearance of malnourishment to significantly improve safe swallow outcomes.    1. Patient and caregivers will complete oral care 3x/day to reduce risk of aspiration pna development due to likely aspiration of secretions and PO intake.     2. Patient will identify pillars of aspiration pna in 100% of opportunities during education and tx, to improve insight into deficits and reduce risk of development of pna.     3. Patient will complete effortful swallow exercise given min cues from clinician for appropriate positioning and increased effort to improve pharyngeal constriction.     4. Patient will complete exercises focused on suprahyoid strength including chin tuck against resistance and shaker exercise given min cues from clinician for appropriate positioning to improve  hyolaryngeal elevation, excursion and LVC.       Outcome: Ongoing, Progressing

## 2023-06-20 NOTE — PROGRESS NOTES
Surgery Inpatient Progress Note    Date: 06/20/2023    Overnight events: 5 recorded bowel movements overnight     O:   Vitals:    06/20/23 1045   BP: 126/74   Pulse: 80   Resp: 16   Temp: 97.5 °F (36.4 °C)       Physical Exam   General: 88 y.o. male, cachectic  Neuro: alert and oriented x 4.  Moves all extremities.     HEENT: normocephalic, atraumatic, PERRL, EOMI   Respiratory: respirations are even and unlabored  Cardiac: regular rate and rhythm  Abdomen: scaphoid abdomen with mild distention   Extremities: Warm dry and intact      Assessment and plan:   Julio Benites is a 88 y.o. 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  Continue bowel regimen on dc  - GI following, possible flex sig to evaluate for stercoral colitis vs. Malignancy  - diet as tolerated    Mame Mathur MD  Staff Surgeon   Colon & Rectal Surgery

## 2023-06-20 NOTE — ASSESSMENT & PLAN NOTE
- Consult for advance care planning/ goals of care in elderly patient admitted with worsening abdominal pain. Extensive chart review performed.  - Along with Margareth Hernandez ( RN), met patient at bedside. He was sitting up in his bedside chair. Overall, he is very frail and cachectic, he is chronically ill appearing. He has a very weak cough. He reports he lives at home alone, however his grandson helps him at home. His wife of 63 years is a resident at [facility]. He has 2 children (son and daughter) who both live out of town. 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 He describes himself as a very independent man, even driving up until recently. He reports he is living for his wife and he is very fearful if he would die before her then if she would be okay. He stated he feels that he has lived his life and he has been very blessed, even citing this is the first time he has been hospitalized for more than 1 day. He told us that he was raised to treat everyone how you want to be treated, we did inform him that he is a very smart man and seems like a great person. He was very open that losing his independence and having to accept help from others is very difficult.  - Dr. Salmeron then presented to bedside and we again discussed overall plan for MBSS. Mr. Kim is looking forward to [facility] because he will be able to spend time with his wife.  - We discussed Mr. Kim's goals. He is hopeful to have good quality days with his wife, wants to remain independent and avoid suffering. Along those lines we did introduce code status, Mr. Kim said he wants to avoid any measures that would cause suffering. I was very transparent that given his age and health status CPR/ intubation would likely be suffering. He agreed with this and stated he desires a natural peaceful passing,I notified him that a DNR order would be placed in his chart.

## 2023-06-20 NOTE — CONSULTS
[facility] ([address])  Palliative Medicine  Consult Note    Patient Name: Julio Benites  MRN: [id number]  Admission Date: 6/16/2023  Hospital Length of Stay: 4 days  Code Status: Full Code   Attending Provider: DEXTER Salmeron MD  Consulting Provider: Brittney Mcwilliams DNP  Primary Care Physician: Gregorio Jensen MD  Principal Problem:Colitis    Patient information was obtained from patient and primary team.      Inpatient consult to Palliative Care  Consult performed by: Brittney Mcwilliams DNP  Consult ordered by: DEXTER Salmeron MD        Assessment/Plan:     Cardiac/Vascular  Chronic systolic heart failure  - Echo 1/1/22   The left ventricle is normal in size with concentric remodeling and mildly decreased systolic function.   The estimated ejection fraction is 40%.   There are segmental left ventricular wall motion abnormalities.   Grade I left ventricular diastolic dysfunction.   With normal right ventricular systolic function.   The estimated PA systolic pressure is 30 mmHg.   Normal central venous pressure (3 mmHg).      GI  * Colitis  - Management per primary team/ GI     Esophagitis  - Patient with dysphagia  - GI consulted  - SLP consulted, plan for MBSS     Palliative Care  Encounter for palliative care  - Consult for advance care planning/ goals of care in elderly patient admitted with worsening abdominal pain. Extensive chart review performed.  - Along with Margareth Hernandez ( RN), met patient at bedside. He was sitting up in his bedside chair. Overall, he is very frail and cachectic, he is chronically ill appearing. He has a very weak cough. He reports he lives at home alone, however his grandson helps him at home. His wife of 63 years is a resident at [facility]. He has 2 children (son and daughter) who both live out of town. 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 He describes himself as a very independent man, even driving up until recently. He reports he is living for  "his wife and he is very fearful if he would die before her then if she would be okay. He stated he feels that he has lived his life and he has been very blessed, even citing this is the first time he has been hospitalized for more than 1 day. He told us that he was raised to treat everyone how you want to be treated, we did inform him that he is a very smart man and seems like a great person. He was very open that losing his independence and having to accept help from others is very difficult.  - Dr. Salmeron then presented to bedside and we again discussed overall plan for MBSS. Mr. Kim is looking forward to [facility] because he will be able to spend time with his wife.  - We discussed Mr. Kim's goals. He is hopeful to have good quality days with his wife, wants to remain independent and avoid suffering. Along those lines we did introduce code status, Mr. Kim said he wants to avoid any measures that would cause suffering. I was very transparent that given his age and health status CPR/ intubation would likely be suffering. He agreed with this and stated he desires a natural peaceful passing,I notified him that a DNR order would be placed in his chart.       Other  Cachexia  - Very frail/ cachectic  - BMI 10.58     Physical debility  - PT/OT consulted, recommending SNF         Thank you for your consult.     Subjective:     HPI:   Per H&P: "HPI: The patient is a 88 y.o. male with a past medical history of DM, NSTEMI, HTN, HLD, and systolic CHF who was recently treated for CAP  with azithromycin who presents with left upper quadrant abdominal pain.  Patient reports worsening left upper quadrant abdominal pain over the past 4-5 days.  He states it is positional and worse when he sits up or stands.  He rates the pain at a 10/10 during these times but 0/10 currently.  Patient also endorses about 1-1/2 months of shortness of breath.  He is also endorsed a significant weight loss during this time.  He denies chest " "pain, nausea, vomiting, diarrhea, fevers, chills."    At time of initial consult, patient seen sitting up in bedside chair. Patient is very frail and cachectic. Please see encounter for palliative care.       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    NSTEMI (non-ST elevated myocardial infarction) 1/1/2022       Past Surgical History:   Procedure Laterality Date    CATARACT EXTRACTION Right     ESOPHAGOGASTRODUODENOSCOPY Left 6/19/2023    Procedure: EGD (ESOPHAGOGASTRODUODENOSCOPY);  Surgeon: Kvng Holly MD;  [facility];  Service: Endoscopy;  Laterality: Left;    EYE SURGERY      INFECTED SKIN DEBRIDEMENT  8/2012    spider bite with surgery to right forearm    NE EVAL,SWALLOW FUNCTION,CINE/VIDEO RECORD  9/22/2021         subtotal gastrectomy  2013       Review of patient's allergies indicates:   Allergen Reactions    Lisinopril Rash     Patient reports history of rash with previous lisinopril use.     Nicoderm     Nicoderm cq [nicotine] Rash       Medications:  Continuous Infusions:  Scheduled Meds:   losartan  100 mg Oral Daily    NIFEdipine  90 mg Oral Daily    pantoprazole  40 mg Intravenous Q12H    polyethylene glycol  17 g Oral BID    pravastatin  20 mg Oral QHS    senna-docusate 8.6-50 mg  1 tablet Oral BID     PRN Meds:acetaminophen, albuterol-ipratropium, bisacodyL, HYDROmorphone, naloxone, ondansetron, oxyCODONE, prochlorperazine, sodium chloride 0.9%, sodium chloride " 0.9%    Family History       Problem Relation (Age of Onset)    Breast cancer Sister, Daughter    Lung cancer Mother    No Known Problems Father, Son, Maternal Grandmother, Maternal Grandfather, Paternal Grandmother, Paternal Grandfather          Tobacco Use    Smoking status: Former     Packs/day: 0.25     Types: Cigarettes     Quit date: 2022     Years since quittin.4     Passive exposure: Never    Smokeless tobacco: Never    Tobacco comments:     patient states that he started smoking cigarettes again: half a pack of cigarettes over 4 or 5 days   Substance and Sexual Activity    Alcohol use: Yes     Comment: drinks scotch 2X weekly    Drug use: No    Sexual activity: Not Currently     Partners: Female       Review of Systems   Constitutional:  Positive for fatigue and unexpected weight change. Negative for activity change and appetite change.   Respiratory:  Positive for cough. Negative for shortness of breath.    Objective:     Vital Signs (Most Recent):  Temp: 97.5 °F (36.4 °C) (23 1045)  Pulse: 80 (23 1045)  Resp: 16 (23 1045)  BP: 126/74 (23 1045)  SpO2: 100 % (23 1045) Vital Signs (24h Range):  Temp:  [97.3 °F (36.3 °C)-98.6 °F (37 °C)] 97.5 °F (36.4 °C)  Pulse:  [70-80] 80  Resp:  [15-18] 16  SpO2:  [86 %-100 %] 100 %  BP: (117-131)/(66-75) 126/74     Weight: 32.5 kg (71 lb 10.4 oz)  Body mass index is 10.58 kg/m².       Physical Exam  Constitutional:       Appearance: He is ill-appearing (Chronically).      Comments: Very frail/ cachectic/ temporal lobe wasting    Cardiovascular:      Rate and Rhythm: Normal rate.   Pulmonary:      Effort: No respiratory distress.      Comments: Weak cough   Neurological:      Mental Status: He is alert and oriented to person, place, and time.      Comments: Sitting up in chair           Review of Symptoms      Symptom Assessment (ESAS 0-10 Scale)  Fatigue:  2         Living Arrangements:  Lives alone    Psychosocial/Cultural:    See Palliative Psychosocial Note: Yes  Patient lives alone, however his grandson helps him at home. His wife is a resident at [facility].   **Primary  to Follow**  Palliative Care  Consult: No      Advance Care Planning   Advance Directives:   Do Not Resuscitate Status: Yes      Decision Making:  Patient answered questions  Goals of Care: The patient endorses that what is most important right now is to focus on spending time at home and quality of life, even if it means sacrificing a little time    Accordingly, we have decided that the best plan to meet the patient's goals includes continuing with treatment       Significant Labs: All pertinent labs within the past 24 hours have been reviewed.  CBC:   Recent Labs   Lab 06/19/23  0418   WBC 9.26   HGB 10.7*   HCT 34.2*   MCV 81*        BMP:  No results for input(s): GLU, NA, K, CL, CO2, BUN, CREATININE, CALCIUM, MG in the last 24 hours.  LFT:  Lab Results   Component Value Date    AST 18 06/19/2023    ALKPHOS 53 (L) 06/19/2023    BILITOT 0.5 06/19/2023     Albumin:   Albumin   Date Value Ref Range Status   06/19/2023 2.7 (L) 3.5 - 5.2 g/dL Final     Protein:   Total Protein   Date Value Ref Range Status   06/19/2023 6.3 6.0 - 8.4 g/dL Final     Lactic acid:   Lab Results   Component Value Date    LACTATE 2.4 (H) 06/16/2023       Significant Imaging: I have reviewed all pertinent imaging results/findings within the past 24 hours.      Discussed with primary team    Total visit time: 70 minutes   > 50% of 50 min visit spent in chart review, face to face discussion of symptom assessment, coordination of care with other specialists, and d/c planning  20 minutes ACP time spent: goals of care, emotional support, formulating and communication prognosis and goals of care, exploring burden/ benefit of various approaches of treatment.     Brittney Mcwilliams, MAHESH  Palliative Medicine  [facility] ([address])

## 2023-06-20 NOTE — PROGRESS NOTES
[address] Medicine  Progress Note    Patient Name: Julio Benites  MRN: [id number]  Patient Class: IP- Inpatient   Admission Date: 6/16/2023  Length of Stay: 3 days  Attending Physician: Carol Manzanares MD  Primary Care Provider: Gregorio Jensen MD        Subjective:     Principal Problem:Colitis        HPI:  The patient is a 88 y.o. male with a past medical history of DM, NSTEMI, HTN, HLD, and systolic CHF who was recently treated for CAP  with azithromycin who presents with left upper quadrant abdominal pain.  Patient reports worsening left upper quadrant abdominal pain over the past 4-5 days.  He states it is positional and worse when he sits up or stands.  He rates the pain at a 10/10 during these times but 0/10 currently.  Patient also endorses about 1-1/2 months of shortness of breath.  He is also endorsed a significant weight loss during this time.  He denies chest pain, nausea, vomiting, diarrhea, fevers, chills.        Overview/Hospital Course:  Mr. Benites presented with abdominal pain. Admitted with colitis with fecal impaction/constipation. General surgery and GI consulted. Manually disimpacted with partial relief on 6/17. Bowel regimen initiated.       Interval History: Report constipation and abdominal pain resolved and belly feel much better. He still reports coughing after swallowing but improved. Feels weak still and not much appetite although improved.    Review of Systems   Constitutional:  Positive for activity change, appetite change and unexpected weight change. Negative for chills and fever.   Respiratory:  Negative for shortness of breath.    Gastrointestinal:  Negative for abdominal pain, constipation, nausea and vomiting.   Neurological:  Positive for weakness.   Objective:     Vital Signs (Most Recent):  Temp: 97.6 °F (36.4 °C) (06/19/23 2028)  Pulse: 76 (06/19/23 2028)  Resp: 15 (06/19/23 2028)  BP: 117/66 (06/19/23 2028)  SpO2: 98 % (06/19/23 2028)  Vital Signs (24h Range):  Temp:  [97.5 °F (36.4 °C)-97.9 °F (36.6 °C)] 97.6 °F (36.4 °C)  Pulse:  [76-87] 76  Resp:  [15-18] 15  SpO2:  [95 %-100 %] 98 %  BP: (116-137)/(66-81) 117/66     Weight: 32.5 kg (71 lb 10.4 oz)  Body mass index is 10.58 kg/m².    Intake/Output Summary (Last 24 hours) at 6/19/2023 7903  Last data filed at 6/19/2023 0751  Gross per 24 hour   Intake 420 ml   Output --   Net 420 ml           Physical Exam  Constitutional:       Appearance: He is cachectic. He is ill-appearing.   HENT:      Head:      Comments: +Temporal wasting, enophthalmoas     Right Ear: Decreased hearing noted.      Ears:      Comments: Uses hearing aids     Nose: Nose normal.   Eyes:      Extraocular Movements: Extraocular movements intact.      Conjunctiva/sclera: Conjunctivae normal.      Pupils: Pupils are equal, round, and reactive to light.   Neck:      Comments: Pronounced collar bone  Cardiovascular:      Rate and Rhythm: Normal rate and regular rhythm.      Pulses:           Radial pulses are 2+ on the right side and 2+ on the left side.      Heart sounds: Normal heart sounds.   Pulmonary:      Effort: Pulmonary effort is normal.      Breath sounds: Decreased breath sounds present.   Abdominal:      General: Bowel sounds are normal. There is no distension.      Palpations: Abdomen is soft.      Tenderness: There is no abdominal tenderness. There is no guarding or rebound.      Comments: Scaphoid, no more fullness   Musculoskeletal:         General: Normal range of motion.      Cervical back: Normal range of motion and neck supple.      Comments: Muscular atrophy to all extremities   Skin:     General: Skin is warm and dry.      Capillary Refill: Capillary refill takes 2 to 3 seconds.   Neurological:      Mental Status: He is alert and oriented to person, place, and time.      GCS: GCS eye subscore is 4. GCS verbal subscore is 5. GCS motor subscore is 6.      Motor: Motor function is intact.   Psychiatric:          "Mood and Affect: Mood normal.         Speech: Speech normal.         Behavior: Behavior normal.         Thought Content: Thought content normal.           Significant Labs: All pertinent labs within the past 24 hours have been reviewed.    Significant Imaging: I have reviewed all pertinent imaging results/findings within the past 24 hours.      Assessment/Plan:      * Colitis  Constipation  Fecal impaction  Abdominal pain  Cachexia   Debility  - CT of abd/pelvis with "persistent changes of small bowel ileus and constipation with rectal fecal impaction and early signs of stercoral colitis.  Thickening at the anal rectal verge either due to inflammation.  Mass is difficult to exclude.  - General surgery following, manually disimpacted with partial relief on 6/16  - s/p enema and on Senna, miralax regimen with some improvement  - KUB done on 6/18 still with heavy stool burden  - Continue clear liquid diet, consider IVF if decreased intake  - No indication for antibiotics at this time  - Consult placed to GI, underwent EGD on 6/19:    - LA Grade D esophagitis with no bleeding.                          - Marija-en-Y gastrojejunostomy with gastrojejunal                          anastomosis characterized by healthy appearing                          mucosa.  - Repeat KUB today with no more fecal impaction/constipation  - Continue bowel regimen but will decrease frequency of Miralax to BID today and diet to stay at clears for now   - Speech therapy following, plan for further evaluation with MBSS  - PT/OT, will need SNF and patient is agreeable and case management initiated placement process    Elevated troponin  - Trended troponins, overall flat and stable  - Monitor on telemetry, no further workup needed    Chronic systolic heart failure  - BNP- 62, no signs of acute decompensation  - Monitor for acute decompensation    Mixed hyperlipidemia  - Continue pravastatin    Essential hypertension, benign  - Continue nifedipine, " losartan      VTE Risk Mitigation (From admission, onward)         Ordered     IP VTE HIGH RISK PATIENT  Once         06/16/23 1829     Place sequential compression device  Until discontinued         06/16/23 1829     Reason for No Pharmacological VTE Prophylaxis  Once        Question:  Reasons:  Answer:  Risk of Bleeding    06/16/23 1829                      Carol Manzanares MD  Department of Hospital Medicine   [facility] ([address])

## 2023-06-20 NOTE — PT/OT/SLP PROGRESS
Occupational Therapy      Patient Name:  Julio Benites   MRN:  [id number]    Patient not seen today secondary to off floor for x-ray . Will follow-up at next scheduled therapy session.    ANDREWS Renee  6/20/2023

## 2023-06-20 NOTE — ASSESSMENT & PLAN NOTE
"Constipation  Fecal impaction  Abdominal pain  Cachexia   Debility  - CT of abd/pelvis with "persistent changes of small bowel ileus and constipation with rectal fecal impaction and early signs of stercoral colitis.  Thickening at the anal rectal verge either due to inflammation.  Mass is difficult to exclude.  - General surgery following, manually disimpacted with partial relief on 6/16  - s/p enema and on Senna, miralax regimen with some improvement  - KUB done on 6/18 still with heavy stool burden  - Continue clear liquid diet, consider IVF if decreased intake  - No indication for antibiotics at this time  - Consult placed to GI, underwent EGD on 6/19:    - LA Grade D esophagitis with no bleeding.                          - Marija-en-Y gastrojejunostomy with gastrojejunal                          anastomosis characterized by healthy appearing                          mucosa.  - Repeat KUB today with no more fecal impaction/constipation  - Continue bowel regimen but will decrease frequency of Miralax to BID today and diet to stay at clears for now   - Speech therapy following, plan for further evaluation with MBSS  - PT/OT, will need SNF and patient is agreeable and case management initiated placement process  "

## 2023-06-20 NOTE — PLAN OF CARE
Discharge pending SNF acceptance.   06/20/23 1337   Discharge Reassessment   Assessment Type Discharge Planning Reassessment   Did the patient's condition or plan change since previous assessment? No   Discharge Plan discussed with: Patient;Adult children   Communicated KYLAH with patient/caregiver Date not available/Unable to determine   Discharge Plan A Skilled Nursing Facility   Discharge Plan B Home Health   DME Needed Upon Discharge  none   Transition of Care Barriers No family/friends to help   Why the patient remains in the hospital Requires continued medical care

## 2023-06-20 NOTE — PLAN OF CARE
06/20/23 1132   Physical Activity   On average, how many days per week do you engage in moderate to strenuous exercise (like a brisk walk)? 0 days   On average, how many minutes do you engage in exercise at this level? 0 min   Financial Resource Strain   How hard is it for you to pay for the very basics like food, housing, medical care, and heating? Not hard   Housing Stability   In the last 12 months, was there a time when you were not able to pay the mortgage or rent on time? N   In the last 12 months, was there a time when you did not have a steady place to sleep or slept in a shelter (including now)? N   Transportation Needs   In the past 12 months, has lack of transportation kept you from medical appointments or from getting medications? no   Food Insecurity   Within the past 12 months, you worried that your food would run out before you got the money to buy more. Never true   Within the past 12 months, the food you bought just didn't last and you didn't have money to get more. Never true   Stress   Do you feel stress - tense, restless, nervous, or anxious, or unable to sleep at night because your mind is troubled all the time - these days? Not at all   Social Connections   In a typical week, how many times do you talk on the phone with family, friends, or neighbors? Twice a week   How often do you get together with friends or relatives? Never   How often do you attend Worship or Confucianism services? More than 4   Do you belong to any clubs or organizations such as Worship groups, unions, fraternal or athletic groups, or school groups? Yes   How often do you attend meetings of the clubs or organizations you belong to? More than 4   Are you , , , , never , or living with a partner?    Alcohol Use   Q1: How often do you have a drink containing alcohol? Never   Q2: How many drinks containing alcohol do you have on a typical day when you are drinking? None   Q3: How  often do you have six or more drinks on one occasion? Never

## 2023-06-20 NOTE — PROCEDURES
Modified Barium Swallow    Patient Name:  Julio Benites   MRN:  [id number]      Recommendations:     Recommendations:                General Recommendations:    Palliative care assessment of wishes re: PO intake  SLP to follow 3-5x/week, to trial rehabilitative course of dysphagia tx vs promote safest method of oral intake if comfort feeding is opted for     Diet recommendations:   Diet recommendations pending discussion with palliative care  If patient does not wish to receive alternative method of nutrition/hydration, would recommend continuing small amount of clear/full liquid diet vs pureed solids and thin liquids  If goals of care remain full code, wishes for alternative means of nutrition/hydration, and would like to trial course of rehabilitation, consider NPO except icechips with alternative feeding, though, this method may not significantly benefit patient or completely reduce risk of development of pna in setting of significant malnourishment and likely poor ability to build muscle needed for rehabilitation of pharyngeal/laryngeal musculature     Aspiration Precautions: If medical team and patient wish to remain on PO nutrition recommend the following:  Frequent and thorough oral care to reduce risk of aspiration pna of likely aspiration of secretions/intake  Small singular sips of liquid via cup  Alternate solids/purees   Effortful swallow during intake  Intermittent throat clear/cough during intake    General Precautions: Standard,        Referral     Reason for Referral  Patient was referred for a Modified Barium Swallow Study to assess the efficiency of his swallow function, rule out aspiration and make recommendations regarding safe dietary consistencies, effective compensatory strategies, and safe eating environment.     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    NSTEMI (non-ST elevated myocardial infarction) 1/1/2022       Objective:     Patient is alert and cooperative, followed directions throughout assessment period    Visualization  Patient was seen in the lateral view    Consistencies Assessed  Thin liquid varibar barium via 5cc cupsips self-administered   Puree 1 tsp varibar pudding barium    Oral Preparation/Oral Phase  Adequate mandibular depression and elevation for acceptance of bolus  Reduced labial seal on cup, utilized lingual to upper labial seal on spoon for stripping of bolus vs bilabial receipt  Intermittent reduced posterior lingual elevation with contact to velum, resulting in intermittent preloss to pharynx during oral prep phase with liquids  Frequent posterior lingual residues post swallow, suspect reduced lingual strength    Pharyngeal Phase   Pharyngeal swallow was further evaluated utilizing the analysis of Swallowing Physiology: Events, Kinematics & Timing for Use in Clinical Practice (ASPEKT-C) method of evaluation. This method of evaluation further assesses the swallowing safety and efficiency utilizing measurements of timing and pharyngeal residues as well as pharyngeal area at maximum pharyngeal constriction (PhAMPC) as indicated.     THIN LIQUID  Swallow reaction time (defined as time from the bolus passing the ramus of the mandible until hyoid burst) was measured at 165 ms   This is indicative of a timely swallow reaction time (WNL = 212.12ms (±579.54) for thin liquids)   Time to LVC per ASPEKT-C measurements = 366.3ms  WFL time to LVC for thin liquids =(<167ms)  This is  indicative of delayed onset of laryngeal vestibule closure  There was reduced duration of LVC measuring 333ms (WNL =603.70 (±272.53))  Pharyngeal area at max constriction measured 8.77% of total pharyngeal space- (WFL = <2.7%) indicative of decreased base of tongue contraction/pharyngeal stripping wave with incomplete pharyngeal obliteration  Resultant residues measuring 7.69%  (Normative amount of residues = <1.7%)  Airway invasion noted in 100% of attempts, with PAS scores of 3-8   Airway invasion occurred during and after the swallow as bolus within the pharynx migrated into the laryngeal vestibule prior to full LVC and pharyngeal residues migrated into the laryngeal vestibule after the swallow  On initial swallow patient with PAS score of 3 indicating that contrast entered the laryngeal vestibule, remained within laryngeal vestibule post swallow initiation  A cued cough was inefficient and did not clear the larynx  As the study progressed contrast within the larynx migrated toward and eventually below the vocal folds, with trace aspiration noted      PUREED SOLIDS  Bolus aggregated to the level of the vallecular space prior to intiation of the swallow  Swallow reaction time (defined as time from the bolus passing the ramus of the mandible until hyoid burst) was measured at 732.6 ms   This is indicative of a timely swallow reaction time (WNL = 827.97ms (±1448.47) for pureed solids)   Time to LVC per ASPEKT-C measurements = 266.4ms. WFL time to LVC for pureed solids =(<167ms)  This is indicative of delayed onset of laryngeal vestibule closure  Pharyngeal area at max constriction measured up to 31.4% of total pharyngeal space- (WFL = <1.5%)  indicative of decreased base of tongue contraction/pharyngeal stripping wave with incomplete pharyngeal obliteration  Resultant residues measuring 28.6% of pharynx (Normative amount of residues = <1.4%)  Given subsequent swallows with residues head turns were trialed   Head  turn right not successful in decreasing residues, no contrast passed through UES with head turn right attempt  residues following head turn left measured 18.4% of pharynx  There was airway penetration with swallows of puree, however, unclear if pureed contrast entering the laryngeal vestibule vs prior residues from thin liquids within the LVC traveling toward vocal folds.     Cervical Esophageal Phase  Significantly reduced width of upper esophageal opening  Adequate, though, on lower side of average duration of UES opening measuring at 366.3ms (WNL = 429.53ms (±87.44))    Assessment:     Impressions  Globally weak pharyngeal swallow noted  Mildly-moderately reduced hyolaryngeal elevation/excursion and UES relaxation  Severely reduced base of tongue retraction, laryngeal vestibule closure, epiglottic retroflexion, pharyngeal squeeze  Deficits resulting in decreased pharyngeal obliteration, subsequent pharyngeal stasis measuring up to 7.69% of pharynx with thin liquids and 28.6% of pharynx with pureed solids  There was discoordination with time to laryngeal vestibule closure, and delayed airway protection with time to laryngeal vestibule closure from hyoid burst/swallow initiation measuring up to 366.3ms  WFL time to LVC for thin liquids =(<167ms), there was also significantly reduced duration of LVC measuring at 333ms (WNL =603.70 (±272.53))  Above deficits resulted in airway invasion occurring in 100% of attempts, occurring during and after the swallow as bolus within the pharynx migrated into the laryngeal vestibule prior to full LVC and pharyngeal residues migrated into the laryngeal vestibule after the swallow  On initial swallow of thin liquids patient with PAS score of 3 indicating that contrast entered the laryngeal vestibule, remained within laryngeal vestibule post swallow initiation  A cued cough was inefficient and did not clear the larynx  As the study progressed contrast within the larynx migrated  toward and eventually below the vocal folds, with trace aspiration noted   No strategies significantly improved pharyngeal clearance or airway protection  Notably post assessment patient with coughing, red tinged saliva expectorated. Patient reports consuming red jello just prior to assessment.     Prognosis: Guarded, severe level of malnourishment and no safe intake method at this time will significantly limit progress in traditional exercise based therapeutic interventions. Strongly recommend palliative care consult to discuss goals of care regarding intake and possibility of alternative means of nutrition/hydration    Barriers:  Fatigue  Respiratory compromise  Malnourishment with likely poor outcomes of therapeutic interventions in setting of reduced ability to receive safe nutrition and hydration    Plan  Recommend palliative care consult    Education  Results were discussed with patient.   Results were discussed with Medical Team, reviewed with MD and palliative NP via secure chat    Goals:   Multidisciplinary Problems       SLP Goals          Problem: SLP    Goal Priority Disciplines Outcome   SLP Goal     SLP Ongoing, Progressing   Description: Goals may change based on discussion with palliative care, and assessment of goals of care between patient, medical team, and patient. As patient is currently full code, goals will reflect rehabilitative method of tx, though, patient may not build musculature with rehabilitative tx due to significant level of deconditioning, appearance of malnourishment to significantly improve safe swallow outcomes.    1. Patient and caregivers will complete oral care 3x/day to reduce risk of aspiration pna development due to likely aspiration of secretions and PO intake.     2. Patient will identify pillars of aspiration pna in 100% of opportunities during education and tx, to improve insight into deficits and reduce risk of development of pna.     3. Patient will complete effortful  swallow exercise given min cues from clinician for appropriate positioning and increased effort to improve pharyngeal constriction.     4. Patient will complete exercises focused on suprahyoid strength including chin tuck against resistance and shaker exercise given min cues from clinician for appropriate positioning to improve hyolaryngeal elevation, excursion and LVC.                            Plan:   Patient to be seen:  Therapy Frequency: 3 x/week, 5 x/week   Plan of Care expires:     Plan of Care reviewed with:  patient        Barriers to Discharge:   need for determination of goals of care re: feeding, and establishment of safe feeding planned    Time Tracking:   SLP Treatment Date:   23  Speech Start Time:  1415  Speech Stop Time:  1500     Speech Total Time (min):  45 min    2023  Standard norms for ultrathin and pudding barium above as studied by Eliza et al 2018.     Eliza ALONZO, Bebeto KL, Joseline E, Margy AK, Leandro F, Imani L, Francis A, Keyon A, Blanca A. Swallowing Kinematic Differences Across Frozen, Mixed, and Ultrathin Liquid Boluses in Healthy Adults: Age, Sex, and Normal Variability. J Speech Lang Hear Res. 2018 ;61(7):7880-4771. doi: 10.1044/6359_EEAAU-R-. PMID: [id number]; PMCID: [id number].    Measurements re: pharyngeal area at max constriction, pharyngeal stasis norms, and time to LVC norms as studiet by Kirill et al., 2019.    Kirill CM, John M, Lionel REESE, Adin BT, Delaney AM, Lux XAVIERV, Fitz S, Alma MS, Martha TJ, Tom AA, Ivan TS. Reference Values for Healthy Swallowing Across the Range From Thin to Extremely Thick Liquids. J Speech Lang Hear Res. 2019 May 21;62(5):2258-1549. doi: 10.1044/4554_RNDMP-Y-. PMID: [id number]; PMCID: [id number].

## 2023-06-21 LAB
BACTERIA BLD CULT: NORMAL
BACTERIA BLD CULT: NORMAL

## 2023-06-21 PROCEDURE — 99233 SBSQ HOSP IP/OBS HIGH 50: CPT | Mod: HCNC,,, | Performed by: FAMILY MEDICINE

## 2023-06-21 PROCEDURE — 25000003 PHARM REV CODE 250: Mod: HCNC | Performed by: INTERNAL MEDICINE

## 2023-06-21 PROCEDURE — 97530 THERAPEUTIC ACTIVITIES: CPT | Mod: HCNC,CQ

## 2023-06-21 PROCEDURE — 99233 SBSQ HOSP IP/OBS HIGH 50: CPT | Mod: HCNC,,, | Performed by: INTERNAL MEDICINE

## 2023-06-21 PROCEDURE — 99233 PR SUBSEQUENT HOSPITAL CARE,LEVL III: ICD-10-PCS | Mod: HCNC,,, | Performed by: INTERNAL MEDICINE

## 2023-06-21 PROCEDURE — 11000001 HC ACUTE MED/SURG PRIVATE ROOM: Mod: HCNC

## 2023-06-21 PROCEDURE — 92526 ORAL FUNCTION THERAPY: CPT | Mod: HCNC

## 2023-06-21 PROCEDURE — 63600175 PHARM REV CODE 636 W HCPCS: Mod: HCNC | Performed by: INTERNAL MEDICINE

## 2023-06-21 PROCEDURE — 99233 PR SUBSEQUENT HOSPITAL CARE,LEVL III: ICD-10-PCS | Mod: HCNC,,, | Performed by: FAMILY MEDICINE

## 2023-06-21 PROCEDURE — 94761 N-INVAS EAR/PLS OXIMETRY MLT: CPT | Mod: HCNC

## 2023-06-21 PROCEDURE — 97530 THERAPEUTIC ACTIVITIES: CPT | Mod: HCNC,CO

## 2023-06-21 PROCEDURE — 97116 GAIT TRAINING THERAPY: CPT | Mod: HCNC,CQ

## 2023-06-21 PROCEDURE — C9113 INJ PANTOPRAZOLE SODIUM, VIA: HCPCS | Mod: HCNC | Performed by: INTERNAL MEDICINE

## 2023-06-21 RX ADMIN — PRAVASTATIN SODIUM 20 MG: 20 TABLET ORAL at 08:06

## 2023-06-21 RX ADMIN — NIFEDIPINE 90 MG: 30 TABLET, FILM COATED, EXTENDED RELEASE ORAL at 10:06

## 2023-06-21 RX ADMIN — PANTOPRAZOLE SODIUM 40 MG: 40 INJECTION, POWDER, LYOPHILIZED, FOR SOLUTION INTRAVENOUS at 09:06

## 2023-06-21 RX ADMIN — PANTOPRAZOLE SODIUM 40 MG: 40 INJECTION, POWDER, LYOPHILIZED, FOR SOLUTION INTRAVENOUS at 08:06

## 2023-06-21 RX ADMIN — LOSARTAN POTASSIUM 100 MG: 50 TABLET, FILM COATED ORAL at 10:06

## 2023-06-21 NOTE — PROGRESS NOTES
"[facility] ([address])  Palliative Medicine  Progress Note    Patient Name: Julio Benites  MRN: [id number]  Admission Date: 6/16/2023  Hospital Length of Stay: 5 days  Code Status: Full Code   Attending Provider: DEXTER Salmeron MD  Consulting Provider: Brittney Mcwilliams DNP  Primary Care Physician: Gregorio Jensen MD  Principal Problem:Colitis    Patient information was obtained from patient, relative(s) and primary team.      Assessment/Plan:     Cardiac/Vascular  Chronic systolic heart failure  - Echo 1/1/22   The left ventricle is normal in size with concentric remodeling and mildly decreased systolic function.   The estimated ejection fraction is 40%.   There are segmental left ventricular wall motion abnormalities.   Grade I left ventricular diastolic dysfunction.   With normal right ventricular systolic function.   The estimated PA systolic pressure is 30 mmHg.   Normal central venous pressure (3 mmHg).      GI  * Colitis  - Management per primary team/ GI     Dysphagia  - Reviewed results of MBSS.     Esophagitis  - GI consulted    Palliative Care  Encounter for palliative care  6/21/23  - Patient seen sitting up in bed. He reports he believes the MBSS went "well". He reports cough with swallowing has been going on for some time now. I discussed with him concern of aspiration and very weak cough. While Mr. Benites did have some difficulty understanding everything, he was very consistent in his wishes that he wants to be with his wife, he knows "I won't be here for long", and he wants to avoid invasive procedures/ suffering. I did discuss feeding tube, which he stated he has never thought about. I was very transparent that based on his goals (spending time with wife/ avoiding suffering and invasive procedures) and overall condition a PEG tube would likely not help him achieve his goals and I would recommend we get him with his wife to spend whatever time he may have left, in which " "he stated "that is my only concern"  - I then discussed with CM and unfortunately [facility] is not in network with his insurance.  - I contacted his very supportive daughter, Natali. We again discussed results of MBSS. She reports cough has been ongoing for some time. I was very transparent with what her fathers wishes are and that PEG Tube would involve surgery. We also discussed difficulty with [facility] SNF not being in network. Natali is very supportive of whatever her father decides. She asked if her father could go to SNF at another facility until she is able to come in town on July 1- she is currently dealing with her own medical issues.     6/20/23  - Consult for advance care planning/ goals of care in elderly patient admitted with worsening abdominal pain. Extensive chart review performed.  - Along with Margareth Hernandez ( RN), met patient at bedside. He was sitting up in his bedside chair. Overall, he is very frail and cachectic, he is chronically ill appearing. He has a very weak cough. He reports he lives at home alone, however his grandson helps him at home. His wife of 63 years is a resident at [facility]. He has 2 children (son and daughter) who both live out of town. 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 He describes himself as a very independent man, even driving up until recently. He reports he is living for his wife and he is very fearful if he would die before her then if she would be okay. He stated he feels that he has lived his life and he has been very blessed, even citing this is the first time he has been hospitalized for more than 1 day. He told us that he was raised to treat everyone how you want to be treated, we did inform him that he is a very smart man and seems like a great person. He was very open that losing his independence and having to accept help from others is very difficult.  - Dr. Salmeron then presented to bedside and we again discussed overall plan for MBSS. Mr. Kim is looking " "forward to [facility] because he will be able to spend time with his wife.  - We discussed Mr. Kim's goals. He is hopeful to have good quality days with his wife, wants to remain independent and avoid suffering. Along those lines we did introduce code status, Mr. Kim said he wants to avoid any measures that would cause suffering. I was very transparent that given his age and health status CPR/ intubation would likely be suffering. He agreed with this and stated he desires a natural peaceful passing,I notified him that a DNR order would be placed in his chart.       Other  Cachexia  - Very frail/ cachectic  - BMI 10.58     Physical debility  - PT/OT consulted, recommending SNF         I will follow-up with patient. Please contact us if you have any additional questions.    Subjective:     Chief Complaint:   Chief Complaint   Patient presents with    Abdominal Pain     C/o LUQ pain 10/10 x 3 days with no improvement. Also c/o SOB x 1 month. O2 90-92% on RA. Speaking in complete sentences. PMH Stomach Ca. -CP. -n/v/d. /111 all other VSS       HPI:   Per H&P: "HPI: The patient is a 88 y.o. male with a past medical history of DM, NSTEMI, HTN, HLD, and systolic CHF who was recently treated for CAP  with azithromycin who presents with left upper quadrant abdominal pain.  Patient reports worsening left upper quadrant abdominal pain over the past 4-5 days.  He states it is positional and worse when he sits up or stands.  He rates the pain at a 10/10 during these times but 0/10 currently.  Patient also endorses about 1-1/2 months of shortness of breath.  He is also endorsed a significant weight loss during this time.  He denies chest pain, nausea, vomiting, diarrhea, fevers, chills."    At time of initial consult, patient seen sitting up in bedside chair. Patient is very frail and cachectic. Please see encounter for palliative care.       Hospital Course:  No notes on file    Interval History:Frail/ cachectic/ " weak cough     Medications:  Continuous Infusions:  Scheduled Meds:   losartan  100 mg Oral Daily    NIFEdipine  90 mg Oral Daily    pantoprazole  40 mg Intravenous Q12H    polyethylene glycol  17 g Oral BID    pravastatin  20 mg Oral QHS    senna-docusate 8.6-50 mg  1 tablet Oral BID     PRN Meds:acetaminophen, albuterol-ipratropium, bisacodyL, HYDROmorphone, naloxone, ondansetron, oxyCODONE, prochlorperazine, sodium chloride 0.9%, sodium chloride 0.9%    Objective:     Vital Signs (Most Recent):  Temp: 97.8 °F (36.6 °C) (06/21/23 0721)  Pulse: 79 (06/21/23 0721)  Resp: 16 (06/21/23 0721)  BP: 125/82 (06/21/23 0721)  SpO2: 98 % (06/21/23 0905) Vital Signs (24h Range):  Temp:  [97.4 °F (36.3 °C)-97.8 °F (36.6 °C)] 97.8 °F (36.6 °C)  Pulse:  [72-80] 79  Resp:  [16-18] 16  SpO2:  [94 %-100 %] 98 %  BP: (122-142)/(74-86) 125/82     Weight: 32.5 kg (71 lb 10.4 oz)  Body mass index is 10.58 kg/m².          Physical Exam  Constitutional:       Appearance: He is ill-appearing (Chronically).      Comments: Very frail/ cachectic/ temporal lobe wasting    Cardiovascular:      Rate and Rhythm: Normal rate.   Pulmonary:      Effort: No respiratory distress.      Comments: Weak cough   Neurological:      Mental Status: He is alert and oriented to person, place, and time.      Comments: Sitting up in bed           Review of Symptoms        Symptom Assessment (ESAS 0-10 Scale)  Fatigue:  2            Living Arrangements:  Lives alone     Psychosocial/Cultural:   See Palliative Psychosocial Note: Yes  Patient lives alone, however his grandson helps him at home. His wife is a resident at [facility].   **Primary  to Follow**  Palliative Care  Consult: No    Advance Care Planning   Advance Directives:   Goals of Care: What is most important right now is to focus on spending time at home, quality of life, even if it means sacrificing a little time. Accordingly, we have decided that the best plan to meet the  patient's goals includes continuing with treatment.       Significant Labs: All pertinent labs within the past 24 hours have been reviewed.  CBC:   Recent Labs   Lab 06/19/23  0418   WBC 9.26   HGB 10.7*   HCT 34.2*   MCV 81*        BMP:  No results for input(s): GLU, NA, K, CL, CO2, BUN, CREATININE, CALCIUM, MG in the last 24 hours.  LFT:  Lab Results   Component Value Date    AST 18 06/19/2023    ALKPHOS 53 (L) 06/19/2023    BILITOT 0.5 06/19/2023     Albumin:   Albumin   Date Value Ref Range Status   06/19/2023 2.7 (L) 3.5 - 5.2 g/dL Final     Protein:   Total Protein   Date Value Ref Range Status   06/19/2023 6.3 6.0 - 8.4 g/dL Final     Lactic acid:   Lab Results   Component Value Date    LACTATE 2.4 (H) 06/16/2023       Significant Imaging: I have reviewed all pertinent imaging results/findings within the past 24 hours.    Discussed with Dr. Salmeron and CHASE     > 50% of 35 min visit spent in chart review, face to face discussion of symptom assessment, coordination of care with other specialists, and d/c planning        Brittney Mcwilliams DNP  Palliative Medicine  [facility] ([address])

## 2023-06-21 NOTE — PROGRESS NOTES
Gastroenterology Progress Note    Active Hospital Problems    Encounter for palliative care      Dysphagia      Esophagitis      Elevated troponin      *Colitis      Cachexia      Chronic systolic heart failure      Physical debility      Mixed hyperlipidemia      Essential hypertension, benign        Subjective:  Patient seen and examined.  The patient is stable this AM.  No new complaints.  SLP notes from yesterday reviewed.      Reviews of Systems:  General:  Negative for fever or chills  Cardiovascular:  Negative for chest pain, shortness of breath, palpitations    Physical Exam    Vitals:  Vital Signs (Most Recent):  Temp: 97.8 °F (36.6 °C) (06/21/23 0721)  Pulse: 79 (06/21/23 0721)  Resp: 16 (06/21/23 0721)  BP: 125/82 (06/21/23 0721)  SpO2: 98 % (06/21/23 0905) Vital Signs (24h Range):  Temp:  [97.4 °F (36.3 °C)-97.8 °F (36.6 °C)] 97.8 °F (36.6 °C)  Pulse:  [72-80] 79  Resp:  [16-18] 16  SpO2:  [94 %-100 %] 98 %  BP: (122-142)/(74-86) 125/82     GEN: Cachectic in no apparent distress   HENT: Normocephalic, anicteric sclera   Cardiovascular: Regular rate and rhythm. No murmurs appreciated.   Chest: Non-labored respirations. Breath sounds equal   Abdomen: soft, NTN, +BS  Psych: Appropriate mood and affect.   Extermities: No C/C/E. 2+ dorsalis pedis pulses bilaterally  Skin: No new visible or palpable lesions.      Medications/Infusions:  Current Facility-Administered Medications   Medication Dose Route Frequency Provider Last Rate Last Admin    acetaminophen tablet 650 mg  650 mg Oral Q4H PRN Kvng Holly MD        albuterol-ipratropium 2.5 mg-0.5 mg/3 mL nebulizer solution 3 mL  3 mL Nebulization Q4H PRN Kvng Holly MD        bisacodyL suppository 10 mg  10 mg Rectal Daily PRN Kvng Holly MD        HYDROmorphone (PF) injection 0.4 mg  0.4 mg Intravenous Q5 Min PRN Isauro Bhandari MD        losartan tablet 100 mg  100 mg Oral Daily Kvng Holly MD   100 mg at 06/20/23 1007     naloxone 0.4 mg/mL injection 0.02 mg  0.02 mg Intravenous PRN Kvng Holly MD        NIFEdipine 24 hr tablet 90 mg  90 mg Oral Daily Kvng Holly MD   90 mg at 06/20/23 0851    ondansetron injection 4 mg  4 mg Intravenous Q8H PRN Kvng Holly MD   4 mg at 06/17/23 1456    oxyCODONE immediate release tablet 5 mg  5 mg Oral Q3H PRN Isauro Bhandari MD        pantoprazole injection 40 mg  40 mg Intravenous Q12H Kvng Holly MD   40 mg at 06/20/23 2106    polyethylene glycol packet 17 g  17 g Oral BID Carol Manzanares MD        pravastatin tablet 20 mg  20 mg Oral QHS Kvng Holly MD   20 mg at 06/20/23 2106    prochlorperazine injection Soln 5 mg  5 mg Intravenous Q30 Min PRN Isauro Bhandari MD        senna-docusate 8.6-50 mg per tablet 1 tablet  1 tablet Oral BID Kvng Holly MD   1 tablet at 06/19/23 2025    sodium chloride 0.9% flush 10 mL  10 mL Intravenous Q8H PRN Kvng Holly MD        sodium chloride 0.9% flush 3 mL  3 mL Intravenous PRN Isauro Bhandari MD           Intake and Output:    Intake/Output Summary (Last 24 hours) at 6/21/2023 0942  Last data filed at 6/20/2023 2108  Gross per 24 hour   Intake 360 ml   Output 350 ml   Net 10 ml       Labs:    No new    Imaging and other studies:    MBSS and SLP interpreation reviewed - significant issues with penetration, aspiration and stasis noted.     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      Plan:     Continue with BID PPI for 2 weeks total, then daily   Continue with BID Miralax and senna    PRN enema    SLP/MBSS reviewed.  Agree with discussions from Palliative Care with patient and family re: goals of care.  Alternative means of nutrition - PEG tube - would have to be surgically placed due to prior surgery.    Will follow at this time.

## 2023-06-21 NOTE — PT/OT/SLP PROGRESS
Occupational Therapy   Treatment    Name: Julio Benites  MRN: [id number]  Admitting Diagnosis:  Colitis  2 Days Post-Op    Recommendations:     Discharge Recommendations: nursing facility, skilled  Discharge Equipment Recommendations:  to be determined by next level of care  Barriers to discharge:  Decreased caregiver support (current functional level)    Assessment:     Julio Benites is a 88 y.o. male with a medical diagnosis of Colitis.  He presents with fatigue and low activity tolerance/endurance. Performance deficits affecting function are weakness, impaired endurance, impaired self care skills, impaired functional mobility, gait instability, impaired balance, decreased coordination, decreased upper extremity function, decreased lower extremity function, decreased safety awareness, impaired cardiopulmonary response to activity. Patient agreeable to therapy but then quickly fatigue after performing sit <> stand with RW in preparation for grooming tasks. Then proceed wit sit back down and getting back to bed. Patient stating he is not feeling well and quickly getting tired when moving around. MOYA came back and tolerated ~1 hr OOB sitting up in chair. Patient transferred back to bed with Max A and [facility].     Rehab Prognosis:  Fair/guarded; patient would benefit from acute skilled OT services to address these deficits and reach maximum level of function.       Plan:     Patient to be seen 4 x/week to address the above listed problems via self-care/home management, therapeutic exercises, therapeutic activities  Plan of Care Expires: 07/02/23  Plan of Care Reviewed with: patient    Subjective     Chief Complaint: Fatiguing quickly when moving around  Patient/Family Comments/goals: wanting to be with his wife at [facility]   Pain/Comfort:  Pain Rating 1: 0/10    Objective:     Communicated with: RN (Alina) prior to session.  Patient found supine with peripheral IV upon OT entry to room.    General Precautions: Standard,  fall, hearing impaired    Orthopedic Precautions:N/A  Braces: N/A  Respiratory Status: Room air in AM. PM: 3 mL nasal canula      Occupational Performance:     Bed Mobility:    (AM) Supine <> Sit: Mod A - assistance with BLE management   (AM) Scoot HOB: Mod A and cues for hand/feet placement     (PM) Sit > Supine: Mod A with extended time   (PM) Scoot HOB: Max A     Functional Mobility/Transfers:  (AM) Sit <> Stand: Mod A and RW   Patient tolerated standing 1 min and quickly fatigued - proceed to sit on EOB and then sit > supine    (PM) Scooting edge of chair: Min A   Patient tolerated OOB ~1hr up in chair.   (PM) Sit <> Stand: Max A and [place] - cues for feet placement prior to standing   (PM) Chair> Bed Transfer: Max A and [place] with step transfer with extended time - pt fatiguing during transfer           [facility] 6 Click ADL: 14    Treatment & Education:  OT role, plan of care, progression of goals, importance of continued OOB activity, ADL/functional transfer and mobility retraining, discharge recommendation, call don't fall, safety precautions, fall prevention.      Patient left HOB elevated with all lines intact, call button in reach, bed alarm on, and RN notified    GOALS:   Multidisciplinary Problems       Occupational Therapy Goals          Problem: Occupational Therapy    Goal Priority Disciplines Outcome Interventions   Occupational Therapy Goal     OT, PT/OT Ongoing, Progressing    Description: Goals to be met by: 7/2/2023     Patient will increase functional independence with ADLs by performing:    UE Dressing with Set-up Assistance.  LE Dressing Supervision, increased time, and 2 rest breaks.  Grooming while standing at sink with Supervision.  Toileting from bedside commode with Supervision for hygiene and clothing management.   Toilet transfer to bedside commode with Supervision.                         Time Tracking:     OT Date of Treatment: 06/21/23  OT Start Time: 1116  OT Stop Time: 1130  OT Total Time  (min): 14 min    OT Start Time: 1529  OT Stop Time: 1538  OT Total Time (min): 9 min    Session divided in two: 23 mins total treatment time      Billable Minutes:Therapeutic Activity 23 min    OT/DEON: DEON     Number of DEON visits since last OT visit: 1    6/21/2023

## 2023-06-21 NOTE — ASSESSMENT & PLAN NOTE
"Constipation, fecal impaction, abdominal pain, cachexia, debility, dysphagia  - CT of abd/pelvis with "persistent changes of small bowel ileus and constipation with rectal fecal impaction and early signs of stercoral colitis.  Thickening at the anal rectal verge either due to inflammation.  Mass is difficult to exclude.  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  - Continue bowel regimen.  - SLP following, to MBSS to further evaluate today.  - Palliative consultation given significant cachexia and dysphagia.  - Agreeable to SNF placement.  "

## 2023-06-21 NOTE — PT/OT/SLP PROGRESS
Physical Therapy Treatment    Patient Name:  Julio Benites   MRN:  [id number]    Recommendations:     Discharge Recommendations: nursing facility, skilled  Discharge Equipment Recommendations: to be determined by next level of care  Barriers to discharge: Decreased caregiver support    Assessment:     Julio Benites is a 88 y.o. male admitted with a medical diagnosis of Colitis.  He presents with the following impairments/functional limitations: weakness, impaired endurance, impaired self care skills, impaired functional mobility, gait instability, impaired balance, decreased coordination, decreased lower extremity function, decreased upper extremity function, decreased safety awareness, impaired cardiopulmonary response to activity ;pt with fair/good mobility today, amb'd short distance in room, though c/o SOB/fatigue, HR:112, O2:93-98% on RA.    Rehab Prognosis: Good and Fair; patient would benefit from acute skilled PT services to address these deficits and reach maximum level of function.    Recent Surgery: Procedure(s) (LRB):  EGD (ESOPHAGOGASTRODUODENOSCOPY) (Left) 2 Days Post-Op    Plan:     During this hospitalization, patient to be seen 5 x/week to address the identified rehab impairments via gait training, therapeutic activities, therapeutic exercises and progress toward the following goals:    Plan of Care Expires:  07/18/23    Subjective     Chief Complaint: SOB  Patient/Family Comments/goals: pt agreeable to session w/ coaxing/encouragement.   Pain/Comfort:  Pain Rating 1: 0/10  Pain Rating Post-Intervention 1: 0/10      Objective:     Communicated with nurse prior to session.  Patient found HOB elevated with peripheral IV upon PT entry to room.     General Precautions: Standard, fall, hearing impaired  Orthopedic Precautions: N/A  Braces: N/A  Respiratory Status: Room air     Functional Mobility:  Bed Mobility:     Supine to Sit: minimum assistance  Transfers:     Sit to Stand:  contact guard  assistance and minimum assistance with rolling walker  Gait: amb'd 12' w/ RW and CGA/Karol, O2 on RA:93% following, HR:112      AM-PAC 6 CLICK MOBILITY  Turning over in bed (including adjusting bedclothes, sheets and blankets)?: 3  Sitting down on and standing up from a chair with arms (e.g., wheelchair, bedside commode, etc.): 3  Moving from lying on back to sitting on the side of the bed?: 3  Moving to and from a bed to a chair (including a wheelchair)?: 3  Need to walk in hospital room?: 3  Climbing 3-5 steps with a railing?: 2  Basic Mobility Total Score: 17       Treatment & Education:  Pt stood to use urinal w/ CGA/min.A.   Pt needed assistance w/ hygiene (since bed was wet)    Patient left up in chair with all lines intact, call button in reach, and nurse present..    GOALS:   Multidisciplinary Problems       Physical Therapy Goals          Problem: Physical Therapy    Goal Priority Disciplines Outcome Goal Variances Interventions   Physical Therapy Goal     PT, PT/OT Ongoing, Progressing     Description: Goals to be met by:2023    Patient will increase functional independence with mobility by performin. Patient will perform supine <> EOB with SBA with or without use of HB features during two consecutive sessions   2. Patient will perform sit <> stand with RW without verbal cues for hand placement requiring SBA  3. Patient will ambulate > 150' with RW while using step-through gait pattern with no LOB requiring SBA for safety  4. Patient will negotiate 3 steps 2x (or 7 consecutive steps) with BHR with SBA from therapist twice during session                            Time Tracking:     PT Received On: 23  PT Start Time:      PT Stop Time: 1453  PT Total Time (min): 25 min     Billable Minutes: Gait Training 15 and Therapeutic Activity 10    Treatment Type: Treatment  PT/PTA: PTA     Number of PTA visits since last PT visit: 2     2023

## 2023-06-21 NOTE — CARE UPDATE
06/21/23 0905   PRE-TX-O2   Device (Oxygen Therapy) room air   SpO2 98 %   Pulse Oximetry Type Intermittent   $ Pulse Oximetry - Multiple Charge Pulse Oximetry - Multiple        intact

## 2023-06-21 NOTE — PT/OT/SLP PROGRESS
Speech Language Pathology Treatment    Patient Name:  Julio Benites   MRN:  [id number]  Admitting Diagnosis: Colitis    Recommendations:      Recommendations:                General Recommendations:    Palliative care assessment of wishes re: PO intake  SLP to follow 3-5x/week, to trial rehabilitative course of dysphagia tx vs promote safest method of oral intake if comfort feeding is opted for      Diet recommendations:   Diet recommendations pending discussion with palliative care  If patient does not wish to receive alternative method of nutrition/hydration, would recommend continuing small amount of clear/full liquid diet vs pureed solids and thin liquids  If goals of care remain full code, wishes for alternative means of nutrition/hydration, and would like to trial course of rehabilitation, consider NPO except icechips with alternative feeding, though, this method may not significantly benefit patient or completely reduce risk of development of pna in setting of significant malnourishment and likely poor ability to build muscle needed for rehabilitation of pharyngeal/laryngeal musculature      Aspiration Precautions: If medical team and patient wish to remain on PO nutrition recommend the following:  Frequent and thorough oral care to reduce risk of aspiration pna of likely aspiration of secretions/intake  Small singular sips of liquid via cup  Alternate solids/purees   Effortful swallow during intake  Intermittent throat clear/cough during intake     General Precautions: Standard,      Assessment:     Julio Benites is a 88 y.o. male with an SLP diagnosis of  severe pharyngeal dysphagia .  He presents with delayed and reduced LVC, significantly reduced base of tongue retraction and pharyngeal constriction with resultant decreased pharyngeal obliteration during the swallow, and severe post swallow pharyngeal stasis.     Impressions MBSS 6/20/23:  Globally weak pharyngeal swallow noted  Mildly-moderately  reduced hyolaryngeal elevation/excursion and UES relaxation  Severely reduced base of tongue retraction, laryngeal vestibule closure, epiglottic retroflexion, pharyngeal squeeze  Deficits resulting in decreased pharyngeal obliteration, subsequent pharyngeal stasis measuring up to 7.69% of pharynx with thin liquids and 28.6% of pharynx with pureed solids  There was discoordination with time to laryngeal vestibule closure, and delayed airway protection with time to laryngeal vestibule closure from hyoid burst/swallow initiation measuring up to 366.3ms  WFL time to LVC for thin liquids =(<167ms), there was also significantly reduced duration of LVC measuring at 333ms (WNL =603.70 (±272.53))  Above deficits resulted in airway invasion occurring in 100% of attempts, occurring during and after the swallow as bolus within the pharynx migrated into the laryngeal vestibule prior to full LVC and pharyngeal residues migrated into the laryngeal vestibule after the swallow  On initial swallow of thin liquids patient with PAS score of 3 indicating that contrast entered the laryngeal vestibule, remained within laryngeal vestibule post swallow initiation  A cued cough was inefficient and did not clear the larynx  As the study progressed contrast within the larynx migrated toward and eventually below the vocal folds, with trace aspiration noted   No strategies significantly improved pharyngeal clearance or airway protection  Notably post assessment patient with coughing, red tinged saliva expectorated. Patient reports consuming red jello just prior to assessment.        Subjective     Patient seen in room, assisted DEON with transfer from bedside chair to bed per patient request at beginning of session    Pain/Comfort:   Denies discomfort, assisted with repositioning to promote comfort    Respiratory Status: Room air    Objective:     Cognitive communication brief assessment  Patient awake, alert, attends to all directives and all  conversation without difficulty  Patient followed abstract commands for dysphagia tx given min cues   Makes wants/needs known, appropriately requests items  Answers all yes/no and simple open ended questions without difficulty    Dysphagia tx  Reviewed findings from MBSS 6/20/23. Reviewed risks present for aspiration, reviewed pillars of aspiration pna with focus today on oral care. Patient instructed to complete oral care 3x/day, will review with RN need for assistance. This writer set up suction during this tx session, provided patient with yankauer, which assisted with clearance of oral secretions which patient has difficulty expectorating. Please utilize yankauer during oral care. Dentures removed and scrubbed with toothbrush. Gums and tongue cleaned with toothbrush soaked in mouthwash, immediately followed by yankauer suction.     Patient able to teachback education re: reduction in risk of pna if oral care to be provided 3x/day    Introduced patient to effortful swallow exercise, to target globally weak pharyngeal swallow.   Patient able to complete exercise with icechips as weighted bolus (patient declined items from clear liquid tray at bedside)  Patient completed 1 set of 5 trials this date.   Reviewed with patient importance of frequent effortful swallows throughout the day to improve overall pharyngeal strength and clearance    Goals:   Multidisciplinary Problems       SLP Goals          Problem: SLP    Goal Priority Disciplines Outcome   SLP Goal     SLP Ongoing, Progressing   Description: Goals may change based on discussion with palliative care, and assessment of goals of care between patient, medical team, and patient. As patient is currently full code, goals will reflect rehabilitative method of tx, though, patient may not build musculature with rehabilitative tx due to significant level of deconditioning, appearance of malnourishment to significantly improve safe swallow outcomes.    1. Patient and  caregivers will complete oral care 3x/day to reduce risk of aspiration pna development due to likely aspiration of secretions and PO intake.     2. Patient will identify pillars of aspiration pna in 100% of opportunities during education and tx, to improve insight into deficits and reduce risk of development of pna.     3. Patient will complete effortful swallow exercise given min cues from clinician for appropriate positioning and increased effort to improve pharyngeal constriction.     4. Patient will complete exercises focused on suprahyoid strength including chin tuck against resistance and shaker exercise given min cues from clinician for appropriate positioning to improve hyolaryngeal elevation, excursion and LVC.                            Plan:     Patient to be seen:  3 x/week, 5 x/week   Plan of Care expires:     Plan of Care reviewed with:  patient   SLP Follow-Up:          Discharge recommendations:    follow up with OP SLP  Barriers to Discharge:   medical acuity    Time Tracking:     SLP Treatment Date:   06/21/23  Speech Start Time:  1540  Speech Stop Time:  1600     Speech Total Time (min):  20 min    Billable Minutes: Treatment Swallowing Dysfunction 20 06/21/2023

## 2023-06-21 NOTE — PROGRESS NOTES
[facility] Surg [address] Medicine  Progress Note    Patient Name: Julio Benites  MRN: [id number]  Patient Class: IP- Inpatient   Admission Date: 6/16/2023  Length of Stay: 5 days  Attending Physician: DEXTER Salmeron MD  Primary Care Provider: Gregorio Jensen MD        Subjective:     Principal Problem:Colitis        HPI:  The patient is a 88 y.o. male with a past medical history of DM, NSTEMI, HTN, HLD, and systolic CHF who was recently treated for CAP  with azithromycin who presents with left upper quadrant abdominal pain.  Patient reports worsening left upper quadrant abdominal pain over the past 4-5 days.  He states it is positional and worse when he sits up or stands.  He rates the pain at a 10/10 during these times but 0/10 currently.  Patient also endorses about 1-1/2 months of shortness of breath.  He is also endorsed a significant weight loss during this time.  He denies chest pain, nausea, vomiting, diarrhea, fevers, chills.        Overview/Hospital Course:  Mr. Benites presented with abdominal pain. Admitted with colitis with fecal impaction/constipation. General surgery and GI consulted. Manually disimpacted with partial relief on 6/17. Bowel regimen initiated.       Interval History: No acute events overnight. Discussed results of MBSS in detail. Reports he would not be interested in PEG placement and would rather accept risk of aspiration with continued PO intake.    Review of Systems   Constitutional:  Negative for chills and fever.   Respiratory:  Negative for cough and shortness of breath.    Cardiovascular:  Negative for chest pain and palpitations.   Gastrointestinal:  Negative for abdominal pain, nausea and vomiting.   Objective:     Vital Signs (Most Recent):  Temp: 98.1 °F (36.7 °C) (06/21/23 2054)  Pulse: 83 (06/21/23 2054)  Resp: 16 (06/21/23 2054)  BP: 120/70 (06/21/23 2054)  SpO2: 100 % (06/21/23 2054) Vital Signs (24h Range):  Temp:  [97.6 °F (36.4 °C)-98.1 °F  "(36.7 °C)] 98.1 °F (36.7 °C)  Pulse:  [72-96] 83  Resp:  [16-18] 16  SpO2:  [96 %-100 %] 100 %  BP: (107-142)/(70-86) 120/70     Weight: 32.5 kg (71 lb 10.4 oz)  Body mass index is 10.58 kg/m².    Intake/Output Summary (Last 24 hours) at 6/21/2023 2100  Last data filed at 6/21/2023 1840  Gross per 24 hour   Intake 290 ml   Output 1 ml   Net 289 ml           Physical Exam  Vitals and nursing note reviewed.   Constitutional:       General: He is not in acute distress.     Appearance: He is well-developed. He is cachectic. He is ill-appearing.   HENT:      Head: Normocephalic and atraumatic.   Eyes:      General:         Right eye: No discharge.         Left eye: No discharge.      Conjunctiva/sclera: Conjunctivae normal.   Cardiovascular:      Rate and Rhythm: Normal rate.      Pulses: Normal pulses.   Pulmonary:      Effort: Pulmonary effort is normal. No respiratory distress.   Abdominal:      Palpations: Abdomen is soft.      Tenderness: There is no abdominal tenderness.   Musculoskeletal:         General: Normal range of motion.      Right lower leg: No edema.      Left lower leg: No edema.   Skin:     General: Skin is warm and dry.   Neurological:      Mental Status: He is alert and oriented to person, place, and time.         Significant Labs:   CBC:  Recent Labs   Lab 06/19/23  0418   WBC 9.26   HGB 10.7*   HCT 34.2*      GRAN 87.5*  8.1*   LYMPH 5.8*  0.5*   MONO 6.2  0.6   EOS 0.0   BASO 0.01     BMP:  Recent Labs   Lab 06/19/23  0418   *   K 3.3*      CO2 24   BUN 38*   CREATININE 0.7   GLU 79   CALCIUM 9.3   MG 2.2   PHOS 4.8*       Significant Imaging:   No new imaging this morning.      Assessment/Plan:      * Colitis  Constipation, fecal impaction, abdominal pain, cachexia, debility, dysphagia  - CT of abd/pelvis with "persistent changes of small bowel ileus and constipation with rectal fecal impaction and early signs of stercoral colitis.  Thickening at the anal rectal verge either " due to inflammation.  Mass is difficult to exclude.  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  - Continue bowel regimen.  - [facility] which showed laryngeal penetration with all consistencies.  - Discussed goals of care; would not be interested in PEG placement.  - Appreciate palliative assistance. Pursue SNF placement with likely transition to long-term with hospice.    Acute on chronic respiratory failure with hypoxia  Patient with Hypoxic Respiratory failure which is Acute on chronic.  he is not on home oxygen. Supplemental oxygen was provided and noted-      .   Signs/symptoms of respiratory failure include- tachypnea. Contributing diagnoses includes - Interstitial lung disease Labs and images were reviewed. Patient Has not had a recent ABG. Will treat underlying causes and adjust management of respiratory failure as follows- Duoneb PRN, Oxygen    Elevated troponin  - Trended troponins, overall flat and stable  - Monitor on telemetry, no further workup needed    Chronic systolic heart failure  - BNP- 62, no signs of acute decompensation  - Monitor for acute decompensation    Mixed hyperlipidemia  - Continue pravastatin    Essential hypertension, benign  - Continue nifedipine, losartan    VTE Risk Mitigation (From admission, onward)         Ordered     IP VTE HIGH RISK PATIENT  Once         06/16/23 1829     Place sequential compression device  Until discontinued         06/16/23 1829     Reason for No Pharmacological VTE Prophylaxis  Once        Question:  Reasons:  Answer:  Risk of Bleeding    06/16/23 1829                Discharge Planning    KYLAH:      Code Status: Full Code   Is the patient medically ready for discharge?:     Reason for patient still in hospital (select all that apply): Treatment  Discharge Plan A: Skilled Nursing Facility   Discharge Delays: (!) Post-Acute Set-up              D Hilton Salmeron MD  Department of Hospital Medicine   [facility] - Med Surg ([address])

## 2023-06-21 NOTE — SUBJECTIVE & OBJECTIVE
Interval History:Frail/ cachectic/ weak cough     Medications:  Continuous Infusions:  Scheduled Meds:   losartan  100 mg Oral Daily    NIFEdipine  90 mg Oral Daily    pantoprazole  40 mg Intravenous Q12H    polyethylene glycol  17 g Oral BID    pravastatin  20 mg Oral QHS    senna-docusate 8.6-50 mg  1 tablet Oral BID     PRN Meds:acetaminophen, albuterol-ipratropium, bisacodyL, HYDROmorphone, naloxone, ondansetron, oxyCODONE, prochlorperazine, sodium chloride 0.9%, sodium chloride 0.9%    Objective:     Vital Signs (Most Recent):  Temp: 97.8 °F (36.6 °C) (06/21/23 0721)  Pulse: 79 (06/21/23 0721)  Resp: 16 (06/21/23 0721)  BP: 125/82 (06/21/23 0721)  SpO2: 98 % (06/21/23 0905) Vital Signs (24h Range):  Temp:  [97.4 °F (36.3 °C)-97.8 °F (36.6 °C)] 97.8 °F (36.6 °C)  Pulse:  [72-80] 79  Resp:  [16-18] 16  SpO2:  [94 %-100 %] 98 %  BP: (122-142)/(74-86) 125/82     Weight: 32.5 kg (71 lb 10.4 oz)  Body mass index is 10.58 kg/m².          Physical Exam  Constitutional:       Appearance: He is ill-appearing (Chronically).      Comments: Very frail/ cachectic/ temporal lobe wasting    Cardiovascular:      Rate and Rhythm: Normal rate.   Pulmonary:      Effort: No respiratory distress.      Comments: Weak cough   Neurological:      Mental Status: He is alert and oriented to person, place, and time.      Comments: Sitting up in bed           Review of Symptoms        Symptom Assessment (ESAS 0-10 Scale)  Fatigue:  2            Living Arrangements:  Lives alone     Psychosocial/Cultural:   See Palliative Psychosocial Note: Yes  Patient lives alone, however his grandson helps him at home. His wife is a resident at [facility].   **Primary  to Follow**  Palliative Care  Consult: No    Advance Care Planning   Advance Directives:   Goals of Care: What is most important right now is to focus on spending time at home, quality of life, even if it means sacrificing a little time. Accordingly, we have decided  that the best plan to meet the patient's goals includes continuing with treatment.       Significant Labs: All pertinent labs within the past 24 hours have been reviewed.  CBC:   Recent Labs   Lab 06/19/23  0418   WBC 9.26   HGB 10.7*   HCT 34.2*   MCV 81*        BMP:  No results for input(s): GLU, NA, K, CL, CO2, BUN, CREATININE, CALCIUM, MG in the last 24 hours.  LFT:  Lab Results   Component Value Date    AST 18 06/19/2023    ALKPHOS 53 (L) 06/19/2023    BILITOT 0.5 06/19/2023     Albumin:   Albumin   Date Value Ref Range Status   06/19/2023 2.7 (L) 3.5 - 5.2 g/dL Final     Protein:   Total Protein   Date Value Ref Range Status   06/19/2023 6.3 6.0 - 8.4 g/dL Final     Lactic acid:   Lab Results   Component Value Date    LACTATE 2.4 (H) 06/16/2023       Significant Imaging: I have reviewed all pertinent imaging results/findings within the past 24 hours.

## 2023-06-21 NOTE — SUBJECTIVE & OBJECTIVE
Interval History: No acute events overnight. To MBSS today. Discussed with patient and palliative at bedside. No other concerns at this time.    Review of Systems   Constitutional:  Negative for chills and fever.   Respiratory:  Negative for cough and shortness of breath.    Cardiovascular:  Negative for chest pain and palpitations.   Gastrointestinal:  Negative for abdominal pain, nausea and vomiting.   Objective:     Vital Signs (Most Recent):  Temp: 97.4 °F (36.3 °C) (06/20/23 1645)  Pulse: 78 (06/20/23 1645)  Resp: 16 (06/20/23 1645)  BP: 132/78 (06/20/23 1645)  SpO2: (!) 94 % (06/20/23 1955) Vital Signs (24h Range):  Temp:  [97.3 °F (36.3 °C)-98.6 °F (37 °C)] 97.4 °F (36.3 °C)  Pulse:  [70-80] 78  Resp:  [16-18] 16  SpO2:  [86 %-100 %] 94 %  BP: (122-132)/(68-78) 132/78     Weight: 32.5 kg (71 lb 10.4 oz)  Body mass index is 10.58 kg/m².    Intake/Output Summary (Last 24 hours) at 6/20/2023 2053  Last data filed at 6/20/2023 1543  Gross per 24 hour   Intake 480 ml   Output 1000 ml   Net -520 ml         Physical Exam  Vitals and nursing note reviewed.   Constitutional:       General: He is not in acute distress.     Appearance: He is well-developed. He is cachectic. He is ill-appearing.   HENT:      Head: Normocephalic and atraumatic.   Eyes:      General:         Right eye: No discharge.         Left eye: No discharge.      Conjunctiva/sclera: Conjunctivae normal.   Cardiovascular:      Rate and Rhythm: Normal rate.      Pulses: Normal pulses.   Pulmonary:      Effort: Pulmonary effort is normal. No respiratory distress.   Abdominal:      Palpations: Abdomen is soft.      Tenderness: There is no abdominal tenderness.   Musculoskeletal:         General: Normal range of motion.      Right lower leg: No edema.      Left lower leg: No edema.   Skin:     General: Skin is warm and dry.   Neurological:      Mental Status: He is alert and oriented to person, place, and time.         Significant Labs:   CBC:  Recent  Labs   Lab 06/18/23  0337 06/19/23  0418   WBC 7.38 9.26   HGB 11.9* 10.7*   HCT 38.5* 34.2*    231   GRAN 82.4*  6.1 87.5*  8.1*   LYMPH 7.5*  0.6* 5.8*  0.5*   MONO 9.2  0.7 6.2  0.6   EOS 0.0 0.0   BASO 0.02 0.01   BMP:  Recent Labs   Lab 06/18/23 0337 06/19/23  0418    146*   K 5.1 3.3*    101   CO2 30* 24   BUN 34* 38*   CREATININE 0.7 0.7    79   CALCIUM 10.2 9.3   MG 2.4 2.2   PHOS 4.9* 4.8*     Significant Imaging:   No new imaging this morning.

## 2023-06-21 NOTE — ASSESSMENT & PLAN NOTE
"6/21/23  - Patient seen sitting up in bed. He reports he believes the MBSS went "well". He reports cough with swallowing has been going on for some time now. I discussed with him concern of aspiration and very weak cough. While Mr. Benites did have some difficulty understanding everything, he was very consistent in his wishes that he wants to be with his wife, he knows "I won't be here for long", and he wants to avoid invasive procedures/ suffering. I did discuss feeding tube, which he stated he has never thought about. I was very transparent that based on his goals (spending time with wife/ avoiding suffering and invasive procedures) and overall condition a PEG tube would likely not help him achieve his goals and I would recommend we get him with his wife to spend whatever time he may have left, in which he stated "that is my only concern"  - I then discussed with CM and unfortunately [facility] is not in network with his insurance.  - I contacted his very supportive daughter, Natali. We again discussed results of MBSS. She reports cough has been ongoing for some time. I was very transparent with what her fathers wishes are and that PEG Tube would involve surgery. We also discussed difficulty with [facility] not being in network. Natali is very supportive of whatever her father decides. She asked if her father could go to SNF at another facility until she is able to come in town on July 1- she is currently dealing with her own medical issues.     6/20/23  - Consult for advance care planning/ goals of care in elderly patient admitted with worsening abdominal pain. Extensive chart review performed.  - Along with Margareth Hernandez ( RN), met patient at bedside. He was sitting up in his bedside chair. Overall, he is very frail and cachectic, he is chronically ill appearing. He has a very weak cough. He reports he lives at home alone, however his grandson helps him at home. His wife of 63 years is a resident at [facility]. He has " 2 children (son and daughter) who both live out of town. 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 He describes himself as a very independent man, even driving up until recently. He reports he is living for his wife and he is very fearful if he would die before her then if she would be okay. He stated he feels that he has lived his life and he has been very blessed, even citing this is the first time he has been hospitalized for more than 1 day. He told us that he was raised to treat everyone how you want to be treated, we did inform him that he is a very smart man and seems like a great person. He was very open that losing his independence and having to accept help from others is very difficult.  - Dr. Salmeron then presented to bedside and we again discussed overall plan for MBSS. Mr. Kim is looking forward to [facility] because he will be able to spend time with his wife.  - We discussed Mr. Kim's goals. He is hopeful to have good quality days with his wife, wants to remain independent and avoid suffering. Along those lines we did introduce code status, Mr. Kim said he wants to avoid any measures that would cause suffering. I was very transparent that given his age and health status CPR/ intubation would likely be suffering. He agreed with this and stated he desires a natural peaceful passing,I notified him that a DNR order would be placed in his chart.

## 2023-06-22 PROCEDURE — 27000221 HC OXYGEN, UP TO 24 HOURS: Mod: HCNC

## 2023-06-22 PROCEDURE — 25000003 PHARM REV CODE 250: Mod: HCNC | Performed by: INTERNAL MEDICINE

## 2023-06-22 PROCEDURE — C9113 INJ PANTOPRAZOLE SODIUM, VIA: HCPCS | Mod: HCNC | Performed by: INTERNAL MEDICINE

## 2023-06-22 PROCEDURE — 25000003 PHARM REV CODE 250: Mod: HCNC | Performed by: HOSPITALIST

## 2023-06-22 PROCEDURE — 63600175 PHARM REV CODE 636 W HCPCS: Mod: HCNC | Performed by: INTERNAL MEDICINE

## 2023-06-22 PROCEDURE — 99232 PR SUBSEQUENT HOSPITAL CARE,LEVL II: ICD-10-PCS | Mod: HCNC,,, | Performed by: INTERNAL MEDICINE

## 2023-06-22 PROCEDURE — 11000001 HC ACUTE MED/SURG PRIVATE ROOM: Mod: HCNC

## 2023-06-22 PROCEDURE — 99900035 HC TECH TIME PER 15 MIN (STAT): Mod: HCNC

## 2023-06-22 PROCEDURE — 99232 SBSQ HOSP IP/OBS MODERATE 35: CPT | Mod: HCNC,,, | Performed by: FAMILY MEDICINE

## 2023-06-22 PROCEDURE — 99232 PR SUBSEQUENT HOSPITAL CARE,LEVL II: ICD-10-PCS | Mod: HCNC,,, | Performed by: FAMILY MEDICINE

## 2023-06-22 PROCEDURE — 92526 ORAL FUNCTION THERAPY: CPT | Mod: HCNC

## 2023-06-22 PROCEDURE — 99232 SBSQ HOSP IP/OBS MODERATE 35: CPT | Mod: HCNC,,, | Performed by: INTERNAL MEDICINE

## 2023-06-22 PROCEDURE — 94761 N-INVAS EAR/PLS OXIMETRY MLT: CPT | Mod: HCNC

## 2023-06-22 PROCEDURE — 97116 GAIT TRAINING THERAPY: CPT | Mod: HCNC,CQ

## 2023-06-22 RX ORDER — PANTOPRAZOLE SODIUM 40 MG/1
40 TABLET, DELAYED RELEASE ORAL 2 TIMES DAILY
Status: DISCONTINUED | OUTPATIENT
Start: 2023-06-22 | End: 2023-06-26 | Stop reason: HOSPADM

## 2023-06-22 RX ORDER — SODIUM CHLORIDE 0.9 % (FLUSH) 0.9 %
10 SYRINGE (ML) INJECTION
Status: DISCONTINUED | OUTPATIENT
Start: 2023-06-22 | End: 2023-06-26 | Stop reason: HOSPADM

## 2023-06-22 RX ORDER — NIFEDIPINE 30 MG/1
60 TABLET, EXTENDED RELEASE ORAL 2 TIMES DAILY
Status: DISCONTINUED | OUTPATIENT
Start: 2023-06-22 | End: 2023-06-26 | Stop reason: HOSPADM

## 2023-06-22 RX ADMIN — SENNOSIDES AND DOCUSATE SODIUM 1 TABLET: 50; 8.6 TABLET ORAL at 09:06

## 2023-06-22 RX ADMIN — PRAVASTATIN SODIUM 20 MG: 20 TABLET ORAL at 09:06

## 2023-06-22 RX ADMIN — PANTOPRAZOLE SODIUM 40 MG: 40 TABLET, DELAYED RELEASE ORAL at 09:06

## 2023-06-22 RX ADMIN — LOSARTAN POTASSIUM 100 MG: 50 TABLET, FILM COATED ORAL at 09:06

## 2023-06-22 RX ADMIN — POLYETHYLENE GLYCOL 3350 17 G: 17 POWDER, FOR SOLUTION ORAL at 09:06

## 2023-06-22 RX ADMIN — PANTOPRAZOLE SODIUM 40 MG: 40 INJECTION, POWDER, LYOPHILIZED, FOR SOLUTION INTRAVENOUS at 09:06

## 2023-06-22 RX ADMIN — NIFEDIPINE 90 MG: 30 TABLET, FILM COATED, EXTENDED RELEASE ORAL at 09:06

## 2023-06-22 RX ADMIN — NIFEDIPINE 60 MG: 30 TABLET, FILM COATED, EXTENDED RELEASE ORAL at 09:06

## 2023-06-22 NOTE — PROGRESS NOTES
"[facility] ([address])  Palliative Medicine  Progress Note    Patient Name: Julio Benites  MRN: [id number]  Admission Date: 6/16/2023  Hospital Length of Stay: 6 days  Code Status: Full Code   Attending Provider: DEXTER Salmeron MD  Consulting Provider: Brittney Mcwilliams DNP  Primary Care Physician: Gregorio Jensen MD  Principal Problem:Colitis    Patient information was obtained from patient, relative(s) and primary team.      Assessment/Plan:     Cardiac/Vascular  Chronic systolic heart failure  - Echo 1/1/22   The left ventricle is normal in size with concentric remodeling and mildly decreased systolic function.   The estimated ejection fraction is 40%.   There are segmental left ventricular wall motion abnormalities.   Grade I left ventricular diastolic dysfunction.   With normal right ventricular systolic function.   The estimated PA systolic pressure is 30 mmHg.   Normal central venous pressure (3 mmHg).      GI  * Colitis  - Management per primary team/ GI     Dysphagia  - Reviewed results of MBSS.     Palliative Care  Encounter for palliative care  6/22/23  - Patient seen sitting up in bed. Patient reports he is feeling "okay", he reports he did not sleep well overnight. Patient states he spoke to Dr. Salmeron yesterday about his swallowing and if he would desire a feeding tube. Mr. Benites says he wants to go back to his "normal routine" and he knows he "will not be here for a long time". His main concern is getting to spend time with his wife, which  is working on getting him into [place] with his wife.     6/21/23  - Patient seen sitting up in bed. He reports he believes the MBSS went "well". He reports cough with swallowing has been going on for some time now. I discussed with him concern of aspiration and very weak cough. While Mr. Benites did have some difficulty understanding everything, he was very consistent in his wishes that he wants to be with his wife, he knows "I " "won't be here for long", and he wants to avoid invasive procedures/ suffering. I did discuss feeding tube, which he stated he has never thought about. I was very transparent that based on his goals (spending time with wife/ avoiding suffering and invasive procedures) and overall condition a PEG tube would likely not help him achieve his goals and I would recommend we get him with his wife to spend whatever time he may have left, in which he stated "that is my only concern"  - I then discussed with CM and unfortunately [facility] is not in network with his insurance.  - I contacted his very supportive daughter, Natali. We again discussed results of MBSS. She reports cough has been ongoing for some time. I was very transparent with what her fathers wishes are and that PEG Tube would involve surgery. We also discussed difficulty with [facility] not being in network. Natali is very supportive of whatever her father decides. She asked if her father could go to SNF at another facility until she is able to come in town on July 1- she is currently dealing with her own medical issues.     6/20/23  - Consult for advance care planning/ goals of care in elderly patient admitted with worsening abdominal pain. Extensive chart review performed.  - Along with Margareth Hernandez ( RN), met patient at bedside. He was sitting up in his bedside chair. Overall, he is very frail and cachectic, he is chronically ill appearing. He has a very weak cough. He reports he lives at home alone, however his grandson helps him at home. His wife of 63 years is a resident at [facility]. He has 2 children (son and daughter) who both live out of town. 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 He describes himself as a very independent man, even driving up until recently. He reports he is living for his wife and he is very fearful if he would die before her then if she would be okay. He stated he feels that he has lived his life and he has been very blessed, even " "citing this is the first time he has been hospitalized for more than 1 day. He told us that he was raised to treat everyone how you want to be treated, we did inform him that he is a very smart man and seems like a great person. He was very open that losing his independence and having to accept help from others is very difficult.  - Dr. Salmeron then presented to bedside and we again discussed overall plan for MBSS. Mr. Kim is looking forward to [facility] because he will be able to spend time with his wife.  - We discussed Mr. Kim's goals. He is hopeful to have good quality days with his wife, wants to remain independent and avoid suffering. Along those lines we did introduce code status, Mr. Kim said he wants to avoid any measures that would cause suffering. I was very transparent that given his age and health status CPR/ intubation would likely be suffering. He agreed with this and stated he desires a natural peaceful passing,I notified him that a DNR order would be placed in his chart.       Other  Cachexia  - Very frail/ cachectic  - BMI 10.58     Physical debility  - PT/OT consulted, recommending SNF         I will follow-up with patient. Please contact us if you have any additional questions.    Subjective:     Chief Complaint:   Chief Complaint   Patient presents with    Abdominal Pain     C/o LUQ pain 10/10 x 3 days with no improvement. Also c/o SOB x 1 month. O2 90-92% on RA. Speaking in complete sentences. PMH Stomach Ca. -CP. -n/v/d. /111 all other VSS       HPI:   Per H&P: "HPI: The patient is a 88 y.o. male with a past medical history of DM, NSTEMI, HTN, HLD, and systolic CHF who was recently treated for CAP  with azithromycin who presents with left upper quadrant abdominal pain.  Patient reports worsening left upper quadrant abdominal pain over the past 4-5 days.  He states it is positional and worse when he sits up or stands.  He rates the pain at a 10/10 during these times but 0/10 " "currently.  Patient also endorses about 1-1/2 months of shortness of breath.  He is also endorsed a significant weight loss during this time.  He denies chest pain, nausea, vomiting, diarrhea, fevers, chills."    At time of initial consult, patient seen sitting up in bedside chair. Patient is very frail and cachectic. Please see encounter for palliative care.       Hospital Course:  No notes on file    Interval History: Frail/cachectic/ very weak cough     Medications:  Continuous Infusions:  Scheduled Meds:   losartan  100 mg Oral Daily    NIFEdipine  90 mg Oral Daily    pantoprazole  40 mg Intravenous Q12H    polyethylene glycol  17 g Oral BID    pravastatin  20 mg Oral QHS    senna-docusate 8.6-50 mg  1 tablet Oral BID     PRN Meds:acetaminophen, albuterol-ipratropium, bisacodyL, HYDROmorphone, naloxone, ondansetron, oxyCODONE, prochlorperazine, sodium chloride 0.9%, sodium chloride 0.9%    Objective:     Vital Signs (Most Recent):  Temp: 97.4 °F (36.3 °C) (06/22/23 0734)  Pulse: 78 (06/22/23 1122)  Resp: 20 (06/22/23 1122)  BP: 127/85 (06/22/23 1122)  SpO2: 99 % (06/22/23 1122) Vital Signs (24h Range):  Temp:  [96.4 °F (35.8 °C)-98.1 °F (36.7 °C)] 97.4 °F (36.3 °C)  Pulse:  [70-96] 78  Resp:  [16-20] 20  SpO2:  [96 %-100 %] 99 %  BP: (107-143)/(65-90) 127/85     Weight: 32.5 kg (71 lb 10.4 oz)  Body mass index is 10.58 kg/m².       Physical Exam  Constitutional:       Appearance: He is ill-appearing (Chronically).      Comments: Very frail/ cachectic/ temporal lobe wasting    Cardiovascular:      Rate and Rhythm: Normal rate.   Pulmonary:      Effort: No respiratory distress.      Comments: Weak cough   Neurological:      Mental Status: He is alert and oriented to person, place, and time.      Comments: Sitting up in bed           Review of Symptoms        Symptom Assessment (ESAS 0-10 Scale)  Fatigue:  2            Living Arrangements:  Lives alone     Psychosocial/Cultural:   See Palliative Psychosocial " Note: Yes  Patient lives alone, however his grandson helps him at home. His wife is a resident at [facility].   **Primary  to Follow**  Palliative Care  Consult: No    Advance Care Planning   Advance Directives:   Goals of Care: What is most important right now is to focus on spending time at home, quality of life, even if it means sacrificing a little time. Accordingly, we have decided that the best plan to meet the patient's goals includes continuing with treatment.       Significant Labs: All pertinent labs within the past 24 hours have been reviewed.  CBC:   Recent Labs   Lab 06/19/23  0418   WBC 9.26   HGB 10.7*   HCT 34.2*   MCV 81*        BMP:  No results for input(s): GLU, NA, K, CL, CO2, BUN, CREATININE, CALCIUM, MG in the last 24 hours.  LFT:  Lab Results   Component Value Date    AST 18 06/19/2023    ALKPHOS 53 (L) 06/19/2023    BILITOT 0.5 06/19/2023     Albumin:   Albumin   Date Value Ref Range Status   06/19/2023 2.7 (L) 3.5 - 5.2 g/dL Final     Protein:   Total Protein   Date Value Ref Range Status   06/19/2023 6.3 6.0 - 8.4 g/dL Final     Lactic acid:   Lab Results   Component Value Date    LACTATE 2.4 (H) 06/16/2023       Significant Imaging: I have reviewed all pertinent imaging results/findings within the past 24 hours.    > 50% of 25 min visit spent in chart review, face to face discussion of symptom assessment, coordination of care with other specialists, and d/c     Brittney Mcwilliams DNP  Palliative Medicine  [facility] ([address])

## 2023-06-22 NOTE — PT/OT/SLP PROGRESS
Speech Language Pathology Treatment    Patient Name:  Julio Benites   MRN:  [id number]  Admitting Diagnosis: Colitis    Recommendations:      Recommendations:                General Recommendations:    Palliative care assessment of wishes re: PO intake  SLP to follow 3-5x/week, to trial rehabilitative course of dysphagia tx vs promote safest method of oral intake if comfort feeding is opted for      Diet recommendations:   Reviewed palliative care documentation. In light of patient medical status, low threshold for tolerance of procedures/surgeries patient opting to remain on PO diet.   Patient remains on clear liquid diet, if/when restrictions to be lifted, recommend transition to puree diet with thin liquids, with knowledge that patient will likely have significant pharyngeal stasis and frequent aspiration during meals across consistencies  Please crush pills if able, please provide 1 pill at a time in applesauce with a liquid wash. Pharyngeal stasis is SEVERE and risk present for aspiration of pills and/or caustic medication injury to pharynx/larynx if multiple pills are provided at once due to decreased pharyngeal clearance      Aspiration Precautions: If medical team and patient wish to remain on PO nutrition recommend the following:  Frequent and thorough oral care to reduce risk of aspiration pna of likely aspiration of secretions/intake  Small singular sips of liquid via cup  Alternate solids/liquids   Effortful swallow during intake  Intermittent throat clear/cough during intake     General Precautions: Standard,      Assessment:     Julio Benites is a 88 y.o. male with an SLP diagnosis of  severe pharyngeal dysphagia .  He presents with delayed and reduced LVC, significantly reduced base of tongue retraction and pharyngeal constriction with resultant decreased pharyngeal obliteration during the swallow, and severe post swallow pharyngeal stasis.     Impressions MBSS 6/20/23:  Globally weak pharyngeal  swallow noted  Mildly-moderately reduced hyolaryngeal elevation/excursion and UES relaxation  Severely reduced base of tongue retraction, laryngeal vestibule closure, epiglottic retroflexion, pharyngeal squeeze  Deficits resulting in decreased pharyngeal obliteration, subsequent pharyngeal stasis measuring up to 7.69% of pharynx with thin liquids and 28.6% of pharynx with pureed solids  There was discoordination with time to laryngeal vestibule closure, and delayed airway protection with time to laryngeal vestibule closure from hyoid burst/swallow initiation measuring up to 366.3ms  WFL time to LVC for thin liquids =(<167ms), there was also significantly reduced duration of LVC measuring at 333ms (WNL =603.70 (±272.53))  Above deficits resulted in airway invasion occurring in 100% of attempts, occurring during and after the swallow as bolus within the pharynx migrated into the laryngeal vestibule prior to full LVC and pharyngeal residues migrated into the laryngeal vestibule after the swallow  On initial swallow of thin liquids patient with PAS score of 3 indicating that contrast entered the laryngeal vestibule, remained within laryngeal vestibule post swallow initiation  A cued cough was inefficient and did not clear the larynx  As the study progressed contrast within the larynx migrated toward and eventually below the vocal folds, with trace aspiration noted   No strategies significantly improved pharyngeal clearance or airway protection  Notably post assessment patient with coughing, red tinged saliva expectorated. Patient reports consuming red jello just prior to assessment.        Subjective     Patient seen in room, assisted DEON with transfer from bedside chair to bed per patient request at beginning of session    Pain/Comfort:   Denies discomfort, assisted with repositioning to promote comfort    Respiratory Status: Room air    Objective:     Cognitive communication brief assessment  Patient awake, alert,  attends to all directives and all conversation without difficulty  Patient followed abstract commands for dysphagia tx given min cues   Makes wants/needs known, appropriately requests items  Answers all yes/no and simple open ended questions without difficulty  No recall of education from yesterday tx session, poor recall of results from modified barium swallow study  Suspect poor insight into dysphagia deficits    Dysphagia tx  Reviewed findings from MBSS 6/20/23. Reviewed risks present for aspiration, reviewed pillars of aspiration pna with focus today on oral care. Patient instructed to complete oral care 3x/day, will review with RN need for assistance. Please utilize yankauer during oral care. Dentures removed and scrubbed with toothbrush. Gums and tongue cleaned with toothbrush soaked in mouthwash, immediately followed by yankauer suction.     Patient able to teachback education re: reduction in risk of pna if oral care to be provided 3x/day    Reviewed effortful swallow exercise, to target globally weak pharyngeal swallow.   Patient able to complete exercise with icechips as weighted bolus  Patient completed 4 set of 5 trials this date.   Required frequent cues throughout for increased perceived effort  Reviewed with patient importance of frequent effortful swallows throughout the day to improve overall pharyngeal strength and clearance    Introduced patient to modified chin tuck against resistance exercise to target suprahyoid musculature with the goal to improve hyolaryngeal elevation/excursion and LVC  Patient able to sustain chin tuck and held resistance against resistance of SLP hand under mandible for 4 sets of 10 second hold  Pt required min-mod cues throughout to ensure mandible closed (so as not to target masseter muscles), and to maintain breathing throughout exercise.     Patient with wet breathing, throat clearing, and coughing throughout intake of icechips. Patient likely with pharyngeal stasis,  "pooling, and subsequent aspiration as seen on MBSS 6/20/23, patient with poor insight stating "it is all gone", despite education re: deficits.     Goals:   Multidisciplinary Problems       SLP Goals          Problem: SLP    Goal Priority Disciplines Outcome   SLP Goal     SLP Ongoing, Progressing   Description: Goals may change based on discussion with palliative care, and assessment of goals of care between patient, medical team, and patient. As patient is currently full code, goals will reflect rehabilitative method of tx, though, patient may not build musculature with rehabilitative tx due to significant level of deconditioning, appearance of malnourishment to significantly improve safe swallow outcomes.    1. Patient and caregivers will complete oral care 3x/day to reduce risk of aspiration pna development due to likely aspiration of secretions and PO intake.     2. Patient will identify pillars of aspiration pna in 100% of opportunities during education and tx, to improve insight into deficits and reduce risk of development of pna.     3. Patient will complete effortful swallow exercise given min cues from clinician for appropriate positioning and increased effort to improve pharyngeal constriction.     4. Patient will complete exercises focused on suprahyoid strength including chin tuck against resistance and shaker exercise given min cues from clinician for appropriate positioning to improve hyolaryngeal elevation, excursion and LVC.                            Plan:     Patient to be seen:  3 x/week, 5 x/week   Plan of Care expires:     Plan of Care reviewed with:  patient (nurse)   SLP Follow-Up:          Discharge recommendations:    follow up with OP SLP  Barriers to Discharge:   medical acuity    Time Tracking:     SLP Treatment Date:   06/22/23  Speech Start Time:  1409  Speech Stop Time:  1433     Speech Total Time (min):  24 min    Billable Minutes: Treatment Swallowing Dysfunction 24    06/22/2023  "

## 2023-06-22 NOTE — SUBJECTIVE & OBJECTIVE
Interval History: Frail/cachectic/ very weak cough     Medications:  Continuous Infusions:  Scheduled Meds:   losartan  100 mg Oral Daily    NIFEdipine  90 mg Oral Daily    pantoprazole  40 mg Intravenous Q12H    polyethylene glycol  17 g Oral BID    pravastatin  20 mg Oral QHS    senna-docusate 8.6-50 mg  1 tablet Oral BID     PRN Meds:acetaminophen, albuterol-ipratropium, bisacodyL, HYDROmorphone, naloxone, ondansetron, oxyCODONE, prochlorperazine, sodium chloride 0.9%, sodium chloride 0.9%    Objective:     Vital Signs (Most Recent):  Temp: 97.4 °F (36.3 °C) (06/22/23 0734)  Pulse: 78 (06/22/23 1122)  Resp: 20 (06/22/23 1122)  BP: 127/85 (06/22/23 1122)  SpO2: 99 % (06/22/23 1122) Vital Signs (24h Range):  Temp:  [96.4 °F (35.8 °C)-98.1 °F (36.7 °C)] 97.4 °F (36.3 °C)  Pulse:  [70-96] 78  Resp:  [16-20] 20  SpO2:  [96 %-100 %] 99 %  BP: (107-143)/(65-90) 127/85     Weight: 32.5 kg (71 lb 10.4 oz)  Body mass index is 10.58 kg/m².       Physical Exam  Constitutional:       Appearance: He is ill-appearing (Chronically).      Comments: Very frail/ cachectic/ temporal lobe wasting    Cardiovascular:      Rate and Rhythm: Normal rate.   Pulmonary:      Effort: No respiratory distress.      Comments: Weak cough   Neurological:      Mental Status: He is alert and oriented to person, place, and time.      Comments: Sitting up in bed           Review of Symptoms        Symptom Assessment (ESAS 0-10 Scale)  Fatigue:  2            Living Arrangements:  Lives alone     Psychosocial/Cultural:   See Palliative Psychosocial Note: Yes  Patient lives alone, however his grandson helps him at home. His wife is a resident at [facility].   **Primary  to Follow**  Palliative Care  Consult: No    Advance Care Planning   Advance Directives:   Goals of Care: What is most important right now is to focus on spending time at home, quality of life, even if it means sacrificing a little time. Accordingly, we have decided  that the best plan to meet the patient's goals includes continuing with treatment.       Significant Labs: All pertinent labs within the past 24 hours have been reviewed.  CBC:   Recent Labs   Lab 06/19/23  0418   WBC 9.26   HGB 10.7*   HCT 34.2*   MCV 81*        BMP:  No results for input(s): GLU, NA, K, CL, CO2, BUN, CREATININE, CALCIUM, MG in the last 24 hours.  LFT:  Lab Results   Component Value Date    AST 18 06/19/2023    ALKPHOS 53 (L) 06/19/2023    BILITOT 0.5 06/19/2023     Albumin:   Albumin   Date Value Ref Range Status   06/19/2023 2.7 (L) 3.5 - 5.2 g/dL Final     Protein:   Total Protein   Date Value Ref Range Status   06/19/2023 6.3 6.0 - 8.4 g/dL Final     Lactic acid:   Lab Results   Component Value Date    LACTATE 2.4 (H) 06/16/2023       Significant Imaging: I have reviewed all pertinent imaging results/findings within the past 24 hours.

## 2023-06-22 NOTE — PLAN OF CARE
SNF referrals remain pending.     [facility]-no SNF beds  [facility]-unable to accept

## 2023-06-22 NOTE — ASSESSMENT & PLAN NOTE
"Constipation, fecal impaction, abdominal pain, cachexia, debility, dysphagia  - CT of abd/pelvis with "persistent changes of small bowel ileus and constipation with rectal fecal impaction and early signs of stercoral colitis.  Thickening at the anal rectal verge either due to inflammation.  Mass is difficult to exclude.  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  - Continue bowel regimen.  - SLP following, to MBSS 06/20 which showed laryngeal penetration with all consistencies.  - Discussed goals of care; would not be interested in PEG placement.  - Appreciate palliative assistance. Pursue SNF placement with likely transition to [facility] with hospice.  "

## 2023-06-22 NOTE — SUBJECTIVE & OBJECTIVE
Interval History: No acute events overnight. Discussed results of MBSS in detail. Reports he would not be interested in PEG placement and would rather accept risk of aspiration with continued PO intake.    Review of Systems   Constitutional:  Negative for chills and fever.   Respiratory:  Negative for cough and shortness of breath.    Cardiovascular:  Negative for chest pain and palpitations.   Gastrointestinal:  Negative for abdominal pain, nausea and vomiting.   Objective:     Vital Signs (Most Recent):  Temp: 98.1 °F (36.7 °C) (06/21/23 2054)  Pulse: 83 (06/21/23 2054)  Resp: 16 (06/21/23 2054)  BP: 120/70 (06/21/23 2054)  SpO2: 100 % (06/21/23 2054) Vital Signs (24h Range):  Temp:  [97.6 °F (36.4 °C)-98.1 °F (36.7 °C)] 98.1 °F (36.7 °C)  Pulse:  [72-96] 83  Resp:  [16-18] 16  SpO2:  [96 %-100 %] 100 %  BP: (107-142)/(70-86) 120/70     Weight: 32.5 kg (71 lb 10.4 oz)  Body mass index is 10.58 kg/m².    Intake/Output Summary (Last 24 hours) at 6/21/2023 2100  Last data filed at 6/21/2023 1840  Gross per 24 hour   Intake 290 ml   Output 1 ml   Net 289 ml           Physical Exam  Vitals and nursing note reviewed.   Constitutional:       General: He is not in acute distress.     Appearance: He is well-developed. He is cachectic. He is ill-appearing.   HENT:      Head: Normocephalic and atraumatic.   Eyes:      General:         Right eye: No discharge.         Left eye: No discharge.      Conjunctiva/sclera: Conjunctivae normal.   Cardiovascular:      Rate and Rhythm: Normal rate.      Pulses: Normal pulses.   Pulmonary:      Effort: Pulmonary effort is normal. No respiratory distress.   Abdominal:      Palpations: Abdomen is soft.      Tenderness: There is no abdominal tenderness.   Musculoskeletal:         General: Normal range of motion.      Right lower leg: No edema.      Left lower leg: No edema.   Skin:     General: Skin is warm and dry.   Neurological:      Mental Status: He is alert and oriented to person,  place, and time.         Significant Labs:   CBC:  Recent Labs   Lab 06/19/23  0418   WBC 9.26   HGB 10.7*   HCT 34.2*      GRAN 87.5*  8.1*   LYMPH 5.8*  0.5*   MONO 6.2  0.6   EOS 0.0   BASO 0.01     BMP:  Recent Labs   Lab 06/19/23  0418   *   K 3.3*      CO2 24   BUN 38*   CREATININE 0.7   GLU 79   CALCIUM 9.3   MG 2.2   PHOS 4.8*       Significant Imaging:   No new imaging this morning.

## 2023-06-22 NOTE — PROGRESS NOTES
[facility] Surg [address] Medicine  Progress Note    Patient Name: Julio Benites  MRN: [id number]  Patient Class: IP- Inpatient   Admission Date: 6/16/2023  Length of Stay: 6 days  Attending Physician: DEXTER Salmeron MD  Primary Care Provider: Gregorio Jensen MD        Subjective:     Principal Problem:Colitis        HPI:  The patient is a 88 y.o. male with a past medical history of DM, NSTEMI, HTN, HLD, and systolic CHF who was recently treated for CAP  with azithromycin who presents with left upper quadrant abdominal pain.  Patient reports worsening left upper quadrant abdominal pain over the past 4-5 days.  He states it is positional and worse when he sits up or stands.  He rates the pain at a 10/10 during these times but 0/10 currently.  Patient also endorses about 1-1/2 months of shortness of breath.  He is also endorsed a significant weight loss during this time.  He denies chest pain, nausea, vomiting, diarrhea, fevers, chills.        Overview/Hospital Course:  Mr. Benites presented with abdominal pain. Admitted with colitis with fecal impaction/constipation. General surgery and GI consulted. Manually disimpacted with partial relief on 6/17. Bowel regimen initiated.       Interval History: No acute events overnight. Pursuing placement options. Hopefully to SNF with transition to correction with hospice care. His major goal is to be in the same place as his wife.    Review of Systems   Constitutional:  Negative for chills and fever.   Respiratory:  Negative for cough and shortness of breath.    Cardiovascular:  Negative for chest pain and palpitations.   Gastrointestinal:  Negative for abdominal pain, nausea and vomiting.   Objective:     Vital Signs (Most Recent):  Temp: 97.8 °F (36.6 °C) (06/22/23 1951)  Pulse: 80 (06/22/23 1951)  Resp: 17 (06/22/23 1951)  BP: (!) 176/87 (06/22/23 1951)  SpO2: (!) 94 % (06/22/23 1951) Vital Signs (24h Range):  Temp:  [96.4 °F (35.8 °C)-98.1 °F (36.7  "°C)] 97.8 °F (36.6 °C)  Pulse:  [70-83] 80  Resp:  [16-20] 17  SpO2:  [94 %-100 %] 94 %  BP: (112-176)/(65-90) 176/87     Weight: 32.5 kg (71 lb 10.4 oz)  Body mass index is 10.58 kg/m².    Intake/Output Summary (Last 24 hours) at 6/22/2023 2001  Last data filed at 6/22/2023 1921  Gross per 24 hour   Intake 200 ml   Output 500 ml   Net -300 ml           Physical Exam  Vitals and nursing note reviewed.   Constitutional:       General: He is not in acute distress.     Appearance: He is well-developed. He is cachectic. He is ill-appearing.   HENT:      Head: Normocephalic and atraumatic.   Eyes:      General:         Right eye: No discharge.         Left eye: No discharge.      Conjunctiva/sclera: Conjunctivae normal.   Cardiovascular:      Rate and Rhythm: Normal rate.      Pulses: Normal pulses.   Pulmonary:      Effort: Pulmonary effort is normal. No respiratory distress.   Abdominal:      Palpations: Abdomen is soft.      Tenderness: There is no abdominal tenderness.   Musculoskeletal:         General: Normal range of motion.      Right lower leg: No edema.      Left lower leg: No edema.   Skin:     General: Skin is warm and dry.   Neurological:      Mental Status: He is alert and oriented to person, place, and time.         Significant Labs:   No new labs this morning.    Significant Imaging:   No new imaging this morning.      Assessment/Plan:      * Colitis  Constipation, fecal impaction, abdominal pain, cachexia, debility, dysphagia  - CT of abd/pelvis with "persistent changes of small bowel ileus and constipation with rectal fecal impaction and early signs of stercoral colitis.  Thickening at the anal rectal verge either due to inflammation.  Mass is difficult to exclude.  - General surgery following, manually disimpacted with partial relief on 6/16  - s/p enema and on Senna, miralax regimen with some improvement  - KUB done on 6/18 still with heavy stool burden  - Continue clear liquid diet  - No indication " for antibiotics at this time  - GI consulted, underwent EGD 06/19 showing grade D esophagitis, lavell-en-Y gastrojejunostomy with healthy appearing anastomosis.  - Continue bowel regimen.  - SLP following, to MBSS 06/20 which showed laryngeal penetration with all consistencies.  - Discussed goals of care; would not be interested in PEG placement.  - Appreciate palliative assistance. Pursue SNF placement with likely transition to long-term with hospice.    Acute on chronic respiratory failure with hypoxia  Patient with Hypoxic Respiratory failure which is Acute on chronic.  he is not on home oxygen. Supplemental oxygen was provided and noted-      .   Signs/symptoms of respiratory failure include- tachypnea. Contributing diagnoses includes - Interstitial lung disease Labs and images were reviewed. Patient Has not had a recent ABG. Will treat underlying causes and adjust management of respiratory failure as follows- Duoneb PRN, Oxygen    Elevated troponin  - Trended troponins, overall flat and stable  - Monitor on telemetry, no further workup needed    Chronic systolic heart failure  - BNP- 62, no signs of acute decompensation  - Monitor for acute decompensation    Mixed hyperlipidemia  - Continue pravastatin    Essential hypertension, benign  - Continue nifedipine at 60mg PO BID, losartan 100mg PO daily.    VTE Risk Mitigation (From admission, onward)         Ordered     IP VTE HIGH RISK PATIENT  Once         06/16/23 1829     Place sequential compression device  Until discontinued         06/16/23 1829     Reason for No Pharmacological VTE Prophylaxis  Once        Question:  Reasons:  Answer:  Risk of Bleeding    06/16/23 1829                Discharge Planning   KYLAH:      Code Status: Full Code   Is the patient medically ready for discharge?:     Reason for patient still in hospital (select all that apply): Pending disposition  Discharge Plan A: Skilled Nursing Facility   Discharge Delays: (!) Post-Acute  Set-up              D Hilton Salmeron MD  Department of Hospital Medicine   [facility] - Med Surg ([address])

## 2023-06-22 NOTE — CHAPLAIN
06/22/23 1400   Clinical Encounter Type   Visit Type Initial Visit   Visit Category General Rounding   Visited With Patient   Number of Family Visited 0   Length of Visit 10 Minutes   Continue Visiting Yes   Patient Spiritual Encounters   Care Provided Compassionate presence;Reflective listening   Patient Coping Non-verbal   Comments - Patient Patient was not fully engaged in conversation.

## 2023-06-22 NOTE — ASSESSMENT & PLAN NOTE
"6/22/23  - Patient seen sitting up in bed. Patient reports he is feeling "okay", he reports he did not sleep well overnight. Patient states he spoke to Dr. Salmeron yesterday about his swallowing and if he would desire a feeding tube. Mr. Benites says he wants to go back to his "normal routine" and he knows he "will not be here for a long time". His main concern is getting to spend time with his wife, which CM is working on getting him into [facility] with his wife.     6/21/23  - Patient seen sitting up in bed. He reports he believes the MBSS went "well". He reports cough with swallowing has been going on for some time now. I discussed with him concern of aspiration and very weak cough. While Mr. Benites did have some difficulty understanding everything, he was very consistent in his wishes that he wants to be with his wife, he knows "I won't be here for long", and he wants to avoid invasive procedures/ suffering. I did discuss feeding tube, which he stated he has never thought about. I was very transparent that based on his goals (spending time with wife/ avoiding suffering and invasive procedures) and overall condition a PEG tube would likely not help him achieve his goals and I would recommend we get him with his wife to spend whatever time he may have left, in which he stated "that is my only concern"  - I then discussed with CM and unfortunately [facility] is not in network with his insurance.  - I contacted his very supportive daughter, Natali. We again discussed results of MBSS. She reports cough has been ongoing for some time. I was very transparent with what her fathers wishes are and that PEG Tube would involve surgery. We also discussed difficulty with [facility] not being in network. Natali is very supportive of whatever her father decides. She asked if her father could go to SNF at another facility until she is able to come in town on July 1- she is currently dealing with her own medical issues.     6/20/23  - " Consult for advance care planning/ goals of care in elderly patient admitted with worsening abdominal pain. Extensive chart review performed.  - Along with Margareth Hernandez ( RN), met patient at bedside. He was sitting up in his bedside chair. Overall, he is very frail and cachectic, he is chronically ill appearing. He has a very weak cough. He reports he lives at home alone, however his grandson helps him at home. His wife of 63 years is a resident at [facility]. He has 2 children (son and daughter) who both live out of town. 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 He describes himself as a very independent man, even driving up until recently. He reports he is living for his wife and he is very fearful if he would die before her then if she would be okay. He stated he feels that he has lived his life and he has been very blessed, even citing this is the first time he has been hospitalized for more than 1 day. He told us that he was raised to treat everyone how you want to be treated, we did inform him that he is a very smart man and seems like a great person. He was very open that losing his independence and having to accept help from others is very difficult.  - Dr. Salmeron then presented to bedside and we again discussed overall plan for MBSS. Mr. Kim is looking forward to [facility] because he will be able to spend time with his wife.  - We discussed Mr. Kim's goals. He is hopeful to have good quality days with his wife, wants to remain independent and avoid suffering. Along those lines we did introduce code status, Mr. Kim said he wants to avoid any measures that would cause suffering. I was very transparent that given his age and health status CPR/ intubation would likely be suffering. He agreed with this and stated he desires a natural peaceful passing,I notified him that a DNR order would be placed in his chart.

## 2023-06-22 NOTE — PT/OT/SLP PROGRESS
Physical Therapy Treatment    Patient Name:  Julio Benites   MRN:  [id number]    Recommendations:     Discharge Recommendations: nursing facility, skilled  Discharge Equipment Recommendations: to be determined by next level of care  Barriers to discharge: Decreased caregiver support    Assessment:     Julio Benites is a 88 y.o. male admitted with a medical diagnosis of Colitis.  He presents with the following impairments/functional limitations: weakness, impaired endurance, impaired self care skills, impaired functional mobility, gait instability, impaired balance, decreased coordination, decreased upper extremity function, decreased lower extremity function, decreased safety awareness, impaired cardiopulmonary response to activity ;pt with fair/good mobility today, needing some encouragement today, amb short distance in room to chair w/ RW and CGA/min.A.    Rehab Prognosis: Good and Fair; patient would benefit from acute skilled PT services to address these deficits and reach maximum level of function.    Recent Surgery: Procedure(s) (LRB):  EGD (ESOPHAGOGASTRODUODENOSCOPY) (Left) 3 Days Post-Op    Plan:     During this hospitalization, patient to be seen 5 x/week to address the identified rehab impairments via gait training, therapeutic activities, therapeutic exercises and progress toward the following goals:    Plan of Care Expires:  07/18/23    Subjective     Chief Complaint: none stated  Patient/Family Comments/goals: pt agreeable to session on 2nd attempt today. Seems depressed, wants to see his wife (who's in a NH).   Pain/Comfort:  Pain Rating 1: 0/10  Pain Rating Post-Intervention 1: 0/10      Objective:     Communicated with nurse prior to session.  Patient found HOB elevated with peripheral IV, oxygen upon PT entry to room.     General Precautions: Standard, fall, hearing impaired  Orthopedic Precautions: N/A  Braces: N/A  Respiratory Status: Nasal cannula, flow 2 L/min     Functional Mobility:  Bed  Mobility:     Supine to Sit: minimum assistance  Transfers:     Sit to Stand:  minimum assistance with rolling walker  Gait: amb'd 12' around bed to chair w/ RW and O2@2L, CGA/min.A      AM-PAC 6 CLICK MOBILITY  Turning over in bed (including adjusting bedclothes, sheets and blankets)?: 3  Sitting down on and standing up from a chair with arms (e.g., wheelchair, bedside commode, etc.): 3  Moving from lying on back to sitting on the side of the bed?: 3  Moving to and from a bed to a chair (including a wheelchair)?: 3  Need to walk in hospital room?: 3  Climbing 3-5 steps with a railing?: 2  Basic Mobility Total Score: 17       Treatment & Education:  Pt perf'd seated LE exs' of heel/toe raises, hip flex, LAQ's x 5 ea.     Patient left up in chair with all lines intact, call button in reach, nurse notified, and door left open ..    GOALS:   Multidisciplinary Problems       Physical Therapy Goals          Problem: Physical Therapy    Goal Priority Disciplines Outcome Goal Variances Interventions   Physical Therapy Goal     PT, PT/OT Ongoing, Progressing     Description: Goals to be met by:2023    Patient will increase functional independence with mobility by performin. Patient will perform supine <> EOB with SBA with or without use of HB features during two consecutive sessions   2. Patient will perform sit <> stand with RW without verbal cues for hand placement requiring SBA  3. Patient will ambulate > 150' with RW while using step-through gait pattern with no LOB requiring SBA for safety  4. Patient will negotiate 3 steps 2x (or 7 consecutive steps) with BHR with SBA from therapist twice during session                            Time Tracking:     PT Received On: 23  PT Start Time: 1355     PT Stop Time: 1408  PT Total Time (min): 13 min     Billable Minutes: Gait Training 13    Treatment Type: Treatment  PT/PTA: PTA     Number of PTA visits since last PT visit: 3     2023

## 2023-06-23 PROBLEM — R64 CACHEXIA: Chronic | Status: ACTIVE | Noted: 2023-03-23

## 2023-06-23 PROBLEM — Z75.8 DISCHARGE PLANNING ISSUES: Status: ACTIVE | Noted: 2023-06-23

## 2023-06-23 PROBLEM — R79.89 ELEVATED TROPONIN: Status: RESOLVED | Noted: 2023-06-16 | Resolved: 2023-06-23

## 2023-06-23 PROBLEM — R53.81 PHYSICAL DEBILITY: Chronic | Status: ACTIVE | Noted: 2022-01-05

## 2023-06-23 PROBLEM — K52.9 COLITIS: Status: RESOLVED | Noted: 2023-06-16 | Resolved: 2023-06-23

## 2023-06-23 PROBLEM — R13.10 DYSPHAGIA: Chronic | Status: ACTIVE | Noted: 2023-06-20

## 2023-06-23 PROBLEM — I50.22 CHRONIC SYSTOLIC HEART FAILURE: Chronic | Status: ACTIVE | Noted: 2022-03-18

## 2023-06-23 PROCEDURE — 99232 PR SUBSEQUENT HOSPITAL CARE,LEVL II: ICD-10-PCS | Mod: HCNC,,, | Performed by: FAMILY MEDICINE

## 2023-06-23 PROCEDURE — 97530 THERAPEUTIC ACTIVITIES: CPT | Mod: HCNC,CQ

## 2023-06-23 PROCEDURE — 99232 PR SUBSEQUENT HOSPITAL CARE,LEVL II: ICD-10-PCS | Mod: HCNC,,, | Performed by: INTERNAL MEDICINE

## 2023-06-23 PROCEDURE — 25000003 PHARM REV CODE 250: Mod: HCNC | Performed by: HOSPITALIST

## 2023-06-23 PROCEDURE — 25000003 PHARM REV CODE 250: Mod: HCNC | Performed by: INTERNAL MEDICINE

## 2023-06-23 PROCEDURE — 99232 SBSQ HOSP IP/OBS MODERATE 35: CPT | Mod: HCNC,,, | Performed by: INTERNAL MEDICINE

## 2023-06-23 PROCEDURE — 99232 SBSQ HOSP IP/OBS MODERATE 35: CPT | Mod: HCNC,,, | Performed by: FAMILY MEDICINE

## 2023-06-23 PROCEDURE — 92526 ORAL FUNCTION THERAPY: CPT | Mod: HCNC

## 2023-06-23 PROCEDURE — 11000001 HC ACUTE MED/SURG PRIVATE ROOM: Mod: HCNC

## 2023-06-23 RX ADMIN — POLYETHYLENE GLYCOL 3350 17 G: 17 POWDER, FOR SOLUTION ORAL at 08:06

## 2023-06-23 RX ADMIN — LOSARTAN POTASSIUM 100 MG: 50 TABLET, FILM COATED ORAL at 09:06

## 2023-06-23 RX ADMIN — PANTOPRAZOLE SODIUM 40 MG: 40 TABLET, DELAYED RELEASE ORAL at 09:06

## 2023-06-23 RX ADMIN — PRAVASTATIN SODIUM 20 MG: 20 TABLET ORAL at 08:06

## 2023-06-23 RX ADMIN — SENNOSIDES AND DOCUSATE SODIUM 1 TABLET: 50; 8.6 TABLET ORAL at 09:06

## 2023-06-23 RX ADMIN — NIFEDIPINE 60 MG: 30 TABLET, FILM COATED, EXTENDED RELEASE ORAL at 08:06

## 2023-06-23 RX ADMIN — PANTOPRAZOLE SODIUM 40 MG: 40 TABLET, DELAYED RELEASE ORAL at 08:06

## 2023-06-23 RX ADMIN — NIFEDIPINE 60 MG: 30 TABLET, FILM COATED, EXTENDED RELEASE ORAL at 09:06

## 2023-06-23 RX ADMIN — POLYETHYLENE GLYCOL 3350 17 G: 17 POWDER, FOR SOLUTION ORAL at 09:06

## 2023-06-23 RX ADMIN — SENNOSIDES AND DOCUSATE SODIUM 1 TABLET: 50; 8.6 TABLET ORAL at 08:06

## 2023-06-23 NOTE — PHYSICIAN QUERY
PT Name: Julio Benites  MR #: [id number]     DOCUMENTATION CLARIFICATION     CDS/: Dana Mckeon RN, CDS              Contact information: [email or web address]    This form is a permanent document in the medical record.     Query Date: June 23, 2023    By submitting this query, we are merely seeking further clarification of documentation.  Please utilize your independent clinical judgment when addressing the question(s) below.  The Medical Record contains the following   Indicators   Supporting Clinical Findings Location in Medical Record   x Documentation of Respiratory Failure, ARDS Acute on chronic respiratory failure with hypoxia  Patient with Hypoxic Respiratory failure which is Acute on chronic.  he is not on home oxygen. Supplemental oxygen was provided and noted-       Signs/symptoms of respiratory failure include- tachypnea. Contributing diagnoses includes - Interstitial lung disease Labs and images were reviewed. Patient Has not had a recent ABG. Will treat underlying causes and adjust management of respiratory failure as follows- Duoneb PRN, Oxygen   H&P 6/16    x Subjective Respiratory Signs/Symptoms:   SOB, BARON, Cough, etc. Patient also endorses about 1-1/2 months of shortness of breath   H&P 6/16    x Objective Respiratory Signs/Symptoms: Respiratory distress, Accessory muscle use, tachypnea, wheezing, etc. Effort: Pulmonary effort is normal.     O2 90-92% on RA. Speaking in complete sentences    No obvious use of accessory muscles    Effort: Pulmonary effort is normal. No respiratory distress.   Breath sounds: No stridor. No wheezing, rhonchi or rales   H&P 6/16     ED Provider First Eval 6/16      ED Provider First Eval 6/16    ED Provider Note 6/16    x RR     ABGs     O2 sat Resp:  [16-20] 18  SpO2:  [92 %-100 %] 96 %    Resp:  [16-18] 18  SpO2:  [96 %-100 %] 99 %    Resp:  [15-18] 18  SpO2:  [93 %-99 %] 93 %   H&P 6/16       Hosp Med PN 6/17      Hosp Med PN 6/18    Hypoxia/Hypercapnia       BiPAP/Intubation/Mechanical Ventilation     x Supplemental O2 Nasal cannula in place.     Patient resting well  On room air 3 lpm nasal cannula    Respiratory Status: Nasal cannula, flow 2 L/min   ED Provider Note 6/16    Respiratory Therapy PN 6/19    PT/OT PN 6/20    Home O2, Oxygen Dependence      Radiology findings     x Acute/Chronic Illness 88 y.o. male with a past medical history of DM, NSTEMI, HTN, HLD, and systolic CHF who was recently treated for CAP  with azithromycin who presents with left upper quadrant abdominal pain.  Patient reports worsening left upper quadrant abdominal pain over the past 4-5 days     presents with episodic left upper quadrant pain and chronic SOB, found to be newly hypoxic on arrival.  He also appears significantly cachectic with unintentional weight loss, but recently had outpatient CT with no evidence for recurrent malignancy, though it did show pneumonia treated with outpatient antibiotics.  Imaging here shows persistent worsening pneumonia, H&P 6/16               ED Provider Note 6/16    Treatment      Other         The noted clinical guidelines following a diagnosis are only system guidelines and do not replace the providers clinical judgment.    Due to the conflicting clinical picture, please clinically validate the diagnosis of _Acute respiratory failure with hypoxia __.    If validated, please provide additional clinical support for the diagnosis.     [  X  ] Above stated diagnosis is not confirmed and/or it has been ruled out   [    ] Above stated diagnosis is not confirmed and/or it has been ruled out, other diagnosis ruled in (please specify):_______________   [    ] Acute Respiratory Failure with Hypoxia (ABG pO2 < 60 mmHg or O2 sat of <91% on room air and respiratory symptoms documented) diagnosis is confirmed and additional clinical support/decision-making indicators for the diagnosis include (please specify): _______________________________________________   [     ] Other clarification (please specify): ___________________         Please document in your progress notes daily for the duration of treatment until resolved and include in your discharge summary.     Reference:    ISRAEL Patel MD. (2020, March 13). Acute respiratory distress syndrome: Clinical features, diagnosis, and complications in adults ([id number] LINETTE Tello MD & [id number] BRAD Irwin MD, Eds.). Retrieved November 13, 2020, from https://[email or web address]/contents/acute-respiratory-distress-syndrome-clinical-features-diagnosis-and-complications-in-adults?search=ards&source=search_result&selectedTitle=1~150&usage_type=default&display_rank=1  Form No. [id number]

## 2023-06-23 NOTE — PLAN OF CARE
Problem: Adult Inpatient Plan of Care  Goal: Plan of Care Review  Outcome: Ongoing, Progressing  Goal: Patient-Specific Goal (Individualized)  Outcome: Ongoing, Progressing  Goal: Absence of Hospital-Acquired Illness or Injury  Outcome: Ongoing, Progressing  Goal: Optimal Comfort and Wellbeing  Outcome: Ongoing, Progressing  Goal: Readiness for Transition of Care  Outcome: Ongoing, Progressing     Problem: Diabetes Comorbidity  Goal: Blood Glucose Level Within Targeted Range  Outcome: Ongoing, Progressing     Problem: Fall Injury Risk  Goal: Absence of Fall and Fall-Related Injury  Outcome: Ongoing, Progressing     Problem: Skin Injury Risk Increased  Goal: Skin Health and Integrity  Outcome: Ongoing, Progressing     Problem: Coping Ineffective  Goal: Effective Coping  Outcome: Ongoing, Progressing

## 2023-06-23 NOTE — ASSESSMENT & PLAN NOTE
"6/23/23  - Patient sitting up in bed eating breakfast (roughly 30%). Patient reports feeling okay, he knows his daughter is coming in town on Monday. He remains really hopeful he is able to go to [facility] with his wife. He again stressed the importance of being with his wife. He is having to suction himself often due to cough.   - Discussed with CM, hopefully patient can go to [facility] however financial situation remains an issue. His daughter is very aware of this and is working on it.     6/22/23  - Patient seen sitting up in bed. Patient reports he is feeling "okay", he reports he did not sleep well overnight. Patient states he spoke to Dr. Salmeron yesterday about his swallowing and if he would desire a feeding tube. Mr. Benites says he wants to go back to his "normal routine" and he knows he "will not be here for a long time". His main concern is getting to spend time with his wife, which CM is working on getting him into [facility] with his wife.     6/21/23  - Patient seen sitting up in bed. He reports he believes the MBSS went "well". He reports cough with swallowing has been going on for some time now. I discussed with him concern of aspiration and very weak cough. While Mr. Benites did have some difficulty understanding everything, he was very consistent in his wishes that he wants to be with his wife, he knows "I won't be here for long", and he wants to avoid invasive procedures/ suffering. I did discuss feeding tube, which he stated he has never thought about. I was very transparent that based on his goals (spending time with wife/ avoiding suffering and invasive procedures) and overall condition a PEG tube would likely not help him achieve his goals and I would recommend we get him with his wife to spend whatever time he may have left, in which he stated "that is my only concern"  - I then discussed with CM and unfortunately [facility] is not in network with his insurance.  - I contacted his very supportive daughter, " Natali. We again discussed results of MBSS. She reports cough has been ongoing for some time. I was very transparent with what her fathers wishes are and that PEG Tube would involve surgery. We also discussed difficulty with [facility] not being in network. Natali is very supportive of whatever her father decides. She asked if her father could go to SNF at another facility until she is able to come in town on July 1- she is currently dealing with her own medical issues.     6/20/23  - Consult for advance care planning/ goals of care in elderly patient admitted with worsening abdominal pain. Extensive chart review performed.  - Along with Margareth Hernandez ( RN), met patient at bedside. He was sitting up in his bedside chair. Overall, he is very frail and cachectic, he is chronically ill appearing. He has a very weak cough. He reports he lives at home alone, however his grandson helps him at home. His wife of 63 years is a resident at [facility]. He has 2 children (son and daughter) who both live out of town. 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 He describes himself as a very independent man, even driving up until recently. He reports he is living for his wife and he is very fearful if he would die before her then if she would be okay. He stated he feels that he has lived his life and he has been very blessed, even citing this is the first time he has been hospitalized for more than 1 day. He told us that he was raised to treat everyone how you want to be treated, we did inform him that he is a very smart man and seems like a great person. He was very open that losing his independence and having to accept help from others is very difficult.  - Dr. Salmeron then presented to bedside and we again discussed overall plan for MBSS. Mr. Kim is looking forward to [facility] because he will be able to spend time with his wife.  - We discussed Mr. Kim's goals. He is hopeful to have good quality days with his wife, wants  to remain independent and avoid suffering. Along those lines we did introduce code status, Mr. Kim said he wants to avoid any measures that would cause suffering. I was very transparent that given his age and health status CPR/ intubation would likely be suffering. He agreed with this and stated he desires a natural peaceful passing,I notified him that a DNR order would be placed in his chart.

## 2023-06-23 NOTE — PROGRESS NOTES
VSS. Very [place]-- B hearing aids in. L AC SL intact. Voiding ok per urinal. Poor appetite-- but did eat a little better today. Up with assist X1-2. Sat in recliner for about 2-3 hours today. [facility] consult noted. No C/O. Pleasant.

## 2023-06-23 NOTE — CONSULTS
Thank you for your consult to [facility]. We have reviewed the patient chart. This patient does meet criteria for [facility] service at this time.  Will assume care on 06/24/23 at 7AM.

## 2023-06-23 NOTE — ASSESSMENT & PLAN NOTE
- Pursuing SNF placement as feasible; [facility] amenable to taking Mon (06/26) if PO intake over 50% for the weekend.  - Daughter en route to arrive Mon as well; backup plan if unable to pursue SNF placement would be for her assistance in obtaining financial information for him to be placed in retirement NH with hospice with his wife at [facility].

## 2023-06-23 NOTE — PROGRESS NOTES
"[facility] [address])  Palliative Medicine  Progress Note    Patient Name: Julio Benites  MRN: [id number]  Admission Date: 6/16/2023  Hospital Length of Stay: 7 days  Code Status: Full Code   Attending Provider: DEXTER Salmeron MD  Consulting Provider: Brittney Mcwilliams DNP  Primary Care Physician: Gregorio Jensen MD  Principal Problem:Discharge planning issues    Patient information was obtained from patient and primary team.      Assessment/Plan:     Cardiac/Vascular  Chronic systolic heart failure  - Echo 1/1/22   The left ventricle is normal in size with concentric remodeling and mildly decreased systolic function.   The estimated ejection fraction is 40%.   There are segmental left ventricular wall motion abnormalities.   Grade I left ventricular diastolic dysfunction.   With normal right ventricular systolic function.   The estimated PA systolic pressure is 30 mmHg.   Normal central venous pressure (3 mmHg).      GI  Dysphagia  - Reviewed results of MBSS.     Colitis  - Management per primary team/ GI     Palliative Care  Encounter for palliative care  6/23/23  - Patient sitting up in bed eating breakfast (roughly 30%). Patient reports feeling okay, he knows his daughter is coming in town on Monday. He remains really hopeful he is able to go to [facility] with his wife. He again stressed the importance of being with his wife. He is having to suction himself often due to cough.   - Discussed with CM, hopefully patient can go to [facility] however financial situation remains an issue. His daughter is very aware of this and is working on it.     6/22/23  - Patient seen sitting up in bed. Patient reports he is feeling "okay", he reports he did not sleep well overnight. Patient states he spoke to Dr. Salmeron yesterday about his swallowing and if he would desire a feeding tube. Mr. Benites says he wants to go back to his "normal routine" and he knows he "will not be here for a long time". His main " "concern is getting to spend time with his wife, which CM is working on getting him into [facility] with his wife.     6/21/23  - Patient seen sitting up in bed. He reports he believes the MBSS went "well". He reports cough with swallowing has been going on for some time now. I discussed with him concern of aspiration and very weak cough. While Mr. Benites did have some difficulty understanding everything, he was very consistent in his wishes that he wants to be with his wife, he knows "I won't be here for long", and he wants to avoid invasive procedures/ suffering. I did discuss feeding tube, which he stated he has never thought about. I was very transparent that based on his goals (spending time with wife/ avoiding suffering and invasive procedures) and overall condition a PEG tube would likely not help him achieve his goals and I would recommend we get him with his wife to spend whatever time he may have left, in which he stated "that is my only concern"  - I then discussed with  and unfortunately [facility] is not in network with his insurance.  - I contacted his very supportive daughter, Natali. We again discussed results of MBSS. She reports cough has been ongoing for some time. I was very transparent with what her fathers wishes are and that PEG Tube would involve surgery. We also discussed difficulty with [facility] not being in network. Natali is very supportive of whatever her father decides. She asked if her father could go to SNF at another facility until she is able to come in town on July 1- she is currently dealing with her own medical issues.     6/20/23  - Consult for advance care planning/ goals of care in elderly patient admitted with worsening abdominal pain. Extensive chart review performed.  - Along with Margareth Hernandez ( RN), met patient at bedside. He was sitting up in his bedside chair. Overall, he is very frail and cachectic, he is chronically ill appearing. He has a very weak cough. He reports he " "lives at home alone, however his grandson helps him at home. His wife of 63 years is a resident at [facility]. He has 2 children (son and daughter) who both live out of town. 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 He describes himself as a very independent man, even driving up until recently. He reports he is living for his wife and he is very fearful if he would die before her then if she would be okay. He stated he feels that he has lived his life and he has been very blessed, even citing this is the first time he has been hospitalized for more than 1 day. He told us that he was raised to treat everyone how you want to be treated, we did inform him that he is a very smart man and seems like a great person. He was very open that losing his independence and having to accept help from others is very difficult.  - Dr. Salmeron then presented to bedside and we again discussed overall plan for MBSS. Mr. Kim is looking forward to [facility] because he will be able to spend time with his wife.  - We discussed Mr. Kim's goals. He is hopeful to have good quality days with his wife, wants to remain independent and avoid suffering. Along those lines we did introduce code status, Mr. Kim said he wants to avoid any measures that would cause suffering. I was very transparent that given his age and health status CPR/ intubation would likely be suffering. He agreed with this and stated he desires a natural peaceful passing,I notified him that a DNR order would be placed in his chart.       Other  Cachexia  - Very frail/ cachectic  - BMI 10.58     Physical debility  - PT/OT consulted, recommending SNF           Subjective:     Chief Complaint:   Chief Complaint   Patient presents with    Abdominal Pain     C/o LUQ pain 10/10 x 3 days with no improvement. Also c/o SOB x 1 month. O2 90-92% on RA. Speaking in complete sentences. PMH Stomach Ca. -CP. -n/v/d. /111 all other VSS       HPI:   Per H&P: "HPI: The patient is a " "88 y.o. male with a past medical history of DM, NSTEMI, HTN, HLD, and systolic CHF who was recently treated for CAP  with azithromycin who presents with left upper quadrant abdominal pain.  Patient reports worsening left upper quadrant abdominal pain over the past 4-5 days.  He states it is positional and worse when he sits up or stands.  He rates the pain at a 10/10 during these times but 0/10 currently.  Patient also endorses about 1-1/2 months of shortness of breath.  He is also endorsed a significant weight loss during this time.  He denies chest pain, nausea, vomiting, diarrhea, fevers, chills."    At time of initial consult, patient seen sitting up in bedside chair. Patient is very frail and cachectic. Please see encounter for palliative care.       Hospital Course:  No notes on file    Interval History: Frail/ cachectic/ weak cough     Medications:  Continuous Infusions:  Scheduled Meds:   losartan  100 mg Oral Daily    NIFEdipine  60 mg Oral BID    pantoprazole  40 mg Oral BID    polyethylene glycol  17 g Oral BID    pravastatin  20 mg Oral QHS    senna-docusate 8.6-50 mg  1 tablet Oral BID     PRN Meds:acetaminophen, albuterol-ipratropium, bisacodyL, HYDROmorphone, naloxone, ondansetron, oxyCODONE, prochlorperazine, sodium chloride 0.9%    Objective:     Vital Signs (Most Recent):  Temp: 97.6 °F (36.4 °C) (06/23/23 1109)  Pulse: 86 (06/23/23 1109)  Resp: 18 (06/23/23 1109)  BP: 118/71 (06/23/23 1109)  SpO2: 100 % (06/23/23 1109) Vital Signs (24h Range):  Temp:  [97.3 °F (36.3 °C)-97.8 °F (36.6 °C)] 97.6 °F (36.4 °C)  Pulse:  [76-86] 86  Resp:  [14-18] 18  SpO2:  [94 %-100 %] 100 %  BP: (116-176)/(68-87) 118/71     Weight: 32.5 kg (71 lb 10.4 oz)  Body mass index is 10.58 kg/m².       Physical Exam  Constitutional:       Appearance: He is ill-appearing (Chronically).      Comments: Very frail/ cachectic/ temporal lobe wasting    Cardiovascular:      Rate and Rhythm: Normal rate.   Pulmonary:      " Effort: No respiratory distress.      Comments: Weak cough   Neurological:      Mental Status: He is alert and oriented to person, place, and time.      Comments: Sitting up in bed           Review of Symptoms        Symptom Assessment (ESAS 0-10 Scale)  Fatigue:  2            Living Arrangements:  Lives alone     Psychosocial/Cultural:   See Palliative Psychosocial Note: Yes  Patient lives alone, however his grandson helps him at home. His wife is a resident at [facility].   **Primary  to Follow**  Palliative Care  Consult: No     Advance Care Planning   Advance Directives:   Goals of Care: What is most important right now is to focus on spending time at home, quality of life, even if it means sacrificing a little time. Accordingly, we have decided that the best plan to meet the patient's goals includes continuing with treatment.       Significant Labs: All pertinent labs within the past 24 hours have been reviewed.  CBC:   Recent Labs   Lab 06/19/23  0418   WBC 9.26   HGB 10.7*   HCT 34.2*   MCV 81*        BMP:  No results for input(s): GLU, NA, K, CL, CO2, BUN, CREATININE, CALCIUM, MG in the last 24 hours.  LFT:  Lab Results   Component Value Date    AST 18 06/19/2023    ALKPHOS 53 (L) 06/19/2023    BILITOT 0.5 06/19/2023     Albumin:   Albumin   Date Value Ref Range Status   06/19/2023 2.7 (L) 3.5 - 5.2 g/dL Final     Protein:   Total Protein   Date Value Ref Range Status   06/19/2023 6.3 6.0 - 8.4 g/dL Final     Lactic acid:   Lab Results   Component Value Date    LACTATE 2.4 (H) 06/16/2023       Significant Imaging: I have reviewed all pertinent imaging results/findings within the past 24 hours.     > 50% of 25 min visit spent in chart review, face to face discussion of symptom assessment, coordination of care with other specialists, and d/c planning         Brittney Mcwilliams DNP  Palliative Medicine  [facility] ([address])

## 2023-06-23 NOTE — PT/OT/SLP PROGRESS
"Occupational Therapy      Patient Name:  Julio Benites   MRN:  [id number]    Attempted to pt at 12:06.  Upon arrival to room pt declined participation in therapy stating he had already done exercises with his legs.  OT provided encouragement and educated pt on importance and benefits of participating in therapy.  Pt stated, "maybe later, but not now." OT unable to attempt again in afternoon.  Will follow-up at next scheduled therapy session.    ANDREWS Renee  6/23/2023  "

## 2023-06-23 NOTE — SUBJECTIVE & OBJECTIVE
Interval History: Frail/ cachectic/ weak cough     Medications:  Continuous Infusions:  Scheduled Meds:   losartan  100 mg Oral Daily    NIFEdipine  60 mg Oral BID    pantoprazole  40 mg Oral BID    polyethylene glycol  17 g Oral BID    pravastatin  20 mg Oral QHS    senna-docusate 8.6-50 mg  1 tablet Oral BID     PRN Meds:acetaminophen, albuterol-ipratropium, bisacodyL, HYDROmorphone, naloxone, ondansetron, oxyCODONE, prochlorperazine, sodium chloride 0.9%    Objective:     Vital Signs (Most Recent):  Temp: 97.6 °F (36.4 °C) (06/23/23 1109)  Pulse: 86 (06/23/23 1109)  Resp: 18 (06/23/23 1109)  BP: 118/71 (06/23/23 1109)  SpO2: 100 % (06/23/23 1109) Vital Signs (24h Range):  Temp:  [97.3 °F (36.3 °C)-97.8 °F (36.6 °C)] 97.6 °F (36.4 °C)  Pulse:  [76-86] 86  Resp:  [14-18] 18  SpO2:  [94 %-100 %] 100 %  BP: (116-176)/(68-87) 118/71     Weight: 32.5 kg (71 lb 10.4 oz)  Body mass index is 10.58 kg/m².       Physical Exam  Constitutional:       Appearance: He is ill-appearing (Chronically).      Comments: Very frail/ cachectic/ temporal lobe wasting    Cardiovascular:      Rate and Rhythm: Normal rate.   Pulmonary:      Effort: No respiratory distress.      Comments: Weak cough   Neurological:      Mental Status: He is alert and oriented to person, place, and time.      Comments: Sitting up in bed           Review of Symptoms        Symptom Assessment (ESAS 0-10 Scale)  Fatigue:  2            Living Arrangements:  Lives alone     Psychosocial/Cultural:   See Palliative Psychosocial Note: Yes  Patient lives alone, however his grandson helps him at home. His wife is a resident at [facility].   **Primary  to Follow**  Palliative Care  Consult: No     Advance Care Planning   Advance Directives:   Goals of Care: What is most important right now is to focus on spending time at home, quality of life, even if it means sacrificing a little time. Accordingly, we have decided that the best plan to meet the  patient's goals includes continuing with treatment.       Significant Labs: All pertinent labs within the past 24 hours have been reviewed.  CBC:   Recent Labs   Lab 06/19/23  0418   WBC 9.26   HGB 10.7*   HCT 34.2*   MCV 81*        BMP:  No results for input(s): GLU, NA, K, CL, CO2, BUN, CREATININE, CALCIUM, MG in the last 24 hours.  LFT:  Lab Results   Component Value Date    AST 18 06/19/2023    ALKPHOS 53 (L) 06/19/2023    BILITOT 0.5 06/19/2023     Albumin:   Albumin   Date Value Ref Range Status   06/19/2023 2.7 (L) 3.5 - 5.2 g/dL Final     Protein:   Total Protein   Date Value Ref Range Status   06/19/2023 6.3 6.0 - 8.4 g/dL Final     Lactic acid:   Lab Results   Component Value Date    LACTATE 2.4 (H) 06/16/2023       Significant Imaging: I have reviewed all pertinent imaging results/findings within the past 24 hours.

## 2023-06-23 NOTE — CHAPLAIN
06/23/23 1129   Clinical Encounter Type   Visit Type Follow-up   Visit Category General Rounding   Visited With Patient   Number of Family Visited 0   Length of Visit 20 Minutes   Continue Visiting Yes   Patient Spiritual Encounters   Care Provided Reflective listening;Compassionate presence;Prayer support   Patient Coping Accepting   Comments - Patient Patient has difficulty hearing.

## 2023-06-23 NOTE — PT/OT/SLP PROGRESS
Physical Therapy Treatment    Patient Name:  Julio Benites   MRN:  [id number]    Recommendations:     Discharge Recommendations: nursing facility, skilled  Discharge Equipment Recommendations: to be determined by next level of care  Barriers to discharge: Decreased caregiver support    Assessment:     Julio Benites is a 88 y.o. male admitted with a medical diagnosis of Discharge planning issues.  He presents with the following impairments/functional limitations: weakness, impaired endurance, impaired self care skills, impaired functional mobility, gait instability, impaired balance, decreased coordination, decreased upper extremity function, decreased lower extremity function, decreased safety awareness, decreased ROM, impaired cardiopulmonary response to activity ;pt with fair mobility today, did stand up and take a few steps to chair, though unable to walk further, pt reporting feeling weaker..    Rehab Prognosis: Good and Fair; patient would benefit from acute skilled PT services to address these deficits and reach maximum level of function.    Recent Surgery: Procedure(s) (LRB):  EGD (ESOPHAGOGASTRODUODENOSCOPY) (Left) 4 Days Post-Op    Plan:     During this hospitalization, patient to be seen 5 x/week to address the identified rehab impairments via gait training, therapeutic activities, therapeutic exercises and progress toward the following goals:    Plan of Care Expires:  07/18/23    Subjective     Chief Complaint: weakness  Patient/Family Comments/goals: pt agreeable to session  Pain/Comfort:  Pain Rating 1: 0/10  Pain Rating Post-Intervention 1: 0/10      Objective:     Communicated with nurse prior to session.  Patient found HOB elevated with peripheral IV, oxygen upon PT entry to room.     General Precautions: Standard, fall, hearing impaired  Orthopedic Precautions: N/A  Braces: N/A  Respiratory Status: Nasal cannula, flow 2 L/min     Functional Mobility:  Bed Mobility:     Supine to Sit: minimum  assistance and and using bedrail  Transfers:     Sit to Stand:  contact guard assistance and minimum assistance with rolling walker  Gait: pt took a few steps bed to chair w/ RW and min.A      AM-PAC 6 CLICK MOBILITY  Turning over in bed (including adjusting bedclothes, sheets and blankets)?: 3  Sitting down on and standing up from a chair with arms (e.g., wheelchair, bedside commode, etc.): 3  Moving from lying on back to sitting on the side of the bed?: 3  Moving to and from a bed to a chair (including a wheelchair)?: 3  Need to walk in hospital room?: 3  Climbing 3-5 steps with a railing?: 2  Basic Mobility Total Score: 17       Treatment & Education:  Pt stood ~1 min. For hygiene, following BM in bed.  Educated on importance of mobility and sitting up everyday.     Patient left up in chair with all lines intact, call button in reach, and speech therapist present..    GOALS:   Multidisciplinary Problems       Physical Therapy Goals          Problem: Physical Therapy    Goal Priority Disciplines Outcome Goal Variances Interventions   Physical Therapy Goal     PT, PT/OT Ongoing, Progressing     Description: Goals to be met by:2023    Patient will increase functional independence with mobility by performin. Patient will perform supine <> EOB with SBA with or without use of HB features during two consecutive sessions   2. Patient will perform sit <> stand with RW without verbal cues for hand placement requiring SBA  3. Patient will ambulate > 150' with RW while using step-through gait pattern with no LOB requiring SBA for safety  4. Patient will negotiate 3 steps 2x (or 7 consecutive steps) with BHR with SBA from therapist twice during session                            Time Tracking:     PT Received On: 23  PT Start Time: 5     PT Stop Time: 1450  PT Total Time (min): 15 min     Billable Minutes: Therapeutic Activity 15    Treatment Type: Treatment  PT/PTA: PTA     Number of PTA visits since  last PT visit: 4     06/23/2023

## 2023-06-23 NOTE — PT/OT/SLP PROGRESS
Speech Language Pathology Treatment    Patient Name:  Julio Benites   MRN:  [id number]  Admitting Diagnosis: Discharge planning issues    Recommendations:      Recommendations:                General Recommendations:    Palliative care assessment of wishes re: PO intake  SLP to follow 3-5x/week, to trial rehabilitative course of dysphagia tx vs promote safest method of oral intake if comfort feeding is opted for      Diet recommendations:   Reviewed palliative care documentation. In light of patient medical status, low threshold for tolerance of procedures/surgeries patient opting to remain on PO diet.   Patient remains on clear liquid diet, if/when restrictions to be lifted, recommend transition to puree diet with thin liquids, with knowledge that patient will likely have significant pharyngeal stasis and frequent aspiration during meals across consistencies  Please crush pills if able, please provide 1 pill at a time in applesauce with a liquid wash. Pharyngeal stasis is SEVERE and risk present for aspiration of pills and/or caustic medication injury to pharynx/larynx if multiple pills are provided at once due to decreased pharyngeal clearance      Aspiration Precautions: If medical team and patient wish to remain on PO nutrition recommend the following:  Frequent and thorough oral care to reduce risk of aspiration pna of likely aspiration of secretions/intake  Small singular sips of liquid via cup  Alternate solids/liquids   Effortful swallow during intake  Intermittent throat clear/cough during intake     General Precautions: Standard,      Assessment:     Julio Benites is a 88 y.o. male with an SLP diagnosis of  severe pharyngeal dysphagia .  He presents with delayed and reduced LVC, significantly reduced base of tongue retraction and pharyngeal constriction with resultant decreased pharyngeal obliteration during the swallow, and severe post swallow pharyngeal stasis.     Impressions MBSS 6/20/23:  Globally  weak pharyngeal swallow noted  Mildly-moderately reduced hyolaryngeal elevation/excursion and UES relaxation  Severely reduced base of tongue retraction, laryngeal vestibule closure, epiglottic retroflexion, pharyngeal squeeze  Deficits resulting in decreased pharyngeal obliteration, subsequent pharyngeal stasis measuring up to 7.69% of pharynx with thin liquids and 28.6% of pharynx with pureed solids  There was discoordination with time to laryngeal vestibule closure, and delayed airway protection with time to laryngeal vestibule closure from hyoid burst/swallow initiation measuring up to 366.3ms  WFL time to LVC for thin liquids =(<167ms), there was also significantly reduced duration of LVC measuring at 333ms (WNL =603.70 (±272.53))  Above deficits resulted in airway invasion occurring in 100% of attempts, occurring during and after the swallow as bolus within the pharynx migrated into the laryngeal vestibule prior to full LVC and pharyngeal residues migrated into the laryngeal vestibule after the swallow  On initial swallow of thin liquids patient with PAS score of 3 indicating that contrast entered the laryngeal vestibule, remained within laryngeal vestibule post swallow initiation  A cued cough was inefficient and did not clear the larynx  As the study progressed contrast within the larynx migrated toward and eventually below the vocal folds, with trace aspiration noted   No strategies significantly improved pharyngeal clearance or airway protection  Notably post assessment patient with coughing, red tinged saliva expectorated. Patient reports consuming red jello just prior to assessment.        Subjective     Patient seen in room seated upright in chair following PT session    Pain/Comfort:   Denies discomfort    Respiratory Status: 2L via nasal cannula    Objective:     Cognitive communication brief assessment  Patient awake, alert, attends to all directives and all conversation without difficulty  Patient  followed abstract commands for dysphagia tx given min cues   Makes wants/needs known, appropriately requests items  Answers all yes/no and simple open ended questions without difficulty  No recall of education from yesterday tx session, poor recall of results from modified barium swallow study  Suspect poor insight into dysphagia deficits    Dysphagia tx  Reviewed findings from MBSS 6/20/23. Reviewed risks present for aspiration, reviewed pillars of aspiration pna with focus today on oral care. Patient instructed to complete oral care 3x/day, will review with RN need for assistance. Please utilize yankauer during oral care. Dentures removed and scrubbed with toothbrush. Gums and tongue cleaned with toothbrush soaked in mouthwash, immediately followed by yankauer suction.     Patient able to teachback education re: reduction in risk of pna if oral care to be provided 3x/day, though, this is the second day in a row patient reports he has not completed oral care    Patient with lunch tray at bedside. Two diet orders in chart now, one for regular and one for soft and bite sized solids. Patient with a regular tray with fish, mashed potatoes, and squash. Thin liquids. Removed squash as unable to be modified by this writer with a fork. Mashed fish to minced and moist consistency to decrease risk of choking. Patient encouraged to utilize multiple effortful swallows throughout intake, as well as alternating solids/liquids as stasis was more significant with solids on MBSS than with liquids.     Reviewed effortful swallow exercise, to target globally weak pharyngeal swallow.   Patient able to complete exercise with trials of minced solids and thin liquids from lunch tray as weighted bolus  Patient completed 4 set of 5 trials this date.   Required frequent cues throughout for increased perceived effort  Reviewed with patient importance of frequent effortful swallows throughout the day to improve overall pharyngeal strength and  "clearance    Other exercises deferred as patient consuming lunch tray    Patient with wet breathing, throat clearing, and coughing throughout intake of solids and liquids from tray. Patient likely with pharyngeal stasis, pooling, and subsequent aspiration as seen on MBSS 6/20/23, patient with poor insight stating "it is all gone", despite education re: deficits. Highly recommend against solids in pieces larger than 3mm as patient will likely aspirate during a meals, risk for blockage of vocal folds and subsequent choking/asphyxiation is high. Will review with MD, would highly recommend downgrading diet to pureed solids, or if patient wishes to remain on chewables, to respect patient wishes of remaining on solid diet minced and moist solids.    Goals:   Multidisciplinary Problems       SLP Goals          Problem: SLP    Goal Priority Disciplines Outcome   SLP Goal     SLP Ongoing, Progressing   Description: Goals may change based on discussion with palliative care, and assessment of goals of care between patient, medical team, and patient. As patient is currently full code, goals will reflect rehabilitative method of tx, though, patient may not build musculature with rehabilitative tx due to significant level of deconditioning, appearance of malnourishment to significantly improve safe swallow outcomes.    1. Patient and caregivers will complete oral care 3x/day to reduce risk of aspiration pna development due to likely aspiration of secretions and PO intake.     2. Patient will identify pillars of aspiration pna in 100% of opportunities during education and tx, to improve insight into deficits and reduce risk of development of pna.     3. Patient will complete effortful swallow exercise given min cues from clinician for appropriate positioning and increased effort to improve pharyngeal constriction.     4. Patient will complete exercises focused on suprahyoid strength including chin tuck against resistance and " shaker exercise given min cues from clinician for appropriate positioning to improve hyolaryngeal elevation, excursion and LVC.                            Plan:     Patient to be seen:  3 x/week, 5 x/week   Plan of Care expires:     Plan of Care reviewed with:  patient (nurse)   SLP Follow-Up:          Discharge recommendations:    follow up with OP SLP  Barriers to Discharge:   medical acuity    Time Tracking:     SLP Treatment Date:   06/22/23  Speech Start Time:  1450  Speech Stop Time:  1500     Speech Total Time (min):  10 min    Billable Minutes: Treatment Swallowing Dysfunction 10    06/23/2023

## 2023-06-23 NOTE — PROGRESS NOTES
[facility] [address] Medicine  Progress Note    Patient Name: Julio Benites  MRN: [id number]  Patient Class: IP- Inpatient   Admission Date: 6/16/2023  Length of Stay: 7 days  Attending Physician: DEXTER Salmeron MD  Primary Care Provider: Gregorio Jensen MD        Subjective:     Principal Problem:Discharge planning issues        HPI:  The patient is a 88 y.o. male with a past medical history of DM, NSTEMI, HTN, HLD, and systolic CHF who was recently treated for CAP  with azithromycin who presents with left upper quadrant abdominal pain.  Patient reports worsening left upper quadrant abdominal pain over the past 4-5 days.  He states it is positional and worse when he sits up or stands.  He rates the pain at a 10/10 during these times but 0/10 currently.  Patient also endorses about 1-1/2 months of shortness of breath.  He is also endorsed a significant weight loss during this time.  He denies chest pain, nausea, vomiting, diarrhea, fevers, chills.        Overview/Hospital Course:  Mr. Benites presented with abdominal pain. Admitted with colitis with fecal impaction/constipation. General surgery and GI consulted. Manually disimpacted with partial relief on 6/17. Bowel regimen initiated.       Interval History: No acute events overnight. Pursuing placement options. No new concerns.    Review of Systems   Constitutional:  Negative for chills and fever.   Respiratory:  Negative for cough and shortness of breath.    Cardiovascular:  Negative for chest pain and palpitations.   Gastrointestinal:  Negative for abdominal pain, nausea and vomiting.   Objective:     Vital Signs (Most Recent):  Temp: 97.8 °F (36.6 °C) (06/23/23 1500)  Pulse: 79 (06/23/23 1500)  Resp: 16 (06/23/23 1500)  BP: 119/89 (06/23/23 1500)  SpO2: (!) 92 % (06/23/23 1500) Vital Signs (24h Range):  Temp:  [97.3 °F (36.3 °C)-97.8 °F (36.6 °C)] 97.8 °F (36.6 °C)  Pulse:  [79-86] 79  Resp:  [14-18] 16  SpO2:  [92 %-100 %] 92  %  BP: (116-176)/(68-89) 119/89     Weight: 32.5 kg (71 lb 10.4 oz)  Body mass index is 10.58 kg/m².    Intake/Output Summary (Last 24 hours) at 6/23/2023 1822  Last data filed at 6/23/2023 1710  Gross per 24 hour   Intake 840 ml   Output 525 ml   Net 315 ml           Physical Exam  Vitals and nursing note reviewed.   Constitutional:       General: He is not in acute distress.     Appearance: He is well-developed. He is cachectic. He is ill-appearing.   HENT:      Head: Normocephalic and atraumatic.   Eyes:      General:         Right eye: No discharge.         Left eye: No discharge.      Conjunctiva/sclera: Conjunctivae normal.   Cardiovascular:      Rate and Rhythm: Normal rate.      Pulses: Normal pulses.   Pulmonary:      Effort: Pulmonary effort is normal. No respiratory distress.   Abdominal:      Palpations: Abdomen is soft.      Tenderness: There is no abdominal tenderness.   Musculoskeletal:         General: Normal range of motion.      Right lower leg: No edema.      Left lower leg: No edema.   Skin:     General: Skin is warm and dry.   Neurological:      Mental Status: He is alert and oriented to person, place, and time.         Significant Labs:   No new labs this morning.    Significant Imaging:   No new imaging this morning.      Assessment/Plan:      * Discharge planning issues  - Pursuing SNF placement as feasible; [facility] amenable to taking Mon (06/26) if PO intake over 50% for the weekend.  - Daughter en route to arrive Mon as well; backup plan if unable to pursue SNF placement would be for her assistance in obtaining financial information for him to be placed in [facility] with hospice with his wife at [facility].    Dysphagia  Colitis / constipation with fecal impaction (resolved), cachexia, debility  - CT Abd/Pelvis on presentation with ileus/constipation and fecal impaction with signs of stercoral colitis. Colorectal consulted and disimpacted with partial relief 06/16, underwent enema / bowel  regimen with resolution.  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  - Continue bowel regimen.  - SLP following, underwent MBSS 06/20 which showed laryngeal penetration with all consistencies.  - Discussed goals of care; would not be interested in PEG placement.  - Appreciate palliative assistance.      Acute on chronic respiratory failure with hypoxia  Patient with Hypoxic Respiratory failure which is Acute on chronic.  he is not on home oxygen. Supplemental oxygen was provided and noted-      .   Signs/symptoms of respiratory failure include- tachypnea. Contributing diagnoses includes - Interstitial lung disease Labs and images were reviewed. Patient Has not had a recent ABG. Will treat underlying causes and adjust management of respiratory failure as follows- Duoneb PRN, Oxygen    Chronic systolic heart failure  - BNP- 62, no signs of acute decompensation  - Monitor for acute decompensation    Mixed hyperlipidemia  - Continue pravastatin    Essential hypertension  - Continue nifedipine at 60mg PO BID, losartan 100mg PO daily.      VTE Risk Mitigation (From admission, onward)           Ordered     IP VTE HIGH RISK PATIENT  Once         06/16/23 1829     Place sequential compression device  Until discontinued         06/16/23 1829     Reason for No Pharmacological VTE Prophylaxis  Once        Question:  Reasons:  Answer:  Risk of Bleeding    06/16/23 1829                    Discharge Planning   KYLAH:      Code Status: Full Code   Is the patient medically ready for discharge?:     Reason for patient still in hospital (select all that apply): Pending disposition  Discharge Plan A: Skilled Nursing Facility   Discharge Delays: (!) Post-Acute Set-up              BOBBY Salmeron MD  Department of Hospital Medicine   [facility] ([address])

## 2023-06-23 NOTE — SUBJECTIVE & OBJECTIVE
Interval History: No acute events overnight. Pursuing placement options. Hopefully to SNF with transition to [facility] with hospice care. His major goal is to be in the same place as his wife.    Review of Systems   Constitutional:  Negative for chills and fever.   Respiratory:  Negative for cough and shortness of breath.    Cardiovascular:  Negative for chest pain and palpitations.   Gastrointestinal:  Negative for abdominal pain, nausea and vomiting.   Objective:     Vital Signs (Most Recent):  Temp: 97.8 °F (36.6 °C) (06/22/23 1951)  Pulse: 80 (06/22/23 1951)  Resp: 17 (06/22/23 1951)  BP: (!) 176/87 (06/22/23 1951)  SpO2: (!) 94 % (06/22/23 1951) Vital Signs (24h Range):  Temp:  [96.4 °F (35.8 °C)-98.1 °F (36.7 °C)] 97.8 °F (36.6 °C)  Pulse:  [70-83] 80  Resp:  [16-20] 17  SpO2:  [94 %-100 %] 94 %  BP: (112-176)/(65-90) 176/87     Weight: 32.5 kg (71 lb 10.4 oz)  Body mass index is 10.58 kg/m².    Intake/Output Summary (Last 24 hours) at 6/22/2023 2001  Last data filed at 6/22/2023 1921  Gross per 24 hour   Intake 200 ml   Output 500 ml   Net -300 ml           Physical Exam  Vitals and nursing note reviewed.   Constitutional:       General: He is not in acute distress.     Appearance: He is well-developed. He is cachectic. He is ill-appearing.   HENT:      Head: Normocephalic and atraumatic.   Eyes:      General:         Right eye: No discharge.         Left eye: No discharge.      Conjunctiva/sclera: Conjunctivae normal.   Cardiovascular:      Rate and Rhythm: Normal rate.      Pulses: Normal pulses.   Pulmonary:      Effort: Pulmonary effort is normal. No respiratory distress.   Abdominal:      Palpations: Abdomen is soft.      Tenderness: There is no abdominal tenderness.   Musculoskeletal:         General: Normal range of motion.      Right lower leg: No edema.      Left lower leg: No edema.   Skin:     General: Skin is warm and dry.   Neurological:      Mental Status: He is alert and oriented to person,  place, and time.         Significant Labs:   No new labs this morning.    Significant Imaging:   No new imaging this morning.

## 2023-06-23 NOTE — ASSESSMENT & PLAN NOTE
"Constipation, fecal impaction, abdominal pain, cachexia, debility, dysphagia  - CT of abd/pelvis with "persistent changes of small bowel ileus and constipation with rectal fecal impaction and early signs of stercoral colitis.  Thickening at the anal rectal verge either due to inflammation.  Mass is difficult to exclude.  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  - Continue bowel regimen.  - SLP following, to MBSS 06/20 which showed laryngeal penetration with all consistencies.  - Discussed goals of care; would not be interested in PEG placement.  - Appreciate palliative assistance. Pursue SNF placement with likely transition to [facility] with hospice.  " Routine safety standards initiated.  Routine nursing standards initiated.

## 2023-06-24 PROBLEM — K20.90 ESOPHAGITIS: Status: RESOLVED | Noted: 2023-06-19 | Resolved: 2023-06-24

## 2023-06-24 PROCEDURE — 11000001 HC ACUTE MED/SURG PRIVATE ROOM: Mod: HCNC

## 2023-06-24 PROCEDURE — 25000003 PHARM REV CODE 250: Mod: HCNC | Performed by: INTERNAL MEDICINE

## 2023-06-24 PROCEDURE — 25000003 PHARM REV CODE 250: Mod: HCNC | Performed by: HOSPITALIST

## 2023-06-24 PROCEDURE — 99232 PR SUBSEQUENT HOSPITAL CARE,LEVL II: ICD-10-PCS | Mod: HCNC,95,, | Performed by: HOSPITALIST

## 2023-06-24 PROCEDURE — 99232 SBSQ HOSP IP/OBS MODERATE 35: CPT | Mod: HCNC,95,, | Performed by: HOSPITALIST

## 2023-06-24 RX ADMIN — SENNOSIDES AND DOCUSATE SODIUM 1 TABLET: 50; 8.6 TABLET ORAL at 09:06

## 2023-06-24 RX ADMIN — LOSARTAN POTASSIUM 100 MG: 50 TABLET, FILM COATED ORAL at 09:06

## 2023-06-24 RX ADMIN — NIFEDIPINE 60 MG: 30 TABLET, FILM COATED, EXTENDED RELEASE ORAL at 09:06

## 2023-06-24 RX ADMIN — PANTOPRAZOLE SODIUM 40 MG: 40 TABLET, DELAYED RELEASE ORAL at 09:06

## 2023-06-24 RX ADMIN — POLYETHYLENE GLYCOL 3350 17 G: 17 POWDER, FOR SOLUTION ORAL at 09:06

## 2023-06-24 RX ADMIN — PRAVASTATIN SODIUM 20 MG: 20 TABLET ORAL at 09:06

## 2023-06-24 NOTE — ASSESSMENT & PLAN NOTE
- Pursuing SNF placement as feasible; [facility] amenable to taking Mon (06/26) if PO intake over 50% for the weekend.  - Daughter en route to arrive Mon as well; backup plan if unable to pursue SNF placement would be for her assistance in obtaining financial information for him to be placed in [facility] with hospice with his wife at [facility].

## 2023-06-24 NOTE — PROGRESS NOTES
[facility] Surg [address] Medicine  Telemedicine Progress Note    Patient Name: Julio Benites  MRN: [id number]  Patient Class: IP- Inpatient   Admission Date: 6/16/2023  Length of Stay: 8 days  Attending Physician: Russel Felix MD  Primary Care Provider: Gregorio Jensen MD          Subjective:     Principal Problem:Discharge planning issues        HPI:  The patient is a 88 y.o. male with a past medical history of DM, NSTEMI, HTN, HLD, and systolic CHF who was recently treated for CAP  with azithromycin who presents with left upper quadrant abdominal pain.  Patient reports worsening left upper quadrant abdominal pain over the past 4-5 days.  He states it is positional and worse when he sits up or stands.  He rates the pain at a 10/10 during these times but 0/10 currently.  Patient also endorses about 1-1/2 months of shortness of breath.  He is also endorsed a significant weight loss during this time.  He denies chest pain, nausea, vomiting, diarrhea, fevers, chills.        Overview/Hospital Course:  Mr. Benites presented with abdominal pain. Admitted with colitis with fecal impaction/constipation. General surgery and GI consulted. Manually disimpacted with partial relief on 6/17. Bowel regimen initiated.       Interval History: Seen and examined with tele presenter. No complaints. Medically ready for discharge. Awaiting NH placement.     Review of Systems   Constitutional:  Negative for chills, fatigue and fever.   HENT: Negative.     Eyes: Negative.    Respiratory:  Negative for cough and shortness of breath.    Cardiovascular:  Negative for chest pain, palpitations and leg swelling.   Gastrointestinal:  Negative for abdominal pain and vomiting.   Genitourinary: Negative.    Skin: Negative.    Neurological:  Negative for seizures and speech difficulty.   Psychiatric/Behavioral:  Negative for agitation and confusion. The patient is not nervous/anxious.    Objective:     Vital Signs  (Most Recent):  Temp: 97.8 °F (36.6 °C) (06/24/23 0730)  Pulse: 84 (06/24/23 0730)  Resp: 16 (06/24/23 0730)  BP: 136/87 (06/24/23 0730)  SpO2: 100 % (06/24/23 0730) Vital Signs (24h Range):  Temp:  [97.3 °F (36.3 °C)-98.5 °F (36.9 °C)] 97.8 °F (36.6 °C)  Pulse:  [78-89] 84  Resp:  [16-18] 16  SpO2:  [92 %-100 %] 100 %  BP: (118-161)/(71-99) 136/87     Weight: 32.5 kg (71 lb 10.4 oz)  Body mass index is 10.58 kg/m².    Intake/Output Summary (Last 24 hours) at 6/24/2023 0921  Last data filed at 6/24/2023 0857  Gross per 24 hour   Intake 360 ml   Output 650 ml   Net -290 ml         Physical Exam  Vitals and nursing note reviewed.   Constitutional:       General: He is awake.      Appearance: He is cachectic. He is ill-appearing.      Interventions: Nasal cannula in place.   HENT:      Head: Normocephalic and atraumatic.   Cardiovascular:      Rate and Rhythm: Normal rate.   Pulmonary:      Effort: No respiratory distress.   Abdominal:      General: There is no distension.   Musculoskeletal:      Right lower leg: No edema.      Left lower leg: No edema.   Skin:     Coloration: Skin is not jaundiced.   Neurological:      Mental Status: He is alert and oriented to person, place, and time.   Psychiatric:         Mood and Affect: Mood normal.         Behavior: Behavior normal. Behavior is cooperative.         Thought Content: Thought content normal.           Significant Labs: All pertinent labs within the past 24 hours have been reviewed.    Significant Imaging: I have reviewed all pertinent imaging results/findings within the past 24 hours.      Assessment/Plan:      * Discharge planning issues  - Pursuing SNF placement as feasible; [facility] amenable to taking Mon (06/26) if PO intake over 50% for the weekend.  - Daughter en route to arrive Mon as well; backup plan if unable to pursue SNF placement would be for her assistance in obtaining financial information for him to be placed in retirement NH with hospice [facility].    Dysphagia  Colitis / constipation with fecal impaction (resolved), cachexia, debility  - CT Abd/Pelvis on presentation with ileus/constipation and fecal impaction with signs of stercoral colitis. Colorectal consulted and disimpacted with partial relief 06/16, underwent enema / bowel regimen with resolution.  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  - Continue bowel regimen.  - SLP following, underwent MBSS 06/20 which showed laryngeal penetration with all consistencies.  - Discussed goals of care; would not be interested in PEG placement.  - Appreciate palliative assistance.      Acute on chronic respiratory failure with hypoxia  Patient with Hypoxic Respiratory failure which is Acute on chronic.  he is not on home oxygen. Supplemental oxygen was provided and noted-      .   Signs/symptoms of respiratory failure include- tachypnea. Contributing diagnoses includes - Interstitial lung disease Labs and images were reviewed. Patient Has not had a recent ABG. Will treat underlying causes and adjust management of respiratory failure as follows- Duoneb PRN, Oxygen    Cachexia  Nutrition consult      Chronic systolic heart failure  - BNP- 62, no signs of acute decompensation  - Monitor for acute decompensation    Physical debility  Cont with PT/OT for gait training and strengthening and restoration of ADL's   Encourage mobility, OOB in chair, and early ambulation as appropriate  Fall precautions   Monitor for bowel and bladder dysfunction  Monitor for and prevent skin breakdown and pressure ulcers      Mixed hyperlipidemia  - Continue pravastatin    Essential hypertension  - Continue nifedipine at 60mg PO BID, losartan 100mg PO daily.      VTE Risk Mitigation (From admission, onward)         Ordered     IP VTE HIGH RISK PATIENT  Once         06/16/23 1820     Place sequential compression device  Until discontinued          06/16/23 1829     Reason for No Pharmacological VTE Prophylaxis  Once        Question:  Reasons:  Answer:  Risk of Bleeding    06/16/23 1829                      I have completed this tele-visit with the assistance of a telepresenter.    The attending portion of this evaluation, treatment, and documentation was performed per Russel Brandon MD via Telemedicine AudioVisual using the secure [employer] software platform with 2 way audio/video. The provider was located off-site and the patient is located in the hospital. The aforementioned video software was utilized to document the relevant history and physical exam    Russel Brandon MD  Department of Hospital Medicine   [facility] - Med Surg ([address])

## 2023-06-24 NOTE — PROGRESS NOTES
VSS-- 2L O2/NC. Cough noted with thick, white productive sputum-- has yankeur to self suction. L AC SL intact. Christopher some diet. Voiding per urinal. Up with assist X2. Repositioned in bed. No C/O. Pleasant.

## 2023-06-24 NOTE — ASSESSMENT & PLAN NOTE
Colitis / constipation with fecal impaction (resolved), cachexia, debility  - CT Abd/Pelvis on presentation with ileus/constipation and fecal impaction with signs of stercoral colitis. Colorectal consulted and disimpacted with partial relief 06/16, underwent enema / bowel regimen with resolution.  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  - Continue bowel regimen.  - SLP following, underwent MBSS 06/20 which showed laryngeal penetration with all consistencies.  - Discussed goals of care; would not be interested in PEG placement.  - Appreciate palliative assistance.

## 2023-06-24 NOTE — SUBJECTIVE & OBJECTIVE
Interval History: Seen and examined with tele presenter. No complaints. Medically ready for discharge. Awaiting NH placement.     Review of Systems   Constitutional:  Negative for chills, fatigue and fever.   HENT: Negative.     Eyes: Negative.    Respiratory:  Negative for cough and shortness of breath.    Cardiovascular:  Negative for chest pain, palpitations and leg swelling.   Gastrointestinal:  Negative for abdominal pain and vomiting.   Genitourinary: Negative.    Skin: Negative.    Neurological:  Negative for seizures and speech difficulty.   Psychiatric/Behavioral:  Negative for agitation and confusion. The patient is not nervous/anxious.    Objective:     Vital Signs (Most Recent):  Temp: 97.8 °F (36.6 °C) (06/24/23 0730)  Pulse: 84 (06/24/23 0730)  Resp: 16 (06/24/23 0730)  BP: 136/87 (06/24/23 0730)  SpO2: 100 % (06/24/23 0730) Vital Signs (24h Range):  Temp:  [97.3 °F (36.3 °C)-98.5 °F (36.9 °C)] 97.8 °F (36.6 °C)  Pulse:  [78-89] 84  Resp:  [16-18] 16  SpO2:  [92 %-100 %] 100 %  BP: (118-161)/(71-99) 136/87     Weight: 32.5 kg (71 lb 10.4 oz)  Body mass index is 10.58 kg/m².    Intake/Output Summary (Last 24 hours) at 6/24/2023 0921  Last data filed at 6/24/2023 0857  Gross per 24 hour   Intake 360 ml   Output 650 ml   Net -290 ml         Physical Exam  Vitals and nursing note reviewed.   Constitutional:       General: He is awake.      Appearance: He is cachectic. He is ill-appearing.      Interventions: Nasal cannula in place.   HENT:      Head: Normocephalic and atraumatic.   Cardiovascular:      Rate and Rhythm: Normal rate.   Pulmonary:      Effort: No respiratory distress.   Abdominal:      General: There is no distension.   Musculoskeletal:      Right lower leg: No edema.      Left lower leg: No edema.   Skin:     Coloration: Skin is not jaundiced.   Neurological:      Mental Status: He is alert and oriented to person, place, and time.   Psychiatric:         Mood and Affect: Mood normal.          Behavior: Behavior normal. Behavior is cooperative.         Thought Content: Thought content normal.           Significant Labs: All pertinent labs within the past 24 hours have been reviewed.    Significant Imaging: I have reviewed all pertinent imaging results/findings within the past 24 hours.

## 2023-06-24 NOTE — PLAN OF CARE
Problem: Adult Inpatient Plan of Care  Goal: Plan of Care Review  Outcome: Ongoing, Progressing  Goal: Patient-Specific Goal (Individualized)  Outcome: Ongoing, Progressing  Goal: Absence of Hospital-Acquired Illness or Injury  Outcome: Ongoing, Progressing  Goal: Optimal Comfort and Wellbeing  Outcome: Ongoing, Progressing  Goal: Readiness for Transition of Care  Outcome: Ongoing, Progressing     Problem: Diabetes Comorbidity  Goal: Blood Glucose Level Within Targeted Range  Outcome: Ongoing, Progressing     Problem: Fall Injury Risk  Goal: Absence of Fall and Fall-Related Injury  Outcome: Ongoing, Progressing     Problem: Skin Injury Risk Increased  Goal: Skin Health and Integrity  Outcome: Ongoing, Progressing     Problem: Coping Ineffective  Goal: Effective Coping  Outcome: Ongoing, Progressing     Problem: Adult Inpatient Plan of Care  Goal: Plan of Care Review  Outcome: Ongoing, Progressing  Goal: Patient-Specific Goal (Individualized)  Outcome: Ongoing, Progressing  Goal: Absence of Hospital-Acquired Illness or Injury  Outcome: Ongoing, Progressing  Goal: Optimal Comfort and Wellbeing  Outcome: Ongoing, Progressing  Goal: Readiness for Transition of Care  Outcome: Ongoing, Progressing     Problem: Diabetes Comorbidity  Goal: Blood Glucose Level Within Targeted Range  Outcome: Ongoing, Progressing     Problem: Fall Injury Risk  Goal: Absence of Fall and Fall-Related Injury  Outcome: Ongoing, Progressing     Problem: Skin Injury Risk Increased  Goal: Skin Health and Integrity  Outcome: Ongoing, Progressing     Problem: Coping Ineffective  Goal: Effective Coping  Outcome: Ongoing, Progressing

## 2023-06-25 PROCEDURE — 99232 PR SUBSEQUENT HOSPITAL CARE,LEVL II: ICD-10-PCS | Mod: HCNC,95,, | Performed by: HOSPITALIST

## 2023-06-25 PROCEDURE — 99232 SBSQ HOSP IP/OBS MODERATE 35: CPT | Mod: HCNC,95,, | Performed by: HOSPITALIST

## 2023-06-25 PROCEDURE — 25000003 PHARM REV CODE 250: Mod: HCNC | Performed by: INTERNAL MEDICINE

## 2023-06-25 PROCEDURE — 25000003 PHARM REV CODE 250: Mod: HCNC | Performed by: HOSPITALIST

## 2023-06-25 PROCEDURE — 11000001 HC ACUTE MED/SURG PRIVATE ROOM: Mod: HCNC

## 2023-06-25 PROCEDURE — 99900035 HC TECH TIME PER 15 MIN (STAT): Mod: HCNC

## 2023-06-25 PROCEDURE — 94761 N-INVAS EAR/PLS OXIMETRY MLT: CPT | Mod: HCNC

## 2023-06-25 PROCEDURE — 27000221 HC OXYGEN, UP TO 24 HOURS: Mod: HCNC

## 2023-06-25 RX ADMIN — NIFEDIPINE 60 MG: 30 TABLET, FILM COATED, EXTENDED RELEASE ORAL at 08:06

## 2023-06-25 RX ADMIN — SENNOSIDES AND DOCUSATE SODIUM 1 TABLET: 50; 8.6 TABLET ORAL at 09:06

## 2023-06-25 RX ADMIN — PANTOPRAZOLE SODIUM 40 MG: 40 TABLET, DELAYED RELEASE ORAL at 08:06

## 2023-06-25 RX ADMIN — POLYETHYLENE GLYCOL 3350 17 G: 17 POWDER, FOR SOLUTION ORAL at 08:06

## 2023-06-25 RX ADMIN — PRAVASTATIN SODIUM 20 MG: 20 TABLET ORAL at 08:06

## 2023-06-25 RX ADMIN — LOSARTAN POTASSIUM 100 MG: 50 TABLET, FILM COATED ORAL at 09:06

## 2023-06-25 RX ADMIN — NIFEDIPINE 60 MG: 30 TABLET, FILM COATED, EXTENDED RELEASE ORAL at 09:06

## 2023-06-25 RX ADMIN — POLYETHYLENE GLYCOL 3350 17 G: 17 POWDER, FOR SOLUTION ORAL at 09:06

## 2023-06-25 RX ADMIN — PANTOPRAZOLE SODIUM 40 MG: 40 TABLET, DELAYED RELEASE ORAL at 09:06

## 2023-06-25 RX ADMIN — SENNOSIDES AND DOCUSATE SODIUM 1 TABLET: 50; 8.6 TABLET ORAL at 08:06

## 2023-06-25 NOTE — PLAN OF CARE
Problem: Adult Inpatient Plan of Care  Goal: Plan of Care Review  Outcome: Ongoing, Progressing  Goal: Optimal Comfort and Wellbeing  Outcome: Ongoing, Progressing     Problem: Diabetes Comorbidity  Goal: Blood Glucose Level Within Targeted Range  Outcome: Ongoing, Progressing     Problem: Fall Injury Risk  Goal: Absence of Fall and Fall-Related Injury  Outcome: Ongoing, Progressing     Problem: Skin Injury Risk Increased  Goal: Skin Health and Integrity  Outcome: Ongoing, Progressing     Problem: Coping Ineffective  Goal: Effective Coping  Outcome: Ongoing, Progressing

## 2023-06-25 NOTE — PROGRESS NOTES
[facility] Surg [address] Medicine  Telemedicine Progress Note    Patient Name: Julio Benites  MRN: [id number]  Patient Class: IP- Inpatient   Admission Date: 6/16/2023  Length of Stay: 9 days  Attending Physician: Russel Felix MD  Primary Care Provider: Gregorio Jensen MD          Subjective:     Principal Problem:Discharge planning issues        HPI:  The patient is a 88 y.o. male with a past medical history of DM, NSTEMI, HTN, HLD, and systolic CHF who was recently treated for CAP  with azithromycin who presents with left upper quadrant abdominal pain.  Patient reports worsening left upper quadrant abdominal pain over the past 4-5 days.  He states it is positional and worse when he sits up or stands.  He rates the pain at a 10/10 during these times but 0/10 currently.  Patient also endorses about 1-1/2 months of shortness of breath.  He is also endorsed a significant weight loss during this time.  He denies chest pain, nausea, vomiting, diarrhea, fevers, chills.        Overview/Hospital Course:  Mr. Benites presented with abdominal pain. Admitted with colitis with fecal impaction/constipation. General surgery and GI consulted. Manually disimpacted with partial relief on 6/17. Bowel regimen initiated.       Interval History: Seen and examined with tele presenter. No complaints. Medically ready for discharge. Awaiting NH placement.     Review of Systems   Constitutional:  Negative for chills, fatigue and fever.   HENT: Negative.     Eyes: Negative.    Respiratory:  Negative for cough and shortness of breath.    Cardiovascular:  Negative for chest pain, palpitations and leg swelling.   Gastrointestinal:  Negative for abdominal pain and vomiting.   Genitourinary: Negative.    Skin: Negative.    Neurological:  Negative for seizures and speech difficulty.   Psychiatric/Behavioral:  Negative for agitation and confusion. The patient is not nervous/anxious.    Objective:     Vital Signs  (Most Recent):  Temp: 97.9 °F (36.6 °C) (06/25/23 0508)  Pulse: 85 (06/25/23 0508)  Resp: 18 (06/25/23 0508)  BP: (!) 157/68 (06/25/23 0508)  SpO2: 100 % (06/25/23 0508) Vital Signs (24h Range):  Temp:  [97.9 °F (36.6 °C)-98.5 °F (36.9 °C)] 97.9 °F (36.6 °C)  Pulse:  [81-87] 85  Resp:  [16-18] 18  SpO2:  [98 %-100 %] 100 %  BP: (133-157)/(68-93) 157/68     Weight: 32.5 kg (71 lb 10.4 oz)  Body mass index is 10.58 kg/m².    Intake/Output Summary (Last 24 hours) at 6/25/2023 0926  Last data filed at 6/24/2023 1710  Gross per 24 hour   Intake 120 ml   Output 300 ml   Net -180 ml           Physical Exam  Vitals and nursing note reviewed.   Constitutional:       General: He is awake.      Appearance: He is cachectic. He is ill-appearing.      Interventions: Nasal cannula in place.   HENT:      Head: Normocephalic and atraumatic.   Cardiovascular:      Rate and Rhythm: Normal rate.   Pulmonary:      Effort: No respiratory distress.   Abdominal:      General: There is no distension.   Musculoskeletal:      Right lower leg: No edema.      Left lower leg: No edema.   Skin:     Coloration: Skin is not jaundiced.   Neurological:      Mental Status: He is alert and oriented to person, place, and time.   Psychiatric:         Mood and Affect: Mood normal.         Behavior: Behavior normal. Behavior is cooperative.         Thought Content: Thought content normal.           Significant Labs: All pertinent labs within the past 24 hours have been reviewed.    Significant Imaging: I have reviewed all pertinent imaging results/findings within the past 24 hours.      Assessment/Plan:      * Discharge planning issues  - Pursuing SNF placement as feasible; [facility] amenable to taking Mon (06/26) if PO intake over 50% for the weekend.  - Daughter en route to arrive Mon as well; backup plan if unable to pursue SNF placement would be for her assistance in obtaining financial information for him to be placed in alf NH with hospice  with his [facility] at [facility].    Dysphagia  Colitis / constipation with fecal impaction (resolved), cachexia, debility  - CT Abd/Pelvis on presentation with ileus/constipation and fecal impaction with signs of stercoral colitis. Colorectal consulted and disimpacted with partial relief 06/16, underwent enema / bowel regimen with resolution.  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  - Continue bowel regimen.  - SLP following, underwent MBSS 06/20 which showed laryngeal penetration with all consistencies.  - Discussed goals of care; would not be interested in PEG placement.  - Appreciate palliative assistance.      Acute on chronic respiratory failure with hypoxia  Patient with Hypoxic Respiratory failure which is Acute on chronic.  he is not on home oxygen. Supplemental oxygen was provided and noted-      .   Signs/symptoms of respiratory failure include- tachypnea. Contributing diagnoses includes - Interstitial lung disease Labs and images were reviewed. Patient Has not had a recent ABG. Will treat underlying causes and adjust management of respiratory failure as follows- Duoneb PRN, Oxygen    Cachexia  Nutrition consult      Chronic systolic heart failure  - BNP- 62, no signs of acute decompensation  - Monitor for acute decompensation    Physical debility  Cont with PT/OT for gait training and strengthening and restoration of ADL's   Encourage mobility, OOB in chair, and early ambulation as appropriate  Fall precautions   Monitor for bowel and bladder dysfunction  Monitor for and prevent skin breakdown and pressure ulcers      Mixed hyperlipidemia  - Continue pravastatin    Essential hypertension  - Continue nifedipine at 60mg PO BID, losartan 100mg PO daily.      VTE Risk Mitigation (From admission, onward)         Ordered     IP VTE HIGH RISK PATIENT  Once         06/16/23 1823     Place sequential compression device  Until  discontinued         06/16/23 1829     Reason for No Pharmacological VTE Prophylaxis  Once        Question:  Reasons:  Answer:  Risk of Bleeding    06/16/23 1829                      I have completed this tele-visit with the assistance of a telepresenter.    The attending portion of this evaluation, treatment, and documentation was performed per Russel Brandon MD via Telemedicine AudioVisual using the secure [employer] software platform with 2 way audio/video. The provider was located off-site and the patient is located in the hospital. The aforementioned video software was utilized to document the relevant history and physical exam    Russel Brandon MD  Department of Hospital Medicine   [facility] - Med Surg ([address])

## 2023-06-25 NOTE — NURSING
Patient had a witnessed fall in shower at appx 0920. HARSHA Fraire was with patient the entire time. Patient and PCT stated when the patient stated his left leg felt weak the PCT supported patient from the back and guided him to the shower floor. Patient stated nothing hurt and he did not hit anything. Vitals signs stable (/71, , Temp 98.2, RR 18, O2 99% (RA)). No pain noted. No complaints. No new bruising. Patient was able to complete shower and ambulate back to bed with walker and 1 person assist. Patient back in bed. No acute distress noted. Patient educated on fall risks. Fall precautions in place. Bed alarm set. Patient instructed to notify RN if any pain or discomfort is experienced throughout the day. MD Bryan notified. No new orders received. Will continue to monitor and support patient.

## 2023-06-25 NOTE — SUBJECTIVE & OBJECTIVE
Interval History: Seen and examined with tele presenter. No complaints. Medically ready for discharge. Awaiting NH placement.     Review of Systems   Constitutional:  Negative for chills, fatigue and fever.   HENT: Negative.     Eyes: Negative.    Respiratory:  Negative for cough and shortness of breath.    Cardiovascular:  Negative for chest pain, palpitations and leg swelling.   Gastrointestinal:  Negative for abdominal pain and vomiting.   Genitourinary: Negative.    Skin: Negative.    Neurological:  Negative for seizures and speech difficulty.   Psychiatric/Behavioral:  Negative for agitation and confusion. The patient is not nervous/anxious.    Objective:     Vital Signs (Most Recent):  Temp: 97.9 °F (36.6 °C) (06/25/23 0508)  Pulse: 85 (06/25/23 0508)  Resp: 18 (06/25/23 0508)  BP: (!) 157/68 (06/25/23 0508)  SpO2: 100 % (06/25/23 0508) Vital Signs (24h Range):  Temp:  [97.9 °F (36.6 °C)-98.5 °F (36.9 °C)] 97.9 °F (36.6 °C)  Pulse:  [81-87] 85  Resp:  [16-18] 18  SpO2:  [98 %-100 %] 100 %  BP: (133-157)/(68-93) 157/68     Weight: 32.5 kg (71 lb 10.4 oz)  Body mass index is 10.58 kg/m².    Intake/Output Summary (Last 24 hours) at 6/25/2023 0926  Last data filed at 6/24/2023 1710  Gross per 24 hour   Intake 120 ml   Output 300 ml   Net -180 ml           Physical Exam  Vitals and nursing note reviewed.   Constitutional:       General: He is awake.      Appearance: He is cachectic. He is ill-appearing.      Interventions: Nasal cannula in place.   HENT:      Head: Normocephalic and atraumatic.   Cardiovascular:      Rate and Rhythm: Normal rate.   Pulmonary:      Effort: No respiratory distress.   Abdominal:      General: There is no distension.   Musculoskeletal:      Right lower leg: No edema.      Left lower leg: No edema.   Skin:     Coloration: Skin is not jaundiced.   Neurological:      Mental Status: He is alert and oriented to person, place, and time.   Psychiatric:         Mood and Affect: Mood normal.          Behavior: Behavior normal. Behavior is cooperative.         Thought Content: Thought content normal.           Significant Labs: All pertinent labs within the past 24 hours have been reviewed.    Significant Imaging: I have reviewed all pertinent imaging results/findings within the past 24 hours.

## 2023-06-26 ENCOUNTER — HOSPITAL ENCOUNTER (INPATIENT)
Facility: HOSPITAL | Age: 88
LOS: 21 days | Discharge: HOME HEALTH | DRG: 640 | End: 2023-07-17
Attending: HOSPITALIST | Admitting: HOSPITALIST
Payer: MEDICARE

## 2023-06-26 VITALS
HEIGHT: 69 IN | DIASTOLIC BLOOD PRESSURE: 76 MMHG | HEART RATE: 75 BPM | WEIGHT: 71.63 LBS | TEMPERATURE: 98 F | OXYGEN SATURATION: 98 % | BODY MASS INDEX: 10.61 KG/M2 | SYSTOLIC BLOOD PRESSURE: 132 MMHG | RESPIRATION RATE: 16 BRPM

## 2023-06-26 DIAGNOSIS — E43 SEVERE MALNUTRITION: Primary | ICD-10-CM

## 2023-06-26 DIAGNOSIS — Z71.89 ADVANCED CARE PLANNING/COUNSELING DISCUSSION: ICD-10-CM

## 2023-06-26 DIAGNOSIS — I10 ESSENTIAL HYPERTENSION, BENIGN: ICD-10-CM

## 2023-06-26 DIAGNOSIS — E78.2 MIXED HYPERLIPIDEMIA: ICD-10-CM

## 2023-06-26 DIAGNOSIS — K52.9 CHRONIC COLITIS: ICD-10-CM

## 2023-06-26 LAB — SARS-COV-2 RDRP RESP QL NAA+PROBE: NEGATIVE

## 2023-06-26 PROCEDURE — 1111F PR DISCHARGE MEDS RECONCILED W/ CURRENT OUTPATIENT MED LIST: ICD-10-PCS | Mod: HCNC,95,CPTII, | Performed by: HOSPITALIST

## 2023-06-26 PROCEDURE — 99900035 HC TECH TIME PER 15 MIN (STAT): Mod: HCNC

## 2023-06-26 PROCEDURE — 1111F DSCHRG MED/CURRENT MED MERGE: CPT | Mod: HCNC,95,CPTII, | Performed by: HOSPITALIST

## 2023-06-26 PROCEDURE — 99233 PR SUBSEQUENT HOSPITAL CARE,LEVL III: ICD-10-PCS | Mod: HCNC,,, | Performed by: STUDENT IN AN ORGANIZED HEALTH CARE EDUCATION/TRAINING PROGRAM

## 2023-06-26 PROCEDURE — 25000003 PHARM REV CODE 250: Mod: HCNC | Performed by: INTERNAL MEDICINE

## 2023-06-26 PROCEDURE — 94761 N-INVAS EAR/PLS OXIMETRY MLT: CPT | Mod: HCNC

## 2023-06-26 PROCEDURE — U0002 COVID-19 LAB TEST NON-CDC: HCPCS | Mod: HCNC | Performed by: HOSPITALIST

## 2023-06-26 PROCEDURE — 99239 PR HOSPITAL DISCHARGE DAY,>30 MIN: ICD-10-PCS | Mod: HCNC,95,, | Performed by: HOSPITALIST

## 2023-06-26 PROCEDURE — 99239 HOSP IP/OBS DSCHRG MGMT >30: CPT | Mod: HCNC,95,, | Performed by: HOSPITALIST

## 2023-06-26 PROCEDURE — 11000004 HC SNF PRIVATE: Mod: HCNC

## 2023-06-26 PROCEDURE — 25000003 PHARM REV CODE 250: Mod: HCNC | Performed by: HOSPITALIST

## 2023-06-26 PROCEDURE — 99497 ADVNCD CARE PLAN 30 MIN: CPT | Mod: HCNC,,, | Performed by: STUDENT IN AN ORGANIZED HEALTH CARE EDUCATION/TRAINING PROGRAM

## 2023-06-26 PROCEDURE — 92526 ORAL FUNCTION THERAPY: CPT | Mod: HCNC

## 2023-06-26 PROCEDURE — 99233 SBSQ HOSP IP/OBS HIGH 50: CPT | Mod: HCNC,,, | Performed by: STUDENT IN AN ORGANIZED HEALTH CARE EDUCATION/TRAINING PROGRAM

## 2023-06-26 PROCEDURE — 99497 PR ADVNCD CARE PLAN 30 MIN: ICD-10-PCS | Mod: HCNC,,, | Performed by: STUDENT IN AN ORGANIZED HEALTH CARE EDUCATION/TRAINING PROGRAM

## 2023-06-26 RX ORDER — ACETAMINOPHEN 325 MG/1
650 TABLET ORAL EVERY 6 HOURS PRN
Status: DISCONTINUED | OUTPATIENT
Start: 2023-06-26 | End: 2023-07-17 | Stop reason: HOSPADM

## 2023-06-26 RX ORDER — AMOXICILLIN 250 MG
1 CAPSULE ORAL 2 TIMES DAILY
Status: DISCONTINUED | OUTPATIENT
Start: 2023-06-26 | End: 2023-07-17 | Stop reason: HOSPADM

## 2023-06-26 RX ORDER — CALCIUM CARBONATE 200(500)MG
500 TABLET,CHEWABLE ORAL 2 TIMES DAILY PRN
Status: DISCONTINUED | OUTPATIENT
Start: 2023-06-26 | End: 2023-07-17 | Stop reason: HOSPADM

## 2023-06-26 RX ORDER — LOSARTAN POTASSIUM 50 MG/1
100 TABLET ORAL DAILY
Status: DISCONTINUED | OUTPATIENT
Start: 2023-06-27 | End: 2023-07-17 | Stop reason: HOSPADM

## 2023-06-26 RX ORDER — TALC
6 POWDER (GRAM) TOPICAL NIGHTLY PRN
Status: DISCONTINUED | OUTPATIENT
Start: 2023-06-26 | End: 2023-07-17 | Stop reason: HOSPADM

## 2023-06-26 RX ORDER — NIFEDIPINE 30 MG/1
60 TABLET, EXTENDED RELEASE ORAL 2 TIMES DAILY
Status: DISCONTINUED | OUTPATIENT
Start: 2023-06-26 | End: 2023-07-17 | Stop reason: HOSPADM

## 2023-06-26 RX ORDER — PRAVASTATIN SODIUM 20 MG/1
20 TABLET ORAL NIGHTLY
Status: DISCONTINUED | OUTPATIENT
Start: 2023-06-26 | End: 2023-07-17 | Stop reason: HOSPADM

## 2023-06-26 RX ORDER — COLCHICINE 0.6 MG/1
0.6 TABLET, FILM COATED ORAL 2 TIMES DAILY PRN
Status: DISCONTINUED | OUTPATIENT
Start: 2023-06-26 | End: 2023-06-27

## 2023-06-26 RX ORDER — PANTOPRAZOLE SODIUM 40 MG/1
40 TABLET, DELAYED RELEASE ORAL
Status: DISCONTINUED | OUTPATIENT
Start: 2023-06-26 | End: 2023-07-17 | Stop reason: HOSPADM

## 2023-06-26 RX ORDER — PANTOPRAZOLE SODIUM 40 MG/1
40 TABLET, DELAYED RELEASE ORAL 2 TIMES DAILY
Qty: 60 TABLET | Refills: 11 | Status: ON HOLD
Start: 2023-06-26 | End: 2023-07-14 | Stop reason: SDUPTHER

## 2023-06-26 RX ORDER — NIFEDIPINE 60 MG/1
60 TABLET, EXTENDED RELEASE ORAL 2 TIMES DAILY
Qty: 60 TABLET | Refills: 11 | Status: ON HOLD
Start: 2023-06-26 | End: 2023-07-14 | Stop reason: SDUPTHER

## 2023-06-26 RX ORDER — ASPIRIN 81 MG/1
81 TABLET ORAL DAILY
Status: DISCONTINUED | OUTPATIENT
Start: 2023-06-27 | End: 2023-07-17 | Stop reason: HOSPADM

## 2023-06-26 RX ADMIN — NIFEDIPINE 60 MG: 30 TABLET, FILM COATED, EXTENDED RELEASE ORAL at 09:06

## 2023-06-26 RX ADMIN — SENNOSIDES AND DOCUSATE SODIUM 1 TABLET: 50; 8.6 TABLET ORAL at 09:06

## 2023-06-26 RX ADMIN — PRAVASTATIN SODIUM 20 MG: 20 TABLET ORAL at 09:06

## 2023-06-26 RX ADMIN — POLYETHYLENE GLYCOL 3350 17 G: 17 POWDER, FOR SOLUTION ORAL at 09:06

## 2023-06-26 RX ADMIN — PANTOPRAZOLE SODIUM 40 MG: 40 TABLET, DELAYED RELEASE ORAL at 09:06

## 2023-06-26 RX ADMIN — LOSARTAN POTASSIUM 100 MG: 50 TABLET, FILM COATED ORAL at 09:06

## 2023-06-26 NOTE — DISCHARGE SUMMARY
[facility] ([address])  [facility] Medicine  Discharge Summary      Patient Name: Julio Benites  MRN: [id number]  Patient Class: IP- Inpatient  Admission Date: 6/16/2023  Hospital Length of Stay: 10 days  Discharge Date and Time:  06/26/2023 8:53 AM  Attending Physician: Russel Felix MD   Discharging Provider: Russel Brandon MD  Primary Care Provider: Gregorio Jensen MD      HPI:   The patient is a 88 y.o. male with a past medical history of DM, NSTEMI, HTN, HLD, and systolic CHF who was recently treated for CAP  with azithromycin who presents with left upper quadrant abdominal pain.  Patient reports worsening left upper quadrant abdominal pain over the past 4-5 days.  He states it is positional and worse when he sits up or stands.  He rates the pain at a 10/10 during these times but 0/10 currently.  Patient also endorses about 1-1/2 months of shortness of breath.  He is also endorsed a significant weight loss during this time.  He denies chest pain, nausea, vomiting, diarrhea, fevers, chills.        Procedure(s) (LRB):  EGD (ESOPHAGOGASTRODUODENOSCOPY) (Left)      Hospital Course:   Mr. Benites presented with abdominal pain. Admitted with colitis with fecal impaction/constipation. General surgery and GI consulted. Manually disimpacted with partial relief on 6/17. Bowel regimen initiated.        Goals of Care Treatment Preferences:  Code Status: Full Code          What is most important right now is to focus on spending time at home, quality of life, even if it means sacrificing a little time.  Accordingly, we have decided that the best plan to meet the patient's goals includes continuing with treatment.      Consults:   Consults (From admission, onward)        Status Ordering Provider     Inpatient virtual consult to Hospital Medicine  Once        Provider:  Russel Felix MD    Completed DEXTER OCHOA     Inpatient consult to Palliative Care  Once        Provider:  (Not yet assigned)    Completed  DEXTER OCHOA     Inpatient consult to Social Work/Case Management  Once        Provider:  (Not yet assigned)    Completed HEMANTH FOREMAN     Inpatient consult to General Surgery  Once        Provider:  (Not yet assigned)    Completed ALDO MARQUES          Cardiac/Vascular  Chronic systolic heart failure  - BNP- 62, no signs of acute decompensation  - Monitor for acute decompensation    Mixed hyperlipidemia  - Continue pravastatin    Essential hypertension  - Continue nifedipine at 60mg PO BID, losartan 100mg PO daily.    GI  Dysphagia  Colitis / constipation with fecal impaction (resolved), cachexia, debility  - CT Abd/Pelvis on presentation with ileus/constipation and fecal impaction with signs of stercoral colitis. Colorectal consulted and disimpacted with partial relief 06/16, underwent enema / bowel regimen with resolution.  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  - Continue bowel regimen.  - SLP following, underwent MBSS 06/20 which showed laryngeal penetration with all consistencies.  - Discussed goals of care; would not be interested in PEG placement.  - Appreciate palliative assistance.      Other  * Discharge planning issues  - Pursuing SNF placement as feasible; [facility] Mon (06/26) if PO intake over 50% for the weekend.  - Daughter en route to arrive Mon as well; backup plan if unable to pursue SNF placement would be for her assistance in obtaining financial information for him to be placed in senior living NH with hospice with his wife at [facility].   - Stable for discharge to SNF    Cachexia  Nutrition consult      Physical debility  Cont with PT/OT for gait training and strengthening and restoration of ADL's   Encourage mobility, OOB in chair, and early ambulation as appropriate  Fall precautions   Monitor for bowel and bladder dysfunction  Monitor for and prevent skin breakdown and  pressure ulcers        Final Active Diagnoses:    Diagnosis Date Noted POA    PRINCIPAL PROBLEM:  Discharge planning issues [Z02.9] 06/23/2023 Not Applicable    Dysphagia [R13.10] 06/20/2023 Yes     Chronic    Cachexia [R64] 03/23/2023 Yes     Chronic    Chronic systolic heart failure [I50.22] 03/18/2022 Yes     Chronic    Physical debility [R53.81] 01/05/2022 Yes     Chronic    Mixed hyperlipidemia [E78.2] 11/07/2012 Yes     Chronic    Essential hypertension [I10] 11/07/2012 Yes     Chronic      Problems Resolved During this Admission:    Diagnosis Date Noted Date Resolved POA    Esophagitis [K20.90] 06/19/2023 06/24/2023 Yes    Elevated troponin [R77.8] 06/16/2023 06/23/2023 Yes    Colitis [K52.9] 06/16/2023 06/23/2023 Yes       Discharged Condition: fair    Disposition: Skilled Nursing Facility    Follow Up:    Patient Instructions:      Diet Adult Regular     Notify your health care provider if you experience any of the following:  increased confusion or weakness     Activity as tolerated       Significant Diagnostic Studies: N/A    Pending Diagnostic Studies:     None         Medications:  Reconciled Home Medications:      Medication List      START taking these medications    NIFEdipine 60 MG (OSM) 24 hr tablet  Commonly known as: PROCARDIA-XL  Take 1 tablet (60 mg total) by mouth 2 (two) times a day.  Replaces: NIFEdipine 90 MG Tbsr        CHANGE how you take these medications    pantoprazole 40 MG tablet  Commonly known as: PROTONIX  Take 1 tablet (40 mg total) by mouth 2 (two) times daily.  What changed: when to take this        CONTINUE taking these medications    aspirin 81 MG EC tablet  Commonly known as: ECOTRIN  TAKE 1 TABLET BY MOUTH EVERY DAY     colchicine 0.6 mg tablet  Commonly known as: COLCRYS  Take 0.6 mg by mouth 2 (two) times daily as needed (gout attack).     losartan 100 MG tablet  Commonly known as: COZAAR  TAKE 1 TABLET BY MOUTH EVERY DAY     metoprolol succinate 25 MG 24 hr  tablet  Commonly known as: TOPROL-XL  Take 0.5 tablets (12.5 mg total) by mouth once daily.     pravastatin 20 MG tablet  Commonly known as: PRAVACHOL  TAKE 1 TABLET BY MOUTH EVERY DAY AT NIGHT        STOP taking these medications    ciprofloxacin HCl 500 MG tablet  Commonly known as: CIPRO     NIFEdipine 90 MG Tbsr  Commonly known as: ADALAT CC  Replaced by: NIFEdipine 60 MG (OSM) 24 hr tablet            Indwelling Lines/Drains at time of discharge:   Lines/Drains/Airways     None                 Time spent on the discharge of patient: 30 minutes         The attending portion of this evaluation, treatment, and documentation was performed per Russel Brandon MD via Telemedicine AudioVisual using the secure Cloud[employer] software platform with 2 way audio/video. The provider was located off-site and the patient is located in the hospital. The aforementioned video software was utilized to document the relevant history and physical exam    Russel Brandon MD  Department of Hospital Medicine  [facility] ([address])

## 2023-06-26 NOTE — CHAPLAIN
06/26/23 0941   Clinical Encounter Type   Visit Type Follow-up   Visit Category General Rounding   Visited With Patient   Length of Visit 10 Minutes   Continue Visiting Yes   Patient Spiritual Encounters   Care Provided Reflective listening;Compassionate presence   Patient Coping Accepting

## 2023-06-26 NOTE — PROGRESS NOTES
VSS. L AC SL dc'd. Christopher diet. Voiding well. Sm soft BM X2. Sacral mepilex in place. Up with walker & CGA X1-2. Pt cachectic. Ambulance here to transport pt via stretcher to [facility]. Pleasant.

## 2023-06-26 NOTE — ASSESSMENT & PLAN NOTE
6/26/2023:  - patient seen at bedside with palliative RN Margareth  - he was resting in bed comfortably  - he was okay with us turning on the lights, muting the tv and talking with him  - introduced self, he already knew Margareth  - he shared he was overall feeling okay this morning  - his daughter had been by to visit him yesterday and he stated it was wonderful to see her  - he had eaten most of his breakfast and a boost shake too  - the main thing on his mind was what the next step of the plan was going to be and when it was going to happen  - he was eager to find out when he was going to be getting out of the hospital  - he stated he was aware he can't just stay at the hospital and is eager for the next steps  - part way through our conversation the /niels entered the room and excitedly updated Mr. Benites regarding him being accepted to [facility] facility with plan for transfer there today and for him to be there for a couple weeks to get stronger, after which he can go to the same nursing home as his wife to be with her.   - he was very pleased to hear this update and have a concrete next step plan  - he stated his main desire in life is to be with his wife and support her. He is not even worried for himself, he is mainly just always thinking of her  - he shared the frustration regarding the difficulty with insurance to make everything work out the way he and his wife would want  - we opened up an open discussion regarding his wishes regarding code status to ensure his wishes are known given plans for transfer to rehab facility today  - he was very clear regarding his wishes for wanting to go peacefully when his time comes. He does not want heroic measures or machines. He agreed DNR/DNI best aligned with his wishes. He re-confirmed his wish for not wanting tube feeds for artificial nutrition.    - he was comfortable completing a e-LaPOST with us, so we did   [] updated code status to outline his wish for  DNR/DNI   [] e-LaPOST completed to make his wishes known     Please review past palliative care notes during this admission for further details.

## 2023-06-26 NOTE — PLAN OF CARE
Problem: Adult Inpatient Plan of Care  Goal: Plan of Care Review  6/26/2023 1724 by Pamela Gatica RN  Outcome: Met  6/26/2023 1159 by Pamela Gatica RN  Outcome: Ongoing, Progressing  Goal: Patient-Specific Goal (Individualized)  6/26/2023 1724 by Pamela Gatica RN  Outcome: Met  6/26/2023 1159 by Pamela Gatica RN  Outcome: Ongoing, Progressing  Goal: Absence of Hospital-Acquired Illness or Injury  6/26/2023 1724 by Pamela Gatica RN  Outcome: Met  6/26/2023 1159 by Pamela Gatica RN  Outcome: Ongoing, Progressing  Goal: Optimal Comfort and Wellbeing  6/26/2023 1724 by Pamela Gatica RN  Outcome: Met  6/26/2023 1159 by Pamela Gatica RN  Outcome: Ongoing, Progressing  Goal: Readiness for Transition of Care  6/26/2023 1724 by Pamela Gatica RN  Outcome: Met  6/26/2023 1159 by Pamela Gatica RN  Outcome: Ongoing, Progressing     Problem: Diabetes Comorbidity  Goal: Blood Glucose Level Within Targeted Range  6/26/2023 1724 by Pamela Gatica RN  Outcome: Met  6/26/2023 1159 by Pamela Gatica RN  Outcome: Ongoing, Progressing     Problem: Fall Injury Risk  Goal: Absence of Fall and Fall-Related Injury  6/26/2023 1724 by Pamela Gatica RN  Outcome: Met  6/26/2023 1159 by Pamela Gatica RN  Outcome: Ongoing, Progressing     Problem: Skin Injury Risk Increased  Goal: Skin Health and Integrity  6/26/2023 1724 by Pamela Gatica RN  Outcome: Met  6/26/2023 1159 by Pamela Gatica RN  Outcome: Ongoing, Progressing     Problem: Coping Ineffective  Goal: Effective Coping  6/26/2023 1724 by Pamela Gatica RN  Outcome: Met  6/26/2023 1159 by Pamela Gatica RN  Outcome: Ongoing, Progressing

## 2023-06-26 NOTE — CARE UPDATE
06/25/23 2000   Patient Assessment/Suction   Level of Consciousness (AVPU) alert   Respiratory Effort Normal;Unlabored   All Lung Fields Breath Sounds diminished   Rhythm/Pattern, Respiratory unlabored   Skin Integrity   $ Wound Care Tech Time 15 min   Area Observed Left;Right;Behind ear   Skin Appearance without discoloration   PRE-TX-O2   Device (Oxygen Therapy) nasal cannula   $ Is the patient on Low Flow Oxygen? Yes   Flow (L/min) 1   Oxygen Concentration (%) 24   SpO2 100 %   Pulse Oximetry Type Intermittent   $ Pulse Oximetry - Multiple Charge Pulse Oximetry - Multiple   Ready to Wean/Extubation Screen   FIO2<=50 (chart decimal) 0.24

## 2023-06-26 NOTE — PT/OT/SLP PROGRESS
Speech Language Pathology Treatment    Patient Name:  Julio Benites   MRN:  [id number]  Admitting Diagnosis: Discharge planning issues    Recommendations:      Recommendations:                General Recommendations:    Palliative care assessment of wishes re: PO intake  SLP to follow 3-5x/week, to trial rehabilitative course of dysphagia tx vs promote safest method of oral intake if comfort feeding is opted for      Diet recommendations:   Reviewed palliative care documentation. In light of patient medical status, low threshold for tolerance of procedures/surgeries patient opting to remain on PO diet.   Patient remains on clear liquid diet, if/when restrictions to be lifted, recommend transition to puree diet with thin liquids, with knowledge that patient will likely have significant pharyngeal stasis and frequent aspiration during meals across consistencies  Please crush pills if able, please provide 1 pill at a time in applesauce with a liquid wash. Pharyngeal stasis is SEVERE and risk present for aspiration of pills and/or caustic medication injury to pharynx/larynx if multiple pills are provided at once due to decreased pharyngeal clearance      Aspiration Precautions: If medical team and patient wish to remain on PO nutrition recommend the following:  Frequent and thorough oral care to reduce risk of aspiration pna of likely aspiration of secretions/intake  Small singular sips of liquid via cup  Alternate solids/liquids   Effortful swallow during intake  Intermittent throat clear/cough during intake     General Precautions: Standard,      Assessment:     Julio Benites is a 88 y.o. male with an SLP diagnosis of  severe pharyngeal dysphagia .  He presents with delayed and reduced LVC, significantly reduced base of tongue retraction and pharyngeal constriction with resultant decreased pharyngeal obliteration during the swallow, and severe post swallow pharyngeal stasis.     Impressions MBSS 6/20/23:  Globally  weak pharyngeal swallow noted  Mildly-moderately reduced hyolaryngeal elevation/excursion and UES relaxation  Severely reduced base of tongue retraction, laryngeal vestibule closure, epiglottic retroflexion, pharyngeal squeeze  Deficits resulting in decreased pharyngeal obliteration, subsequent pharyngeal stasis measuring up to 7.69% of pharynx with thin liquids and 28.6% of pharynx with pureed solids  There was discoordination with time to laryngeal vestibule closure, and delayed airway protection with time to laryngeal vestibule closure from hyoid burst/swallow initiation measuring up to 366.3ms  WFL time to LVC for thin liquids =(<167ms), there was also significantly reduced duration of LVC measuring at 333ms (WNL =603.70 (±272.53))  Above deficits resulted in airway invasion occurring in 100% of attempts, occurring during and after the swallow as bolus within the pharynx migrated into the laryngeal vestibule prior to full LVC and pharyngeal residues migrated into the laryngeal vestibule after the swallow  On initial swallow of thin liquids patient with PAS score of 3 indicating that contrast entered the laryngeal vestibule, remained within laryngeal vestibule post swallow initiation  A cued cough was inefficient and did not clear the larynx  As the study progressed contrast within the larynx migrated toward and eventually below the vocal folds, with trace aspiration noted   No strategies significantly improved pharyngeal clearance or airway protection  Notably post assessment patient with coughing, red tinged saliva expectorated. Patient reports consuming red jello just prior to assessment.        Subjective     Patient seen in room, HOB elevated for support, scheduled d/c to SNF at 15:30 per RN  Patient now with diet order for pureed solids and thin liquids    Pain/Comfort:   Denies discomfort, assisted with repositioning to promote comfort    Respiratory Status: Room air    Objective:     Cognitive  communication brief assessment  Patient awake, alert, attends to all directives and all conversation without difficulty  Patient followed abstract commands for dysphagia tx given min cues   Makes wants/needs known, appropriately requests items  Answers all yes/no and simple open ended questions without difficulty  No recall of education from yesterday tx session, poor recall of results from modified barium swallow study  Suspect poor insight into dysphagia deficits    Dysphagia tx  Reviewed findings from MBSS 6/20/23. Reviewed risks present for aspiration, reviewed pillars of aspiration pna with focus today on oral care. Patient instructed to complete oral care 3x/day, will review with RN need for assistance. Please utilize yankauer during oral care. Dentures removed and scrubbed with toothbrush. Gums and tongue cleaned with toothbrush soaked in mouthwash, immediately followed by yankauer suction.     Patient able to teachback education re: reduction in risk of pna if oral care to be provided 3x/day    Reviewed effortful swallow exercise, to target globally weak pharyngeal swallow.   Patient able to complete exercise with icechips as weighted bolus  Patient completed 4 set of 5 trials this date.   Required frequent cues throughout for increased perceived effort  Reviewed with patient importance of frequent effortful swallows throughout the day to improve overall pharyngeal strength and clearance    Introduced patient to modified chin tuck against resistance exercise to target suprahyoid musculature with the goal to improve hyolaryngeal elevation/excursion and LVC  Patient able to sustain chin tuck and held resistance against resistance of SLP hand under mandible for 4 sets of 10 second hold  Pt required min-mod cues throughout to ensure mandible closed (so as not to target masseter muscles), and to maintain breathing throughout exercise.     Patient with wet breathing, throat clearing, and coughing throughout intake  "of icechips. Patient likely with pharyngeal stasis, pooling, and subsequent aspiration as seen on MBSS 6/20/23, patient with poor insight stating "it is all gone", despite education re: deficits.     Goals:   Multidisciplinary Problems       SLP Goals          Problem: SLP    Goal Priority Disciplines Outcome   SLP Goal     SLP Ongoing, Progressing   Description: Goals may change based on discussion with palliative care, and assessment of goals of care between patient, medical team, and patient. As patient is currently full code, goals will reflect rehabilitative method of tx, though, patient may not build musculature with rehabilitative tx due to significant level of deconditioning, appearance of malnourishment to significantly improve safe swallow outcomes.    1. Patient and caregivers will complete oral care 3x/day to reduce risk of aspiration pna development due to likely aspiration of secretions and PO intake.     2. Patient will identify pillars of aspiration pna in 100% of opportunities during education and tx, to improve insight into deficits and reduce risk of development of pna.     3. Patient will complete effortful swallow exercise given min cues from clinician for appropriate positioning and increased effort to improve pharyngeal constriction.     4. Patient will complete exercises focused on suprahyoid strength including chin tuck against resistance and shaker exercise given min cues from clinician for appropriate positioning to improve hyolaryngeal elevation, excursion and LVC.                            Plan:     Patient to be seen:  3 x/week, 5 x/week   Plan of Care expires:     Plan of Care reviewed with:  patient (nurse)   SLP Follow-Up:          Discharge recommendations:    follow up with OP SLP  Barriers to Discharge:   medical acuity    Time Tracking:     SLP Treatment Date:   06/26/23  Speech Start Time:  1335  Speech Stop Time:  1351     Speech Total Time (min):  16 min    Billable Minutes: " Treatment Swallowing Dysfunction 16    06/26/2023

## 2023-06-26 NOTE — PLAN OF CARE
Patient has been accepted to [facility] and will be transported by ambulance. ADT 30 placed. Daughter and patient updated.    06/26/23 1227   Final Note   Assessment Type Final Discharge Note   Anticipated Discharge Disposition SNF   Hospital Resources/Appts/Education Provided Provided patient/caregiver with written discharge plan information   Post-Acute Status   Discharge Delays None known at this time     [facility] ([address])  Discharge Final Note    Primary Care Provider: Gregorio Jensen MD    Expected Discharge Date: 6/26/2023    Final Discharge Note (most recent)       Final Note - 06/26/23 1227          Final Note    Assessment Type Final Discharge Note (P)      Anticipated Discharge Disposition Skilled Nursing Facility (P)      Hospital Resources/Appts/Education Provided Provided patient/caregiver with written discharge plan information (P)         Post-Acute Status    Discharge Delays None known at this time (P)                      Important Message from Medicare

## 2023-06-26 NOTE — ASSESSMENT & PLAN NOTE
-  Noted hx, management per primary team  - Echo 1/1/22   The left ventricle is normal in size with concentric remodeling and mildly decreased systolic function.   The estimated ejection fraction is 40%.   There are segmental left ventricular wall motion abnormalities.   Grade I left ventricular diastolic dysfunction.   With normal right ventricular systolic function.   The estimated PA systolic pressure is 30 mmHg.   Normal central venous pressure (3 mmHg).

## 2023-06-26 NOTE — ASSESSMENT & PLAN NOTE
- Pursuing SNF placement as feasible; [facility] amenable to taking Mon (06/26) if PO intake over 50% for the weekend.  - Daughter en route to arrive Mon as well; backup plan if unable to pursue SNF placement would be for her assistance in obtaining financial information for him to be placed in [facility] NH with hospice with his wife at [facility].   - Stable for discharge to SNF

## 2023-06-26 NOTE — PT/OT/SLP PROGRESS
Occupational Therapy      Patient Name:  Julio Benites   MRN:  [id number]    Attempted to see pt at 12:55.  Upon arrival to room pt eating lunch and stated he was supposed to be leaving later today.  Pt requested to hold off on therapy  . Will follow-up if pt remains in hospital.    ANDREWS Renee  6/26/2023

## 2023-06-26 NOTE — PROGRESS NOTES
[facility], [place] - Med Surg ([address])  Palliative Medicine  Progress Note    Patient Name: Julio Benites  MRN: [id number]  Admission Date: 6/16/2023  Hospital Length of Stay: 10 days  Code Status: DNR   Attending Provider: Russel Felix MD  Consulting Provider: Eduardo Velazco MD  Primary Care Physician: Gregorio Jensen MD  Principal Problem:Discharge planning issues    Patient information was obtained from patient, past medical records and primary team.      Assessment/Plan:     Cardiac/Vascular  Chronic systolic heart failure  -  Noted hx, management per primary team  - Echo 1/1/22   The left ventricle is normal in size with concentric remodeling and mildly decreased systolic function.   The estimated ejection fraction is 40%.   There are segmental left ventricular wall motion abnormalities.   Grade I left ventricular diastolic dysfunction.   With normal right ventricular systolic function.   The estimated PA systolic pressure is 30 mmHg.   Normal central venous pressure (3 mmHg).      GI  Dysphagia  - slp following and will continue to do so at rehab for optimization  - Reviewed results of MBSS. noted      Palliative Care  Advanced care planning/counseling discussion  6/26/2023:  - patient seen at bedside with palliative RN Margareth  - he was resting in bed comfortably  - he was okay with us turning on the lights, muting the tv and talking with him  - introduced self, he already knew Margareth  - he shared he was overall feeling okay this morning  - his daughter had been by to visit him yesterday and he stated it was wonderful to see her  - he had eaten most of his breakfast and a boost shake too  - the main thing on his mind was what the next step of the plan was going to be and when it was going to happen  - he was eager to find out when he was going to be getting out of the hospital  - he stated he was aware he can't just stay at the hospital and is eager for the next steps  - part way through our conversation  the /sw entered the room and excitedly updated Mr. Benites regarding him being accepted to [facility] re[facility] facility with plan for transfer there today and for him to be there for a couple weeks to get stronger, after which he can go to the same nursing home as his wife to be with her.   - he was very pleased to hear this update and have a concrete next step plan  - he stated his main desire in life is to be with his wife and support her. He is not even worried for himself, he is mainly just always thinking of her  - he shared the frustration regarding the difficulty with insurance to make everything work out the way he and his wife would want  - we opened up an open discussion regarding his wishes regarding code status to ensure his wishes are known given plans for transfer to rehab facility today  - he was very clear regarding his wishes for wanting to go peacefully when his time comes. He does not want heroic measures or machines. He agreed DNR/DNI best aligned with his wishes. He re-confirmed his wish for not wanting tube feeds for artificial nutrition.    - he was comfortable completing a e-LaPOST with us, so we did   - provided emotional support and encouragement  [] updated code status to outline his wish for DNR/DNI   [] e-LaPOST completed to make his wishes known   [] updated primary team regarding the above conversation    Please review past palliative care notes during this admission for further details.    Other  Cachexia  - Very frail/ cachectic  - BMI 10.58   - eating/drinking what he can  - working with SLP    Physical debility  - PT/OT following, plan for SNF for rehab        I will follow-up with patient. Please contact us if you have any additional questions.    Subjective:     Chief Complaint:   Chief Complaint   Patient presents with    Abdominal Pain     C/o LUQ pain 10/10 x 3 days with no improvement. Also c/o SOB x 1 month. O2 90-92% on RA. Speaking in complete sentences. PMH  "Stomach Ca. -CP. -n/v/d. /111 all other VSS       HPI:   Per H&P: "HPI: The patient is a 88 y.o. male with a past medical history of DM, NSTEMI, HTN, HLD, and systolic CHF who was recently treated for CAP  with azithromycin who presents with left upper quadrant abdominal pain.  Patient reports worsening left upper quadrant abdominal pain over the past 4-5 days.  He states it is positional and worse when he sits up or stands.  He rates the pain at a 10/10 during these times but 0/10 currently.  Patient also endorses about 1-1/2 months of shortness of breath.  He is also endorsed a significant weight loss during this time.  He denies chest pain, nausea, vomiting, diarrhea, fevers, chills."    At time of initial consult, patient seen sitting up in bedside chair. Patient is very frail and cachectic. Please see encounter for palliative care.       Hospital Course:  No notes on file    Medications:  Scheduled Meds:   losartan  100 mg Oral Daily    NIFEdipine  60 mg Oral BID    pantoprazole  40 mg Oral BID    polyethylene glycol  17 g Oral BID    pravastatin  20 mg Oral QHS    senna-docusate 8.6-50 mg  1 tablet Oral BID     PRN Meds:acetaminophen, albuterol-ipratropium, bisacodyL, HYDROmorphone, naloxone, ondansetron, oxyCODONE, prochlorperazine, sodium chloride 0.9%    Objective:     Vital Signs (Most Recent):  Temp: 98.6 °F (37 °C) (06/26/23 0730)  Pulse: 93 (06/26/23 0739)  Resp: 16 (06/26/23 0739)  BP: 120/71 (06/26/23 0730)  SpO2: 98 % (06/26/23 0739) Vital Signs (24h Range):  Temp:  [97.7 °F (36.5 °C)-98.6 °F (37 °C)] 98.6 °F (37 °C)  Pulse:  [74-93] 93  Resp:  [16-18] 16  SpO2:  [98 %-100 %] 98 %  BP: (120-136)/(71-89) 120/71     Weight: 32.5 kg (71 lb 10.4 oz)  Body mass index is 10.58 kg/m².       Physical Exam  Constitutional:       General: He is not in acute distress.     Appearance: He is ill-appearing (Chronically).      Comments: Frail appearing. Cachectic   HENT:      Head:      Comments: " Bitemporal wasting  Pulmonary:      Effort: No respiratory distress.   Skin:     General: Skin is warm.   Neurological:      Mental Status: He is alert and oriented to person, place, and time.      Comments: Sitting up in bed   Psychiatric:         Mood and Affect: Mood normal.          Review of Symptoms        Living Arrangements:  Lives alone    Psychosocial/Cultural:   See Palliative Psychosocial Note: Yes  Patient lives alone, however his grandson helps him at home.     His wife is a resident at [facility] and he is hoping to be with her there   **Primary  to Follow**  Palliative Care  Consult: No      Advance Care Planning   Advance Directives:   LaPOST: Yes    Do Not Resuscitate Status: Yes      Decision Making:  Patient answered questions  Goals of Care: The patient endorses that what is most important right now is to focus on getting to the same nursing home as his wife. Hopes for improvement of condition, but with limits to invasive interventions.     Accordingly, we have decided that the best plan to meet the patient's goals includes continuing with treatment         Significant Labs: reviewed  CBC:   No results for input(s): WBC, HGB, HCT, MCV, PLT in the last 168 hours.  BMP:  No results for input(s): GLU, NA, K, CL, CO2, BUN, CREATININE, CALCIUM, MG in the last 24 hours.  LFT:  Lab Results   Component Value Date    AST 18 06/19/2023    ALKPHOS 53 (L) 06/19/2023    BILITOT 0.5 06/19/2023     Albumin:   Albumin   Date Value Ref Range Status   06/19/2023 2.7 (L) 3.5 - 5.2 g/dL Final     Protein:   Total Protein   Date Value Ref Range Status   06/19/2023 6.3 6.0 - 8.4 g/dL Final     Lactic acid:   Lab Results   Component Value Date    LACTATE 2.4 (H) 06/16/2023       Significant Imaging: reviewed    > 50% of 35 min visit spent in chart review, face to face discussion of symptom assessment, coordination of care with other specialists, and d/c planning  16 minutes ACP time spent:  goals of care, emotional support, formulating and communication prognosis and goals of care, exploring burden/ benefit of various approaches of treatment.         Eduardo Velazco MD  Palliative Medicine  [facility] - Med Surg ([address])

## 2023-06-26 NOTE — ASSESSMENT & PLAN NOTE
- slp following and will continue to do so at rehab for optimization  - Reviewed results of MBSS. noted

## 2023-06-26 NOTE — PLAN OF CARE
Medicare Message     Important Message from Medicare regarding Discharge Appeal Rights Given to patient/caregiver; Explained to patient/caregiver; Signed/date by patient/caregiver   Date IMM was signed 6/26/2023   Time IMM was signed 0939

## 2023-06-26 NOTE — SUBJECTIVE & OBJECTIVE
Medications:  Scheduled Meds:   losartan  100 mg Oral Daily    NIFEdipine  60 mg Oral BID    pantoprazole  40 mg Oral BID    polyethylene glycol  17 g Oral BID    pravastatin  20 mg Oral QHS    senna-docusate 8.6-50 mg  1 tablet Oral BID     PRN Meds:acetaminophen, albuterol-ipratropium, bisacodyL, HYDROmorphone, naloxone, ondansetron, oxyCODONE, prochlorperazine, sodium chloride 0.9%    Objective:     Vital Signs (Most Recent):  Temp: 98.6 °F (37 °C) (06/26/23 0730)  Pulse: 93 (06/26/23 0739)  Resp: 16 (06/26/23 0739)  BP: 120/71 (06/26/23 0730)  SpO2: 98 % (06/26/23 0739) Vital Signs (24h Range):  Temp:  [97.7 °F (36.5 °C)-98.6 °F (37 °C)] 98.6 °F (37 °C)  Pulse:  [74-93] 93  Resp:  [16-18] 16  SpO2:  [98 %-100 %] 98 %  BP: (120-136)/(71-89) 120/71     Weight: 32.5 kg (71 lb 10.4 oz)  Body mass index is 10.58 kg/m².       Physical Exam  Constitutional:       General: He is not in acute distress.     Appearance: He is ill-appearing (Chronically).      Comments: Frail appearing. Cachectic   HENT:      Head:      Comments: Bitemporal wasting  Pulmonary:      Effort: No respiratory distress.   Skin:     General: Skin is warm.   Neurological:      Mental Status: He is alert and oriented to person, place, and time.      Comments: Sitting up in bed   Psychiatric:         Mood and Affect: Mood normal.          Review of Symptoms        Living Arrangements:  Lives alone    Psychosocial/Cultural:   See Palliative Psychosocial Note: Yes  Patient lives alone, however his grandson helps him at home.     His wife is a resident at [facility] and he is hoping to be with her there   **Primary  to Follow**  Palliative Care  Consult: No      Advance Care Planning   Advance Directives:   LaPOST: Yes    Do Not Resuscitate Status: Yes      Decision Making:  Patient answered questions  Goals of Care: The patient endorses that what is most important right now is to focus on getting to the same nursing home as his  wife. Hopes for improvement of condition, but with limits to invasive interventions.     Accordingly, we have decided that the best plan to meet the patient's goals includes continuing with treatment         Significant Labs: reviewed  CBC:   No results for input(s): WBC, HGB, HCT, MCV, PLT in the last 168 hours.  BMP:  No results for input(s): GLU, NA, K, CL, CO2, BUN, CREATININE, CALCIUM, MG in the last 24 hours.  LFT:  Lab Results   Component Value Date    AST 18 06/19/2023    ALKPHOS 53 (L) 06/19/2023    BILITOT 0.5 06/19/2023     Albumin:   Albumin   Date Value Ref Range Status   06/19/2023 2.7 (L) 3.5 - 5.2 g/dL Final     Protein:   Total Protein   Date Value Ref Range Status   06/19/2023 6.3 6.0 - 8.4 g/dL Final     Lactic acid:   Lab Results   Component Value Date    LACTATE 2.4 (H) 06/16/2023       Significant Imaging: reviewed

## 2023-06-26 NOTE — PLAN OF CARE
[facility]    FACILITY TRANSFER ORDERS      Patient Name: Julio Benites  YOB: 1934    PCP: Gregorio Jensen MD   PCP Address: [address] [place] [address]  PCP Phone Number: [phone]  PCP Fax: [phone]    Encounter Date: 06/26/2023    Admit to: SNF    Vital Signs:  Routine    Diagnoses:   Active Hospital Problems    Diagnosis  POA    *Discharge planning issues [Z02.9]  Not Applicable    Dysphagia [R13.10]  Yes     Chronic    Cachexia [R64]  Yes     Chronic    Chronic systolic heart failure [I50.22]  Yes     Chronic    Physical debility [R53.81]  Yes     Chronic    Mixed hyperlipidemia [E78.2]  Yes     Chronic    Essential hypertension [I10]  Yes     Chronic      Resolved Hospital Problems    Diagnosis Date Resolved POA    Esophagitis [K20.90] 06/24/2023 Yes    Elevated troponin [R77.8] 06/23/2023 Yes    Colitis [K52.9] 06/23/2023 Yes       Allergies:  Review of patient's allergies indicates:   Allergen Reactions    Lisinopril Rash     Patient reports history of rash with previous lisinopril use.     Nicoderm     Nicoderm cq [nicotine] Rash       Diet: cardiac diet and soft diet    Activities: Activity as tolerated    Goals of Care Treatment Preferences:  Code Status: DNR          What is most important right now is to focus on spending time at home, quality of life, even if it means sacrificing a little time.  Accordingly, we have decided that the best plan to meet the patient's goals includes continuing with treatment.    CONSULTS:    Physical Therapy to evaluate and treat. , Occupational Therapy to evaluate and treat., Speech Therapy to evaluate and treat for Language, Swallowing, and Cognition., and  to evaluate for community resources/long-range planning.    MISCELLANEOUS CARE:  Routine Skin for Bedridden Patients: Apply moisture barrier cream to all skin folds and wet areas in perineal area daily and after baths and all bowel  movements.    WOUND CARE ORDERS  None    Medications: Review discharge medications with patient and family and provide education.      Current Discharge Medication List        START taking these medications    Details   NIFEdipine (PROCARDIA-XL) 60 MG (OSM) 24 hr tablet Take 1 tablet (60 mg total) by mouth 2 (two) times a day.  Qty: 60 tablet, Refills: 11    Comments: .           CONTINUE these medications which have CHANGED    Details   pantoprazole (PROTONIX) 40 MG tablet Take 1 tablet (40 mg total) by mouth 2 (two) times daily.  Qty: 60 tablet, Refills: 11           CONTINUE these medications which have NOT CHANGED    Details   aspirin (ECOTRIN) 81 MG EC tablet TAKE 1 TABLET BY MOUTH EVERY DAY  Qty: 90 tablet, Refills: 3      colchicine (COLCRYS) 0.6 mg tablet Take 0.6 mg by mouth 2 (two) times daily as needed (gout attack).      losartan (COZAAR) 100 MG tablet TAKE 1 TABLET BY MOUTH EVERY DAY  Qty: 90 tablet, Refills: 3    Associated Diagnoses: Essential hypertension, benign      metoprolol succinate (TOPROL-XL) 25 MG 24 hr tablet Take 0.5 tablets (12.5 mg total) by mouth once daily.  Qty: 45 tablet, Refills: 3    Comments: .      pravastatin (PRAVACHOL) 20 MG tablet TAKE 1 TABLET BY MOUTH EVERY DAY AT NIGHT  Qty: 90 tablet, Refills: 0    Associated Diagnoses: Mixed hyperlipidemia               Immunizations Administered as of 6/26/2023  Reviewed on 6/26/2023      Name Date Dose VIS Date Route Exp Date    COVID-19, MRNA, LN-S, PF (Pfizer) (Purple Cap) 9/28/2021  9:37 AM 0.3 mL 12/12/2020 Intramuscular 12/31/2021    Site: Right deltoid     Given By: Elena Chu     : [employer]     Lot: [id number]     COVID-19, MRNA, LN-S, PF (Pfizer) (Purple Cap) 1/31/2021 -- -- -- --    : Pf[employer]     Lot: [id number]     COVID-19, MRNA, LN-S, PF (Pfizer) (Purple Cap) 1/10/2021 -- -- -- --    : Pf[employer]     Lot: [id number]               Some patients may experience side effects after  vaccination.  These may include fever, headache, muscle or joint aches.  Most symptoms resolve with 24-48 hours and do not require urgent medical evaluation unless they persist for more than 72 hours or symptoms are concerning for an unrelated medical condition.          _________________________________  Russel Brandon MD  06/26/2023

## 2023-06-27 PROCEDURE — 25000003 PHARM REV CODE 250: Mod: HCNC | Performed by: FAMILY MEDICINE

## 2023-06-27 PROCEDURE — 97535 SELF CARE MNGMENT TRAINING: CPT | Mod: HCNC

## 2023-06-27 PROCEDURE — 97165 OT EVAL LOW COMPLEX 30 MIN: CPT | Mod: HCNC

## 2023-06-27 PROCEDURE — 11000004 HC SNF PRIVATE: Mod: HCNC

## 2023-06-27 PROCEDURE — 92610 EVALUATE SWALLOWING FUNCTION: CPT | Mod: HCNC

## 2023-06-27 PROCEDURE — 25000003 PHARM REV CODE 250: Mod: HCNC | Performed by: HOSPITALIST

## 2023-06-27 PROCEDURE — 92526 ORAL FUNCTION THERAPY: CPT | Mod: HCNC

## 2023-06-27 RX ORDER — LACTULOSE 10 G/15ML
20 SOLUTION ORAL ONCE
Status: CANCELLED | OUTPATIENT
Start: 2023-06-27

## 2023-06-27 RX ORDER — GUAIFENESIN 100 MG/5ML
200 SOLUTION ORAL EVERY 4 HOURS PRN
Status: DISCONTINUED | OUTPATIENT
Start: 2023-06-27 | End: 2023-07-05

## 2023-06-27 RX ORDER — COLCHICINE 0.6 MG/1
0.6 TABLET, FILM COATED ORAL DAILY PRN
Status: DISCONTINUED | OUTPATIENT
Start: 2023-06-27 | End: 2023-07-17 | Stop reason: HOSPADM

## 2023-06-27 RX ADMIN — GUAIFENESIN 200 MG: 200 SOLUTION ORAL at 04:06

## 2023-06-27 RX ADMIN — SENNOSIDES AND DOCUSATE SODIUM 1 TABLET: 50; 8.6 TABLET ORAL at 09:06

## 2023-06-27 RX ADMIN — PRAVASTATIN SODIUM 20 MG: 20 TABLET ORAL at 09:06

## 2023-06-27 RX ADMIN — GUAIFENESIN 200 MG: 200 SOLUTION ORAL at 09:06

## 2023-06-27 RX ADMIN — PANTOPRAZOLE SODIUM 40 MG: 40 TABLET, DELAYED RELEASE ORAL at 09:06

## 2023-06-27 RX ADMIN — METOPROLOL SUCCINATE 12.5 MG: 25 TABLET, EXTENDED RELEASE ORAL at 09:06

## 2023-06-27 RX ADMIN — NIFEDIPINE 60 MG: 30 TABLET, FILM COATED, EXTENDED RELEASE ORAL at 09:06

## 2023-06-27 RX ADMIN — ASPIRIN 81 MG: 81 TABLET, COATED ORAL at 09:06

## 2023-06-27 RX ADMIN — PANTOPRAZOLE SODIUM 40 MG: 40 TABLET, DELAYED RELEASE ORAL at 04:06

## 2023-06-27 RX ADMIN — LOSARTAN POTASSIUM 100 MG: 50 TABLET, FILM COATED ORAL at 09:06

## 2023-06-27 NOTE — PROGRESS NOTES
[facility]                                  Skilled Nursing Facility                   Progress Note     Admit Date: 6/26/2023  [facility]   Principal Problem:  Severe malnutrition   HPI obtained from patient interview and chart review   Code Status: DNR    Chief Complaint: Establish Care/ Initial Visit     HPI: Julio Benites is an 88 y.o. male with a past medical history of DM, NSTEMI, HTN, HLD, and systolic CHF who was recently treated for CAP  with azithromycin who presented to ED on 6/16/23 with abdominal pain. Admitted with colitis with fecal impaction/constipation. General surgery and GI consulted. Bowel regimen initiated following manual disimpaction and enema. EGD 06/19 showing grade D esophagitis, lavell-en-Y gastrojejunostomy with healthy-appearing anastomosis. MBSS 06/20 which showed laryngeal penetration with all consistencies. Patient declined PEG placement. Patient lives at home alone with support from grandson. Patient goal to discharge to [facility] where his wife resides. Patient and family considering hospice.    Patient will be treated at [facility] with PT and OT to improve functional status and ability to perform ADLs.     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    Past Surgical History: Patient has a past surgical history that includes Infected skin debridement (8/2012); subtotal gastrectomy (2013); Cataract extraction (Right); Eye surgery; pr eval,swallow function,cine/video record (9/22/2021); and  Esophagogastroduodenoscopy (Left, 6/19/2023).    Social History: Patient reports that he quit smoking about 17 months ago. His smoking use included cigarettes. He smoked an average of .25 packs per day. He has never been exposed to tobacco smoke. He has never used smokeless tobacco. He reports current alcohol use. He reports that he does not use drugs.    Family History: family history includes Breast cancer in his daughter and sister; Lung cancer in his mother; No Known Problems in his father, maternal grandfather, maternal grandmother, paternal grandfather, paternal grandmother, and son.    Allergies: Patient is allergic to lisinopril, nicoderm, and nicoderm cq [nicotine].    ROS  Constitutional: Negative for fever   Eyes: Negative for blurred vision, double vision   Respiratory: + cough, Negative for shortness of breath   Cardiovascular: Negative for chest pain, palpitations, and leg swelling.   Gastrointestinal: Negative for abdominal pain, constipation, diarrhea, nausea, vomiting.   Genitourinary: Negative for dysuria, frequency   Musculoskeletal:  + generalized weakness. Negative for back pain and myalgias.   Skin: Negative for itching and rash.   Neurological: Negative for dizziness, headaches.   Psychiatric/Behavioral: Negative for depression. The patient is not nervous/anxious.      24 hour Vital Sign Range   Temp:  [97.7 °F (36.5 °C)-98.1 °F (36.7 °C)]   Pulse:  [78-95]   Resp:  [18]   BP: (129-141)/(76-83)   SpO2:  [98 %-99 %]     Current BMI: Body mass index is 11.66 kg/m².    PEx  Constitutional: Patient appears debilitated, cachectic, chronically ill-appearing. .  No distress noted  HENT:   Head: Normocephalic and atraumatic.   Eyes: Pupils are equal, round.   Neck: Normal range of motion. Neck supple.   Cardiovascular: Normal rate, regular rhythm + murmur   Pulmonary/Chest: Effort normal and breath sounds are diminished. + frequent productive cough  Abdominal: Soft. Bowel sounds are normal. Scaphoid  abdomen.  Musculoskeletal: Normal range of motion.   Neurological: Alert and oriented to person, place, and time.   Psychiatric: Normal mood and affect. Behavior is normal.   Skin: Skin is warm and dry; thin and frail, intact. Full skin assessment completed by NP on initial exam.       No results for input(s): GLUCOSE, NA, K, CL, CO2, BUN, CREATININE, MG in the last 24 hours.    Invalid input(s):  CALCIUM    No results for input(s): WBC, RBC, HGB, HCT, PLT, MCV, MCH, MCHC in the last 24 hours.    No results for input(s): POCTGLUCOSE in the last 168 hours.     Assessment and Plan:     Severe Protein Calorie Malnutrition  Cachexia  Dysphagia  - Body mass index is 11.69 kg/m².  - Continue bowel regimen.  - SLP and RD consulted to eval and tx  - MBSS 06/20 showed laryngeal penetration with all consistencies.  - Declined PEG placement.  - Aspiration precautions  - Purred diet per SLP rec    Debility   - Continue with PT/OT for gait training and strengthening and restoration of ADL's   - Encourage mobility, OOB in chair, and early ambulation as appropriate  - Fall precautions   - Monitor for bowel and bladder dysfunction  - Monitor for and prevent skin breakdown and pressure ulcers     Grade D esophagitis  - GI consulted and underwent EGD 06/19 showing grade D esophagitis, lavell-en-Y gastrojejunostomy with healthy-appearing anastomosis.   - Protonix po 40 mg bid    Discharge planning issues  - Patient seeking alf NH with hospice with his wife at [facility].  - Recurrent aspiration likely given MBSS findings. Patient declines PEG.  - CM consulted  - Offer hospice consult to patient in SNF    Chronic systolic heart failure  - stable  - Monitor for acute decompensation     Mixed hyperlipidemia  - Continue pravastatin     Essential hypertension  - Continue nifedipine at 60mg PO BID, losartan 100mg PO daily.      Anticipate disposition:  Home with home health      Follow-up needed during SNF stay-     Follow-up needed after  discharge from SNF: PCP,     No future appointments.      I certify that SNF services are required to be given on an inpatient basis because Julio Benites needs for skilled nursing care and/or skilled rehabilitation are required on a daily basis and such services can only practically be provided in a skilled nursing facility setting and are for an ongoing condition for which she received inpatient care in the hospital.     Total time of the visit 121 minutes   Non physical exam/ non charting time: 90 minutes   Description of non physical exam/non charting time: counseling patient on clinical conditions and therapies provided.  Extensive chart review completed including all consultation notes.  All pertinent laboratory and radiographical images reviewed.        Nicolette Tripp NP  Department of Hospital Medicine   [facility]- Skilled Nursing [facility]     DOS: 6/27/2023

## 2023-06-27 NOTE — NURSING
Pt arrived to floor for skilled services with admitting diagnosis of Noninfective gastroenteritis and c* via wheelchair. Pt required 3 person assistance from stretcher to bed. Pt is AAOx4, Hard of Hearing, Upper dentures in place. Patient is continent of B/B. Pt is on a Cardiac Mechanical soft diet. Skin assessment done: Bony sacrum area with blanchable redness, Dry skin. No additional breakdown noted. Family informed of arrival. POC reviewed with patient. Pt denies pain at this time and is educated to use call light and not get OOB w/o assistance

## 2023-06-27 NOTE — PLAN OF CARE
Problem: Adult Inpatient Plan of Care  Goal: Plan of Care Review  Outcome: Ongoing, Progressing  Flowsheets (Taken 6/27/2023 0054)  Plan of Care Reviewed With: patient  Goal: Absence of Hospital-Acquired Illness or Injury  Outcome: Ongoing, Progressing  Goal: Optimal Comfort and Wellbeing  Outcome: Ongoing, Progressing  Intervention: Monitor Pain and Promote Comfort  Flowsheets (Taken 6/27/2023 0054)  Pain Management Interventions: pain management plan reviewed with patient/caregiver     Problem: Fall Injury Risk  Goal: Absence of Fall and Fall-Related Injury  Outcome: Ongoing, Progressing  Intervention: Promote Injury-Free Environment  Flowsheets (Taken 6/27/2023 0054)  Safety Promotion/Fall Prevention: instructed to call staff for mobility     Problem: Skin Injury Risk Increased  Goal: Skin Health and Integrity  Outcome: Ongoing, Progressing

## 2023-06-27 NOTE — CLINICAL REVIEW
Clinical Pharmacy Chart Review Note      Admit Date: 6/26/2023   LOS: 1 day       Julio Benites is a 88 y.o. male admitted to SNF for PT/OT after hospitalization for colitis.    There are no hospital problems to display for this patient.    Review of patient's allergies indicates:   Allergen Reactions    Lisinopril Rash     Patient reports history of rash with previous lisinopril use.     Nicoderm     Nicoderm cq [nicotine] Rash     Patient Active Problem List    Diagnosis Date Noted    Advanced care planning/counseling discussion 06/26/2023    Discharge planning issues 06/23/2023    Encounter for palliative care 06/20/2023    Dysphagia 06/20/2023    Acute on chronic respiratory failure with hypoxia 06/16/2023    Cachexia 03/23/2023    Chronic systolic heart failure 03/18/2022    Simple chronic bronchitis 03/18/2022    Physical debility 01/05/2022    Coronary atherosclerosis 01/05/2022    Weight loss 01/05/2022    NSTEMI (non-ST elevated myocardial infarction) 01/01/2022    Ectatic thoracic aorta 09/28/2021    Asbestosis 09/28/2021    Gastroesophageal reflux disease without esophagitis 09/28/2021    Type 2 diabetes mellitus with microalbuminuria, without long-term current use of insulin 06/30/2020    Atherosclerosis of aorta 08/31/2015    Idiopathic chronic gout of multiple sites without tophus 08/31/2015    History of gastric cancer 05/01/2014    Elevated PSA 12/26/2012    Alcohol use 11/08/2012    Tobacco abuse 11/08/2012    Essential hypertension 11/07/2012    Mixed hyperlipidemia 11/07/2012    Anemia 08/25/2012       Scheduled Meds:    aspirin  81 mg Oral Daily    losartan  100 mg Oral Daily    metoprolol succinate  12.5 mg Oral Daily    NIFEdipine  60 mg Oral BID    pantoprazole  40 mg Oral BID AC    pravastatin  20 mg Oral QHS    senna-docusate 8.6-50 mg  1 tablet Oral BID     Continuous Infusions:   PRN Meds: acetaminophen, acetaminophen, calcium carbonate, colchicine, melatonin    OBJECTIVE:     Vital  Signs (Last 24H)  Temp:  [97.7 °F (36.5 °C)-98.3 °F (36.8 °C)]   Pulse:  [75-95]   Resp:  [16-18]   BP: (129-141)/(76-83)   SpO2:  [98 %-99 %]     Laboratory:  BMP  Lab Results   Component Value Date     (H) 06/19/2023    K 3.3 (L) 06/19/2023     06/19/2023    CO2 24 06/19/2023    BUN 38 (H) 06/19/2023    CREATININE 0.7 06/19/2023    CALCIUM 9.3 06/19/2023    ANIONGAP 21 (H) 06/19/2023    EGFRNORACEVR >60 06/19/2023     Lab Results   Component Value Date    WBC 9.26 06/19/2023    HGB 10.7 (L) 06/19/2023    HCT 34.2 (L) 06/19/2023    MCV 81 (L) 06/19/2023     06/19/2023       Lab Results   Component Value Date    ALT 9 (L) 06/19/2023    AST 18 06/19/2023    ALKPHOS 53 (L) 06/19/2023    BILITOT 0.5 06/19/2023         ASSESSMENT/PLAN:     I have reviewed the medications in compliance with CMS Regulation F756 of the [place]. No irregularities were noted at this time.    Medications reviewed by PharmD, please re-consult if needed.      Sarah Arredondo, Pharm. D.  Clinical Pharmacist  [facility]

## 2023-06-27 NOTE — PT/OT/SLP EVAL
"Occupational Therapy   Evaluation    Name: Julio Benites  MRN: [id number]  Admit Date: 6/26/2023  Recent Surgeries: Abdominal endoscopy     General Precautions: Standard, hearing impaired  Orthopedic Precautions:N/A   Braces: N/A    Recommendations:     Discharge Recommendations: home health OT  Level of Assistance Recommended: 24 hours significant assistance  Discharge Equipment Recommendations:  other (see comments) (TBD)  Barriers to discharge:  Inaccessible home environment, Decreased caregiver support    Assessment:     Julio Benites is a 88 y.o. male with a medical diagnosis of noninfective gastroenteritis and colitis. Pt tolerated session well and without incident, but he continues to require assistance to perform self-care tasks and mobility.   He would continue to benefit from skilled OT services at SNF to maximize his gains in functional independence. He presents with impaired functional endurance, strength, and mobility. Performance deficits affecting function: weakness, impaired endurance, impaired self care skills, impaired functional mobility, gait instability, impaired balance, decreased upper extremity function, decreased lower extremity function, impaired coordination, decreased ROM, impaired cardiopulmonary response to activity.      Rehab Potential is fair    Activity Tolerance: Fair    Plan:     Patient to be seen 5 x/week to address the above listed problems via self-care/home management, therapeutic activities, therapeutic exercises  Plan of Care Expires: 07/26/23  Plan of Care Reviewed with: patient    Subjective     Chief Complaint: "I feel tired while I am doing all of these things."  Patient/Family Comments/goals: "Take care of myself."    Occupational Profile:  Living Environment: Pt lives alone in 1SH /c 7STE /c stair lift. Pt has walk in shower and raised toilet seat.   Previous level of function: I /c ADLs and Mod I /c functional mobility /c use of Rolator.  Roles and Routines: Pt " is sole caregiver for home, cooks, cleans, and drives. Pt is a father; at time of eval, daughter is in town for assistance, however lives in [place]. Son lives in [place] and works offshore- able assist when home at times.  Equipment Used at Home: rollator  Assistance upon Discharge: 24 hour light to moderate assistance.     Pain/Comfort:  Pain Rating 1: 0/10    Patients cultural, spiritual, Oriental orthodox conflicts given the current situation: no    Objective:     Communicated with: RN prior to session.  Patient found supine with Other (comments) (no lines attached) upon OT entry to room. RN in room and pt alert and agreeable to therapy.    Occupational Performance:     Bed Mobility:    Patient completed Supine to Sit with moderate assistance    Functional Mobility/Transfers:  Patient completed Sit <> Stand Transfer with moderate assistance  with  rolling walker; Pt noted to fatigue throughout session and required Max A for sit<>stand t/f at end of session to don pants.  Patient completed Bed <> Chair Transfer using Stand Pivot technique with moderate assistance with rolling walker  Patient completed Toilet Transfer Stand Pivot technique with moderate assistance with  rolling walker      Activities of Daily Livin/27/23 1145   Prior Functioning: Everyday Activities   Self Care Independent   Indoor Mobility (Ambulation) Independent   Stairs Independent   Functional Cognition Independent   Prior Device Use None of the given options  (rollator)   Eating   Assistance Needed Set-up / clean-up   Physical Assistance Level No physical assistance   CARE Score - Eating 5   Oral Hygiene   Assistance Needed Set-up / clean-up   Physical Assistance Level No physical assistance   Comment seated in WC at sink   CARE Score - Oral Hygiene 5   Toileting Hygiene   Comment Pt did not need to use bathroom   Reason if not Attempted Refused to perform   CARE Score - Toileting Hygiene 7   Toileting Hygiene Discharge Goal   Discharge Goal 4    Shower/Bathe Self   Assistance Needed Physical assistance   Physical Assistance Level 25% or less   Comment Pt req A to wash b/l feet seated in WC at sink; pt noted /c fatigue throughout bathing, req restbreaks throughout   CARE Score - Shower/Bathe Self 3   Upper Body Dressing   Assistance Needed Physical assistance   Physical Assistance Level 26%-50%   Comment Pt req A to don over head   CARE Score - Upper Body Dressing 3   Lower Body Dressing   Assistance Needed Physical assistance   Physical Assistance Level 76% or more   Comment Pt req assist to don over BLE and Max A to stand for 3rd party to don over hips; Pt able to help adjust over thighs.   CARE Score - Lower Body Dressing 2   Putting On/Taking Off Footwear   Assistance Needed Physical assistance   Physical Assistance Level Total assistance   CARE Score - Putting On/Taking Off Footwear 1   Toilet Transfer   Assistance Needed Physical assistance   Physical Assistance Level 51%-75%   CARE Score - Toilet Transfer 2   Toilet Transfer Discharge Goal   Discharge Goal 4       Cognitive/Visual Perceptual:  Cognitive/Psychosocial Skills:     -       Oriented to: Person, Place, Time, and Situation   -       Follows Commands/attention:Follows multistep  commands  -       Communication: clear/fluent  -       Mood/Affect/Coping skills/emotional control: Blunted  Visual/Perceptual:      -Impaired  motor planning praxis per finger to nose test    Physical Exam:  Postural examination/scapula alignment:    -       Rounded shoulders  -       Posterior pelvic tilt  Dominant hand: -       R  Upper Extremity Range of Motion:  -       Right Upper Extremity: Deficits: impaired shoulder horizontal abduction/adduction and flexion/extension  -       Left Upper Extremity: Deficits: impaired shoulder horizontal abduction/adduction and flexion/extensio  Upper Extremity Strength: -       Right Upper Extremity: Deficits: generalized weekness throughout entire UE  -       Left Upper  Extremity: Deficits: generalized weakness throughout entire UE   Strength: -       Right Upper Extremity: Deficits:    -       Left Upper Extremity: Deficits:    Fine Motor Coordination: -       Intact  Left hand thumb/finger opposition skills, Right hand thumb/finger opposition skills, Left hand, manipulation of objects, and Right hand, manipulation of objects  Gross motor coordination: WFL    AMPA 6 Click ADL:  AMPA Total Score: 15    Treatment & Education:  Pt edu on role of OT, POC, safety when performing self care tasks , benefit of performing OOB activity, and safety when performing functional transfers and mobility.  - White board updated  - Self care tasks completed-- as noted above       Patient left up in chair with call button in reach    GOALS:   Multidisciplinary Problems       Occupational Therapy Goals          Problem: Occupational Therapy    Goal Priority Disciplines Outcome Interventions   Occupational Therapy Goal     OT, PT/OT Ongoing, Progressing    Description: Goals to be met by:  7/26/2023    Patient will increase functional independence with ADLs by performing:    UE Dressing with Set-up.   LE Dressing with Min A, AE, and LRAD as needed.   Grooming while seated at sink with [place].  Toileting from toilet with Minimal Assistance for hygiene and clothing management with AE as needed and LRAD.  Bathing from  shower chair/bench with Supervision and AE as needed.  Stand pivot transfers with Min A for 100% t/f and LRAD.  Toilet transfer to toilet with Min A for 100% t/f and LRAD.  Upper extremity exercise program x10 reps per handout, with independence.                         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    NSTEMI (non-ST elevated myocardial infarction) 1/1/2022         Past Surgical History:   Procedure Laterality Date    CATARACT EXTRACTION Right     ESOPHAGOGASTRODUODENOSCOPY Left 6/19/2023    Procedure: EGD (ESOPHAGOGASTRODUODENOSCOPY);  Surgeon: Kvng Holly MD;  [facility]: [facility];  Service: Endoscopy;  Laterality: Left;    EYE SURGERY      INFECTED SKIN DEBRIDEMENT  8/2012    spider bite with surgery to right forearm    CO EVAL,SWALLOW FUNCTION,CINE/VIDEO RECORD  9/22/2021         subtotal gastrectomy  2013       Time Tracking:     OT Date of Treatment: 06/27/23  OT Start Time: 0940  OT Stop Time: 1019  OT Total Time (min): 39 min    Billable Minutes:Evaluation 15  Self Care/Home Management 24    6/27/2023

## 2023-06-27 NOTE — PLAN OF CARE
Problem: Occupational Therapy  Goal: Occupational Therapy Goal  Description: Goals to be met by:  7/26/2023    Patient will increase functional independence with ADLs by performing:    UE Dressing with Set-up.   LE Dressing with Min A, AE, and LRAD as needed.   Grooming while seated at sink with [place].  Toileting from toilet with Minimal Assistance for hygiene and clothing management with AE as needed and LRAD.  Bathing from  shower chair/bench with Supervision and AE as needed.  Stand pivot transfers with Min A for 100% t/f and LRAD.  Toilet transfer to toilet with Min A for 100% t/f and LRAD.  Upper extremity exercise program x10 reps per handout, with independence.    Outcome: Ongoing, Progressing     Pt evaluated and goals set based on function at baseline.

## 2023-06-27 NOTE — PT/OT/SLP PROGRESS
Physical Therapy      Patient Name:  Julio Benites   MRN:  [id number]    Patient not seen today secondary to  feeling weak and fatigued. Will follow-up 6/28/23.  Caprice Cobb, PT  6/27/2023

## 2023-06-27 NOTE — PT/OT/SLP EVAL
Speech Language Pathology  Swallow Evaluation + Dysphagia Treatment    Julio Benites   MRN: [id number]   Admitting Diagnosis: <principal problem not specified>    Diet recommendations: Solid Diet Level: Puree  Liquid Diet Level: Thin 1 bite/sip at a time, Alternating bites/sips, Assistance with meals, Avoid talking while eating, Double swallow with each bite/sip, Feed only when awake/alert, Frequent oral care, HOB to 90 degrees, Meds crushed in puree, Monitor for s/s of aspiration, Remain upright 30 minutes post meal, Small bites/sips, and Strict aspiration precautions    SLP Treatment Date: 06/27/23  Speech Start Time: 1541     Speech Stop Time: 1607     Speech Total (min): 26 min       TREATMENT BILLABLE MINUTES:  Treatment Swallowing Dysfunction 8, Eval Swallow and Oral Function 10, and Self Care/Home Management Training 8    Diagnosis: <principal problem not specified>      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    Cataract     Cellulitis of right forearm 8/25/2012    Overview:  dx update    Chronic systolic heart failure 3/18/2022    Diabetes mellitus type II     Diabetic retinopathy     Elevated PSA     GERD (gastroesophageal reflux disease)     Gout attack     Hyperlipidemia     Hypertension     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 : Yes  Keep patient NPO?:  No General Precautions: Standard, aspiration, fall, hearing impaired, pureed diet        HPI: The patient is a 88 y.o. male with a past medical history of DM, NSTEMI, HTN, HLD, and systolic CHF who was recently treated for CAP  with azithromycin who presents with left upper quadrant abdominal pain.  Patient reports worsening left upper quadrant abdominal pain over the past 4-5 days.  He states it is positional and worse when he sits up or stands.  He rates the pain at a 10/10 during these times but 0/10 currently.  Patient also endorses about 1-1/2 months of shortness of breath.  He is also endorsed a significant weight loss during this time.  He denies chest pain, nausea, vomiting, diarrhea, fevers, chills.        Prior diet: diet orders were for mechanical soft/thin liquids, but he reportedly received purees for meals today.     MBSS: 9/21/21 and 6/20/23:  Impressions MBSS 6/20/23  Globally weak pharyngeal swallow noted  Mildly-moderately reduced hyolaryngeal elevation/excursion and UES relaxation  Severely reduced base of tongue retraction, laryngeal vestibule closure, epiglottic retroflexion, pharyngeal squeeze  Deficits resulting in decreased pharyngeal obliteration, subsequent pharyngeal stasis measuring up to 7.69% of pharynx with thin liquids and 28.6% of pharynx with pureed solids  There was discoordination with time to laryngeal vestibule closure, and delayed airway protection with time to laryngeal vestibule closure from hyoid burst/swallow initiation measuring up to 366.3ms  WFL time to LVC for thin liquids =(<167ms), there was also significantly reduced duration of LVC measuring at 333ms (WNL =603.70 (±272.53))  Above deficits resulted in airway invasion occurring in 100% of attempts, occurring during and after the swallow as bolus within the pharynx migrated into the laryngeal vestibule prior to full LVC and pharyngeal residues migrated into the laryngeal vestibule after the swallow  On initial swallow  "of thin liquids patient with PAS score of 3 indicating that contrast entered the laryngeal vestibule, remained within laryngeal vestibule post swallow initiation  A cued cough was inefficient and did not clear the larynx  As the study progressed contrast within the larynx migrated toward and eventually below the vocal folds, with trace aspiration noted   No strategies significantly improved pharyngeal clearance or airway protection  Notably post assessment patient with coughing, red tinged saliva expectorated. Patient reports consuming red jello just prior to assessment.         Subjective:  "Soft" pt stated in regards to foods/textured he feels most comfortable consuming. Pt also indicated he did not wish to try soft solids without lower dentures, which were not present at time of evaluation.        Objective:        Oral Musculature Evaluation  Oral Musculature: general weakness  Dentition: upper dentures (lower dentures at home)  Secretion Management: problems swallowing secretions  Mucosal Quality: adequate  Mandibular Strength and Mobility: WFL  Oral Labial Strength and Mobility: impaired retraction  Lingual Strength and Mobility: WFL  Volitional Cough: decreased strength  Volitional Swallow: elicited, decreased hyolaryngeal excursion noted upon palpation  Voice Prior to PO Intake: decreased volume and intensity, pitch breaks     Bedside Swallow Eval:  Consistencies Assessed: Thin liquids tsp x 1, cup sip x 1, straw sips x 1  Puree 1/2 tsp x 1, full tsp x 1  Oral Phase: WFL  Pharyngeal Phase: decreased hyolaryngeal excursion to palpation  Cough with expectoration of foamy pharyngeal residuals  multiple spontaneous swallows  Muffled and/or wet vocal quality after swallow    Additional Treatment:  Education was provided to pt regarding role of SLP, purpose of swallowing evaluation, results of recent MBSS, risks of aspiration associated with pharyngeal residue, recommendations to perform multiple swallows per " bite/sip to reduce pharyngeal residuals, safest diet recommendations, aspiration precautions, and SLP treatment plan and POC.  Pt demonstrated understanding of education provided, but will benefit from continued reinforcement.   SLP introduced dysphagia exercises to pt. Pt was able to perform the Effortful swallow, Supraglottic swallow, basic tongue base retraction exercises, and Kimberli maneuver with adequate ability.  Decreased ability to perform Falsetto exercises.      Assessment:  Julio Benites is a 88 y.o. male with a medical diagnosis of <principal problem not specified> and presents with dysphagia.       Discharge recommendations: Discharge Facility/Level of Care Needs:  (tbd)     Goals:   Multidisciplinary Problems       SLP Goals          Problem: SLP    Goal Priority Disciplines Outcome   SLP Goal     SLP    Description: Speech Language Pathology Goals  Goals expected to be met by 7/4:  1. Pt will tolerate pureed consistencies and thin liquids with minimal s/s of aspiration.   2. Pt will perform dysphagia exercises x 5-10 to improve pharyngeal swallow.                                 Plan:   Patient to be seen Therapy Frequency: 3 x/week  Planned Interventions: Dysphagia Therapy  Plan of Care expires: 07/26/23  Plan of Care reviewed with: patient  SLP Follow-up?: Yes  SLP - Next Visit Date: 06/28/23 06/27/2023

## 2023-06-28 PROCEDURE — 97535 SELF CARE MNGMENT TRAINING: CPT | Mod: HCNC

## 2023-06-28 PROCEDURE — 97530 THERAPEUTIC ACTIVITIES: CPT | Mod: HCNC,CO

## 2023-06-28 PROCEDURE — 11000004 HC SNF PRIVATE: Mod: HCNC

## 2023-06-28 PROCEDURE — 97162 PT EVAL MOD COMPLEX 30 MIN: CPT | Mod: HCNC

## 2023-06-28 PROCEDURE — 92526 ORAL FUNCTION THERAPY: CPT | Mod: HCNC

## 2023-06-28 PROCEDURE — 97110 THERAPEUTIC EXERCISES: CPT | Mod: HCNC,CO

## 2023-06-28 PROCEDURE — 97110 THERAPEUTIC EXERCISES: CPT | Mod: HCNC

## 2023-06-28 PROCEDURE — 25000003 PHARM REV CODE 250: Mod: HCNC | Performed by: HOSPITALIST

## 2023-06-28 RX ADMIN — NIFEDIPINE 60 MG: 30 TABLET, FILM COATED, EXTENDED RELEASE ORAL at 09:06

## 2023-06-28 RX ADMIN — METOPROLOL SUCCINATE 12.5 MG: 25 TABLET, EXTENDED RELEASE ORAL at 09:06

## 2023-06-28 RX ADMIN — LOSARTAN POTASSIUM 100 MG: 50 TABLET, FILM COATED ORAL at 09:06

## 2023-06-28 RX ADMIN — PRAVASTATIN SODIUM 20 MG: 20 TABLET ORAL at 09:06

## 2023-06-28 RX ADMIN — PANTOPRAZOLE SODIUM 40 MG: 40 TABLET, DELAYED RELEASE ORAL at 06:06

## 2023-06-28 RX ADMIN — SENNOSIDES AND DOCUSATE SODIUM 1 TABLET: 50; 8.6 TABLET ORAL at 09:06

## 2023-06-28 RX ADMIN — ASPIRIN 81 MG: 81 TABLET, COATED ORAL at 09:06

## 2023-06-28 NOTE — PLAN OF CARE
Problem: Physical Therapy  Goal: Physical Therapy Goal  Description: Goals to be met by: 7/28/23     Patient will increase functional independence with mobility by performing:    . Supine to sit with Set-up [place]  . Sit to supine with Set-up [place]  . Rolling to Left and Right with Set-up Assistance.  . Sit to stand transfer with Stand-by Assistance  . Bed to chair transfer with Stand-by Assistance using Rolling Walker/ No AD  . Gait  x 100 feet with Stand-by Assistance using Rolling Walker.   . Wheelchair propulsion x150 feet with Modified [place] using bilateral uppper extremities  . Ascend/Descend 4 inch curb step with Minimal Assistance using Rolling Walker.    Outcome: Ongoing, Progressing

## 2023-06-28 NOTE — PLAN OF CARE
Problem: Occupational Therapy  Goal: Occupational Therapy Goal  Description: Goals to be met by:  7/26/2023    Patient will increase functional independence with ADLs by performing:    UE Dressing with Set-up.   LE Dressing with Min A, AE, and LRAD as needed.   Grooming while seated at sink with [place].  Toileting from toilet with Minimal Assistance for hygiene and clothing management with AE as needed and LRAD.  Bathing from  shower chair/bench with Supervision and AE as needed.  Stand pivot transfers with Min A for 100% t/f and LRAD.  Toilet transfer to toilet with Min A for 100% t/f and LRAD.  Upper extremity exercise program x10 reps per handout, with independence.    Outcome: Ongoing, Progressing

## 2023-06-28 NOTE — PLAN OF CARE
Problem: Adult Inpatient Plan of Care  Goal: Plan of Care Review  Outcome: Ongoing, Progressing  Goal: Patient-Specific Goal (Individualized)  Outcome: Ongoing, Progressing  Goal: Absence of Hospital-Acquired Illness or Injury  Outcome: Ongoing, Progressing  Goal: Optimal Comfort and Wellbeing  Outcome: Ongoing, Progressing  Goal: Readiness for Transition of Care  Outcome: Ongoing, Progressing     Problem: Diabetes Comorbidity  Goal: Blood Glucose Level Within Targeted Range  Outcome: Ongoing, Progressing     Problem: Fall Injury Risk  Goal: Absence of Fall and Fall-Related Injury  Outcome: Ongoing, Progressing     Problem: Skin Injury Risk Increased  Goal: Skin Health and Integrity  Outcome: Ongoing, Progressing   Pt admitted to Skilled Nursing for Noninfective gastroenteritis and c*. Patient is AA0X4. Receiving daily PT/OT for strengthening, balance, gait training and ambulation. Pt currently requiring  1 person assistance,for transfers and ambulation. Patient also requiring 1 person assistance with dressing and set up for grooming and eating. Daily skilled nursing interventions include pain management, medication management and ADL assistance.  Pt is on a Puree diet, tolerating well, consuming 25% of meals, Oral supplementation with Boost offered. Care plan reviewed and updated. No complaints at this time, will continue to update care and monitor.

## 2023-06-28 NOTE — PLAN OF CARE
Problem: Adult Inpatient Plan of Care  Goal: Plan of Care Review  Outcome: Ongoing, Progressing  Flowsheets (Taken 6/28/2023 0303)  Plan of Care Reviewed With: patient  Goal: Patient-Specific Goal (Individualized)  Outcome: Ongoing, Progressing  Goal: Absence of Hospital-Acquired Illness or Injury  Outcome: Ongoing, Progressing  Intervention: Identify and Manage Fall Risk  Flowsheets (Taken 6/28/2023 0303)  Safety Promotion/Fall Prevention:   assistive device/personal item within reach   instructed to call staff for mobility  Goal: Optimal Comfort and Wellbeing  Outcome: Ongoing, Progressing     Problem: Fall Injury Risk  Goal: Absence of Fall and Fall-Related Injury  Outcome: Ongoing, Progressing     Problem: Skin Injury Risk Increased  Goal: Skin Health and Integrity  Outcome: Ongoing, Progressing

## 2023-06-28 NOTE — PT/OT/SLP PROGRESS
"Speech Language Pathology  Treatment    Julio Benites   MRN: [id number]   Admitting Diagnosis: <principal problem not specified>    Diet recommendations: Solid Diet Level: Puree  Liquid Diet Level: Thin 1 bite/sip at a time, Alternating bites/sips, Avoid talking while eating, Double swallow with each bite/sip, Frequent oral care, HOB to 90 degrees, Meds crushed in puree, Monitor for s/s of aspiration, Remain upright 30 minutes post meal, Small bites/sips, and Strict aspiration precautions    SLP Treatment Date: 06/28/23  Speech Start Time: 1340     Speech Stop Time: 1400     Speech Total (min): 20 min       TREATMENT BILLABLE MINUTES:  Treatment Swallowing Dysfunction 12 and Self Care/Home Management Training 8    Has the patient been evaluated by SLP for swallowing? : Yes  Keep patient NPO?: No   General Precautions: Standard, aspiration, fall, pureed diet, hearing impaired (wears hearing aids)          Subjective:  "I'll take the Jello." Given encouragement, pt agreed to accept Jello for PO trials to facilitate swallowing for dysphagia exercises.        Objective:      Pt seen for dysphagia therapy.  Pt accepted small bites of Jello x 4 over course of session to facilitate swallowing production during dysphagia exercises.  Pt performed Effortful swallows x 2-3 for each trial (x10 in total). Pt exhibited crackly breath sounds and delayed cough after 3rd PO trial.  Pt not able to produce a strong productive volitional cough. Pt performed Supraglottic swallow x 5, but with weak volitional cough. Pt performed Falsetto exercise x 5 with decreased ability.  Basic tongue base retraction exercises were completed x 20. Wet vocal quality became present during exercises and pt was instructed to cough to clear wet voice.  Pt performed the Kimberli maneuver adequately.  Education was provided to pt regarding role of SLP, dysphagia exercises, minimizing risks of aspiration, and SLP treatment plan and POC.  Pt demonstrated " understanding of education provided, but will benefit from continued reinforcement.       Please note the following recommendations from SLP services on 6/21 prior to transfer to SNF:  Recommendations:                General Recommendations:    Palliative care assessment of wishes re: PO intake  SLP to follow 3-5x/week, to trial rehabilitative course of dysphagia tx vs promote safest method of oral intake if comfort feeding is opted for      Diet recommendations:   Diet recommendations pending discussion with palliative care  If patient does not wish to receive alternative method of nutrition/hydration, would recommend continuing small amount of clear/full liquid diet vs pureed solids and thin liquids  If goals of care remain full code, wishes for alternative means of nutrition/hydration, and would like to trial course of rehabilitation, consider NPO except icechips with alternative feeding, though, this method may not significantly benefit patient or completely reduce risk of development of pna in setting of significant malnourishment and likely poor ability to build muscle needed for rehabilitation of pharyngeal/laryngeal musculature      Aspiration Precautions: If medical team and patient wish to remain on PO nutrition recommend the following:  Frequent and thorough oral care to reduce risk of aspiration pna of likely aspiration of secretions/intake  Small singular sips of liquid via cup  Alternate solids/purees   Effortful swallow during intake  Intermittent throat clear/cough during intake      Assessment:  Julio Benites is a 88 y.o. male with a medical diagnosis of <principal problem not specified> and presents with dysphagia.    Discharge recommendations: Discharge Facility/Level of Care Needs:  (tbd)     Goals:   Multidisciplinary Problems       SLP Goals          Problem: SLP    Goal Priority Disciplines Outcome   SLP Goal     SLP    Description: Speech Language Pathology Goals  Goals expected to be met  by 7/4:  1. Pt will tolerate pureed consistencies and thin liquids with minimal s/s of aspiration.   2. Pt will perform dysphagia exercises x 5-10 to improve pharyngeal swallow.                                 Plan:   Patient to be seen Therapy Frequency: 3 x/week  Planned Interventions: Dysphagia Therapy  Plan of Care expires: 07/26/23  Plan of Care reviewed with: patient  SLP Follow-up?: Yes  SLP - Next Visit Date: 06/30/23 06/28/2023

## 2023-06-28 NOTE — PT/OT/SLP EVAL
Physical Therapy Evaluation    Patient Name:  Julio Benites   MRN:  [id number]  Admit Date: 6/26/2023  Recent Surgeries: Procedure(s) (LRB):  EGD (ESOPHAGOGASTRODUODENOSCOPY) (Left)    General Precautions: Standard, fall, aspiration, hearing impaired, pureed diet   Orthopedic Precautions: N/A   Braces: N/A    Recommendations:     Discharge Recommendations: home health PT   Level of Assistance Recommended: 24 hours significant assistance  Discharge Equipment Recommendations: bedside commode, wheelchair, walker, rolling   Barriers to discharge: Decreased caregiver support, Inaccessible home environment    Assessment:     Julio Benites is a 88 y.o. male admitted with a medical diagnosis of noninfective gastroenteritis and colitis.  Performance deficits affecting function  weakness, impaired endurance, impaired self care skills, impaired functional mobility, gait instability, impaired balance, decreased upper extremity function, decreased lower extremity function, impaired cardiopulmonary response to activity. Pt was living alone and Mod I with all functional mobility and ADLs and now presents with the above performance deficits and needing Mod /MaxA for bed mobility, transfers and GT. Pt plans on returning home and would therefore benefit from continued SNF rehab.    Rehab Potential is good     Activity Tolerance: Fair    Plan:     Patient to be seen 5 x/week to address the above listed problems via gait training, therapeutic activities, therapeutic exercises, neuromuscular re-education, wheelchair management/training     Plan of Care Expires: 07/28/23  Plan of Care Reviewed with: patient    Subjective     Chief Complaint: fatigue and feeling cold  Patient/Family Comments/goals: none noted by pt.  Pain/Comfort:  Pain Rating 1: 0/10  Pain Rating Post-Intervention 1: 0/10    Living Environment:   Pt lives alone in 1SH /c 7STE /c stair lift. Pt has walk in shower  with shower seat and raised toilet seat.   Previous  level of function: (I) ADLs and Mod I functional mobility using Rolator.  Roles and Routines: Pt is sole caregiver for home, cooks, cleans, and drives. Pt is a father; at time of eval, daughter is in town for assistance, however lives in [place]. Son lives in [place] and works offshore- able assist when home at times.  Equipment Used at Home: rollator, shower seat  Assistance upon Discharge: 24 hour light to moderate assistance. Has friends that visit occasionally.  His daughter lives in [place] and his son lives in [place]; son works offshore and visits when he's off.        Patients cultural, spiritual, Oriental orthodox conflicts given the current situation: no    Objective:     Communicated with pt's nurse prior to session.  Patient found HOB elevated with    upon PT entry to room.    Exams:  Cognitive Exam:  Patient is oriented to Person, Place, Time, and Situation  Gross Motor Coordination:  WFL  Sensation:    -       Intact  Skin Integrity/Edema:      -       Skin integrity: Visible skin intact  RLE ROM: WFL  RLE Strength: grossly 3-/5  LLE ROM: WFL  LLE Strength: grossly 3-/5    Functional Mobility:     06/28/23 1038   Prior Functioning: Everyday Activities   Self Care Independent   Indoor Mobility (Ambulation) Independent   Stairs Unknown  (pt uses a chair lift)   Functional Cognition Independent   Prior Device Use None of the given options   Roll Left and Right   Physical Assistance Level 25% or less   CARE Score - Roll Left and Right 3   Sit to Lying   Physical Assistance Level 26%-50%   CARE Score - Sit to Lying 3   Lying to Sitting on Side of Bed   Physical Assistance Level 26%-50%   Comment Especially for B l/e management and trunk elevation   CARE Score - Lying to Sitting on Side of Bed 3   Sit to Stand   Physical Assistance Level 51%-75%   Comment pt varies between MaxA and Junaid depending on hus fatigue level   CARE Score - Sit to Stand 2   Chair/Bed-to-Chair Transfer   Physical Assistance Level 26%-50%   Comment SPT with no  AD   CARE Score - Chair/Bed-to-Chair Transfer 3   Chair/Bed-to-Chair Transfer Discharge Goal   Discharge Goal 4   Car Transfer   Reason if not Attempted Environmental limitations   CARE Score - Car Transfer 10   Walk 10 Feet   Physical Assistance Level Total assistance   Comment Min a with w/c following closely d.t generalized weakness, 33ft +28ft. pt needed seated rest breaks between GT trials d.t fatigue and low endurance.   CARE Score - Walk 10 Feet 1   Walk 50 Feet with Two Turns   Reason if not Attempted Safety concerns   CARE Score - Walk 50 Feet with Two Turns 88   Walk 150 Feet   Reason if not Attempted Safety concerns   CARE Score - Walk 150 Feet 88   Walking 10 Feet on Uneven Surfaces   Reason if not Attempted Safety concerns   CARE Score - Walking 10 Feet on Uneven Surfaces 88   1 Step (Curb)   Reason if not Attempted Safety concerns   CARE Score - 1 Step (Curb) 88   4 Steps   Reason if not Attempted Safety concerns   CARE Score - 4 Steps 88   12 Steps   Reason if not Attempted Safety concerns   CARE Score - 12 Steps 88   Picking Up Object   Reason if not Attempted Safety concerns   CARE Score - Picking Up Object 88   Uses a Wheelchair/Scooter?   Uses a Wheelchair/Scooter? Yes   Wheel 50 Feet with Two Turns   Physical Assistance Level 25% or less   CARE Score - Wheel 50 Feet with Two Turns 3   Type of Wheelchair/Scooter Manual   Wheel 150 Feet   Physical Assistance Level 25% or less   CARE Score - Wheel 150 Feet 3   Type of Wheelchair/Scooter Manual       Therapeutic Activities and Exercises: LBE at low resistance for 10mins to help improve B L/E MMT and endurance.pt needed multiple rest breaks d.t fatigue.    Education:  Patient provided with daily orientation and goals of this PT session.  Patient educated to transfer to bedside chair/bedside commode/bathroom with RN/PCT present.   Patient educated on Fall risk and plan of care by explanation.   Patient Verbalized Understanding.  Time provided for  therapeutic counseling and discussion of current health disposition. All questions answered to satisfaction, within scope of PT practice; voiced no other concerns. White board updated in patient's room, RN notified of session.     AM-PAC 6 CLICK MOBILITY  Total Score:12     Patient left up in chair with  direct hand off to OT in the gym .    GOALS:   Multidisciplinary Problems       Physical Therapy Goals          Problem: Physical Therapy    Goal Priority Disciplines Outcome Goal Variances Interventions   Physical Therapy Goal     PT, PT/OT Ongoing, Progressing     Description: Goals to be met by: 7/28/23     Patient will increase functional independence with mobility by performing:    . Supine to sit with Set-up [place]  . Sit to supine with Set-up [place]  . Rolling to Left and Right with Set-up Assistance.  . Sit to stand transfer with Stand-by Assistance  . Bed to chair transfer with Stand-by Assistance using Rolling Walker/ No AD  . Gait  x 100 feet with Stand-by Assistance using Rolling Walker.   . Wheelchair propulsion x150 feet with Modified [place] using bilateral uppper extremities  . Ascend/Descend 4 inch curb step with Minimal Assistance using Rolling Walker.                         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    NSTEMI (non-ST elevated myocardial infarction) 1/1/2022       Past Surgical History:   Procedure Laterality Date    CATARACT EXTRACTION Right     ESOPHAGOGASTRODUODENOSCOPY Left 6/19/2023    Procedure: EGD (ESOPHAGOGASTRODUODENOSCOPY);  Surgeon: Kvng  DORA Holly MD;  Location: [facility];  Service: Endoscopy;  Laterality: Left;    EYE SURGERY      INFECTED SKIN DEBRIDEMENT  8/2012    spider bite with surgery to right forearm    NC KAVIN,SWALLOW FUNCTION,CINE/VIDEO RECORD  9/22/2021         subtotal gastrectomy  2013       Time Tracking:     PT Received On: 06/28/23  PT Start Time: 1010     PT Stop Time: 1041  PT Total Time (min): 31 min     Billable Minutes: Evaluation 20 and Therapeutic Exercise 11      06/28/2023

## 2023-06-28 NOTE — PT/OT/SLP PROGRESS
Occupational Therapy   Treatment    Name: Julio Benites  MRN: [id number]  Admit Date: 6/26/2023  Admitting Diagnosis:  noninfective gastroenteritis and colitis    General Precautions: Standard, hearing impaired, aspiration, fall, pureed diet   Orthopedic Precautions: N/A   Braces: N/A    Recommendations:     Discharge Recommendations:  home health OT  Level of Assistance Recommended at Discharge:  24 hours significant assistance  Discharge Equipment Recommendations: other (see comments) (TBD)  Barriers to discharge:  Inaccessible home environment, Decreased caregiver support    Assessment:     Julio Benites is a 88 y.o. male with a medical diagnosis of noninfective gastroenteritis and colitis.  He presents with limitations in performance of self-care, functional mobility, and ADLs. Performance deficits affecting function are weakness, impaired endurance, impaired self care skills, impaired functional mobility, gait instability, impaired balance, decreased upper extremity function, decreased lower extremity function, impaired coordination, decreased ROM, impaired cardiopulmonary response to activity. Cessation of Tx secondary to pt fatigued. Pt reported wanting to get back in bed. Nursing notified. Pt continues to require assist to perform self care tasks, functional mobility and functional transfers. Pt would continue to benefit from OT intervention to further functional (I)ce and safety.     Rehab Potential is fair    Activity tolerance:  Fair    Plan:     Patient to be seen 5 x/week to address the above listed problems via self-care/home management, therapeutic activities, therapeutic exercises    Plan of Care Expires: 07/26/23  Plan of Care Reviewed with: patient    Subjective     Communicated with: Nursing prior to session.     Pain/Comfort:  Pain Rating 1: 0/10  Pain Rating Post-Intervention 1: 0/10    Patient's cultural, spiritual, Mormonism conflicts given the current situation:  no    Objective:      Patient found up in chair with  (no active lines) upon OT entry to room.    Bed Mobility:    Patient completed Scooting/Bridging with moderate assistance and with side rail  Patient completed Sit to Supine with moderate assistance and with side rail     Functional Mobility/Transfers:  Patient completed Sit <> Stand Transfer with moderate assistance  with  rolling walker   Patient completed Chair <> Bed Stand Pivot technique with minimum assistance with rolling walker    AMPAC 6 Click ADL: 15    OT Exercises: Pt performed UBE exercise for 10 minutes with Min resistance.  UE exercises performed to increase functional endurance and strength in order increase independence when performing self care tasks, functional ambulation, W/C propulsion, and functional standing activities .    Treatment & Education:  Pt educated on:  - role of OT  - level of assistance  - energy conservation and task modification to maximized independence with ADL's and mobility   -  safety while performing functional transfers and self care tasks  - progress towards OT goals      Patient left HOB elevated with call button in reach and Nurse notified    GOALS:   Multidisciplinary Problems       Occupational Therapy Goals          Problem: Occupational Therapy    Goal Priority Disciplines Outcome Interventions   Occupational Therapy Goal     OT, PT/OT Ongoing, Progressing    Description: Goals to be met by:  7/26/2023    Patient will increase functional independence with ADLs by performing:    UE Dressing with Set-up.   LE Dressing with Min A, AE, and LRAD as needed.   Grooming while seated at sink with [place].  Toileting from toilet with Minimal Assistance for hygiene and clothing management with AE as needed and LRAD.  Bathing from  shower chair/bench with Supervision and AE as needed.  Stand pivot transfers with Min A for 100% t/f and LRAD.  Toilet transfer to toilet with Min A for 100% t/f and LRAD.  Upper extremity exercise program  x10 reps per handout, with independence.                         Time Tracking:     OT Date of Treatment: 06/28/23  OT Start Time: 1053    OT Stop Time: 1117  OT Total Time (min): 24 min    Billable Minutes:Therapeutic Activity 11  Therapeutic Exercise 13    6/28/2023  A client care conference was performed between the LOTR and MOYA, prior to treatment by MOYA, to discuss the patient's status, treatment plan and established goals.

## 2023-06-29 PROBLEM — K56.41 FECAL IMPACTION: Status: ACTIVE | Noted: 2023-06-16

## 2023-06-29 PROBLEM — E43 SEVERE MALNUTRITION: Status: ACTIVE | Noted: 2023-06-29

## 2023-06-29 LAB
ANION GAP SERPL CALC-SCNC: 9 MMOL/L (ref 8–16)
BASOPHILS # BLD AUTO: 0.03 K/UL (ref 0–0.2)
BASOPHILS NFR BLD: 0.5 % (ref 0–1.9)
BUN SERPL-MCNC: 21 MG/DL (ref 8–23)
CALCIUM SERPL-MCNC: 9.1 MG/DL (ref 8.7–10.5)
CHLORIDE SERPL-SCNC: 101 MMOL/L (ref 95–110)
CO2 SERPL-SCNC: 33 MMOL/L (ref 23–29)
CREAT SERPL-MCNC: 0.5 MG/DL (ref 0.5–1.4)
DIFFERENTIAL METHOD: ABNORMAL
EOSINOPHIL # BLD AUTO: 0.1 K/UL (ref 0–0.5)
EOSINOPHIL NFR BLD: 1.2 % (ref 0–8)
ERYTHROCYTE [DISTWIDTH] IN BLOOD BY AUTOMATED COUNT: 16.4 % (ref 11.5–14.5)
EST. GFR  (NO RACE VARIABLE): >60 ML/MIN/1.73 M^2
GLUCOSE SERPL-MCNC: 88 MG/DL (ref 70–110)
HCT VFR BLD AUTO: 34.6 % (ref 40–54)
HGB BLD-MCNC: 10.7 G/DL (ref 14–18)
IMM GRANULOCYTES # BLD AUTO: 0.07 K/UL (ref 0–0.04)
IMM GRANULOCYTES NFR BLD AUTO: 1.2 % (ref 0–0.5)
LYMPHOCYTES # BLD AUTO: 1 K/UL (ref 1–4.8)
LYMPHOCYTES NFR BLD: 17.8 % (ref 18–48)
MAGNESIUM SERPL-MCNC: 1.7 MG/DL (ref 1.6–2.6)
MCH RBC QN AUTO: 25.2 PG (ref 27–31)
MCHC RBC AUTO-ENTMCNC: 30.9 G/DL (ref 32–36)
MCV RBC AUTO: 81 FL (ref 82–98)
MONOCYTES # BLD AUTO: 0.7 K/UL (ref 0.3–1)
MONOCYTES NFR BLD: 11.9 % (ref 4–15)
NEUTROPHILS # BLD AUTO: 3.9 K/UL (ref 1.8–7.7)
NEUTROPHILS NFR BLD: 67.4 % (ref 38–73)
NRBC BLD-RTO: 0 /100 WBC
PHOSPHATE SERPL-MCNC: 2.1 MG/DL (ref 2.7–4.5)
PLATELET # BLD AUTO: 210 K/UL (ref 150–450)
PMV BLD AUTO: 11.4 FL (ref 9.2–12.9)
POTASSIUM SERPL-SCNC: 3.7 MMOL/L (ref 3.5–5.1)
RBC # BLD AUTO: 4.25 M/UL (ref 4.6–6.2)
SODIUM SERPL-SCNC: 143 MMOL/L (ref 136–145)
WBC # BLD AUTO: 5.78 K/UL (ref 3.9–12.7)

## 2023-06-29 PROCEDURE — 25000003 PHARM REV CODE 250: Mod: HCNC | Performed by: HOSPITALIST

## 2023-06-29 PROCEDURE — 99900035 HC TECH TIME PER 15 MIN (STAT): Mod: HCNC

## 2023-06-29 PROCEDURE — 97116 GAIT TRAINING THERAPY: CPT | Mod: HCNC

## 2023-06-29 PROCEDURE — 97110 THERAPEUTIC EXERCISES: CPT | Mod: HCNC

## 2023-06-29 PROCEDURE — 97535 SELF CARE MNGMENT TRAINING: CPT | Mod: HCNC,CO

## 2023-06-29 PROCEDURE — 94761 N-INVAS EAR/PLS OXIMETRY MLT: CPT | Mod: HCNC

## 2023-06-29 PROCEDURE — 85025 COMPLETE CBC W/AUTO DIFF WBC: CPT | Mod: HCNC | Performed by: HOSPITALIST

## 2023-06-29 PROCEDURE — 94640 AIRWAY INHALATION TREATMENT: CPT | Mod: HCNC

## 2023-06-29 PROCEDURE — 80048 BASIC METABOLIC PNL TOTAL CA: CPT | Mod: HCNC | Performed by: HOSPITALIST

## 2023-06-29 PROCEDURE — 83735 ASSAY OF MAGNESIUM: CPT | Mod: HCNC | Performed by: HOSPITALIST

## 2023-06-29 PROCEDURE — 25000242 PHARM REV CODE 250 ALT 637 W/ HCPCS: Mod: HCNC | Performed by: FAMILY MEDICINE

## 2023-06-29 PROCEDURE — 36415 COLL VENOUS BLD VENIPUNCTURE: CPT | Mod: HCNC | Performed by: HOSPITALIST

## 2023-06-29 PROCEDURE — 84100 ASSAY OF PHOSPHORUS: CPT | Mod: HCNC | Performed by: HOSPITALIST

## 2023-06-29 PROCEDURE — 25000003 PHARM REV CODE 250: Mod: HCNC | Performed by: FAMILY MEDICINE

## 2023-06-29 PROCEDURE — 11000004 HC SNF PRIVATE: Mod: HCNC

## 2023-06-29 RX ORDER — LEVALBUTEROL INHALATION SOLUTION 0.63 MG/3ML
0.63 SOLUTION RESPIRATORY (INHALATION) EVERY 6 HOURS PRN
Status: DISCONTINUED | OUTPATIENT
Start: 2023-06-29 | End: 2023-07-17 | Stop reason: HOSPADM

## 2023-06-29 RX ORDER — AMOXICILLIN AND CLAVULANATE POTASSIUM 875; 125 MG/1; MG/1
1 TABLET, FILM COATED ORAL EVERY 12 HOURS
Status: COMPLETED | OUTPATIENT
Start: 2023-06-29 | End: 2023-07-04

## 2023-06-29 RX ORDER — LEVALBUTEROL INHALATION SOLUTION 0.63 MG/3ML
0.63 SOLUTION RESPIRATORY (INHALATION) EVERY 12 HOURS
Status: COMPLETED | OUTPATIENT
Start: 2023-06-29 | End: 2023-07-06

## 2023-06-29 RX ADMIN — PANTOPRAZOLE SODIUM 40 MG: 40 TABLET, DELAYED RELEASE ORAL at 04:06

## 2023-06-29 RX ADMIN — SENNOSIDES AND DOCUSATE SODIUM 1 TABLET: 50; 8.6 TABLET ORAL at 08:06

## 2023-06-29 RX ADMIN — PRAVASTATIN SODIUM 20 MG: 20 TABLET ORAL at 09:06

## 2023-06-29 RX ADMIN — PANTOPRAZOLE SODIUM 40 MG: 40 TABLET, DELAYED RELEASE ORAL at 06:06

## 2023-06-29 RX ADMIN — SENNOSIDES AND DOCUSATE SODIUM 1 TABLET: 50; 8.6 TABLET ORAL at 09:06

## 2023-06-29 RX ADMIN — NIFEDIPINE 60 MG: 30 TABLET, FILM COATED, EXTENDED RELEASE ORAL at 09:06

## 2023-06-29 RX ADMIN — ASPIRIN 81 MG: 81 TABLET, COATED ORAL at 08:06

## 2023-06-29 RX ADMIN — LEVALBUTEROL HYDROCHLORIDE 0.63 MG: 0.63 SOLUTION RESPIRATORY (INHALATION) at 07:06

## 2023-06-29 RX ADMIN — AMOXICILLIN AND CLAVULANATE POTASSIUM 1 TABLET: 875; 125 TABLET, FILM COATED ORAL at 09:06

## 2023-06-29 NOTE — ASSESSMENT & PLAN NOTE
Malnutrition Type:  Context: chronic illness  Level: severe    Related to (etiology):   Inadequate oral intake    Signs and Symptoms (as evidenced by):   Aspirating on all consistencies  Decline in ADL's , altered GI    Malnutrition Characteristic Summary:  Weight Loss (Malnutrition): greater than 7.5% in 3 months  Energy Intake (Malnutrition): less than or equal to 50% for greater than or equal to 1 month  Subcutaneous Fat (Malnutrition): severe depletion  Muscle Mass (Malnutrition): severe depletion      Interventions/Recommendations (treatment strategy):  Continue pureed diet, dc cardiac diet, add boost plus TID chocolate, assist with feeding, RD following    Nutrition Diagnosis Status:   New

## 2023-06-29 NOTE — PT/OT/SLP PROGRESS
Physical Therapy Treatment    Patient Name:  Julio Benites   MRN:  [id number]  Admit Date: 6/26/2023  Admitting Diagnosis: Severe malnutrition  Recent Surgeries: Procedure(s) (LRB):  EGD (ESOPHAGOGASTRODUODENOSCOPY) (Left)    General Precautions: Standard, fall, aspiration, hearing impaired, pureed diet  Orthopedic Precautions: N/A  Braces: N/A    Recommendations:     Discharge Recommendations: home health PT  Level of Assistance Recommended at Discharge: 24 hours significant assistance  Discharge Equipment Recommendations: bedside commode, wheelchair, walker, rolling  Barriers to discharge: Decreased caregiver support, Inaccessible home environment    Assessment:     Julio Benites is a 88 y.o. male admitted with a medical diagnosis of Severe malnutrition .  Performance deficits affecting function: weakness, impaired endurance, impaired self care skills, impaired functional mobility, gait instability, impaired balance, decreased upper extremity function, decreased lower extremity function, impaired cardiopulmonary response to activity.    Rehab Potential is good    Activity Tolerance: Fair    Plan:     Patient to be seen 5 x/week to address the above listed problems via gait training, therapeutic activities, therapeutic exercises, neuromuscular re-education, wheelchair management/training    Plan of Care Expires: 07/28/23  Plan of Care Reviewed with: patient    Subjective     Pt. Agreeable to work with PT.     Pain/Comfort:  Pain Rating 1: 0/10  Pain Rating Post-Intervention 1: 0/10    Patient's cultural, spiritual, Latter day conflicts given the current situation:  no    Objective:     Communicated with pt's nurse prior to session.  Patient found up in chair with   upon PT entry to room.     Therapeutic Activities and Exercises: LBEx 14mins to help improve B l/e MMT and endurance  UBEx 10mins to help increase pt's cardiovascular endurance    Functional Mobility:  Transfers:     Sit to Stand:  moderate assistance  with rolling walker  Gait: 24ft +60ft with RW and Junaid 2* to pt with FFT, very slow magdiel and short step length    AM-PAC 6 CLICK MOBILITY  13    Patient left up in chair with call button in reach.    GOALS:   Multidisciplinary Problems       Physical Therapy Goals          Problem: Physical Therapy    Goal Priority Disciplines Outcome Goal Variances Interventions   Physical Therapy Goal     PT, PT/OT Ongoing, Progressing     Description: Goals to be met by: 7/28/23     Patient will increase functional independence with mobility by performing:    . Supine to sit with Set-up [place]  . Sit to supine with Set-up [place]  . Rolling to Left and Right with Set-up Assistance.  . Sit to stand transfer with Stand-by Assistance  . Bed to chair transfer with Stand-by Assistance using Rolling Walker/ No AD  . Gait  x 100 feet with Stand-by Assistance using Rolling Walker.   . Wheelchair propulsion x150 feet with Modified [place] using bilateral uppper extremities  . Ascend/Descend 4 inch curb step with Minimal Assistance using Rolling Walker.                         Time Tracking:     PT Received On: 06/29/23  PT Start Time: 0934  PT Stop Time: 1013  PT Total Time (min): 39 min    Billable Minutes: Gait Training 14 and Therapeutic Exercise 25    Treatment Type: Treatment  PT/PTA: PT     Number of PTA visits since last PT visit: 0     06/29/2023   Santhosh Em  Internal Medicine  [address] [place] [address]  Phone: [phone]  Fax: [phone]  Follow Up Time:     Austin Birmingham)  Neurology  Epilepsy  [address] [place] [address]  Phone: [phone]  Fax: ()-  Follow Up Time:    Santhosh Em  Internal Medicine  [address] [place] [address]  Phone: [phone]  Fax: [phone]  Follow Up Time:     Austin Birmingham)  Neurology  Epilepsy  [address] [facility] [facility] [address]  [place] [address]  Phone: [phone]  Fax: ()-  Follow Up Time:     RICHAR MAK  Internal Medicine  [address] [address]  Phone: ([phone]  Fax: ()-  Follow Up Time:

## 2023-06-29 NOTE — PT/OT/SLP PROGRESS
Occupational Therapy   Treatment    Name: Julio Benites  MRN: [id number]  Admit Date: 6/26/2023  Admitting Diagnosis:   noninfective gastroenteritis and colitis    General Precautions: Standard, aspiration, fall, hearing impaired, pureed diet   Orthopedic Precautions: N/A   Braces: N/A    Recommendations:     Discharge Recommendations:  home health OT  Level of Assistance Recommended at Discharge:  24 hour significant assistance   Discharge Equipment Recommendations: other (see comments) (TBD)  Barriers to discharge:  Inaccessible home environment, Decreased caregiver support    Assessment:     Julio Benites is a 88 y.o. male with a medical diagnosis of noninfective gastroenteritis and colitis.  He presents with limitations in performance of self-care, functional mobility, and ADLs. Performance deficits affecting function are weakness, impaired endurance, impaired self care skills, impaired functional mobility, gait instability, impaired balance, decreased upper extremity function, decreased lower extremity function, impaired coordination, decreased ROM, impaired cardiopulmonary response to activity. Pt tolerated Tx without incident and is making progress but continues to require assist to perform self care tasks, functional mobility and functional transfers. Pt would continue to benefit from OT intervention to further functional (I)ce and safety.     Rehab Potential is fair    Activity tolerance:  Fair    Plan:     Patient to be seen 5 x/week to address the above listed problems via self-care/home management, therapeutic activities, therapeutic exercises    Plan of Care Expires: 07/26/23  Plan of Care Reviewed with: patient    Subjective     Communicated with: Nursing prior to session.     Pain/Comfort:  Pain Rating 1: 0/10  Pain Rating Post-Intervention 1: 0/10    Patient's cultural, spiritual, Mandaeism conflicts given the current situation:  no    Objective:     Patient found HOB elevated with  (no active  lines) upon OT entry to room.    Bed Mobility:    Patient completed Scooting to EOB with contact guard assistance  Patient completed Supine to Sit with moderate assistance with HOB flat    Functional Mobility/Transfers:  Patient completed Sit <> Stand Transfer with minimum assistance  with  rolling walker   Patient completed Bed <> Chair Transfer using Step Transfer technique with contact guard assistance with rolling walker    Activities of Daily Living:  Grooming: stand by assistance seated sinkside with set up  Upper Body Dressing: minimum assistance to doff/darren pull over shirt with (A) to manage over head   Lower Body Dressing: contact guard assistance to darren pants seated EOB with RW when standing to manage pants over hips. Pt with total A to darren B socks.    [facility] 6 Click ADL: 17    Treatment & Education:  Pt educated on:  - role of OT  - level of assistance  - energy conservation and task modification to maximized independence with ADL's and mobility   -  safety while performing functional transfers and self care tasks  - progress towards OT goals    Patient left up in chair with call button in reach and Nurse notified    GOALS:   Multidisciplinary Problems       Occupational Therapy Goals          Problem: Occupational Therapy    Goal Priority Disciplines Outcome Interventions   Occupational Therapy Goal     OT, PT/OT Ongoing, Progressing    Description: Goals to be met by:  7/26/2023    Patient will increase functional independence with ADLs by performing:    UE Dressing with Set-up.   LE Dressing with Min A, AE, and LRAD as needed.   Grooming while seated at sink with [place].  Toileting from toilet with Minimal Assistance for hygiene and clothing management with AE as needed and LRAD.  Bathing from  shower chair/bench with Supervision and AE as needed.  Stand pivot transfers with Min A for 100% t/f and LRAD.  Toilet transfer to toilet with Min A for 100% t/f and LRAD.  Upper extremity exercise  program x10 reps per handout, with independence.                         Time Tracking:     OT Date of Treatment: 06/29/23  OT Start Time: 0750    OT Stop Time: 0814  OT Total Time (min): 24 min    Billable Minutes:Self Care/Home Management 24    6/29/2023

## 2023-06-29 NOTE — PROGRESS NOTES
[facility]                                  Skilled Nursing Facility                   Progress Note     Admit Date: 6/26/2023  KYLAH 7/17/2023  Principal Problem:  Severe malnutrition   HPI obtained from patient interview and chart review   Code Status: DNR    Chief Complaint: Re-evaluation of medical treatment and therapy status: lab review, cough, rad review    HPI: Julio Benites is an 88 y.o. male with a past medical history of DM, NSTEMI, HTN, HLD, and systolic CHF who was recently treated for CAP  with azithromycin who presented to ED on 6/16/23 with abdominal pain. Admitted with colitis with fecal impaction/constipation. General surgery and GI consulted. Bowel regimen initiated following manual disimpaction and enema. EGD 06/19 showing grade D esophagitis, lavell-en-Y gastrojejunostomy with healthy-appearing anastomosis. MBSS 06/20 which showed laryngeal penetration with all consistencies. Patient declined PEG placement. Patient lives at home alone with support from grandson. Patient goal to discharge to [facility] where his wife resides. Patient and family considering hospice.    Patient will be treated at [facility] with PT and OT to improve functional status and ability to perform ADLs.     Interval History  24 hour chart review completed. NAEON. NAD.  Afebrile, vital signs stable.   Productive cough more course in sound. Lungs diminished BUL and coars BLL. Initiate order(s): obtain CXR.   CXR reviewed; Indicative of developing pna. Initiate augmentin 975 mg q 12 hours x 5 days and oral probiotic tab daily x 7 days.. Iniitat xopanex nebs q 12 hour and prn q 6 hours. Continue prn mucinex.  All of today's labs reviewed; listed and addressed below.   Patient reports inadequate appetite. Consuming 25% of 2 meals per day.  Pain management: patient denies pain.      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    Past Surgical History: Patient has a past surgical history that includes Infected skin debridement (8/2012); subtotal gastrectomy (2013); Cataract extraction (Right); Eye surgery; pr eval,swallow function,cine/video record (9/22/2021); and Esophagogastroduodenoscopy (Left, 6/19/2023).    Social History: Patient reports that he quit smoking about 17 months ago. His smoking use included cigarettes. He smoked an average of .25 packs per day. He has never been exposed to tobacco smoke. He has never used smokeless tobacco. He reports current alcohol use. He reports that he does not use drugs.    Family History: family history includes Breast cancer in his daughter and sister; Lung cancer in his mother; No Known Problems in his father, maternal grandfather, maternal grandmother, paternal grandfather, paternal grandmother, and son.    Allergies: Patient is allergic to lisinopril, nicoderm, and nicoderm cq [nicotine].    ROS  Constitutional: Negative for fever   Eyes: Negative for blurred vision, double vision   Respiratory: + cough, Negative for shortness of breath   Cardiovascular: Negative for chest pain, palpitations, and leg swelling.   Gastrointestinal: Negative for abdominal pain, constipation, diarrhea, nausea, vomiting.   Genitourinary: Negative for dysuria, frequency   Musculoskeletal:  + generalized weakness. Negative for back pain and myalgias.   Skin: Negative for itching and rash.   Neurological: Negative for dizziness, headaches.   Psychiatric/Behavioral: Negative for depression. The patient is not nervous/anxious.       24 hour Vital Sign Range   Temp:  [98.2 °F (36.8 °C)-98.5 °F (36.9 °C)]   Pulse:  [70-93]   Resp:  [18]   BP: (114-146)/(79-85)   SpO2:  [98 %-100 %]     Current BMI: Body mass index is 11.69 kg/m².    PEx  Constitutional: Patient appears debilitated, cachectic, chronically ill-appearing. .  No distress noted  HENT:   Head: Normocephalic and atraumatic.   Eyes: Pupils are equal, round.   Neck: Normal range of motion. Neck supple.   Cardiovascular: Normal rate, regular rhythm + murmur   Pulmonary/Chest: Effort normal and breath sounds are diminished. + frequent productive cough  Abdominal: Soft. Bowel sounds are normal. Scaphoid abdomen.  Musculoskeletal: Normal range of motion.   Neurological: Alert and oriented to person, place, and time.   Psychiatric: Normal mood and affect. Behavior is normal.   Skin: Skin is warm and dry; thin and frail, intact. Full skin assessment completed by NP on initial exam.       Recent Labs   Lab 06/29/23  0426      K 3.7      CO2 33*   BUN 21   CREATININE 0.5   MG 1.7       Recent Labs   Lab 06/29/23  0426   WBC 5.78   RBC 4.25*   HGB 10.7*   HCT 34.6*      MCV 81*   MCH 25.2*   MCHC 30.9*       No results for input(s): POCTGLUCOSE in the last 168 hours.     Assessment and Plan:     Severe Protein Calorie Malnutrition  Cachexia  Dysphagia  - Body mass index is 11.69 kg/m².  - Continue bowel regimen.  - SLP and RD consulted to eval and tx  - MBSS 06/20 showed laryngeal penetration with all consistencies.  - Declined PEG placement.  - Aspiration precautions  - Purred diet per SLP rec; 25% of 2 meals daily consumed    Aspiration Pneumonia   6/29: Productive cough more course in sound.   Lungs diminished BUL and coars BLL.   Initiate order(s): obtain CXR.   CXR reviewed; Indicative of developing pna.   Initiate augmentin 975 mg q 12 hours x 5 days and oral probiotic tab daily x 7 days.. Iniitat xopanex nebs q 12 hour and prn q 6 hours.   Continue prn  mucinex.    Debility   - Continue with PT/OT for gait training and strengthening and restoration of ADL's   - Encourage mobility, OOB in chair, and early ambulation as appropriate  - Fall precautions   - Monitor for bowel and bladder dysfunction  - Monitor for and prevent skin breakdown and pressure ulcers     Grade D esophagitis  - GI consulted and underwent EGD 06/19 showing grade D esophagitis, lavell-en-Y gastrojejunostomy with healthy-appearing anastomosis.   - Protonix po 40 mg bid    Discharge planning issues  - Patient seeking halfway NH with hospice with his wife at [facility].  - Recurrent aspiration likely given MBSS findings. Patient declines PEG.  - CM consulted  - Offer hospice consult to patient in SNF    Chronic systolic heart failure  - stable  - Monitor for acute decompensation     Mixed hyperlipidemia  - Continue pravastatin     Essential hypertension  - Continue nifedipine at 60mg PO BID, losartan 100mg PO daily.      Anticipate disposition:  NH with Hospice vs [facility] vs Home with home health       Follow-up needed during SNF stay-     Follow-up needed after discharge from SNF: PCP,     No future appointments.    Extended Visit  Total time spent: > 45 minutes  Description of Time: counseling patient on clinical condition, therapies provided, plan of care, emotional support, coordinating patient care with other care team members, reviewing and interpreting labs and imaging, collaboration with physician, initiating new orders, chart review, and documentation. See interval hx.       Nicolette Tripp NP  Department of Hospital Medicine   [facility]     DOS: 6/29/2023

## 2023-06-29 NOTE — CONSULTS
[facility] - Skilled Nursing  Adult Nutrition  Consult Note    SUMMARY   Recommendations  Continue pureed diet, dc cardiac diet, add boost plus TID chocolate, assist with feeding, RD following  Goals: PO to meet 75% of needs with ONS by next RD follow up    Assessment and Plan    Endocrine  Severe malnutrition  Malnutrition Type:  Context: chronic illness  Level: severe    Related to (etiology):   Inadequate oral intake    Signs and Symptoms (as evidenced by):   Aspirating on all consistencies  Decline in ADL's , altered GI    Malnutrition Characteristic Summary:  Weight Loss (Malnutrition): greater than 7.5% in 3 months  Energy Intake (Malnutrition): less than or equal to 50% for greater than or equal to 1 month  Subcutaneous Fat (Malnutrition): severe depletion  Muscle Mass (Malnutrition): severe depletion      Interventions/Recommendations (treatment strategy):  Continue pureed diet, dc cardiac diet, add boost plus TID chocolate, assist with feeding, RD following    Nutrition Diagnosis Status:   New         Malnutrition Assessment 6/29/23  Malnutrition Context: chronic illness  Malnutrition Level: severe  Skin (Micronutrient): dry  Hair/Scalp (Micronutrient): dry  Teeth (Micronutrient): dentures  Tongue (Micronutrient):  (coated)  Neck/Chest (Micronutrient): muscle wasting, bony prominence, subcutaneous fat loss  Musculoskeletal/Lower Extremities: muscle wasting, subcutaneous fat loss, muscle control poor       Weight Loss (Malnutrition): greater than 7.5% in 3 months  Energy Intake (Malnutrition): less than or equal to 50% for greater than or equal to 1 month  Subcutaneous Fat (Malnutrition): severe depletion  Muscle Mass (Malnutrition): severe depletion   Orbital Region (Subcutaneous Fat Loss): severe depletion  Upper Arm Region (Subcutaneous Fat Loss): severe depletion  Thoracic and Lumbar Region: severe depletion   [place] Region (Muscle Loss): severe depletion  Clavicle Bone Region (Muscle Loss): severe  "depletion  Scapular Bone Region (Muscle Loss): severe depletion  Dorsal Hand (Muscle Loss): severe depletion  Patellar Region (Muscle Loss): severe depletion  Anterior Thigh Region (Muscle Loss): severe depletion                 Reason for Assessment  Reason For Assessment: consult  Diagnosis:  (fecal impaction, Colitis,)  Relevant Medical History: chronic constipation, chronic anemia, s/p resp failure with hx of Asbestosis, stomach cancer with subtotal gastrectomy,DM,HTN, HLD, CHF, STEMI, weight loss, cachetic     Interdisciplinary Rounds: attended    General Information Comments: Lives alone, daughter to move back here from [place], patient needs long term care placement. MBSS on 6/20 showing laryngeal penetration with all consistencies, patient is wearing dentures, states this texture change is new to him. no allergies, no special diet or texture at home. He states that he knows his muscles are weak and "you need muscles to swallow" He says he is tolerating pureed and nursing reports he is aspirating. He has opted not to have peg placement. Will dc to [facility] where his wife is located. He was drinking Ensure at home and would like boost plus with every meal. NFPE showing severe fat and severe muslce loss. Weight hx reveals patient has been very thin. 2018 106#, 2019 102#, 2020 105#, 2021 95#, 2022 89#, 2023 decline from 89#- 79# in < 60 days  Nutrition Discharge Planning: DC on diet texture per SLP with aspiration precautions    Nutrition Risk Screen  Nutrition Risk Screen: difficulty chewing/swallowing, reduced oral intake over the last month, unintentional loss of 10 lbs or more in the past 2 months    Nutrition/Diet History  Patient Reported Diet/Restrictions/Preferences: general  Spiritual, Cultural Beliefs, Yarsani Practices, Values that Affect Care: no  Supplemental Drinks or Food Habits: Ensure Plus  Food Allergies: NKFA  Factors Affecting Nutritional Intake: abdominal pain, " "difficulty/impaired swallowing, constipation    Anthropometrics    Temp: 98.2 °F (36.8 °C)  Height Method: Stated  Height: 5' 9" (175.3 cm)  Height (inches): 69 in  Weight Method: Bed Scale  Weight: 35.9 kg (79 lb 2.3 oz)  Weight (lb): 79.15 lb  Ideal Body Weight (IBW), Male: 160 lb  % Ideal Body Weight, Male (lb): 49.47 %  BMI (Calculated): 11.7  BMI Grade: less than 16 protein-energy malnutrition grade III  Weight Loss: unintentional  Usual Body Weight (UBW), k.7 kg (2020 weight)  % Usual Body Weight: 75.42  % Weight Change From Usual Weight: -24.74 %       Lab/Procedures/Meds    Pertinent Labs Reviewed: reviewed  Pertinent Labs Comments: Hg 10.7, Hct 34.6, PO4 2.1,  Pertinent Medications Reviewed: reviewed  Pertinent Medications Comments: ASA, pantoprazole, statin, senna-docusate    Estimated/Assessed Needs    Weight Used For Calorie Calculations: 47.7 kg (105 lb 2.6 oz) (UBW)  Energy Calorie Requirements (kcal): 6435-2070  Energy Need Method: Kcal/kg (x 30-35 kcal/kg)  Protein Requirements: 71  Weight Used For Protein Calculations: 47.7 kg (105 lb 2.6 oz) (x 1.5 kg UBW)  Fluid Requirements (mL): 1431 or per MD  Estimated Fluid Requirement Method: RDA Method  RDA Method (mL): 1431  CHO Requirement: 179      Nutrition Prescription Ordered    Current Diet Order: Pureed, IDDSI level 4, cardiac, thin liquids  Nutrition Order Comments: patient can aspirate on anything, precautions in place . Will monitor BM frequency  patient want to have boost plus each meal  Oral Nutrition Supplement: boost plus TID chocolate    Evaluation of Received Nutrient/Fluid Intake    I/O: no data  Energy Calories Required: not meeting needs  Protein Required: not meeting needs  Fluid Required: not meeting needs  Comments: LBM   Tolerance: not tolerating  % Intake of Estimated Energy Needs: 0 - 25 %  % Meal Intake: 0 - 25 %    Nutrition Risk    Level of Risk/Frequency of Follow-up: high (two times per week)       Monitor and " Evaluation    Food and Nutrient Intake: food and beverage intake  Food and Nutrient Adminstration: diet order  Knowledge/Beliefs/Attitudes: beliefs and attitudes  Anthropometric Measurements: weight change  Biochemical Data, Medical Tests and Procedures: gastrointestinal profile, glucose/endocrine profile, electrolyte and renal panel       Nutrition Follow-Up    RD Follow-up?: Yes

## 2023-06-29 NOTE — PLAN OF CARE
Continue pureed diet, dc cardiac diet, add boost plus TID chocolate, assist with feeding, RD following  Goals: PO to meet 75% of needs with ONS by next RD follow up     Assessment and Plan     Endocrine  Severe malnutrition  Malnutrition Type:  Context: chronic illness  Level: severe     Related to (etiology):   Inadequate oral intake     Signs and Symptoms (as evidenced by):   Aspirating on all consistencies  Decline in ADL's , altered GI     Malnutrition Characteristic Summary:  Weight Loss (Malnutrition): greater than 7.5% in 3 months  Energy Intake (Malnutrition): less than or equal to 50% for greater than or equal to 1 month  Subcutaneous Fat (Malnutrition): severe depletion  Muscle Mass (Malnutrition): severe depletion

## 2023-06-30 PROCEDURE — 92526 ORAL FUNCTION THERAPY: CPT | Mod: HCNC

## 2023-06-30 PROCEDURE — 25000003 PHARM REV CODE 250: Mod: HCNC | Performed by: HOSPITALIST

## 2023-06-30 PROCEDURE — 94761 N-INVAS EAR/PLS OXIMETRY MLT: CPT | Mod: HCNC

## 2023-06-30 PROCEDURE — 97535 SELF CARE MNGMENT TRAINING: CPT | Mod: HCNC,CO

## 2023-06-30 PROCEDURE — 97535 SELF CARE MNGMENT TRAINING: CPT | Mod: HCNC

## 2023-06-30 PROCEDURE — 25000003 PHARM REV CODE 250: Mod: HCNC | Performed by: FAMILY MEDICINE

## 2023-06-30 PROCEDURE — 11000004 HC SNF PRIVATE: Mod: HCNC

## 2023-06-30 PROCEDURE — 94640 AIRWAY INHALATION TREATMENT: CPT | Mod: HCNC

## 2023-06-30 PROCEDURE — 99900035 HC TECH TIME PER 15 MIN (STAT): Mod: HCNC

## 2023-06-30 PROCEDURE — 25000242 PHARM REV CODE 250 ALT 637 W/ HCPCS: Mod: HCNC | Performed by: FAMILY MEDICINE

## 2023-06-30 RX ADMIN — ASPIRIN 81 MG: 81 TABLET, COATED ORAL at 09:06

## 2023-06-30 RX ADMIN — LOSARTAN POTASSIUM 100 MG: 50 TABLET, FILM COATED ORAL at 09:06

## 2023-06-30 RX ADMIN — PANTOPRAZOLE SODIUM 40 MG: 40 TABLET, DELAYED RELEASE ORAL at 06:06

## 2023-06-30 RX ADMIN — Medication 1 CAPSULE: at 09:06

## 2023-06-30 RX ADMIN — LEVALBUTEROL HYDROCHLORIDE 0.63 MG: 0.63 SOLUTION RESPIRATORY (INHALATION) at 07:06

## 2023-06-30 RX ADMIN — METOPROLOL SUCCINATE 12.5 MG: 25 TABLET, EXTENDED RELEASE ORAL at 09:06

## 2023-06-30 RX ADMIN — PANTOPRAZOLE SODIUM 40 MG: 40 TABLET, DELAYED RELEASE ORAL at 03:06

## 2023-06-30 RX ADMIN — NIFEDIPINE 60 MG: 30 TABLET, FILM COATED, EXTENDED RELEASE ORAL at 09:06

## 2023-06-30 RX ADMIN — SENNOSIDES AND DOCUSATE SODIUM 1 TABLET: 50; 8.6 TABLET ORAL at 09:06

## 2023-06-30 RX ADMIN — PRAVASTATIN SODIUM 20 MG: 20 TABLET ORAL at 09:06

## 2023-06-30 RX ADMIN — NIFEDIPINE 60 MG: 30 TABLET, FILM COATED, EXTENDED RELEASE ORAL at 08:06

## 2023-06-30 RX ADMIN — LEVALBUTEROL HYDROCHLORIDE 0.63 MG: 0.63 SOLUTION RESPIRATORY (INHALATION) at 08:06

## 2023-06-30 RX ADMIN — AMOXICILLIN AND CLAVULANATE POTASSIUM 1 TABLET: 875; 125 TABLET, FILM COATED ORAL at 09:06

## 2023-06-30 RX ADMIN — GUAIFENESIN 200 MG: 200 SOLUTION ORAL at 09:06

## 2023-06-30 NOTE — PT/OT/SLP PROGRESS
"Speech Language Pathology  Treatment    Julio Benites   MRN: [id number]   Admitting Diagnosis: Severe malnutrition    Diet recommendations: Solid Diet Level: Puree  Liquid Diet Level: Thin 1 bite/sip at a time, Alternating bites/sips, Assistance with meals, Avoid talking while eating, Double swallow with each bite/sip, Feed only when awake/alert, Frequent oral care, HOB to 90 degrees, Meds crushed in puree, Monitor for s/s of aspiration, Remain upright 30 minutes post meal, Small bites/sips, and Strict aspiration precautions    SLP Treatment Date: 06/30/23  Speech Start Time: 1455     Speech Stop Time: 1511     Speech Total (min): 16 min       TREATMENT BILLABLE MINUTES:  Treatment Swallowing Dysfunction 8 and Self Care/Home Management Training 8    Has the patient been evaluated by SLP for swallowing? : Yes  Keep patient NPO?: No   General Precautions: Standard, aspiration, fall, pureed diet, hearing impaired          Subjective:  "Ok" pt agreed to participate in dysphagia tx.          Objective:      Pt asleep upon entry, but easily roused and agreed to participate in dysphagia tx. HOB elevated. Pt received cup sips of water x 3 to facilitate swallowing production for dysphagia exercises. Delayed cough present after 2 out of 3 trial. Additional cued coughing resulted in expectoration and suctioning of probable pharyngeal residue. Pt performed Effortful swallows x 12, Supraglottic swallow x 5 with weak volitional cough, Falsetto exercise x 5, basic tongue base retraction exercises x 10, and Maskao maneuver on 4 out of 5 attempts. Education was provided to pt regarding dysphagia therapy, aspiration precautions, and SLP treatment plan and POC.  Pt demonstrated understanding of education provided, but will benefit from continued reinforcement.       Assessment:  Julio Benites is a 88 y.o. male with a medical diagnosis of Severe malnutrition and presents with dysphagia.     Discharge recommendations: Discharge " Facility/Level of Care Needs:  (tbd)     Goals:   Multidisciplinary Problems       SLP Goals          Problem: SLP    Goal Priority Disciplines Outcome   SLP Goal     SLP    Description: Speech Language Pathology Goals  Goals expected to be met by 7/4:  1. Pt will tolerate pureed consistencies and thin liquids with minimal s/s of aspiration.   2. Pt will perform dysphagia exercises x 5-10 to improve pharyngeal swallow.                                 Plan:   Patient to be seen Therapy Frequency: 3 x/week  Planned Interventions: Dysphagia Therapy  Plan of Care expires: 07/26/23  Plan of Care reviewed with: patient  SLP Follow-up?: Yes  SLP - Next Visit Date: 07/03/23 06/30/2023

## 2023-06-30 NOTE — PT/OT/SLP PROGRESS
Physical Therapy      Patient Name:  Julio Benites   MRN:  [id number]    Patient not seen today secondary to per nsg hold off has pneumonia  . Will follow-up next PT session.

## 2023-06-30 NOTE — PLAN OF CARE
Problem: Adult Inpatient Plan of Care  Goal: Plan of Care Review  Outcome: Ongoing, Progressing  Goal: Patient-Specific Goal (Individualized)  Outcome: Ongoing, Progressing  Goal: Absence of Hospital-Acquired Illness or Injury  Outcome: Ongoing, Progressing  Goal: Optimal Comfort and Wellbeing  Outcome: Ongoing, Progressing  Goal: Readiness for Transition of Care  Outcome: Ongoing, Progressing     Problem: Diabetes Comorbidity  Goal: Blood Glucose Level Within Targeted Range  Outcome: Ongoing, Progressing     Problem: Fall Injury Risk  Goal: Absence of Fall and Fall-Related Injury  Outcome: Ongoing, Progressing     Problem: Skin Injury Risk Increased  Goal: Skin Health and Integrity  Outcome: Ongoing, Progressing     Problem: Malnutrition  Goal: Improved Nutritional Intake  Outcome: Ongoing, Progressing

## 2023-06-30 NOTE — H&P
"Hospital Medicine  Skilled Nursing Facility   History and Physical Exam      Date of Service: 07/02/2023      Patient Name: Julio Benites  MRN: [id number]  Admission Date: 6/26/2023  Attending Physician: Olaf Mcguire MD  Primary Care Provider: Gregorio Jensen MD  Code Status: DNR (Do Not Resuscitate)      Principal problem:  Severe malnutrition      Chief Complaint:   Chief Complaint   Patient presents with    Abdominal Pain     Admitted to [facility] for rehabilitation following hospital stay with acute colitis due to fecal impaction.           HPI:   "Julio Benites is an 88 y.o. male with a past medical history of DM, NSTEMI, HTN, HLD, and systolic CHF who was recently treated for CAP  with azithromycin who presented to ED on 6/16/23 with abdominal pain. Admitted with colitis with fecal impaction/constipation. General surgery and GI consulted. Bowel regimen initiated following manual disimpaction and enema. EGD 06/19 showing grade D esophagitis, lavell-en-Y gastrojejunostomy with healthy-appearing anastomosis. MBSS 06/20 which showed laryngeal penetration with all consistencies. Patient declined PEG placement. Patient lives at home alone with support from grandson. Patient goal to discharge to [facility] where his wife resides. Patient and family considering hospice." Per Nicolette Tripp NP on 6/27/23.     He is doing okay this morning. He says that he ate grits this morning and when asked how much he said "enough for me". He denies any pain. He walked 97 feet and 73 feet with RW and CGA with PT yesterday. He reports an occasional cough but denies any shortness of breath.     Patient admitted with skilled services with PT and OT to improve functional status and ability to perform ADLs.       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    Cataract     Cellulitis of right forearm 8/25/2012    Overview:  dx update    Chronic systolic heart " failure 3/18/2022    Diabetes mellitus type II     Diabetic retinopathy     Elevated PSA     GERD (gastroesophageal reflux disease)     Gout attack     Hyperlipidemia     Hypertension     Iron deficiency anemia secondary to blood loss (chronic) 2013    NSTEMI (non-ST elevated myocardial infarction) 2022       Past Surgical History:   Past Surgical History:   Procedure Laterality Date    CATARACT EXTRACTION Right     ESOPHAGOGASTRODUODENOSCOPY Left 2023    Procedure: EGD (ESOPHAGOGASTRODUODENOSCOPY);  Surgeon: Kvng Holly MD;  Location: [facility];  Service: Endoscopy;  Laterality: Left;    EYE SURGERY      INFECTED SKIN DEBRIDEMENT  2012    spider bite with surgery to right forearm    HI EVAL,SWALLOW FUNCTION,CINE/VIDEO RECORD  2021         subtotal gastrectomy         Social History:   Tobacco Use    Smoking status: Former     Packs/day: 0.25     Types: Cigarettes     Quit date: 2022     Years since quittin.4     Passive exposure: Never    Smokeless tobacco: Never    Tobacco comments:     patient states that he started smoking cigarettes again: half a pack of cigarettes over 4 or 5 days   Substance and Sexual Activity    Alcohol use: Yes     Comment: drinks scotch 2X weekly    Drug use: No    Sexual activity: Not Currently     Partners: Female       Family History:   Family History       Problem Relation (Age of Onset)    Breast cancer Sister, Daughter    Lung cancer Mother    No Known Problems Father, Son, Maternal Grandmother, Maternal Grandfather, Paternal Grandmother, Paternal Grandfather            No current facility-administered medications on file prior to encounter.     Current Outpatient Medications on File Prior to Encounter   Medication Sig    aspirin (ECOTRIN) 81 MG EC tablet TAKE 1 TABLET BY MOUTH EVERY DAY    colchicine (COLCRYS) 0.6 mg tablet Take 0.6 mg by mouth 2 (two) times daily as needed (gout attack).    losartan (COZAAR) 100 MG tablet TAKE 1 TABLET BY  MOUTH EVERY DAY    metoprolol succinate (TOPROL-XL) 25 MG 24 hr tablet Take 0.5 tablets (12.5 mg total) by mouth once daily.    NIFEdipine (PROCARDIA-XL) 60 MG (OSM) 24 hr tablet Take 1 tablet (60 mg total) by mouth 2 (two) times a day.    pantoprazole (PROTONIX) 40 MG tablet Take 1 tablet (40 mg total) by mouth 2 (two) times daily.    pravastatin (PRAVACHOL) 20 MG tablet TAKE 1 TABLET BY MOUTH EVERY DAY AT NIGHT       Allergies:   Review of patient's allergies indicates:   Allergen Reactions    Lisinopril Rash     Patient reports history of rash with previous lisinopril use.     Nicoderm     Nicoderm cq [nicotine] Rash       ROS:  Review of Systems   Constitutional:  Negative for appetite change and fever.   Respiratory:  Positive for cough. Negative for shortness of breath.    Cardiovascular:  Negative for chest pain and palpitations.   Gastrointestinal:  Negative for abdominal pain, nausea and vomiting.   Genitourinary:  Negative for dysuria.   Musculoskeletal:  Negative for arthralgias and back pain.   Neurological:  Positive for weakness. Negative for dizziness and light-headedness.   Psychiatric/Behavioral:  Negative for sleep disturbance. The patient is not nervous/anxious.        Objective:  Temp:  [98.1 °F (36.7 °C)]   Pulse:  [64-75]   Resp:  [14-18]   BP: (145)/(92)   SpO2:  [95 %-100 %]     Body mass index is 12.14 kg/m².      Physical Exam  Vitals and nursing note reviewed.   Constitutional:       General: He is not in acute distress.     Appearance: He is well-developed. He is cachectic. He is not diaphoretic.   Cardiovascular:      Rate and Rhythm: Normal rate and regular rhythm.      Heart sounds: S1 normal and S2 normal. Murmur heard.   Systolic murmur is present.   Pulmonary:      Effort: Pulmonary effort is normal. No respiratory distress.      Breath sounds: Normal breath sounds. No wheezing or rales.   Abdominal:      General: Bowel sounds are normal. There is no distension.      Palpations:  Abdomen is soft.      Tenderness: There is no abdominal tenderness.   Musculoskeletal:      Right lower leg: No edema.      Left lower leg: No edema.   Skin:     General: Skin is warm and dry.   Neurological:      Mental Status: He is alert and oriented to person, place, and time.   Psychiatric:         Mood and Affect: Mood normal.         Behavior: Behavior normal. Behavior is cooperative.         Thought Content: Thought content normal.       Significant Labs:   A1C:   Recent Labs   Lab 05/11/23  1332   HGBA1C 6.7*     TSH:   Recent Labs   Lab 05/11/23  1332   TSH 2.024     CBC:  Recent Labs   Lab 06/29/23  0426   WBC 5.78   HGB 10.7*   HCT 34.6*      MCV 81*     BMP:  Recent Labs   Lab 06/29/23  0426   GLU 88      K 3.7      CO2 33*   BUN 21   CREATININE 0.5   CALCIUM 9.1   MG 1.7   PHOS 2.1*       Significant Imaging: I have reviewed all pertinent imaging results/findings completed during prior hospitalization.      Assessment and Plan:  Active Diagnoses:    Diagnosis Date Noted POA    PRINCIPAL PROBLEM:  Severe malnutrition [E43] 06/29/2023 Yes    Dysphagia [R13.10] 06/20/2023 Yes     Chronic    Fecal impaction [K56.41] 06/16/2023 Yes    Cachexia [R64] 03/23/2023 Yes     Chronic    Chronic systolic heart failure [I50.22] 03/18/2022 Yes     Chronic    Gastroesophageal reflux disease without esophagitis [K21.9] 09/28/2021 Yes    Type 2 diabetes mellitus with microalbuminuria, without long-term current use of insulin [E11.29, R80.9] 06/30/2020 Yes     Chronic    Essential hypertension [I10] 11/07/2012 Yes     Chronic    Mixed hyperlipidemia [E78.2] 11/07/2012 Yes     Chronic    Anemia of chronic disease [D63.8] 08/25/2012 Yes      Problems Resolved During this Admission:       Severe Protein Calorie Malnutrition  Cachexia  Dysphagia  - Continue bowel regimen.  - SLP and RD evaluated and following  - MBSS 6/20 showed laryngeal penetration with all consistencies.  - Declined PEG placement.  -  Aspiration precautions  - Purred diet per SLP recommendations     Aspiration Pneumonia   CXR reviewed; Indicative of developing pna.   Started on augmentin 975 mg q 12 hours x 5 days and oral probiotic tab daily x 7 days..   Continue xopanex nebs q 12 hour and prn q 6 hours.   Continue prn mucinex.     Debility   - Continue with PT/OT for gait training and strengthening and restoration of ADL's   - Encourage mobility, OOB in chair, and early ambulation as appropriate  - Fall precautions   - Monitor for bowel and bladder dysfunction  - Monitor for and prevent skin breakdown and pressure ulcers     Grade D esophagitis  - GI consulted and underwent EGD 6/19 showing grade D esophagitis, lavell-en-Y gastrojejunostomy with healthy-appearing anastomosis.   - Protonix po 40 mg bid     Discharge planning issues  - Patient seeking long-term NH with hospice with his wife at [facility].  - Recurrent aspiration likely given MBSS findings. Patient declines PEG.  - CM consulted  - Offer hospice consult to patient in SNF     Chronic systolic heart failure  - Monitor for acute decompensation     Mixed hyperlipidemia  - Continue pravastatin     Essential hypertension  - Continue nifedipine at 60mg PO BID, losartan 100mg PO daily.      Anticipated Disposition:  detention nursing home +/- hospice      No future appointments.    I certify that SNF services are required to be given on an inpatient basis because Julio XAVIER Montychulexii needs for skilled nursing care and/or skilled rehabilitation are required on a daily basis and such services can only practically be provided in a skilled nursing facility setting and are for an ongoing condition for which she received inpatient care in the hospital.       Olaf Mcguire MD  Department of Hospital Medicine   [facility] - Skilled Nursing

## 2023-06-30 NOTE — PT/OT/SLP PROGRESS
Occupational Therapy   Treatment    Name: Julio Benites  MRN: [id number]  Admit Date: 6/26/2023  Admitting Diagnosis:  Severe malnutrition    General Precautions: Standard, aspiration, fall, hearing impaired, pureed diet   Orthopedic Precautions: N/A   Braces: N/A    Recommendations:     Discharge Recommendations:  home health OT  Level of Assistance Recommended at Discharge:  24 hour significant assistance   Discharge Equipment Recommendations: other (see comments) (TBD)  Barriers to discharge:  Inaccessible home environment, Decreased caregiver support    Assessment:     Julio Benites is a 88 y.o. male with a medical diagnosis of Severe malnutrition   He presents with limitations in performance of self-care, functional mobility, and ADLs. Performance deficits affecting function are weakness, impaired endurance, impaired self care skills, impaired functional mobility, gait instability, impaired balance, decreased upper extremity function, decreased lower extremity function, impaired coordination, decreased ROM, impaired cardiopulmonary response to activity. Pt tolerated Tx without incident and is making progress but continues to require assist to perform self care tasks, functional mobility and functional transfers. Pt would continue to benefit from OT intervention to further functional (I)ce and safety.     Rehab Potential is fair    Activity tolerance:  Fair    Plan:     Patient to be seen 5 x/week to address the above listed problems via self-care/home management, therapeutic activities, therapeutic exercises    Plan of Care Expires: 07/26/23  Plan of Care Reviewed with: patient    Subjective     Communicated with: Nursing prior to session.     Pain/Comfort:  Pain Rating 1: 0/10  Pain Rating Post-Intervention 1: 0/10    Patient's cultural, spiritual, Voodoo conflicts given the current situation:  no    Objective:     Patient found supine with  (no active lines) upon OT entry to room.    Bed Mobility:     Patient completed Rolling/Turning to Left with  stand by assistance and with side rail  Patient completed Rolling/Turning to Right with stand by assistance and with side rail  Patient completed Scooting/Bridging with stand by assistance and contact guard assistance  Patient completed Supine to Sit with minimum assistance and with side rail     Functional Mobility/Transfers:  Patient completed Sit <> Stand Transfer with minimum assistance  with  rolling walker   Patient completed Bed <> Chair Transfer using Stand Pivot technique with contact guard assistance with rolling walker    Activities of Daily Living:  Grooming: supervision seated sinkside with set up  Upper Body Dressing: minimum assistance to doff/darren pullover shirt with (A) to manage shirt over head   Lower Body Dressing: minimum assistance to darren pants seated EOB and   Toileting: maximal assistance to doff/darren pull tab brief at bedlevel with pt completing front sarwat care with (A) for rear sarwat care    [facility] 6 Click ADL: 17    Treatment & Education:  Pt educated on:  - role of OT  - level of assistance  - energy conservation and task modification to maximized independence with ADL's and mobility   -  safety while performing functional transfers and self care tasks  - progress towards OT goals      Patient left up in chair with call button in reach    GOALS:   Multidisciplinary Problems       Occupational Therapy Goals          Problem: Occupational Therapy    Goal Priority Disciplines Outcome Interventions   Occupational Therapy Goal     OT, PT/OT Ongoing, Progressing    Description: Goals to be met by:  7/26/2023    Patient will increase functional independence with ADLs by performing:    UE Dressing with Set-up.   LE Dressing with Min A, AE, and LRAD as needed.   Grooming while seated at sink with Casey.  Toileting from toilet with Minimal Assistance for hygiene and clothing management with AE as needed and LRAD.  Bathing from  shower  chair/bench with Supervision and AE as needed.  Stand pivot transfers with Min A for 100% t/f and LRAD.  Toilet transfer to toilet with Min A for 100% t/f and LRAD.  Upper extremity exercise program x10 reps per handout, with independence.                         Time Tracking:     OT Date of Treatment: 06/30/23  OT Start Time: 0747    OT Stop Time: 0811  OT Total Time (min): 24 min    Billable Minutes:Self Care/Home Management 24    6/30/2023

## 2023-07-01 PROCEDURE — 99900035 HC TECH TIME PER 15 MIN (STAT): Mod: HCNC

## 2023-07-01 PROCEDURE — 97530 THERAPEUTIC ACTIVITIES: CPT | Mod: HCNC,CQ

## 2023-07-01 PROCEDURE — 94761 N-INVAS EAR/PLS OXIMETRY MLT: CPT | Mod: HCNC

## 2023-07-01 PROCEDURE — 25000003 PHARM REV CODE 250: Mod: HCNC | Performed by: HOSPITALIST

## 2023-07-01 PROCEDURE — 94640 AIRWAY INHALATION TREATMENT: CPT | Mod: HCNC

## 2023-07-01 PROCEDURE — 97116 GAIT TRAINING THERAPY: CPT | Mod: HCNC,CQ

## 2023-07-01 PROCEDURE — 11000004 HC SNF PRIVATE: Mod: HCNC

## 2023-07-01 PROCEDURE — 25000003 PHARM REV CODE 250: Mod: HCNC | Performed by: FAMILY MEDICINE

## 2023-07-01 PROCEDURE — 25000242 PHARM REV CODE 250 ALT 637 W/ HCPCS: Mod: HCNC | Performed by: FAMILY MEDICINE

## 2023-07-01 RX ADMIN — AMOXICILLIN AND CLAVULANATE POTASSIUM 1 TABLET: 875; 125 TABLET, FILM COATED ORAL at 09:07

## 2023-07-01 RX ADMIN — SENNOSIDES AND DOCUSATE SODIUM 1 TABLET: 50; 8.6 TABLET ORAL at 08:07

## 2023-07-01 RX ADMIN — METOPROLOL SUCCINATE 12.5 MG: 25 TABLET, EXTENDED RELEASE ORAL at 08:07

## 2023-07-01 RX ADMIN — PRAVASTATIN SODIUM 20 MG: 20 TABLET ORAL at 09:07

## 2023-07-01 RX ADMIN — LEVALBUTEROL HYDROCHLORIDE 0.63 MG: 0.63 SOLUTION RESPIRATORY (INHALATION) at 08:07

## 2023-07-01 RX ADMIN — NIFEDIPINE 60 MG: 30 TABLET, FILM COATED, EXTENDED RELEASE ORAL at 08:07

## 2023-07-01 RX ADMIN — PANTOPRAZOLE SODIUM 40 MG: 40 TABLET, DELAYED RELEASE ORAL at 03:07

## 2023-07-01 RX ADMIN — NIFEDIPINE 60 MG: 30 TABLET, FILM COATED, EXTENDED RELEASE ORAL at 09:07

## 2023-07-01 RX ADMIN — LEVALBUTEROL HYDROCHLORIDE 0.63 MG: 0.63 SOLUTION RESPIRATORY (INHALATION) at 07:07

## 2023-07-01 RX ADMIN — LOSARTAN POTASSIUM 100 MG: 50 TABLET, FILM COATED ORAL at 08:07

## 2023-07-01 RX ADMIN — Medication 1 CAPSULE: at 08:07

## 2023-07-01 RX ADMIN — ASPIRIN 81 MG: 81 TABLET, COATED ORAL at 08:07

## 2023-07-01 RX ADMIN — PANTOPRAZOLE SODIUM 40 MG: 40 TABLET, DELAYED RELEASE ORAL at 05:07

## 2023-07-01 RX ADMIN — AMOXICILLIN AND CLAVULANATE POTASSIUM 1 TABLET: 875; 125 TABLET, FILM COATED ORAL at 08:07

## 2023-07-01 RX ADMIN — SENNOSIDES AND DOCUSATE SODIUM 1 TABLET: 50; 8.6 TABLET ORAL at 09:07

## 2023-07-01 NOTE — PROGRESS NOTES
[facility]                                  Skilled Nursing Facility                   Progress Note     Admit Date: 6/26/2023  KYLAH 7/17/2023  Principal Problem:  Severe malnutrition   HPI obtained from patient interview and chart review   Code Status: DNR    Chief Complaint: Re-evaluation of medical treatment and therapy status: pna tx initiation follow-up; intake monitoring    HPI: Julio Benites is an 88 y.o. male with a past medical history of DM, NSTEMI, HTN, HLD, and systolic CHF who was recently treated for CAP  with azithromycin who presented to ED on 6/16/23 with abdominal pain. Admitted with colitis with fecal impaction/constipation. General surgery and GI consulted. Bowel regimen initiated following manual disimpaction and enema. EGD 06/19 showing grade D esophagitis, lavell-en-Y gastrojejunostomy with healthy-appearing anastomosis. MBSS 06/20 which showed laryngeal penetration with all consistencies. Patient declined PEG placement. Patient lives at home alone with support from grandson. Patient goal to discharge to [facility] where his wife resides. Patient and family considering hospice.    Patient will be treated at [facility] with PT and OT to improve functional status and ability to perform ADLs.     Interval History  24 hour chart review completed. NAEON. NAD.  Afebrile, vital signs stable.   Productive cough persists.Continue with scheduled and prn xopanex nebs; prn mucinex.  Continue 5 day course augmentin 75 mg q 12 hours x 5 days (EOC 7/4). Iniitate xopanex nebs q 12 hour.  Inadequate appetite persists. Consuming 25% of 2 meals per day. PEG not desired per patient.  Pain management: patient denies pain.  Anticipating NH at time of SNF discharge. Patient and family considering hospice; may consult hospice if indicated.      Past Medical History: Patient has a past medical history of Alcoholism, Cancer (11/2012), Cataract,  Cellulitis of right forearm (8/25/2012), Chronic systolic heart failure (3/18/2022), Diabetes mellitus type II, Diabetic retinopathy, Elevated PSA, GERD (gastroesophageal reflux disease), Gout attack, Hyperlipidemia, Hypertension, Iron deficiency anemia secondary to blood loss (chronic) (9/26/2013), and NSTEMI (non-ST elevated myocardial infarction) (1/1/2022).    Past Surgical History: Patient has a past surgical history that includes Infected skin debridement (8/2012); subtotal gastrectomy (2013); Cataract extraction (Right); Eye surgery; pr eval,swallow function,cine/video record (9/22/2021); and Esophagogastroduodenoscopy (Left, 6/19/2023).    Social History: Patient reports that he quit smoking about 17 months ago. His smoking use included cigarettes. He smoked an average of .25 packs per day. He has never been exposed to tobacco smoke. He has never used smokeless tobacco. He reports current alcohol use. He reports that he does not use drugs.    Family History: family history includes Breast cancer in his daughter and sister; Lung cancer in his mother; No Known Problems in his father, maternal grandfather, maternal grandmother, paternal grandfather, paternal grandmother, and son.    Allergies: Patient is allergic to lisinopril, nicoderm, and nicoderm cq [nicotine].    ROS  Constitutional: Negative for fever   Eyes: Negative for blurred vision, double vision   Respiratory: + cough, Negative for shortness of breath   Cardiovascular: Negative for chest pain, palpitations, and leg swelling.   Gastrointestinal: Negative for abdominal pain, constipation, diarrhea, nausea, vomiting.   Genitourinary: Negative for dysuria, frequency   Musculoskeletal:  + generalized weakness. Negative for back pain and myalgias.   Skin: Negative for itching and rash.   Neurological: Negative for dizziness, headaches.   Psychiatric/Behavioral: Negative for depression. The patient is not nervous/anxious.      24 hour Vital Sign Range    Temp:  [98 °F (36.7 °C)]   Pulse:  [72-74]   Resp:  [16-20]   BP: (120-141)/(70-80)   SpO2:  [95 %-100 %]     Current BMI: Body mass index is 12.24 kg/m².    PEx  Constitutional: Patient appears debilitated, cachectic, chronically ill-appearing. .  No distress noted  HENT:   Head: Normocephalic and atraumatic.   Eyes: Pupils are equal, round.   Neck: Normal range of motion. Neck supple.   Cardiovascular: Normal rate, regular rhythm + murmur   Pulmonary/Chest: Effort normal and breath sounds are diminished. + frequent productive cough  Abdominal: Soft. Bowel sounds are normal. Scaphoid abdomen.  Musculoskeletal: Normal range of motion.   Neurological: Alert and oriented to person, place, and time.   Psychiatric: Normal mood and affect. Behavior is normal.   Skin: Skin is warm and dry; thin and frail, intact. Full skin assessment completed by NP on initial exam.       No results for input(s): GLUCOSE, NA, K, CL, CO2, BUN, CREATININE, MG in the last 24 hours.    Invalid input(s):  CALCIUM      No results for input(s): WBC, RBC, HGB, HCT, PLT, MCV, MCH, MCHC in the last 24 hours.      No results for input(s): POCTGLUCOSE in the last 168 hours.     Assessment and Plan:     Severe Protein Calorie Malnutrition  Cachexia  Dysphagia  - Body mass index is 12.24 kg/m².  - Continue bowel regimen.  - SLP and RD consulted to eval and tx  - MBSS 06/20 showed laryngeal penetration with all consistencies.  - Declined PEG placement.  - Aspiration precautions  - Purred diet per SLP rec; 25% of 2 meals daily consumed    Aspiration Pneumonia   6/29: Productive cough more course in sound.   Lungs diminished BUL and coars BLL.   Initiate order(s): obtain CXR.   CXR reviewed; Indicative of developing pna.   Initiate augmentin 975 mg q 12 hours x 5 days and oral probiotic tab daily x 7 days.. Iniitat xopanex nebs q 12 hour and prn q 6 hours.   Continue prn mucinex.    Debility   - Continue with PT/OT for gait training and strengthening  and restoration of ADL's   - Encourage mobility, OOB in chair, and early ambulation as appropriate  - Fall precautions   - Monitor for bowel and bladder dysfunction  - Monitor for and prevent skin breakdown and pressure ulcers     Grade D esophagitis  - GI consulted and underwent EGD 06/19 showing grade D esophagitis, lavell-en-Y gastrojejunostomy with healthy-appearing anastomosis.   - Protonix po 40 mg bid    Discharge planning issues  - Patient seeking retirement NH with hospice with his wife at [facility].  - Recurrent aspiration likely given MBSS findings. Patient declines PEG.  - CM consulted  - Offer hospice consult to patient in SNF    Chronic systolic heart failure  - stable  - Monitor for acute decompensation     Mixed hyperlipidemia  - Continue pravastatin     Essential hypertension  - Continue nifedipine at 60mg PO BID, losartan 100mg PO daily.      Anticipate disposition:  NH with Hospice vs [facility] vs Home with home health       Follow-up needed during SNF stay-     Follow-up needed after discharge from SNF: PCP,     No future appointments.    Extended Visit  Total time spent: > 30 minutes  Description of Time: counseling patient on clinical condition, therapies provided, plan of care, emotional support, coordinating patient care with other care team members, reviewing and interpreting labs and imaging, collaboration with physician, initiating new orders, chart review, and documentation. See interval hx.       Nicolette Tripp NP  Department of Hospital Medicine   [facility]- Skilled Nursing Facility     DOS:6/30/23

## 2023-07-01 NOTE — PT/OT/SLP PROGRESS
"Physical Therapy Treatment    Patient Name:  Julio Benites   MRN:  [id number]  Admit Date: 6/26/2023  Admitting Diagnosis: Severe malnutrition    General Precautions: Standard, aspiration, fall, hearing impaired, pureed diet (DNR)  Orthopedic Precautions: N/A  Braces: N/A    Recommendations:     Discharge Recommendations: home health PT  Level of Assistance Recommended at Discharge: 24 hours significant assistance  Discharge Equipment Recommendations: bedside commode, wheelchair, walker, rolling  Barriers to discharge: Decreased caregiver support, Inaccessible home environment    Assessment:     Julio Benites is a 88 y.o. male admitted with a medical diagnosis of Severe malnutrition . requiring significant assistance and verbal cues for bed mob, scooting to EOB/edge of chair, OOB transfers, sit < > stand transitions; light assistance for gait to prevent falls due to weakness and fatigue.   Pt remains motivated to participate in PT session and will cont to benefit from skilled PT intervention..  .      Performance deficits affecting function: weakness, impaired endurance, impaired self care skills, impaired functional mobility, gait instability, impaired balance, decreased upper extremity function, decreased lower extremity function, impaired cardiopulmonary response to activity.    Rehab Potential is good    Activity Tolerance: Fair    Plan:     Patient to be seen 5 x/week to address the above listed problems via gait training, therapeutic activities, therapeutic exercises, neuromuscular re-education, wheelchair management/training    Plan of Care Expires: 07/28/23  Plan of Care Reviewed with: patient    Subjective     "I don't feel strong enough to step up".     Pain/Comfort:  Pain Rating 1: 0/10  Pain Rating Post-Intervention 1: 0/10    Patient's cultural, spiritual, Scientology conflicts given the current situation:  no    Objective:      Patient found HOB elevated with  (waffle pad) upon PT entry to room. " "    Therapeutic Activities and Exercises:   Pt requires assistance for donning pants on while in bed    Functional Mobility:  Bed Mobility:     Rolling Left:  SBA with rail  Rolling Right: SBA with rail  Scooting: anteriorly to EOB min A; anteriorly to edge of w/c with mod A  Bridging: attempted in bed, however, pt unable to perform  Supine to Sit: mod/min A for trunk elevation and LE's, exiting on the L side, HOB at 25* angle with use of rail  Transfers:     Sit to Stand:  from w/c with max A of 1 x3 trials with rolling walker  Bed to Chair: mod A of 1 with  no AD  using  SQPT to the R   Gait: RW CGA 97ft and 73ft with chair follow and vc's for upright posture and safety.  Pt demonstrates slight FFT, narrow MEJIA, and mild instability  Balance: static sitting at the EOB SBA; static standing with RW CGA  Stairs:  Pt attempted 4" curb step with Rolling Walker with no handrails, however, pt unable to perform (too weak to raise R LE requiring manual assistance).   Wheelchair Propulsion:  Pt propelled Standard wheelchair x 150 feet on Level tile with  Bilateral upper extremity with Supervision or Set-up Assistance.     AM-PAC 6 CLICK MOBILITY  13    Patient left up in chair with call button in reach.    GOALS:   Multidisciplinary Problems       Physical Therapy Goals          Problem: Physical Therapy    Goal Priority Disciplines Outcome Goal Variances Interventions   Physical Therapy Goal     PT, PT/OT Ongoing, Progressing     Description: Goals to be met by: 7/28/23     Patient will increase functional independence with mobility by performing:    . Supine to sit with Set-up [place]  . Sit to supine with Set-up [place]  . Rolling to Left and Right with Set-up Assistance.  . Sit to stand transfer with Stand-by Assistance  . Bed to chair transfer with Stand-by Assistance using Rolling Walker/ No AD  . Gait  x 100 feet with Stand-by Assistance using Rolling Walker.   . Wheelchair propulsion x150 feet with Modified " [place] using bilateral uppper extremities  . Ascend/Descend 4 inch curb step with Minimal Assistance using Rolling Walker.                         Time Tracking:     PT Received On: 07/01/23  PT Start Time: 0930  PT Stop Time: 1008  PT Total Time (min): 38 min    Billable Minutes: Gait Training 15 and Therapeutic Activity 23    Treatment Type: Treatment  PT/PTA: PTA     Number of PTA visits since last PT visit: 1 07/01/2023

## 2023-07-02 PROCEDURE — 99306 1ST NF CARE HIGH MDM 50: CPT | Mod: AI,HCNC,, | Performed by: HOSPITALIST

## 2023-07-02 PROCEDURE — 94640 AIRWAY INHALATION TREATMENT: CPT | Mod: HCNC

## 2023-07-02 PROCEDURE — 25000003 PHARM REV CODE 250: Mod: HCNC | Performed by: HOSPITALIST

## 2023-07-02 PROCEDURE — 99306 PR NURSING FACILITY CARE, INIT, HIGH SEVERITY: ICD-10-PCS | Mod: AI,HCNC,, | Performed by: HOSPITALIST

## 2023-07-02 PROCEDURE — 25000242 PHARM REV CODE 250 ALT 637 W/ HCPCS: Mod: HCNC | Performed by: FAMILY MEDICINE

## 2023-07-02 PROCEDURE — 11000004 HC SNF PRIVATE: Mod: HCNC

## 2023-07-02 PROCEDURE — 94761 N-INVAS EAR/PLS OXIMETRY MLT: CPT | Mod: HCNC

## 2023-07-02 PROCEDURE — 99900035 HC TECH TIME PER 15 MIN (STAT): Mod: HCNC

## 2023-07-02 PROCEDURE — 97530 THERAPEUTIC ACTIVITIES: CPT | Mod: HCNC,CO

## 2023-07-02 PROCEDURE — 25000003 PHARM REV CODE 250: Mod: HCNC | Performed by: FAMILY MEDICINE

## 2023-07-02 RX ADMIN — LOSARTAN POTASSIUM 100 MG: 50 TABLET, FILM COATED ORAL at 09:07

## 2023-07-02 RX ADMIN — LEVALBUTEROL HYDROCHLORIDE 0.63 MG: 0.63 SOLUTION RESPIRATORY (INHALATION) at 08:07

## 2023-07-02 RX ADMIN — Medication 1 CAPSULE: at 09:07

## 2023-07-02 RX ADMIN — NIFEDIPINE 60 MG: 30 TABLET, FILM COATED, EXTENDED RELEASE ORAL at 09:07

## 2023-07-02 RX ADMIN — PANTOPRAZOLE SODIUM 40 MG: 40 TABLET, DELAYED RELEASE ORAL at 04:07

## 2023-07-02 RX ADMIN — SENNOSIDES AND DOCUSATE SODIUM 1 TABLET: 50; 8.6 TABLET ORAL at 09:07

## 2023-07-02 RX ADMIN — ASPIRIN 81 MG: 81 TABLET, COATED ORAL at 09:07

## 2023-07-02 RX ADMIN — AMOXICILLIN AND CLAVULANATE POTASSIUM 1 TABLET: 875; 125 TABLET, FILM COATED ORAL at 08:07

## 2023-07-02 RX ADMIN — LEVALBUTEROL HYDROCHLORIDE 0.63 MG: 0.63 SOLUTION RESPIRATORY (INHALATION) at 07:07

## 2023-07-02 RX ADMIN — NIFEDIPINE 60 MG: 30 TABLET, FILM COATED, EXTENDED RELEASE ORAL at 08:07

## 2023-07-02 RX ADMIN — SENNOSIDES AND DOCUSATE SODIUM 1 TABLET: 50; 8.6 TABLET ORAL at 08:07

## 2023-07-02 RX ADMIN — METOPROLOL SUCCINATE 12.5 MG: 25 TABLET, EXTENDED RELEASE ORAL at 09:07

## 2023-07-02 RX ADMIN — PRAVASTATIN SODIUM 20 MG: 20 TABLET ORAL at 08:07

## 2023-07-02 RX ADMIN — AMOXICILLIN AND CLAVULANATE POTASSIUM 1 TABLET: 875; 125 TABLET, FILM COATED ORAL at 09:07

## 2023-07-02 RX ADMIN — PANTOPRAZOLE SODIUM 40 MG: 40 TABLET, DELAYED RELEASE ORAL at 05:07

## 2023-07-02 NOTE — PLAN OF CARE
Problem: Occupational Therapy  Goal: Occupational Therapy Goal  Description: Goals to be met by:  7/26/2023    Patient will increase functional independence with ADLs by performing:    UE Dressing with Set-up.   LE Dressing with Min A, AE, and LRAD as needed.   Grooming while seated at sink with [place].  Toileting from toilet with Minimal Assistance for hygiene and clothing management with AE as needed and LRAD.  Bathing from  shower chair/bench with Supervision and AE as needed.  Stand pivot transfers with Min A for 100% t/f and LRAD.  Toilet transfer to toilet with Min A for 100% t/f and LRAD.  Upper extremity exercise program x10 reps per handout, with independence.    Outcome: Ongoing, Progressing

## 2023-07-02 NOTE — PT/OT/SLP PROGRESS
Occupational Therapy   Treatment    Name: Julio Benites  MRN: [id number]  Admit Date: 6/26/2023  Admitting Diagnosis:  Severe malnutrition    General Precautions: Standard, aspiration, fall, hearing impaired, pureed diet   Orthopedic Precautions: N/A   Braces: N/A    Recommendations:     Discharge Recommendations:  home health OT  Level of Assistance Recommended at Discharge: 24 hours physical assistance for all ADL's and home management tasks  Discharge Equipment Recommendations: other (see comments) (TBD)  Barriers to discharge:  Inaccessible home environment, Decreased caregiver support    Assessment:     Julio Benites is a 88 y.o. male with a medical diagnosis of Severe malnutrition.  He presents with. Performance deficits affecting function are weakness, impaired endurance, impaired self care skills, impaired functional mobility, gait instability, impaired balance, decreased upper extremity function, decreased lower extremity function, impaired coordination, decreased ROM, impaired cardiopulmonary response to activity.     Rehab Potential is fair    Activity tolerance:  Fair    Plan:     Patient to be seen 5 x/week to address the above listed problems via self-care/home management, therapeutic activities, therapeutic exercises    Plan of Care Expires: 07/26/23  Plan of Care Reviewed with: patient    Subjective     Communicated with: nsg and Pt. prior to session. I am doing well today    Pain/Comfort:  Pain Rating 1: 0/10  Pain Rating Post-Intervention 1: 0/10    Patient's cultural, spiritual, Adventism conflicts given the current situation:  no    Objective:     Patient found HOB elevated    upon OT entry to room.    Bed Mobility:    Patient completed Scooting/Bridging with minimum assistance  Patient completed Supine to Sit with minimum assistance     Functional Mobility/Transfers:  Patient completed Sit <> Stand Transfer with minimum assistance  with  rolling walker   Patient completed Bed <> Chair  Transfer using Stand Pivot technique with minimum assistance with rolling walker  Pt. Also with w/c mobility approx 50ft with SBA from room to gym partial of way       Activities of Daily Living:  Upper Body Dressing: minimum assistance to darren pull over shirt (A) over head level    [facility] 6 Click ADL: 17    OT Exercises: UE Ergometer 10 min    Treatment & Education:  Pt. With standing act on this day with task. Pt. With CGA/SBA for balance aspects with task with  AD at raised counter Pt with visual perception task with discrimination of various shapes and sizes x 3:27 min/sec with standing bal and min cues through out with weight shifting and use of BUE's incorporated and crossing mid line and facilitation with posture in prep for home management .     Pt. With 1# dowel activity with 2x20 reps with  shd flex, bicep curls horz adb/add and forward flex motion to increase BUE ROM and strength.    Pt. With therex performed to increase ROM, endurance selfcare task and fxl mobility for independence   Pt edu on role of OT, POC, safety when performing self care tasks , benefit of performing OOB activity, and safety when performing functional transfers and mobility management for preparation with goals to progress towards next level of care     Patient left up in chair with all lines intact and call button in reach    GOALS:   Multidisciplinary Problems       Occupational Therapy Goals          Problem: Occupational Therapy    Goal Priority Disciplines Outcome Interventions   Occupational Therapy Goal     OT, PT/OT Ongoing, Progressing    Description: Goals to be met by:  7/26/2023    Patient will increase functional independence with ADLs by performing:    UE Dressing with Set-up.   LE Dressing with Min A, AE, and LRAD as needed.   Grooming while seated at sink with [place].  Toileting from toilet with Minimal Assistance for hygiene and clothing management with AE as needed and LRAD.  Bathing from  shower chair/bench  with Supervision and AE as needed.  Stand pivot transfers with Min A for 100% t/f and LRAD.  Toilet transfer to toilet with Min A for 100% t/f and LRAD.  Upper extremity exercise program x10 reps per handout, with independence.                         Time Tracking:     OT Date of Treatment: 07/02/23  OT Start Time: 1050    OT Stop Time: 1130  OT Total Time (min): 40 min    Billable Minutes:Therapeutic Activity 40    7/2/2023  A client care conference was performed between the IVANR and GRIFFIN, prior to treatment to discuss the patient's status, treatment plan and established goals.

## 2023-07-03 PROCEDURE — 97535 SELF CARE MNGMENT TRAINING: CPT | Mod: HCNC

## 2023-07-03 PROCEDURE — 11000004 HC SNF PRIVATE: Mod: HCNC

## 2023-07-03 PROCEDURE — 25000003 PHARM REV CODE 250: Mod: HCNC | Performed by: FAMILY MEDICINE

## 2023-07-03 PROCEDURE — 99900035 HC TECH TIME PER 15 MIN (STAT): Mod: HCNC

## 2023-07-03 PROCEDURE — 94761 N-INVAS EAR/PLS OXIMETRY MLT: CPT | Mod: HCNC

## 2023-07-03 PROCEDURE — 25000003 PHARM REV CODE 250: Mod: HCNC | Performed by: HOSPITALIST

## 2023-07-03 PROCEDURE — 92526 ORAL FUNCTION THERAPY: CPT | Mod: HCNC

## 2023-07-03 PROCEDURE — 94640 AIRWAY INHALATION TREATMENT: CPT | Mod: HCNC

## 2023-07-03 PROCEDURE — 25000242 PHARM REV CODE 250 ALT 637 W/ HCPCS: Mod: HCNC | Performed by: FAMILY MEDICINE

## 2023-07-03 RX ADMIN — METOPROLOL SUCCINATE 12.5 MG: 25 TABLET, EXTENDED RELEASE ORAL at 09:07

## 2023-07-03 RX ADMIN — LEVALBUTEROL HYDROCHLORIDE 0.63 MG: 0.63 SOLUTION RESPIRATORY (INHALATION) at 07:07

## 2023-07-03 RX ADMIN — SENNOSIDES AND DOCUSATE SODIUM 1 TABLET: 50; 8.6 TABLET ORAL at 09:07

## 2023-07-03 RX ADMIN — ASPIRIN 81 MG: 81 TABLET, COATED ORAL at 09:07

## 2023-07-03 RX ADMIN — AMOXICILLIN AND CLAVULANATE POTASSIUM 1 TABLET: 875; 125 TABLET, FILM COATED ORAL at 09:07

## 2023-07-03 RX ADMIN — SENNOSIDES AND DOCUSATE SODIUM 1 TABLET: 50; 8.6 TABLET ORAL at 08:07

## 2023-07-03 RX ADMIN — PANTOPRAZOLE SODIUM 40 MG: 40 TABLET, DELAYED RELEASE ORAL at 05:07

## 2023-07-03 RX ADMIN — Medication 1 CAPSULE: at 09:07

## 2023-07-03 RX ADMIN — PANTOPRAZOLE SODIUM 40 MG: 40 TABLET, DELAYED RELEASE ORAL at 03:07

## 2023-07-03 RX ADMIN — NIFEDIPINE 60 MG: 30 TABLET, FILM COATED, EXTENDED RELEASE ORAL at 09:07

## 2023-07-03 RX ADMIN — NIFEDIPINE 60 MG: 30 TABLET, FILM COATED, EXTENDED RELEASE ORAL at 08:07

## 2023-07-03 RX ADMIN — AMOXICILLIN AND CLAVULANATE POTASSIUM 1 TABLET: 875; 125 TABLET, FILM COATED ORAL at 08:07

## 2023-07-03 RX ADMIN — PRAVASTATIN SODIUM 20 MG: 20 TABLET ORAL at 08:07

## 2023-07-03 NOTE — PT/OT/SLP PROGRESS
"Speech Language Pathology  Treatment    Julio Benites   MRN: [id number]   Admitting Diagnosis: Severe malnutrition    Diet recommendations: Solid Diet Level: Puree  Liquid Diet Level: Thin 1 bite/sip at a time, Alternating bites/sips, Assistance with meals, Avoid talking while eating, Double swallow with each bite/sip, Frequent oral care, HOB to 90 degrees, Meds crushed in puree, Monitor for s/s of aspiration, Remain upright 30 minutes post meal, Small bites/sips, and Strict aspiration precautions    SLP Treatment Date: 07/03/23  Speech Start Time: 1427     Speech Stop Time: 1454     Speech Total (min): 27 min       TREATMENT BILLABLE MINUTES:  Treatment Swallowing Dysfunction 17 and Self Care/Home Management Training 10    Has the patient been evaluated by SLP for swallowing? : Yes  Keep patient NPO?: No   General Precautions: Standard, aspiration, fall, hearing impaired, pureed diet          Subjective:  "You the one have him singing?" Pt's daughter stated in regards to pt performing dysphagia exercises (specifically Falsetto exercise).          Objective:      Pt seen for ongoing dysphagia therapy this afternoon. Daughter and grandchildren present. Pt positioned upright in bed to prepare for PO trials. Suctioning noted to be clogged. Tubing changed and suctioning was restored.  Pt accepted trials of ice chips x 6 to facilitate swallowing production for dysphagia exercises.  Pt performed Effortful swallows x 3 per trial (18 total).  Pt also performed Supraglottic swallow x 10, Falsetto x 5, basic tongue base retraction exercises x 20, and Kimberli maneuver x 2 out of 4 attempts. Education provided to pt/family regarding role of SLP, rationale for each dysphagia exercise, decreasing risks of aspiration, and SLP treatment plan and POC. Continued reinforcement to be provided.     Assessment:  Julio Benites is a 88 y.o. male with a medical diagnosis of Severe malnutrition and presents with dysphagia.    Discharge " recommendations: Discharge Facility/Level of Care Needs:  (tbd)     Goals:   Multidisciplinary Problems       SLP Goals          Problem: SLP    Goal Priority Disciplines Outcome   SLP Goal     SLP    Description: Speech Language Pathology Goals  Goals expected to be met by 7/4:  1. Pt will tolerate pureed consistencies and thin liquids with minimal s/s of aspiration.   2. Pt will perform dysphagia exercises x 5-10 to improve pharyngeal swallow.                                 Plan:   Patient to be seen Therapy Frequency: 3 x/week  Planned Interventions: Dysphagia Therapy  Plan of Care expires: 07/26/23  Plan of Care reviewed with: patient, daughter, grandchild(gretel)  SLP Follow-up?: Yes  SLP - Next Visit Date: 07/05/23 07/03/2023

## 2023-07-03 NOTE — PROGRESS NOTES
[facility] - Skilled Nursing  Adult Nutrition  Progress Note    SUMMARY   Recommendations  Continue pureed diet, boost plus TID chocolate, assist with feeding, RD following  Goals: PO to meet 75% of needs with ONS by next RD follow up  Nutrition Goal Status: progressing towards goal  Communication of RD Recs: other (comment) (POC)    Assessment and Plan  Endocrine  Severe malnutrition  Malnutrition Type:  Context: chronic illness  Level: severe     Related to (etiology):   Inadequate oral intake     Signs and Symptoms (as evidenced by):   Aspirating on all consistencies  Decline in ADL's , altered GI     Malnutrition Characteristic Summary:  Weight Loss (Malnutrition): greater than 7.5% in 3 months  Energy Intake (Malnutrition): less than or equal to 50% for greater than or equal to 1 month  Subcutaneous Fat (Malnutrition): severe depletion  Muscle Mass (Malnutrition): severe depletion     Malnutrition Assessment  6/29  Malnutrition Context: chronic illness  Malnutrition Level: severe  Skin (Micronutrient): dry  Hair/Scalp (Micronutrient): dry  Teeth (Micronutrient): dentures  Tongue (Micronutrient):  (coated)  Neck/Chest (Micronutrient): muscle wasting, bony prominence, subcutaneous fat loss  Musculoskeletal/Lower Extremities: muscle wasting, subcutaneous fat loss, muscle control poor       Weight Loss (Malnutrition): greater than 7.5% in 3 months  Energy Intake (Malnutrition): less than or equal to 50% for greater than or equal to 1 month  Subcutaneous Fat (Malnutrition): severe depletion  Muscle Mass (Malnutrition): severe depletion   Orbital Region (Subcutaneous Fat Loss): severe depletion  Upper Arm Region (Subcutaneous Fat Loss): severe depletion  Thoracic and Lumbar Region: severe depletion   [place] Region (Muscle Loss): severe depletion  Clavicle Bone Region (Muscle Loss): severe depletion  Scapular Bone Region (Muscle Loss): severe depletion  Dorsal Hand (Muscle Loss): severe depletion  Patellar Region  "(Muscle Loss): severe depletion  Anterior Thigh Region (Muscle Loss): severe depletion                 Reason for Assessment    Reason For Assessment: RD follow-up  Diagnosis:  (fecal impaction, Colitis,)  Relevant Medical History: chronic constipation, chronic anemia, s/p resp failure with hx of Asbestosis, stomach cancer with subtotal gastrectomy,DM,HTN, HLD, CHF, STEMI, weight loss  Interdisciplinary Rounds: attended  General Information Comments: maintaining aspriation precautions, reviewed SLP notes, PO improving per documentation  Nutrition Discharge Planning: DC on pureed diet, thin liquids, ONS of choice    Nutrition Risk Screen    Nutrition Risk Screen: difficulty chewing/swallowing    Nutrition/Diet History    Patient Reported Diet/Restrictions/Preferences: general  Spiritual, Cultural Beliefs, Nondenominational Practices, Values that Affect Care: no  Supplemental Drinks or Food Habits: Ensure Plus  Food Allergies: NKFA  Factors Affecting Nutritional Intake: abdominal pain, difficulty/impaired swallowing, constipation    Anthropometrics    Temp: 98.1 °F (36.7 °C)  Height Method: Stated  Height: 5' 9" (175.3 cm)  Height (inches): 69 in  Weight Method: Standard Scale  Weight: 38.1 kg (83 lb 15.9 oz)  Weight (lb): 84 lb  Ideal Body Weight (IBW), Male: 160 lb  % Ideal Body Weight, Male (lb): 49.47 %  BMI (Calculated): 12.4  BMI Grade: less than 16 protein-energy malnutrition grade III  Weight Loss: unintentional  Usual Body Weight (UBW), k.7 kg (2020 weight)  % Usual Body Weight: 75.42  % Weight Change From Usual Weight: -24.74 %       Lab/Procedures/Meds    Pertinent Labs Reviewed: reviewed  Pertinent Labs Comments: PO 4 2.1  Pertinent Medications Reviewed: reviewed  Pertinent Medications Comments: Lactobacillus GG       Estimated/Assessed Needs    Weight Used For Calorie Calculations: 47.7 kg (105 lb 2.6 oz) (UBW)  Energy Calorie Requirements (kcal): 0624-2900  Energy Need Method: Kcal/kg (x 30-35 " kcal/kg)  Protein Requirements: 71  Weight Used For Protein Calculations: 47.7 kg (105 lb 2.6 oz) (x 1.5 kg UBW)  Fluid Requirements (mL): 1431 or per MD  Estimated Fluid Requirement Method: RDA Method  RDA Method (mL): 1431  CHO Requirement: 179      Nutrition Prescription Ordered    Current Diet Order: Pureed, IDDSI level 4, cardiac, thin liquids  Nutrition Order Comments: patient can aspirate on anything, precautions in place . Will monitor BM frequency  patient want to have boost plus each meal  Oral Nutrition Supplement: boost plus TID chocolate    Evaluation of Received Nutrient/Fluid Intake    I/O: no data  Energy Calories Required: not meeting needs  Protein Required: not meeting needs  Fluid Required: meeting needs  Comments: LBM 6/3  Tolerance: tolerating  % Intake of Estimated Energy Needs: 75 - 100 %  % Meal Intake: 75 - 100 %    Nutrition Risk    Level of Risk/Frequency of Follow-up: low (one time per week)     Monitor and Evaluation    Food and Nutrient Intake: food and beverage intake  Food and Nutrient Adminstration: diet order  Knowledge/Beliefs/Attitudes: beliefs and attitudes  Anthropometric Measurements: weight change  Biochemical Data, Medical Tests and Procedures: gastrointestinal profile, glucose/endocrine profile, electrolyte and renal panel     Nutrition Follow-Up    RD Follow-up?: Yes

## 2023-07-03 NOTE — PT/OT/SLP PROGRESS
Occupational Therapy   Treatment    Name: Julio Benites  MRN: [id number]  Admit Date: 6/26/2023  Admitting Diagnosis:  Severe malnutrition    General Precautions: Standard, aspiration, fall, pureed diet, hearing impaired   Orthopedic Precautions: N/A   Braces: N/A    Recommendations:     Discharge Recommendations:  home health OT  Level of Assistance Recommended at Discharge: 24 hours physical assistance for all ADL's and home management tasks  Discharge Equipment Recommendations: bedside commode, wheelchair, walker, rolling  Barriers to discharge:  Decreased caregiver support, Inaccessible home environment    Assessment:     Julio Benites is a 88 y.o. male with a medical diagnosis of Severe malnutrition.   Pt tolerated session well and without incident, but he continues to require assistance to perform self-care tasks and mobility.  He would continue to benefit from skilled OT services at SNF to maximize his gains in functional independence.  He presents with the following.  Performance deficits affecting function are weakness, impaired endurance, impaired self care skills, impaired functional mobility, gait instability, impaired balance, decreased ROM, impaired cardiopulmonary response to activity, decreased lower extremity function, decreased upper extremity function.     Rehab Potential is good    Activity tolerance:  Fair    Plan:     Patient to be seen 5 x/week to address the above listed problems via self-care/home management, therapeutic activities, therapeutic exercises    Plan of Care Expires: 07/26/23  Plan of Care Reviewed with: patient    Subjective   Pt was pleasant and agreeable to therapy.  Communicated with: nursing prior to session.    Pain/Comfort:  Pain Rating 1: 0/10  Pain Rating Post-Intervention 1: 0/10    Patient's cultural, spiritual, Zoroastrianism conflicts given the current situation:  no    Objective:     Patient found HOB elevated with Other (comments) (no lines attached) upon OT entry  to room.    Bed Mobility:    Patient completed Supine to Sit to the L with minimum assistance for trunk management.  He reported dizziness initially, which subsided.     Functional Mobility/Transfers:  Patient completed Sit <> Stand Transfer from EOB x 1 trial with minimum assistance  with  rolling walker.  Pt performed sit to stand t/f from w/c with Min A using the sink for leverage   Patient completed Bed <> Chair Transfer using Step Transfer technique with contact guard assistance with rolling walker  Functional Mobility: Pt ambulated ~4 ft from EOB to the w/c with CGA with RW.     Activities of Daily Living:  Grooming: setup assistance to perform oral care and to wash his face while seated at sink  Upper Body Dressing: setup assistance to doff dirty pullover shirt; Min A to darren clean one with (A) to pull his head through the sleeve  Lower Body Dressing: Max A to doff dirty pants/darren clean ones.  Total A to doff dirty non-skid socks/darren clean ones while seated in chair  Toileting: Max A for perineal care while pt stood at sink.  He had a small BM.  Max A to doff soiled brief/darren clean one.  Pt declined toilet transfer.  Nursing notified of BM.    [facility] 6 Click ADL: 17    Treatment & Education:  Pt edu on role of OT, POC, safety when performing self care tasks, benefit of performing OOB activity, and safety when performing functional transfers and mobility.  - White board updated  - Self care tasks completed-- as noted above      Patient left up in chair with call button in reach    GOALS:   Multidisciplinary Problems       Occupational Therapy Goals          Problem: Occupational Therapy    Goal Priority Disciplines Outcome Interventions   Occupational Therapy Goal     OT, PT/OT Ongoing, Progressing    Description: Goals to be met by:  7/26/2023    Patient will increase functional independence with ADLs by performing:    UE Dressing with Set-up.   LE Dressing with Min A, AE, and LRAD as needed.   Grooming  while seated at sink with [place].  Toileting from toilet with Minimal Assistance for hygiene and clothing management with AE as needed and LRAD.  Bathing from  shower chair/bench with Supervision and AE as needed.  Stand pivot transfers with Min A for 100% t/f and LRAD.  Toilet transfer to toilet with Min A for 100% t/f and LRAD.  Upper extremity exercise program x10 reps per handout, with independence.                         Time Tracking:     OT Date of Treatment: 07/03/23  OT Start Time: 0910    OT Stop Time: 0948  OT Total Time (min): 38 min    Billable Minutes:Self Care/Home Management 38 min    7/3/2023

## 2023-07-03 NOTE — PT/OT/SLP PROGRESS
Physical Therapy      Patient Name:  Julio Benites   MRN:  [id number]    Patient not seen today secondary to  (Patient politely declined PT tx this PM. Family present in room. Patient reporting increased fatigue following OOB sitting in W/C throughout AM following shower with OT. PT encouraged patient to sit up OOB in chair with meals as tolerated. Nurse aware.). Will follow-up on 7/5/23 as per POC.    Sarika Alegria, PT  7/3/2023

## 2023-07-04 LAB
ANION GAP SERPL CALC-SCNC: 7 MMOL/L (ref 8–16)
BASOPHILS # BLD AUTO: 0.02 K/UL (ref 0–0.2)
BASOPHILS NFR BLD: 0.5 % (ref 0–1.9)
BUN SERPL-MCNC: 19 MG/DL (ref 8–23)
CALCIUM SERPL-MCNC: 9.6 MG/DL (ref 8.7–10.5)
CHLORIDE SERPL-SCNC: 104 MMOL/L (ref 95–110)
CO2 SERPL-SCNC: 29 MMOL/L (ref 23–29)
CREAT SERPL-MCNC: 0.5 MG/DL (ref 0.5–1.4)
DIFFERENTIAL METHOD: ABNORMAL
EOSINOPHIL # BLD AUTO: 0 K/UL (ref 0–0.5)
EOSINOPHIL NFR BLD: 0.9 % (ref 0–8)
ERYTHROCYTE [DISTWIDTH] IN BLOOD BY AUTOMATED COUNT: 16.6 % (ref 11.5–14.5)
EST. GFR  (NO RACE VARIABLE): >60 ML/MIN/1.73 M^2
GLUCOSE SERPL-MCNC: 111 MG/DL (ref 70–110)
HCT VFR BLD AUTO: 31 % (ref 40–54)
HGB BLD-MCNC: 9.7 G/DL (ref 14–18)
IMM GRANULOCYTES # BLD AUTO: 0.02 K/UL (ref 0–0.04)
IMM GRANULOCYTES NFR BLD AUTO: 0.5 % (ref 0–0.5)
LYMPHOCYTES # BLD AUTO: 0.7 K/UL (ref 1–4.8)
LYMPHOCYTES NFR BLD: 15.8 % (ref 18–48)
MAGNESIUM SERPL-MCNC: 1.8 MG/DL (ref 1.6–2.6)
MCH RBC QN AUTO: 25 PG (ref 27–31)
MCHC RBC AUTO-ENTMCNC: 31.3 G/DL (ref 32–36)
MCV RBC AUTO: 80 FL (ref 82–98)
MONOCYTES # BLD AUTO: 0.3 K/UL (ref 0.3–1)
MONOCYTES NFR BLD: 7.6 % (ref 4–15)
NEUTROPHILS # BLD AUTO: 3.3 K/UL (ref 1.8–7.7)
NEUTROPHILS NFR BLD: 74.7 % (ref 38–73)
NRBC BLD-RTO: 0 /100 WBC
PHOSPHATE SERPL-MCNC: 3 MG/DL (ref 2.7–4.5)
PLATELET # BLD AUTO: 236 K/UL (ref 150–450)
PMV BLD AUTO: 11.1 FL (ref 9.2–12.9)
POTASSIUM SERPL-SCNC: 4.1 MMOL/L (ref 3.5–5.1)
RBC # BLD AUTO: 3.88 M/UL (ref 4.6–6.2)
SODIUM SERPL-SCNC: 140 MMOL/L (ref 136–145)
WBC # BLD AUTO: 4.37 K/UL (ref 3.9–12.7)

## 2023-07-04 PROCEDURE — 80048 BASIC METABOLIC PNL TOTAL CA: CPT | Mod: HCNC | Performed by: FAMILY MEDICINE

## 2023-07-04 PROCEDURE — 25000003 PHARM REV CODE 250: Mod: HCNC | Performed by: HOSPITALIST

## 2023-07-04 PROCEDURE — 94761 N-INVAS EAR/PLS OXIMETRY MLT: CPT | Mod: HCNC

## 2023-07-04 PROCEDURE — 85025 COMPLETE CBC W/AUTO DIFF WBC: CPT | Mod: HCNC | Performed by: FAMILY MEDICINE

## 2023-07-04 PROCEDURE — 84100 ASSAY OF PHOSPHORUS: CPT | Mod: HCNC | Performed by: FAMILY MEDICINE

## 2023-07-04 PROCEDURE — 36415 COLL VENOUS BLD VENIPUNCTURE: CPT | Mod: HCNC | Performed by: FAMILY MEDICINE

## 2023-07-04 PROCEDURE — 83735 ASSAY OF MAGNESIUM: CPT | Mod: HCNC | Performed by: FAMILY MEDICINE

## 2023-07-04 PROCEDURE — 94640 AIRWAY INHALATION TREATMENT: CPT | Mod: HCNC

## 2023-07-04 PROCEDURE — 25000242 PHARM REV CODE 250 ALT 637 W/ HCPCS: Mod: HCNC | Performed by: FAMILY MEDICINE

## 2023-07-04 PROCEDURE — 11000004 HC SNF PRIVATE: Mod: HCNC

## 2023-07-04 PROCEDURE — 25000003 PHARM REV CODE 250: Mod: HCNC | Performed by: FAMILY MEDICINE

## 2023-07-04 RX ADMIN — LEVALBUTEROL HYDROCHLORIDE 0.63 MG: 0.63 SOLUTION RESPIRATORY (INHALATION) at 08:07

## 2023-07-04 RX ADMIN — NIFEDIPINE 60 MG: 30 TABLET, FILM COATED, EXTENDED RELEASE ORAL at 08:07

## 2023-07-04 RX ADMIN — SENNOSIDES AND DOCUSATE SODIUM 1 TABLET: 50; 8.6 TABLET ORAL at 08:07

## 2023-07-04 RX ADMIN — AMOXICILLIN AND CLAVULANATE POTASSIUM 1 TABLET: 875; 125 TABLET, FILM COATED ORAL at 08:07

## 2023-07-04 RX ADMIN — ASPIRIN 81 MG: 81 TABLET, COATED ORAL at 08:07

## 2023-07-04 RX ADMIN — METOPROLOL SUCCINATE 12.5 MG: 25 TABLET, EXTENDED RELEASE ORAL at 08:07

## 2023-07-04 RX ADMIN — LEVALBUTEROL HYDROCHLORIDE 0.63 MG: 0.63 SOLUTION RESPIRATORY (INHALATION) at 07:07

## 2023-07-04 RX ADMIN — PANTOPRAZOLE SODIUM 40 MG: 40 TABLET, DELAYED RELEASE ORAL at 04:07

## 2023-07-04 RX ADMIN — LOSARTAN POTASSIUM 100 MG: 50 TABLET, FILM COATED ORAL at 08:07

## 2023-07-04 RX ADMIN — PRAVASTATIN SODIUM 20 MG: 20 TABLET ORAL at 08:07

## 2023-07-04 RX ADMIN — PANTOPRAZOLE SODIUM 40 MG: 40 TABLET, DELAYED RELEASE ORAL at 05:07

## 2023-07-04 RX ADMIN — Medication 1 CAPSULE: at 08:07

## 2023-07-04 NOTE — PLAN OF CARE
Problem: Adult Inpatient Plan of Care  Goal: Plan of Care Review  Outcome: Ongoing, Progressing     Problem: Adult Inpatient Plan of Care  Goal: Patient-Specific Goal (Individualized)  Outcome: Ongoing, Progressing     Problem: Adult Inpatient Plan of Care  Goal: Readiness for Transition of Care  Outcome: Ongoing, Progressing

## 2023-07-05 PROCEDURE — 94640 AIRWAY INHALATION TREATMENT: CPT | Mod: HCNC

## 2023-07-05 PROCEDURE — 25000003 PHARM REV CODE 250: Mod: HCNC | Performed by: HOSPITALIST

## 2023-07-05 PROCEDURE — 97116 GAIT TRAINING THERAPY: CPT | Mod: HCNC

## 2023-07-05 PROCEDURE — 97110 THERAPEUTIC EXERCISES: CPT | Mod: HCNC,CO

## 2023-07-05 PROCEDURE — 97535 SELF CARE MNGMENT TRAINING: CPT | Mod: HCNC

## 2023-07-05 PROCEDURE — 94761 N-INVAS EAR/PLS OXIMETRY MLT: CPT | Mod: HCNC

## 2023-07-05 PROCEDURE — 25000003 PHARM REV CODE 250: Mod: HCNC | Performed by: FAMILY MEDICINE

## 2023-07-05 PROCEDURE — 92526 ORAL FUNCTION THERAPY: CPT | Mod: HCNC

## 2023-07-05 PROCEDURE — 11000004 HC SNF PRIVATE: Mod: HCNC

## 2023-07-05 PROCEDURE — 97535 SELF CARE MNGMENT TRAINING: CPT | Mod: HCNC,CO

## 2023-07-05 PROCEDURE — 97530 THERAPEUTIC ACTIVITIES: CPT | Mod: HCNC

## 2023-07-05 PROCEDURE — 97530 THERAPEUTIC ACTIVITIES: CPT | Mod: HCNC,CO

## 2023-07-05 PROCEDURE — 25000242 PHARM REV CODE 250 ALT 637 W/ HCPCS: Mod: HCNC | Performed by: FAMILY MEDICINE

## 2023-07-05 RX ORDER — GUAIFENESIN 600 MG/1
600 TABLET, EXTENDED RELEASE ORAL 2 TIMES DAILY
Status: COMPLETED | OUTPATIENT
Start: 2023-07-05 | End: 2023-07-10

## 2023-07-05 RX ORDER — GUAIFENESIN/DEXTROMETHORPHAN 100-10MG/5
5 SYRUP ORAL EVERY 6 HOURS PRN
Status: DISCONTINUED | OUTPATIENT
Start: 2023-07-05 | End: 2023-07-10

## 2023-07-05 RX ADMIN — METOPROLOL SUCCINATE 12.5 MG: 25 TABLET, EXTENDED RELEASE ORAL at 09:07

## 2023-07-05 RX ADMIN — GUAIFENESIN 600 MG: 600 TABLET, EXTENDED RELEASE ORAL at 08:07

## 2023-07-05 RX ADMIN — PANTOPRAZOLE SODIUM 40 MG: 40 TABLET, DELAYED RELEASE ORAL at 04:07

## 2023-07-05 RX ADMIN — ASPIRIN 81 MG: 81 TABLET, COATED ORAL at 09:07

## 2023-07-05 RX ADMIN — LEVALBUTEROL HYDROCHLORIDE 0.63 MG: 0.63 SOLUTION RESPIRATORY (INHALATION) at 12:07

## 2023-07-05 RX ADMIN — LEVALBUTEROL HYDROCHLORIDE 0.63 MG: 0.63 SOLUTION RESPIRATORY (INHALATION) at 06:07

## 2023-07-05 RX ADMIN — NIFEDIPINE 60 MG: 30 TABLET, FILM COATED, EXTENDED RELEASE ORAL at 08:07

## 2023-07-05 RX ADMIN — LOSARTAN POTASSIUM 100 MG: 50 TABLET, FILM COATED ORAL at 09:07

## 2023-07-05 RX ADMIN — GUAIFENESIN 200 MG: 200 SOLUTION ORAL at 12:07

## 2023-07-05 RX ADMIN — PANTOPRAZOLE SODIUM 40 MG: 40 TABLET, DELAYED RELEASE ORAL at 05:07

## 2023-07-05 RX ADMIN — PRAVASTATIN SODIUM 20 MG: 20 TABLET ORAL at 08:07

## 2023-07-05 RX ADMIN — LEVALBUTEROL HYDROCHLORIDE 0.63 MG: 0.63 SOLUTION RESPIRATORY (INHALATION) at 07:07

## 2023-07-05 RX ADMIN — Medication 1 CAPSULE: at 09:07

## 2023-07-05 RX ADMIN — SENNOSIDES AND DOCUSATE SODIUM 1 TABLET: 50; 8.6 TABLET ORAL at 09:07

## 2023-07-05 RX ADMIN — SENNOSIDES AND DOCUSATE SODIUM 1 TABLET: 50; 8.6 TABLET ORAL at 08:07

## 2023-07-05 RX ADMIN — NIFEDIPINE 60 MG: 30 TABLET, FILM COATED, EXTENDED RELEASE ORAL at 09:07

## 2023-07-05 NOTE — PT/OT/SLP PROGRESS
Physical Therapy Treatment    Patient Name:  Julio Benites   MRN:  [id number]  Admit Date: 6/26/2023  Admitting Diagnosis: Severe malnutrition  Recent Surgeries:     General Precautions: Standard, aspiration, fall, hearing impaired, pureed diet (DNR)  Orthopedic Precautions: N/A  Braces: N/A    Recommendations:     Discharge Recommendations: home health PT  Level of Assistance Recommended at Discharge:  assistance needed for mobility and safety  Discharge Equipment Recommendations: bedside commode, wheelchair, walker, rolling  Barriers to discharge: Decreased caregiver support, Inaccessible home environment    Assessment:     Julio Benites is a 88 y.o. male admitted with a medical diagnosis of Severe malnutrition . Patient transfers sit to stand from w/c w/ varying assistance from unanable to mod of one and then min of 2 w/ RW. Ambulates w/ RW and CG/min assistance and second person following w/ chair for safety 110 feet.      Performance deficits affecting function: weakness, impaired endurance, impaired self care skills, impaired functional mobility, gait instability, impaired balance, decreased upper extremity function, decreased lower extremity function, impaired cardiopulmonary response to activity.    Rehab Potential is good    Activity Tolerance: Fair    Plan:     Patient to be seen 5 x/week to address the above listed problems via gait training, therapeutic activities, therapeutic exercises, neuromuscular re-education, wheelchair management/training    Plan of Care Expires: 07/28/23  Plan of Care Reviewed with: patient    Subjective     Patient agreeable to session w/ encouragement .     Pain/Comfort:  Pain Rating 1: 0/10  Pain Rating Post-Intervention 1: 0/10    Patient's cultural, spiritual, Quaker conflicts given the current situation:  no    Objective:     Communicated with patient prior to session.  Patient found  up in w/c  with no lines  upon PT entry to room.     Therapeutic Activities and  "Exercises: Quad sets,   Glute sets,   Ankle  Pumps,   hip abduction/adduction,  SAQ,   LAQ   Hip flexion  x20 reps w/ assist as needed in sitting  Pedals LBE x 6 minutes to improve strength and endurance to perform self care and functional mobiltiy; requires frequent assistance for full revolutions.    Patient educated in PT POC, gait and trf tech, call for assistance        Functional Mobility:  Bed Mobility:  not performed. Patient declined, remained in w/c  Transfers:     Sit to Stand:   w/ RW from w/c, first atttmpt patient unable to achieve standing, after instruction in technique, stands w/ RW w/ mod assistance of one; after seated rest break, stands w/ RW w/ min assistance on 2  Gait: patient initiallly stands w/ RW and forward bending, leaning on RW w/ forearms and asking to sit "I'm going to fall.".   After seated rest break, patient ambulates w/ rW and CG/occ min assistance and second person for safety and following closely w/ w/c, 110 feet. Rounded shoulders, sufficient foot clearance in swing, slow pace, no LOB, performs 3 turns  Patient declined another gait trial  W/c mobility: patient propels w/c w/ BUEs and supervision including turns 150 feet    AM-PAC 6 CLICK MOBILITY  13    Patient left  up in w/c   with call button in reach.    GOALS:   Multidisciplinary Problems       Physical Therapy Goals          Problem: Physical Therapy    Goal Priority Disciplines Outcome Goal Variances Interventions   Physical Therapy Goal     PT, PT/OT Ongoing, Progressing     Description: Goals to be met by: 7/28/23     Patient will increase functional independence with mobility by performing:    . Supine to sit with Set-up [place]  . Sit to supine with Set-up [place]  . Rolling to Left and Right with Set-up Assistance.  . Sit to stand transfer with Stand-by Assistance  . Bed to chair transfer with Stand-by Assistance using Rolling Walker/ No AD  . Gait  x 100 feet with Stand-by Assistance using Rolling " Walker.   . Wheelchair propulsion x150 feet with Modified [place] using bilateral uppper extremities  . Ascend/Descend 4 inch curb step with Minimal Assistance using Rolling Walker.                         Time Tracking:     PT Received On: 07/05/23  PT Start Time: 1053  PT Stop Time: 1125  PT Total Time (min): 32 min    Billable Minutes: Gait Training 16; therapeutic activity 16    Treatment Type: Treatment  PT/PTA: PT     Number of PTA visits since last PT visit: 0     07/05/2023

## 2023-07-05 NOTE — PROGRESS NOTES
[facility]                                  Skilled Nursing Facility                   Progress Note     Patient Name: Julio Benites  YOB: 1934  MRN: [id number]  Room: [place] [address]/[place] [address] A     Admit Date: 6/26/2023   KYLAH: 7/17/2023     Principal Problem: Severe malnutrition    HPI obtained from patient interview and chart review     Chief Complaint:   Re-evaluation of medical treatment and therapy status: lab review, cough    HPI:   Julio Benites is an 88 y.o. male with a past medical history of DM, NSTEMI, HTN, HLD, and systolic CHF who was recently treated for CAP  with azithromycin who presented to ED on 6/16/23 with abdominal pain. Admitted with colitis with fecal impaction/constipation. General surgery and GI consulted. Bowel regimen initiated following manual disimpaction and enema. EGD 06/19 showing grade D esophagitis, lavell-en-Y gastrojejunostomy with healthy-appearing anastomosis. MBSS 06/20 which showed laryngeal penetration with all consistencies. Patient declined PEG placement. Patient lives at home alone with support from grandson. Patient goal to discharge to [facility] where his wife resides. Patient and family considering hospice.    Patient will be treated at [facility] with PT and OT to improve functional status and ability to perform ADLs.     Interval History  24 hour chart review completed. NAEON. NAD.  Afebrile, some hypotension, parameters added to BP meds  Productive cough persists.Continue with scheduled and prn xopanex nebs; prn mucinex changed to scheduled x5 days initiated tessalon and robitussin prn.  Continue 5 day course augmentin 75 mg q 12 hours x 5 days (EOC 7/4). Iniitate xopanex nebs q 12 hour.  Inadequate appetite persists. Consuming 25% of 2 meals per day. PEG not desired per patient.  Pain management: patient denies pain.  Anticipating NH at time of SNF discharge. Patient and family  considering hospice; may consult hospice if indicated.      Allergies: Patient is allergic to lisinopril, nicoderm, and nicoderm cq [nicotine].      ROS  Constitutional:  Negative for appetite change and fever.   Respiratory:  Positive for cough, shortness of breath.    Cardiovascular:  Negative for chest pain and palpitations.   Gastrointestinal:  Negative for abdominal pain, nausea and vomiting.   Genitourinary:  Negative for dysuria.   Musculoskeletal:  Negative for arthralgias and back pain.   Neurological:  Positive for weakness. Negative for dizziness and light-headedness.   Psychiatric/Behavioral:  Negative for sleep disturbance. The patient is not nervous/anxious.     24 hour Vital Sign Range   Temp:  [97.6 °F (36.4 °C)-98 °F (36.7 °C)]   Pulse:  [67-82]   Resp:  [14-18]   BP: (101-143)/(57-84)   SpO2:  [97 %-99 %]       Physical Exam  Constitutional:       General: He is not in acute distress. Hard of hearing, dentures     Appearance: He is well-developed. He is cachectic. He is not diaphoretic.   Cardiovascular:      Rate and Rhythm: Normal rate and regular rhythm.      Heart sounds: S1 normal and S2 normal. Murmur heard.   Systolic murmur is present.   Pulmonary:      Effort: Pulmonary effort is normal. No respiratory distress.      Breath sounds: Normal breath sounds. No wheezing or rales.   Productive cough with clear thick sputum  Abdominal:      General: Bowel sounds are normal. There is no distension.      Palpations: Abdomen is soft, flat.      Tenderness: There is no abdominal tenderness.   Musculoskeletal:      Right lower leg: No edema.      Left lower leg: No edema.   Skin:     General: Skin is warm and dry, intact  Neurological:      Mental Status: He is alert and oriented to person, place, and time.   Psychiatric:         Mood and Affect: Mood normal.         Behavior: Behavior normal. Behavior is cooperative.         Thought Content: Thought content normal.           Labs:  Recent Labs   Lab  06/29/23 0426 07/04/23  0400   WBC 5.78 4.37   HGB 10.7* 9.7*   HCT 34.6* 31.0*    236     Recent Labs   Lab 06/29/23  0426 07/04/23  0400    140   K 3.7 4.1    104   CO2 33* 29   BUN 21 19   CREATININE 0.5 0.5   GLU 88 111*   CALCIUM 9.1 9.6   MG 1.7 1.8   PHOS 2.1* 3.0     No results for input(s): ALKPHOS, ALT, AST, ALBUMIN, PROT, BILITOT, INR in the last 168 hours.  No results for input(s): POCTGLUCOSE in the last 72 hours.    Meds Scheduled:   aspirin  81 mg Oral Daily    guaiFENesin  600 mg Oral BID    Lactobacillus rhamnosus GG  1 capsule Oral Daily    levalbuterol  0.63 mg Nebulization Q12H    losartan  100 mg Oral Daily    metoprolol succinate  12.5 mg Oral Daily    NIFEdipine  60 mg Oral BID    pantoprazole  40 mg Oral BID AC    pravastatin  20 mg Oral QHS    senna-docusate 8.6-50 mg  1 tablet Oral BID       PRN:   acetaminophen, acetaminophen, calcium carbonate, colchicine, dextromethorphan-guaiFENesin  mg/5 ml, levalbuterol, melatonin      Assessment and Plan:    Severe Protein Calorie Malnutrition  Cachexia  Dysphagia  - Continue bowel regimen.  - SLP and RD evaluated and following  - MBSS 6/20 showed laryngeal penetration with all consistencies.  - Declined PEG placement.  - Aspiration precautions  - Purred diet per SLP recommendations     Aspiration Pneumonia   CXR reviewed; Indicative of developing pna.   Started on augmentin 975 mg q 12 hours x 5 days and oral probiotic tab daily x 7 days..   Continue xopanex nebs q 12 hour and prn q 6 hours.   Changed prn mucinex to scheduled x5 days  Initiate tessalon, robitussin prn     Debility   - Continue with PT/OT for gait training and strengthening and restoration of ADL's   - Encourage mobility, OOB in chair, and early ambulation as appropriate  - Fall precautions   - Monitor for bowel and bladder dysfunction  - Monitor for and prevent skin breakdown and pressure ulcers     Grade D esophagitis  - GI consulted and underwent EGD 6/19  showing grade D esophagitis, lavell-en-Y gastrojejunostomy with healthy-appearing anastomosis.   - Protonix po 40 mg bid     Discharge planning issues  - Patient seeking halfway NH with hospice with his wife at [facility].  - Recurrent aspiration likely given MBSS findings. Patient declines PEG.  - CM consulted  - Offer hospice consult to patient in SNF     Chronic systolic heart failure  - Monitor for acute decompensation     Mixed hyperlipidemia  - Continue pravastatin     Essential hypertension  - Continue nifedipine at 60mg PO BID, losartan 100mg PO daily.        Anticipated Disposition:  [facility] nursing home +/- hospice      Follow-up needed during SNF admission:       Follow-up needed after discharge from SNF:   - PCP within 1-2 weeks  - See appt scheduled below     No future appointments.      I certify that SNF services are required to be given on an inpatient basis because Julio Benites needs for skilled nursing care and/or skilled rehabilitation are required on a daily basis and such services can only practically be provided in a skilled nursing facility setting and are for an ongoing condition for which she received inpatient care in the hospital.       Extended Visit:   Total time spent: 45 minutes  Description of Time: counseling provided on clinical condition, therapies provided, plan of care, emotional support, coordinating patient care with other care team members, reviewing and interpreting labs and imaging, collaboration with physician, initiating new orders, chart review, and documentation. See interval hx.         MAR BLANCHARD  Department of Hospital Medicine   [facility]     DOS: 7/5/2023       Patient note was created using MModal Dictation.  Any errors in syntax or even information may not have been identified and edited on initial review prior to signing this note.

## 2023-07-05 NOTE — PT/OT/SLP PROGRESS
"Speech Language Pathology  Treatment    Julio Benites   MRN: [id number]   Admitting Diagnosis: Severe malnutrition    Diet recommendations: Solid Diet Level: Puree  Liquid Diet Level: Thin 1 bite/sip at a time, Alternating bites/sips, Assistance with meals, Avoid talking while eating, Double swallow with each bite/sip, Frequent oral care, HOB to 90 degrees, Meds crushed in puree, Monitor for s/s of aspiration, No straws, Remain upright 30 minutes post meal, Small bites/sips, and Strict aspiration precautions    SLP Treatment Date: 07/05/23  Speech Start Time: 1429     Speech Stop Time: 1453     Speech Total (min): 24 min       TREATMENT BILLABLE MINUTES:  Treatment Swallowing Dysfunction 16 and Self Care/Home Management Training 8    Has the patient been evaluated by SLP for swallowing? : Yes  Keep patient NPO?: No   General Precautions: Standard, aspiration, fall, pureed diet, hearing impaired          Subjective:  "So you want met to stay away from the bananas?" Pt noted to have bananas and peanut butter crackers at bedside.  Pt stated he was eating them and not having any difficulty swallowing them. SLP explained that pureed diet has been recommended as safest PO diet at this time due to high risks of aspiration as shown on recent MBSS.  SLP moved bananas and crackers to end table to encourage pt to refrain from consuming solids at this time         Objective:      Pt seen for continued dysphagia therapy.  Pt noted to have audible crackly breath sounds prior to and after PO trials. Weak volitional cough produced.  Pt accepted PO trials of ice chips x 5 and tsp of thin water to facilitate production of Effortful swallows (x31).  Pt also performed Supraglottic swallows x 8, Falsetto x 10 (unable to perform pitch glides), basic tongue base retraction exercises x 20, and Kimberli maneuver x 4 out of 5 attempts.  Fatigue became evident while trying to complete additional Kimberli maneuver and Supraglottic swallows.  " Education was provided to pt regarding results of recent MBSS, high risks of aspiration, pureed as safest PO diet recommendations, dysphagia exercises, and SLP treatment plan and POC.  Pt demonstrated understanding of education provided, but will benefit from continued reinforcement.       Assessment:  Julio Benites is a 88 y.o. male with a medical diagnosis of Severe malnutrition and presents with dysphagia.     Discharge recommendations: Discharge Facility/Level of Care Needs:  (tbd)     Goals:   Multidisciplinary Problems       SLP Goals          Problem: SLP    Goal Priority Disciplines Outcome   SLP Goal     SLP    Description: Speech Language Pathology Goals  Goals expected to be met by 7/4 (goals remain appropriate - reassess on 7/12):   1. Pt will tolerate pureed consistencies and thin liquids with minimal s/s of aspiration.   2. Pt will perform dysphagia exercises x 5-10 to improve pharyngeal swallow.                                 Plan:   Patient to be seen Therapy Frequency: 3 x/week  Planned Interventions: Dysphagia Therapy  Plan of Care expires: 07/26/23  Plan of Care reviewed with: patient  SLP Follow-up?: Yes  SLP - Next Visit Date: 07/07/23 07/05/2023

## 2023-07-05 NOTE — PT/OT/SLP PROGRESS
Occupational Therapy   Treatment    Name: Julio Benites  MRN: [id number]  Admit Date: 6/26/2023  Admitting Diagnosis:  Severe malnutrition    General Precautions: Standard, aspiration, fall, pureed diet, hearing impaired   Orthopedic Precautions: N/A   Braces: N/A    Recommendations:     Discharge Recommendations:  home health OT  Level of Assistance Recommended at Discharge: 24 hours physical assistance for all ADL's and home management tasks  Discharge Equipment Recommendations: bedside commode, wheelchair, walker, rolling  Barriers to discharge:  Decreased caregiver support, Inaccessible home environment    Assessment:     Julio Benites is a 88 y.o. male with a medical diagnosis of Severe malnutrition.  He presents with. Performance deficits affecting function are weakness, impaired endurance, impaired self care skills, impaired functional mobility, gait instability, impaired balance, decreased ROM, impaired cardiopulmonary response to activity, decreased lower extremity function, decreased upper extremity function.     Rehab Potential is fair    Activity tolerance:  Fair    Plan:     Patient to be seen 5 x/week to address the above listed problems via self-care/home management, therapeutic activities, therapeutic exercises    Plan of Care Expires: 07/26/23  Plan of Care Reviewed with: patient    Subjective     Communicated with: nsg and Pt. prior to session. I am doing well today    Pain/Comfort:  Pain Rating 1: 0/10  Pain Rating Post-Intervention 1: 0/10    Patient's cultural, spiritual, Zoroastrian conflicts given the current situation:  no    Objective:     Patient found HOB elevated    upon OT entry to room.    Bed Mobility:    Patient completed Scooting/Bridging with minimum assistance  Patient completed Supine to Sit with minimum assistance     Functional Mobility/Transfers:  Patient completed Sit <> Stand Transfer with minimum assistance  with  rolling walker   Patient completed Bed <> Chair Transfer  using Stand Pivot technique with minimum assistance with rolling walker      Activities of Daily Living:  Grooming: stand by assistance to wipe face  Bathing: moderate assistance for BLE's Pt. Able to wash upper body  Upper Body Dressing: minimum assistance to darren pull over shirt (A) over head level  Lower Body Dressing: moderate assistance to darren BLE feet into pants and Pt.  Able to mange over hips with RW for bal    AMPAC 6 Click ADL: 17    OT Exercises: UE Ergometer 10 min    Treatment & Education:    Pt. With therex performed to increase ROM, endurance selfcare task and fxl mobility for independence     Pt edu on role of OT, POC, safety when performing self care tasks , benefit of performing OOB activity, and safety when performing functional transfers and mobility management for preparation with goals to progress towards next level of care     Patient left up in chair with all lines intact and call button in reach    GOALS:   Multidisciplinary Problems       Occupational Therapy Goals          Problem: Occupational Therapy    Goal Priority Disciplines Outcome Interventions   Occupational Therapy Goal     OT, PT/OT Ongoing, Progressing    Description: Goals to be met by:  7/26/2023    Patient will increase functional independence with ADLs by performing:    UE Dressing with Set-up.   LE Dressing with Min A, AE, and LRAD as needed.   Grooming while seated at sink with [place].  Toileting from toilet with Minimal Assistance for hygiene and clothing management with AE as needed and LRAD.  Bathing from  shower chair/bench with Supervision and AE as needed.  Stand pivot transfers with Min A for 100% t/f and LRAD.  Toilet transfer to toilet with Min A for 100% t/f and LRAD.  Upper extremity exercise program x10 reps per handout, with independence.                         Time Tracking:     OT Date of Treatment: 07/05/23  OT Start Time: 0935    OT Stop Time: 1130  OT Total Time (min): 40 min    Billable  Minutes:Self Care/Home Management 30  Therapeutic Exercise 10    7/5/2023

## 2023-07-06 PROCEDURE — 25000003 PHARM REV CODE 250: Mod: HCNC | Performed by: FAMILY MEDICINE

## 2023-07-06 PROCEDURE — 97535 SELF CARE MNGMENT TRAINING: CPT | Mod: HCNC,CO

## 2023-07-06 PROCEDURE — 25000242 PHARM REV CODE 250 ALT 637 W/ HCPCS: Mod: HCNC | Performed by: FAMILY MEDICINE

## 2023-07-06 PROCEDURE — 94761 N-INVAS EAR/PLS OXIMETRY MLT: CPT | Mod: HCNC

## 2023-07-06 PROCEDURE — 99900035 HC TECH TIME PER 15 MIN (STAT): Mod: HCNC

## 2023-07-06 PROCEDURE — 97530 THERAPEUTIC ACTIVITIES: CPT | Mod: HCNC,CQ

## 2023-07-06 PROCEDURE — 25000003 PHARM REV CODE 250: Mod: HCNC | Performed by: HOSPITALIST

## 2023-07-06 PROCEDURE — 94640 AIRWAY INHALATION TREATMENT: CPT | Mod: HCNC

## 2023-07-06 PROCEDURE — 11000004 HC SNF PRIVATE: Mod: HCNC

## 2023-07-06 RX ORDER — BENZONATATE 100 MG/1
100 CAPSULE ORAL 3 TIMES DAILY
Status: DISCONTINUED | OUTPATIENT
Start: 2023-07-07 | End: 2023-07-17 | Stop reason: HOSPADM

## 2023-07-06 RX ORDER — AMOXICILLIN AND CLAVULANATE POTASSIUM 875; 125 MG/1; MG/1
1 TABLET, FILM COATED ORAL EVERY 12 HOURS
Status: COMPLETED | OUTPATIENT
Start: 2023-07-06 | End: 2023-07-15

## 2023-07-06 RX ORDER — LEVALBUTEROL INHALATION SOLUTION 0.63 MG/3ML
0.63 SOLUTION RESPIRATORY (INHALATION) EVERY 12 HOURS
Status: DISCONTINUED | OUTPATIENT
Start: 2023-07-06 | End: 2023-07-17 | Stop reason: HOSPADM

## 2023-07-06 RX ADMIN — LOSARTAN POTASSIUM 100 MG: 50 TABLET, FILM COATED ORAL at 09:07

## 2023-07-06 RX ADMIN — METOPROLOL SUCCINATE 12.5 MG: 25 TABLET, EXTENDED RELEASE ORAL at 09:07

## 2023-07-06 RX ADMIN — GUAIFENESIN 600 MG: 600 TABLET, EXTENDED RELEASE ORAL at 09:07

## 2023-07-06 RX ADMIN — NIFEDIPINE 60 MG: 30 TABLET, FILM COATED, EXTENDED RELEASE ORAL at 09:07

## 2023-07-06 RX ADMIN — PANTOPRAZOLE SODIUM 40 MG: 40 TABLET, DELAYED RELEASE ORAL at 05:07

## 2023-07-06 RX ADMIN — AMOXICILLIN AND CLAVULANATE POTASSIUM 1 TABLET: 875; 125 TABLET, FILM COATED ORAL at 11:07

## 2023-07-06 RX ADMIN — SENNOSIDES AND DOCUSATE SODIUM 1 TABLET: 50; 8.6 TABLET ORAL at 09:07

## 2023-07-06 RX ADMIN — ASPIRIN 81 MG: 81 TABLET, COATED ORAL at 09:07

## 2023-07-06 RX ADMIN — LEVALBUTEROL HYDROCHLORIDE 0.63 MG: 0.63 SOLUTION RESPIRATORY (INHALATION) at 09:07

## 2023-07-06 RX ADMIN — LEVALBUTEROL HYDROCHLORIDE 0.63 MG: 0.63 SOLUTION RESPIRATORY (INHALATION) at 08:07

## 2023-07-06 RX ADMIN — Medication 1 CAPSULE: at 09:07

## 2023-07-06 RX ADMIN — PRAVASTATIN SODIUM 20 MG: 20 TABLET ORAL at 09:07

## 2023-07-06 NOTE — NURSING
Contacted patient daughter for Discharge plan, Daughter requested placement at [facility]. [facility] contacted via telephone awaiting response for bed availability.

## 2023-07-06 NOTE — PT/OT/SLP PROGRESS
"Occupational Therapy   Treatment    Name: Julio Benites  MRN: [id number]  Admit Date: 6/26/2023  Admitting Diagnosis:  Severe malnutrition    General Precautions: Standard, aspiration, fall, pureed diet, hearing impaired   Orthopedic Precautions: N/A   Braces: N/A    Recommendations:     Discharge Recommendations:  home health OT  Level of Assistance Recommended at Discharge: 24 hours physical assistance for all ADL's and home management tasks  Discharge Equipment Recommendations: bedside commode, wheelchair, walker, rolling  Barriers to discharge:  Decreased caregiver support, Inaccessible home environment    Assessment:     Julio Benites is a 88 y.o. male with a medical diagnosis of Severe malnutrition.  He presents with limitations in performance of self-care, functional mobility, and ADLs. Performance deficits affecting function are weakness, impaired endurance, impaired self care skills, impaired functional mobility, gait instability, impaired balance, decreased ROM, impaired cardiopulmonary response to activity, decreased lower extremity function, decreased upper extremity function. Pt tolerated Tx without incident and is making progress but continues to require assist to perform self care tasks, functional mobility and functional transfers. Pt would continue to benefit from OT intervention to further functional (I)ce and safety.     Rehab Potential is fair    Activity tolerance:  Fair    Plan:     Patient to be seen 5 x/week to address the above listed problems via self-care/home management, therapeutic activities, therapeutic exercises    Plan of Care Expires: 07/26/23  Plan of Care Reviewed with: patient    Subjective     "I'm not sitting in that chair for a long time like I did yesterday."    Communicated with: Nursing prior to session.     Pain/Comfort:  Pain Rating 1: 0/10  Pain Rating Post-Intervention 1: 0/10    Patient's cultural, spiritual, Mormonism conflicts given the current " situation:  no    Objective:     Patient found HOB elevated with  (no active lines) upon OT entry to room.    Bed Mobility:    Patient completed Rolling/Turning to Left with  stand by assistance and with side rail  Patient completed Rolling/Turning to Right with stand by assistance and with side rail  Patient completed Scooting/Bridging with minimum assistance  Patient completed Supine to Sit with moderate assistance and with side rail     Functional Mobility/Transfers:  Patient completed Sit <> Stand Transfer with moderate assistance  with  rolling walker   Patient completed Bed <> Chair Transfer using Stand Pivot technique with contact guard assistance with rolling walker    Activities of Daily Living:  Feeding:  Set Up Assistance Pt able to open caps/lids.  Grooming: supervision seated sinkside with set up  Upper Body Dressing: minimum assistance to darren pull over sweated with (A) to manage sweated down in back  Toileting: maximal assistance to doff/darren pull tab brief at bed level with pt completing front sarwat care with (A) for rear sarwat care.    [facility] 6 Click ADL: 17    Treatment & Education:  Pt educated on:  - role of OT  - level of assistance  - energy conservation and task modification to maximized independence with ADL's and mobility   -  safety while performing functional transfers and self care tasks  - progress towards OT goals      Patient left up in chair with call button in reach and PCT present    GOALS:   Multidisciplinary Problems       Occupational Therapy Goals          Problem: Occupational Therapy    Goal Priority Disciplines Outcome Interventions   Occupational Therapy Goal     OT, PT/OT Ongoing, Progressing    Description: Goals to be met by:  7/26/2023    Patient will increase functional independence with ADLs by performing:    UE Dressing with Set-up.   LE Dressing with Min A, AE, and LRAD as needed.   Grooming while seated at sink with [place].  Toileting from toilet with Minimal  Assistance for hygiene and clothing management with AE as needed and LRAD.  Bathing from  shower chair/bench with Supervision and AE as needed.  Stand pivot transfers with Min A for 100% t/f and LRAD.  Toilet transfer to toilet with Min A for 100% t/f and LRAD.  Upper extremity exercise program x10 reps per handout, with independence.                         Time Tracking:     OT Date of Treatment: 07/06/23  OT Start Time: 0745    OT Stop Time: 0811  OT Total Time (min): 26 min    Billable Minutes:Self Care/Home Management 26 7/6/2023

## 2023-07-06 NOTE — TREATMENT PLAN
"Rehab Services' DME recommendations    Julio Benites  MRN: [id number]          [x] Walker Adult (5'1"-6"6")    Wheels Yes     [x] Wheelchair  Number of hours up in a wheelchair per day 8 or less       Style Light weight        Justification for light weight w/c: patient cannot propel in a standard wheelchair, patient can and does self-propel in a lightweight wheelchair, patient has impaired ability to participate in MRADLs, mobility limitations cannot be sifficiently resolved with a cane/walker, the home provides adequate access between rooms for a wheelchair, a wheelchair will significantly improve the ability to participate in MRADLs and will be used in the home on a regular basis, the patient is willing to use a wheelchair in the home, and the patient has a caregiver who is available, willing, and able to provide assistance with the wheelchair    Seat Width 18 (Standard adult)    Seat Depth 18    Back Height Standard    Leg Support Standard and Swing Away    Arm Height Full and Swing Away    Lap Belt Velcro    Accessories Anti-tippers and Safety belt    Cushion Basic    Justification for Cushion maintain skin integrity    [x] 3 in 1 commode Standard    [x] Home health PT, OT, and Aide SLP    Kenisha Martinez, PTA 7/6/2023      "

## 2023-07-06 NOTE — PROGRESS NOTES
[facility]                                  Skilled Nursing Facility                   Progress Note     Patient Name: Julio Benites  YOB: 1934  MRN: [id number]  Room: [place] [address] [address] A     Admit Date: 6/26/2023   KYLAH: 7/17/2023     Principal Problem: Severe malnutrition    HPI obtained from patient interview and chart review     Chief Complaint: Re-evaluation of medical treatment and therapy status: cough    HPI:   Julio Benites is an 88 y.o. male with a past medical history of DM, NSTEMI, HTN, HLD, and systolic CHF who was recently treated for CAP  with azithromycin who presented to ED on 6/16/23 with abdominal pain. Admitted with colitis with fecal impaction/constipation. General surgery and GI consulted. Bowel regimen initiated following manual disimpaction and enema. EGD 06/19 showing grade D esophagitis, lavell-en-Y gastrojejunostomy with healthy-appearing anastomosis. MBSS 06/20 which showed laryngeal penetration with all consistencies. Patient declined PEG placement. Patient lives at home alone with support from grandson. Patient goal to discharge to [facility] where his wife resides. Patient and family considering hospice.    Patient will be treated at [facility] with PT and OT to improve functional status and ability to perform ADLs.     Interval History  24 hour chart review completed. NAEON. NAD.  Afebrile, VSS.  Frequent, coarse, productive cough persists.Resume q 12 hour scheduled xopanex tx and continue pr nebs;mucinex bid x5 days. initiated tessalon 100 mg tid. Cont robitussin prn.  Coarse RLL.  CXR ordered today, pending at present.  Will resume augmentin q 12 hours (5 day course ended 7/4 needs to be extended based on s/s).   Inadequate appetite persists. Consuming 25% of 2 meals per day. PEG not desired per patient.  Pain management: patient denies pain. He reports he wishes to transition to hospice and  join his wife in [facility] at discharge. CM aware and coordinating discharge plans; may consult hospice if indicated.      Allergies: Patient is allergic to lisinopril, nicoderm, and nicoderm cq [nicotine].      ROS  Constitutional:  Negative for appetite change and fever.   Respiratory:  Positive for cough, shortness of breath.    Cardiovascular:  Negative for chest pain and palpitations.   Gastrointestinal:  Negative for abdominal pain, nausea and vomiting.   Genitourinary:  Negative for dysuria.   Musculoskeletal:  Negative for arthralgias and back pain.   Neurological:  Positive for weakness. Negative for dizziness and light-headedness.   Psychiatric/Behavioral:  Negative for sleep disturbance. The patient is not nervous/anxious.     24 hour Vital Sign Range   Temp:  [97.4 °F (36.3 °C)-98 °F (36.7 °C)]   Pulse:  [62-84]   Resp:  [14-20]   BP: (101-139)/(57-79)   SpO2:  [97 %-99 %]       Physical Exam  Constitutional:       General: He is not in acute distress. Hard of hearing, dentures     Appearance: He is well-developed. He is cachectic. He is not diaphoretic.   Cardiovascular:      Rate and Rhythm: Normal rate and regular rhythm.      Heart sounds: S1 normal and S2 normal. Murmur heard.   Systolic murmur is present.   Pulmonary:      Effort: Pulmonary effort is normal. No respiratory distress.      Breath sounds: Normal breath sounds. No wheezing or rales.   Productive cough with clear thick sputum  Abdominal:      General: Bowel sounds are normal. There is no distension.      Palpations: Abdomen is soft, flat.      Tenderness: There is no abdominal tenderness.   Musculoskeletal:      Right lower leg: No edema.      Left lower leg: No edema.   Skin:     General: Skin is warm and dry, intact  Neurological:      Mental Status: He is alert and oriented to person, place, and time.   Psychiatric:         Mood and Affect: Mood normal.         Behavior: Behavior normal. Behavior is cooperative.         Thought  Content: Thought content normal.           Labs:  Recent Labs   Lab 07/04/23  0400   WBC 4.37   HGB 9.7*   HCT 31.0*          Recent Labs   Lab 07/04/23  0400      K 4.1      CO2 29   BUN 19   CREATININE 0.5   *   CALCIUM 9.6   MG 1.8   PHOS 3.0       No results for input(s): ALKPHOS, ALT, AST, ALBUMIN, PROT, BILITOT, INR in the last 168 hours.  No results for input(s): POCTGLUCOSE in the last 72 hours.    Meds Scheduled:   aspirin  81 mg Oral Daily    guaiFENesin  600 mg Oral BID    losartan  100 mg Oral Daily    metoprolol succinate  12.5 mg Oral Daily    NIFEdipine  60 mg Oral BID    pantoprazole  40 mg Oral BID AC    pravastatin  20 mg Oral QHS    senna-docusate 8.6-50 mg  1 tablet Oral BID       PRN:   acetaminophen, acetaminophen, calcium carbonate, colchicine, dextromethorphan-guaiFENesin  mg/5 ml, levalbuterol, melatonin      Assessment and Plan:    Severe Protein Calorie Malnutrition  Cachexia  Dysphagia  - Continue bowel regimen.  - SLP and RD evaluated and following  - MBSS 6/20 showed laryngeal penetration with all consistencies.  - Declined PEG placement.  - Aspiration precautions  - Purred diet per SLP recommendations     Aspiration Pneumonia   CXR reviewed; Indicative of developing pna.   Started on augmentin 975 mg q 12 hours x 5 days and oral probiotic tab daily x 7 days..   Continue xopanex nebs q 12 hour and prn q 6 hours.   Changed prn mucinex to scheduled x5 days  Initiate tessalon, robitussin prn  7/6: unstable, CXR ordered and Augmentin resumed - see interval hx     Debility   - Continue with PT/OT for gait training and strengthening and restoration of ADL's   - Encourage mobility, OOB in chair, and early ambulation as appropriate  - Fall precautions   - Monitor for bowel and bladder dysfunction  - Monitor for and prevent skin breakdown and pressure ulcers     Grade D esophagitis  - GI consulted and underwent EGD 6/19 showing grade D esophagitis, lavell-en-Y  gastrojejunostomy with healthy-appearing anastomosis.   - Protonix po 40 mg bid     Discharge planning issues  - Patient seeking long-term NH with hospice with his wife at [place].  - Recurrent aspiration likely given MBSS findings. Patient declines PEG.  - CM consulted  - Offer hospice consult to patient in SNF     Chronic systolic heart failure  - Monitor for acute decompensation     Mixed hyperlipidemia  - Continue pravastatin     Essential hypertension  - Continue nifedipine at 60mg PO BID, losartan 100mg PO daily.        Anticipated Disposition:  longterm nursing home +/- hospice.       Follow-up needed during SNF admission:       Follow-up needed after discharge from SNF:   - PCP within 1-2 weeks  - See appt scheduled below     No future appointments.      I certify that SNF services are required to be given on an inpatient basis because Julio XAVIER Montytereza needs for skilled nursing care and/or skilled rehabilitation are required on a daily basis and such services can only practically be provided in a skilled nursing facility setting and are for an ongoing condition for which she received inpatient care in the hospital.       Extended Visit:   Total time spent: > 46 minutes  Description of Time: counseling provided on clinical condition, therapies provided, plan of care, emotional support, coordinating patient care with other care team members, reviewing and interpreting labs and imaging, collaboration with physician, initiating new orders, chart review, and documentation. See interval hx.         Nicolette Tripp NP  Department of Hospital Medicine   [facility]     DOS: 7/6/2023

## 2023-07-06 NOTE — PT/OT/SLP PROGRESS
"Physical Therapy Treatment    Patient Name:  Julio Benites   MRN:  [id number]  Admit Date: 6/26/2023  Admitting Diagnosis: Severe malnutrition  Recent Surgeries:     General Precautions: Standard, aspiration, fall, hearing impaired, pureed diet (DNR)  Orthopedic Precautions: N/A  Braces: N/A    Recommendations:     Discharge Recommendations: home health PT  Level of Assistance Recommended at Discharge: 24 hours significant assistance  Discharge Equipment Recommendations: bedside commode, wheelchair, walker, rolling  Barriers to discharge: Decreased caregiver support, Inaccessible home environment    Assessment:     Julio Benites is a 88 y.o. male admitted with a medical diagnosis of Severe malnutrition . Pt with limited session, despite max education/encouragement, pt declined 2* to SOB and inc fatigue, nsg notified, 02 sats 98% -106 supine,  pt would continue to benefit from skilled PT services to improve overall functional mobility, strength and endurance.  .      Performance deficits affecting function: weakness, impaired endurance, impaired self care skills, impaired functional mobility, gait instability, impaired balance, decreased upper extremity function, decreased lower extremity function, impaired cardiopulmonary response to activity.    Rehab Potential is good and fair    Activity Tolerance: Fair and Poor    Plan:     Patient to be seen 5 x/week to address the above listed problems via gait training, therapeutic activities, therapeutic exercises, neuromuscular re-education, wheelchair management/training    Plan of Care Expires: 07/28/23  Plan of Care Reviewed with: patient    Subjective     "I' can't, I'm  short of breath, I can't breath and so tired" .     Pain/Comfort:  Pain Rating 1: 0/10  Pain Rating Post-Intervention 1: 0/10    Patient's cultural, spiritual, Denominational conflicts given the current situation:  no    Objective:     Communicated with nsg re pts c/o SOB/fatigue/02 sat and HR  " Patient found with  (in wc) upon PT entry to room.     Therapeutic Activities and Exercises: declined    Functional Mobility:  Bed Mobility:     Sit to Supine: minimum assistance and mostly with BLE   Transfers:     Sit to Stand:  minimum assistance with rolling walker vcs for tech  Bed to Chair: minimum assistance with  rolling walker  using  Stand Pivot vcs for seq  Scooting min A while sitting EOB to HOB    AM-PAC 6 CLICK MOBILITY  15    Patient left supine with call button in reach, nsg notified, and belonging sin reach .    GOALS:   Multidisciplinary Problems       Physical Therapy Goals          Problem: Physical Therapy    Goal Priority Disciplines Outcome Goal Variances Interventions   Physical Therapy Goal     PT, PT/OT Ongoing, Progressing     Description: Goals to be met by: 7/28/23     Patient will increase functional independence with mobility by performing:    . Supine to sit with Set-up [place]  . Sit to supine with Set-up [place]  . Rolling to Left and Right with Set-up Assistance.  . Sit to stand transfer with Stand-by Assistance  . Bed to chair transfer with Stand-by Assistance using Rolling Walker/ No AD  . Gait  x 100 feet with Stand-by Assistance using Rolling Walker.   . Wheelchair propulsion x150 feet with Modified [place] using bilateral uppper extremities  . Ascend/Descend 4 inch curb step with Minimal Assistance using Rolling Walker.                         Time Tracking:     PT Received On: 07/06/23  PT Start Time: 0849  PT Stop Time: 0901  PT Total Time (min): 12 min    Billable Minutes: Therapeutic Activity 12    Treatment Type: Treatment  PT/PTA: PTA     Number of PTA visits since last PT visit: 1 07/06/2023

## 2023-07-06 NOTE — PLAN OF CARE
Problem: Adult Inpatient Plan of Care  Goal: Plan of Care Review  Outcome: Ongoing, Progressing  Goal: Patient-Specific Goal (Individualized)  Outcome: Ongoing, Progressing     Problem: Diabetes Comorbidity  Goal: Blood Glucose Level Within Targeted Range  Outcome: Ongoing, Progressing     Problem: Skin Injury Risk Increased  Goal: Skin Health and Integrity  Outcome: Ongoing, Progressing     Problem: Malnutrition  Goal: Improved Nutritional Intake  Outcome: Ongoing, Progressing

## 2023-07-07 LAB
ANION GAP SERPL CALC-SCNC: 7 MMOL/L (ref 8–16)
BASOPHILS # BLD AUTO: 0.03 K/UL (ref 0–0.2)
BASOPHILS NFR BLD: 0.4 % (ref 0–1.9)
BUN SERPL-MCNC: 20 MG/DL (ref 8–23)
CALCIUM SERPL-MCNC: 9.1 MG/DL (ref 8.7–10.5)
CHLORIDE SERPL-SCNC: 103 MMOL/L (ref 95–110)
CO2 SERPL-SCNC: 29 MMOL/L (ref 23–29)
CREAT SERPL-MCNC: 0.6 MG/DL (ref 0.5–1.4)
DIFFERENTIAL METHOD: ABNORMAL
EOSINOPHIL # BLD AUTO: 0 K/UL (ref 0–0.5)
EOSINOPHIL NFR BLD: 0.6 % (ref 0–8)
ERYTHROCYTE [DISTWIDTH] IN BLOOD BY AUTOMATED COUNT: 16.7 % (ref 11.5–14.5)
EST. GFR  (NO RACE VARIABLE): >60 ML/MIN/1.73 M^2
GLUCOSE SERPL-MCNC: 88 MG/DL (ref 70–110)
HCT VFR BLD AUTO: 28 % (ref 40–54)
HGB BLD-MCNC: 8.5 G/DL (ref 14–18)
IMM GRANULOCYTES # BLD AUTO: 0.03 K/UL (ref 0–0.04)
IMM GRANULOCYTES NFR BLD AUTO: 0.4 % (ref 0–0.5)
LYMPHOCYTES # BLD AUTO: 1.1 K/UL (ref 1–4.8)
LYMPHOCYTES NFR BLD: 15.9 % (ref 18–48)
MAGNESIUM SERPL-MCNC: 1.8 MG/DL (ref 1.6–2.6)
MCH RBC QN AUTO: 24.9 PG (ref 27–31)
MCHC RBC AUTO-ENTMCNC: 30.4 G/DL (ref 32–36)
MCV RBC AUTO: 82 FL (ref 82–98)
MONOCYTES # BLD AUTO: 0.4 K/UL (ref 0.3–1)
MONOCYTES NFR BLD: 6.4 % (ref 4–15)
NEUTROPHILS # BLD AUTO: 5.3 K/UL (ref 1.8–7.7)
NEUTROPHILS NFR BLD: 76.3 % (ref 38–73)
NRBC BLD-RTO: 0 /100 WBC
PHOSPHATE SERPL-MCNC: 2.6 MG/DL (ref 2.7–4.5)
PLATELET # BLD AUTO: 204 K/UL (ref 150–450)
PMV BLD AUTO: 11.3 FL (ref 9.2–12.9)
POTASSIUM SERPL-SCNC: 3.9 MMOL/L (ref 3.5–5.1)
RBC # BLD AUTO: 3.42 M/UL (ref 4.6–6.2)
SODIUM SERPL-SCNC: 139 MMOL/L (ref 136–145)
WBC # BLD AUTO: 6.9 K/UL (ref 3.9–12.7)

## 2023-07-07 PROCEDURE — 97535 SELF CARE MNGMENT TRAINING: CPT | Mod: HCNC,CO

## 2023-07-07 PROCEDURE — 36415 COLL VENOUS BLD VENIPUNCTURE: CPT | Mod: HCNC | Performed by: FAMILY MEDICINE

## 2023-07-07 PROCEDURE — 25000003 PHARM REV CODE 250: Mod: HCNC | Performed by: FAMILY MEDICINE

## 2023-07-07 PROCEDURE — 11000004 HC SNF PRIVATE: Mod: HCNC

## 2023-07-07 PROCEDURE — 83735 ASSAY OF MAGNESIUM: CPT | Mod: HCNC | Performed by: FAMILY MEDICINE

## 2023-07-07 PROCEDURE — 92526 ORAL FUNCTION THERAPY: CPT | Mod: HCNC

## 2023-07-07 PROCEDURE — 86580 TB INTRADERMAL TEST: CPT | Mod: HCNC | Performed by: NURSE PRACTITIONER

## 2023-07-07 PROCEDURE — 97110 THERAPEUTIC EXERCISES: CPT | Mod: HCNC

## 2023-07-07 PROCEDURE — 25000003 PHARM REV CODE 250: Mod: HCNC | Performed by: NURSE PRACTITIONER

## 2023-07-07 PROCEDURE — 84100 ASSAY OF PHOSPHORUS: CPT | Mod: HCNC | Performed by: FAMILY MEDICINE

## 2023-07-07 PROCEDURE — 94640 AIRWAY INHALATION TREATMENT: CPT | Mod: HCNC

## 2023-07-07 PROCEDURE — 97535 SELF CARE MNGMENT TRAINING: CPT | Mod: HCNC

## 2023-07-07 PROCEDURE — 25000242 PHARM REV CODE 250 ALT 637 W/ HCPCS: Mod: HCNC | Performed by: FAMILY MEDICINE

## 2023-07-07 PROCEDURE — 94761 N-INVAS EAR/PLS OXIMETRY MLT: CPT | Mod: HCNC

## 2023-07-07 PROCEDURE — 25000003 PHARM REV CODE 250: Mod: HCNC | Performed by: HOSPITALIST

## 2023-07-07 PROCEDURE — 80048 BASIC METABOLIC PNL TOTAL CA: CPT | Mod: HCNC | Performed by: FAMILY MEDICINE

## 2023-07-07 PROCEDURE — 97116 GAIT TRAINING THERAPY: CPT | Mod: HCNC

## 2023-07-07 PROCEDURE — 85025 COMPLETE CBC W/AUTO DIFF WBC: CPT | Mod: HCNC | Performed by: FAMILY MEDICINE

## 2023-07-07 PROCEDURE — 30200315 PPD INTRADERMAL TEST REV CODE 302: Mod: HCNC | Performed by: NURSE PRACTITIONER

## 2023-07-07 RX ORDER — SODIUM,POTASSIUM PHOSPHATES 280-250MG
2 POWDER IN PACKET (EA) ORAL EVERY 4 HOURS
Status: COMPLETED | OUTPATIENT
Start: 2023-07-07 | End: 2023-07-07

## 2023-07-07 RX ADMIN — BENZONATATE 100 MG: 100 CAPSULE ORAL at 08:07

## 2023-07-07 RX ADMIN — POTASSIUM & SODIUM PHOSPHATES POWDER PACK 280-160-250 MG 2 PACKET: 280-160-250 PACK at 06:07

## 2023-07-07 RX ADMIN — BENZONATATE 100 MG: 100 CAPSULE ORAL at 05:07

## 2023-07-07 RX ADMIN — BENZONATATE 100 MG: 100 CAPSULE ORAL at 09:07

## 2023-07-07 RX ADMIN — AMOXICILLIN AND CLAVULANATE POTASSIUM 1 TABLET: 875; 125 TABLET, FILM COATED ORAL at 08:07

## 2023-07-07 RX ADMIN — LOSARTAN POTASSIUM 100 MG: 50 TABLET, FILM COATED ORAL at 08:07

## 2023-07-07 RX ADMIN — ASPIRIN 81 MG: 81 TABLET, COATED ORAL at 08:07

## 2023-07-07 RX ADMIN — LEVALBUTEROL HYDROCHLORIDE 0.63 MG: 0.63 SOLUTION RESPIRATORY (INHALATION) at 06:07

## 2023-07-07 RX ADMIN — NIFEDIPINE 60 MG: 30 TABLET, FILM COATED, EXTENDED RELEASE ORAL at 09:07

## 2023-07-07 RX ADMIN — POTASSIUM & SODIUM PHOSPHATES POWDER PACK 280-160-250 MG 2 PACKET: 280-160-250 PACK at 02:07

## 2023-07-07 RX ADMIN — Medication 6 MG: at 09:07

## 2023-07-07 RX ADMIN — TUBERCULIN PURIFIED PROTEIN DERIVATIVE 5 UNITS: 5 INJECTION, SOLUTION INTRADERMAL at 02:07

## 2023-07-07 RX ADMIN — METOPROLOL SUCCINATE 12.5 MG: 25 TABLET, EXTENDED RELEASE ORAL at 08:07

## 2023-07-07 RX ADMIN — SENNOSIDES AND DOCUSATE SODIUM 1 TABLET: 50; 8.6 TABLET ORAL at 08:07

## 2023-07-07 RX ADMIN — GUAIFENESIN 600 MG: 600 TABLET, EXTENDED RELEASE ORAL at 09:07

## 2023-07-07 RX ADMIN — GUAIFENESIN 600 MG: 600 TABLET, EXTENDED RELEASE ORAL at 08:07

## 2023-07-07 RX ADMIN — LEVALBUTEROL HYDROCHLORIDE 0.63 MG: 0.63 SOLUTION RESPIRATORY (INHALATION) at 07:07

## 2023-07-07 RX ADMIN — NIFEDIPINE 60 MG: 30 TABLET, FILM COATED, EXTENDED RELEASE ORAL at 08:07

## 2023-07-07 RX ADMIN — PRAVASTATIN SODIUM 20 MG: 20 TABLET ORAL at 09:07

## 2023-07-07 RX ADMIN — PANTOPRAZOLE SODIUM 40 MG: 40 TABLET, DELAYED RELEASE ORAL at 04:07

## 2023-07-07 RX ADMIN — AMOXICILLIN AND CLAVULANATE POTASSIUM 1 TABLET: 875; 125 TABLET, FILM COATED ORAL at 09:07

## 2023-07-07 RX ADMIN — PANTOPRAZOLE SODIUM 40 MG: 40 TABLET, DELAYED RELEASE ORAL at 06:07

## 2023-07-07 NOTE — PROGRESS NOTES
[facility]                                  Skilled Nursing Facility                   Progress Note     Patient Name: Julio Benites  YOB: 1934  MRN: [id number]  Room: [place] [address]/[place] [address] A     Admit Date: 6/26/2023   KYLAH: 7/17/2023     Principal Problem: Severe malnutrition    HPI obtained from patient interview and chart review     Chief Complaint: Re-evaluation of medical treatment and therapy status: cough and cxr    HPI:   Julio Benites is an 88 y.o. male with a past medical history of DM, NSTEMI, HTN, HLD, and systolic CHF who was recently treated for CAP  with azithromycin who presented to ED on 6/16/23 with abdominal pain. Admitted with colitis with fecal impaction/constipation. General surgery and GI consulted. Bowel regimen initiated following manual disimpaction and enema. EGD 06/19 showing grade D esophagitis, lavell-en-Y gastrojejunostomy with healthy-appearing anastomosis. MBSS 06/20 which showed laryngeal penetration with all consistencies. Patient declined PEG placement. Patient lives at home alone with support from grandson. Patient goal to discharge to [facility] where his wife resides. Patient and family considering hospice.    Patient will be treated at [facility] with PT and OT to improve functional status and ability to perform ADLs.     Interval History  24 hour chart review completed. VJ. NAD.  Afebrile, VSS. initiated potassium phosphate 280-160-250 mg 2 packet q4h x 2 doses  Frequent, coarse, productive cough improved with q 12 hour scheduled xopanex tx and continue prn nebs;mucinex bid x5 days and tessalon 100 mg tid. Cont robitussin prn.  CXR reviewed and revealed Stable enlargement of cardiopericardial silhouette, arch calcification, and normal pulmonary vasculature.  Reconfirmed and not felt obviously changed multiple scattered calcified variable size pleural plaques.  Stable bilateral coarse  interstitial markings.  Stable more confluent left lower lobe-retrocardiac opacities.  No definite pleural fluid blunting the right or left costophrenic sulci and no right or left pneumothorax.   Continue Augmentin q 12 hours.   Pt wishes to transition to hospice and join his wife in [facility] at discharge. CM aware and coordinating discharge plans; may consult hospice if indicated.  Initiated order for TB skin test.  Labs reviewed. Phos 2.6,      Allergies: Patient is allergic to lisinopril, nicoderm, and nicoderm cq [nicotine].      ROS  Constitutional:  Negative for appetite change and fever.   Respiratory:  Positive for cough, shortness of breath.    Cardiovascular:  Negative for chest pain and palpitations.   Gastrointestinal:  Negative for abdominal pain, nausea and vomiting.   Genitourinary:  Negative for dysuria.   Musculoskeletal:  Negative for arthralgias and back pain.   Neurological:  Positive for weakness. Negative for dizziness and light-headedness.   Psychiatric/Behavioral:  Negative for sleep disturbance. The patient is not nervous/anxious.     24 hour Vital Sign Range   Temp:  [98.1 °F (36.7 °C)-98.9 °F (37.2 °C)]   Pulse:  [71-82]   Resp:  [14-20]   BP: (103-131)/(66-71)   SpO2:  [95 %-100 %]       Physical Exam  Constitutional:       General: He is not in acute distress. Hard of hearing, dentures     Appearance: He is well-developed. He is cachectic. He is not diaphoretic.   Cardiovascular:      Rate and Rhythm: Normal rate and regular rhythm.      Heart sounds: S1 normal and S2 normal. Murmur heard.   Systolic murmur is present.   Pulmonary:      Effort: Pulmonary effort is normal. No respiratory distress.      Breath sounds: Normal breath sounds. No wheezing or rales.   Productive cough with clear thick sputum  Abdominal:      General: Bowel sounds are normal. There is no distension.      Palpations: Abdomen is soft, flat.      Tenderness: There is no abdominal tenderness.   Musculoskeletal:       Right lower leg: No edema.      Left lower leg: No edema.   Skin:     General: Skin is warm and dry, intact  Neurological:      Mental Status: He is alert and oriented to person, place, and time.   Psychiatric:         Mood and Affect: Mood normal.         Behavior: Behavior normal. Behavior is cooperative.         Thought Content: Thought content normal.           Labs:  Recent Labs   Lab 07/04/23  0400 07/07/23  0519   WBC 4.37 6.90   HGB 9.7* 8.5*   HCT 31.0* 28.0*    204       Recent Labs   Lab 07/04/23  0400 07/07/23  0519    139   K 4.1 3.9    103   CO2 29 29   BUN 19 20   CREATININE 0.5 0.6   * 88   CALCIUM 9.6 9.1   MG 1.8 1.8   PHOS 3.0 2.6*       No results for input(s): ALKPHOS, ALT, AST, ALBUMIN, PROT, BILITOT, INR in the last 168 hours.  No results for input(s): POCTGLUCOSE in the last 72 hours.    Meds Scheduled:   amoxicillin-clavulanate 875-125mg  1 tablet Oral Q12H    aspirin  81 mg Oral Daily    benzonatate  100 mg Oral TID    guaiFENesin  600 mg Oral BID    levalbuterol  0.63 mg Nebulization Q12H    losartan  100 mg Oral Daily    metoprolol succinate  12.5 mg Oral Daily    NIFEdipine  60 mg Oral BID    pantoprazole  40 mg Oral BID AC    pravastatin  20 mg Oral QHS    senna-docusate 8.6-50 mg  1 tablet Oral BID       PRN:   acetaminophen, acetaminophen, calcium carbonate, colchicine, dextromethorphan-guaiFENesin  mg/5 ml, levalbuterol, melatonin      Assessment and Plan:    Hypophosphatemia, new 7/7/23 7/7/2023 Initiated potassium phosphate 280-160-250 mg 2 packet q4h x 2 doses    Severe Protein Calorie Malnutrition  Cachexia  Dysphagia  - Continue bowel regimen.  - SLP and RD evaluated and following  - MBSS 6/20 showed laryngeal penetration with all consistencies.  - Declined PEG placement.  7/7/2023   - Aspiration precautions  - Purred diet per SLP recommendations     Aspiration Pneumonia   CXR reviewed; Indicative of developing pna.   Started on augmentin 975 mg  q 12 hours x 5 days and oral probiotic tab daily x 7 days..   Continue xopanex nebs q 12 hour and prn q 6 hours.   Changed prn mucinex to scheduled x5 days  Initiate tessalon, robitussin prn  7/6: unstable, CXR ordered and Augmentin resumed - see interval hx  7/7/2023    Continue q 12 hour scheduled xopanex tx and continue prn nebs, mucinex bid x5 days and tessalon 100 mg tid, and Cont robitussin prn.  CXR reviewed and revealed Stable enlargement of cardiopericardial silhouette, arch calcification, and normal pulmonary vasculature.  Reconfirmed and not felt obviously changed multiple scattered calcified variable size pleural plaques.  Stable bilateral coarse interstitial markings.  Stable more confluent left lower lobe-retrocardiac opacities.  No definite pleural fluid blunting the right or left costophrenic sulci and no right or left pneumothorax.   Continue Augmentin q 12 hours.      Debility  7/7/2023   - Continue with PT/OT for gait training and strengthening and restoration of ADL's   - Encourage mobility, OOB in chair, and early ambulation as appropriate  - Fall precautions   - Monitor for bowel and bladder dysfunction  - Monitor for and prevent skin breakdown and pressure ulcers     Grade D esophagitis  - GI consulted and underwent EGD 6/19 showing grade D esophagitis, lavell-en-Y gastrojejunostomy with healthy-appearing anastomosis.  7/7/2023 Protonix po 40 mg bid     Discharge planning issues  - Patient seeking [facility] NH with hospice with his wife at [facility].  - Recurrent aspiration likely given MBSS findings. Patient declines PEG.  - CM consulted  - Offer hospice consult to patient in SNF  7/7/2023   Pt wishes to transition to hospice and join his wife in [facility] at discharge. CM aware and coordinating discharge plans; may consult hospice if indicated.  Initiated order for TB skin test.     Chronic systolic heart failure  - Monitor for acute decompensation     Mixed hyperlipidemia  - Continue  pravastatin     Essential hypertension  - Continue nifedipine at 60mg PO BID, losartan 100mg PO daily.        Anticipated Disposition:  assisted nursing home +/- hospice.       Follow-up needed during SNF admission:       Follow-up needed after discharge from SNF:   - PCP within 1-2 weeks  - See appt scheduled below     No future appointments.      I certify that SNF services are required to be given on an inpatient basis because Julio Benites needs for skilled nursing care and/or skilled rehabilitation are required on a daily basis and such services can only practically be provided in a skilled nursing facility setting and are for an ongoing condition for which she received inpatient care in the hospital.       Total time spent: > 45 minutes  Description of Time: counseling provided on clinical condition, therapies provided, plan of care, emotional support, coordinating patient care with other care team members, reviewing and interpreting labs and imaging, collaboration with physician, initiating new orders, chart review, and documentation. See interval hx.        Joseph Huffman NP  Department of Hospital Medicine   [facility]     DOS: 7/7/2023

## 2023-07-07 NOTE — PT/OT/SLP PROGRESS
Occupational Therapy   Treatment    Name: Julio Benites  MRN: [id number]  Admit Date: 6/26/2023  Admitting Diagnosis:  Severe malnutrition    General Precautions: Standard, aspiration, fall, pureed diet, hearing impaired   Orthopedic Precautions: N/A   Braces: N/A    Recommendations:     Discharge Recommendations:  home health OT  Level of Assistance Recommended at Discharge: 24 hours physical assistance for all ADL's and home management tasks  Discharge Equipment Recommendations: bedside commode, wheelchair, walker, rolling  Barriers to discharge:  Decreased caregiver support, Inaccessible home environment    Assessment:     Julio Benites is a 88 y.o. male with a medical diagnosis of Severe malnutrition.  He presents with. Performance deficits affecting function are weakness, impaired endurance, impaired self care skills, impaired functional mobility, gait instability, impaired balance, decreased ROM, impaired cardiopulmonary response to activity, decreased lower extremity function, decreased upper extremity function.     Pt. Was cooperative and participated well with session on this day. Pt continues to require (A) for selfcare and t/fs management for safety. Pt.  continues to demonstrate levels of physical deficits with  functional indep with daily management activities tasks, selfcare skills with balance,  functional mobility, UB strength and endurance. Pt. Will continue to benefit from continued OT to progress towards goals    Rehab Potential is fair    Activity tolerance:  Fair    Plan:     Patient to be seen 5 x/week to address the above listed problems via self-care/home management, therapeutic activities, therapeutic exercises    Plan of Care Expires: 07/26/23  Plan of Care Reviewed with: patient    Subjective     Communicated with: nsg and Pt. prior to session. I am doing well today    Pain/Comfort:  Pain Rating 1: 0/10  Pain Rating Post-Intervention 1: 0/10    Patient's cultural, spiritual, Pentecostal  conflicts given the current situation:  no    Objective:     Patient found HOB elevated    upon OT entry to room.    Bed Mobility:    Patient completed Rolling/Turning to Left with  contact guard assistance  Patient completed Rolling/Turning to Right with contact guard assistance  Patient completed Scooting/Bridging with minimum assistance  Patient completed Supine to Sit with minimum assistance     Functional Mobility/Transfers:  Patient completed Sit <> Stand Transfer with minimum assistance  with  rolling walker   Patient completed Bed <> Chair Transfer using Stand Pivot technique with minimum assistance with rolling walker    Activities of Daily Living:  Grooming: stand by assistance to wipe face  Bathing: moderate assistance for BLE's and post sarwat area Pt. Able to wipe upper body  Upper Body Dressing: minimum assistance to darren pull over shirt (A) over head level  Lower Body Dressing: maximal assistance to darren BLE feet into pants and Pt. able to mange over hips with RW for bal  Toileting: total assistance to clean BM and apply fresh diaper management     [facility] 6 Click ADL: 17    Treatment & Education:    Pt edu on role of OT, POC, safety when performing self care tasks , benefit of performing OOB activity, and safety when performing functional transfers and mobility management for preparation with goals to progress towards next level of care     Patient left up in chair with all lines intact and call button in reach    GOALS:   Multidisciplinary Problems       Occupational Therapy Goals          Problem: Occupational Therapy    Goal Priority Disciplines Outcome Interventions   Occupational Therapy Goal     OT, PT/OT Ongoing, Progressing    Description: Goals to be met by:  7/26/2023    Patient will increase functional independence with ADLs by performing:    UE Dressing with Set-up.   LE Dressing with Min A, AE, and LRAD as needed.   Grooming while seated at sink with [place].  Toileting from toilet with  Minimal Assistance for hygiene and clothing management with AE as needed and LRAD.  Bathing from  shower chair/bench with Supervision and AE as needed.  Stand pivot transfers with Min A for 100% t/f and LRAD.  Toilet transfer to toilet with Min A for 100% t/f and LRAD.  Upper extremity exercise program x10 reps per handout, with independence.                         Time Tracking:     OT Date of Treatment: 07/07/23  OT Start Time: 0920    OT Stop Time: 0950  OT Total Time (min): 30 min    Billable Minutes:Self Care/Home Management 30    7/7/2023

## 2023-07-07 NOTE — PT/OT/SLP PROGRESS
Physical Therapy Treatment    Patient Name:  Julio Benites   MRN:  [id number]  Admit Date: 6/26/2023  Admitting Diagnosis: Severe malnutrition      General Precautions: Standard, aspiration, fall, hearing impaired, pureed diet (DNR)  Orthopedic Precautions: N/A  Braces: N/A    Recommendations:     Discharge Recommendations: home health PT  Level of Assistance Recommended at Discharge: 24 hours light assistance  Discharge Equipment Recommendations: bedside commode, wheelchair, walker, rolling  Barriers to discharge: Decreased caregiver support, Inaccessible home environment    Assessment:     Julio Benites is a 88 y.o. male admitted with a medical diagnosis of Severe malnutrition .   Performance deficits affecting function: weakness, impaired endurance, impaired self care skills, impaired functional mobility, gait instability, impaired balance, decreased upper extremity function, decreased lower extremity function, impaired cardiopulmonary response to activity.    Rehab Potential is good    Activity Tolerance: Fair    Plan:     Patient to be seen 5 x/week to address the above listed problems via gait training, therapeutic activities, therapeutic exercises, neuromuscular re-education, wheelchair management/training    Plan of Care Expires: 07/28/23  Plan of Care Reviewed with: patient    Subjective     Pt. Agreeable to work with PT.     Pain/Comfort:  Pain Rating 1: 0/10  Pain Rating Post-Intervention 1: 0/10    Patient's cultural, spiritual, Confucianism conflicts given the current situation:  no    Objective:     Communicated with pt's nurse prior to session.  Patient found up in chair with   upon PT entry to room.     Therapeutic Activities and Exercises: UBE with moderate resistance for 10mins to help improve pt's cardiovascular endurance.    Functional Mobility:  Transfers:     Sit to Stand:  minimum assistance with rolling walker  Gait: ~70ft +80ft with RW and CGA for safety. Pt needed a seated rest break d.t  fatigue    AM-PAC 6 CLICK MOBILITY  16    Patient left up in chair with call button in reach.    GOALS:   Multidisciplinary Problems       Physical Therapy Goals          Problem: Physical Therapy    Goal Priority Disciplines Outcome Goal Variances Interventions   Physical Therapy Goal     PT, PT/OT Ongoing, Progressing     Description: Goals to be met by: 7/28/23     Patient will increase functional independence with mobility by performing:    . Supine to sit with Set-up [place]  . Sit to supine with Set-up [place]  . Rolling to Left and Right with Set-up Assistance.  . Sit to stand transfer with Stand-by Assistance  . Bed to chair transfer with Stand-by Assistance using Rolling Walker/ No AD  . Gait  x 100 feet with Stand-by Assistance using Rolling Walker.   . Wheelchair propulsion x150 feet with Modified [place] using bilateral uppper extremities  . Ascend/Descend 4 inch curb step with Minimal Assistance using Rolling Walker.                         Time Tracking:     PT Received On: 07/07/23  PT Start Time: 1007  PT Stop Time: 1035  PT Total Time (min): 28 min    Billable Minutes: Gait Training 18 and Therapeutic Exercise 10    Treatment Type: Treatment  PT/PTA: PT     Number of PTA visits since last PT visit: 0     07/07/2023

## 2023-07-08 PROCEDURE — 25000003 PHARM REV CODE 250: Mod: HCNC | Performed by: FAMILY MEDICINE

## 2023-07-08 PROCEDURE — 94640 AIRWAY INHALATION TREATMENT: CPT | Mod: HCNC

## 2023-07-08 PROCEDURE — 94761 N-INVAS EAR/PLS OXIMETRY MLT: CPT | Mod: HCNC

## 2023-07-08 PROCEDURE — 25000003 PHARM REV CODE 250: Mod: HCNC | Performed by: HOSPITALIST

## 2023-07-08 PROCEDURE — 25000242 PHARM REV CODE 250 ALT 637 W/ HCPCS: Mod: HCNC | Performed by: FAMILY MEDICINE

## 2023-07-08 PROCEDURE — 11000004 HC SNF PRIVATE: Mod: HCNC

## 2023-07-08 RX ADMIN — GUAIFENESIN 600 MG: 600 TABLET, EXTENDED RELEASE ORAL at 09:07

## 2023-07-08 RX ADMIN — LEVALBUTEROL HYDROCHLORIDE 0.63 MG: 0.63 SOLUTION RESPIRATORY (INHALATION) at 06:07

## 2023-07-08 RX ADMIN — BENZONATATE 100 MG: 100 CAPSULE ORAL at 09:07

## 2023-07-08 RX ADMIN — METOPROLOL SUCCINATE 12.5 MG: 25 TABLET, EXTENDED RELEASE ORAL at 09:07

## 2023-07-08 RX ADMIN — AMOXICILLIN AND CLAVULANATE POTASSIUM 1 TABLET: 875; 125 TABLET, FILM COATED ORAL at 09:07

## 2023-07-08 RX ADMIN — PANTOPRAZOLE SODIUM 40 MG: 40 TABLET, DELAYED RELEASE ORAL at 04:07

## 2023-07-08 RX ADMIN — NIFEDIPINE 60 MG: 30 TABLET, FILM COATED, EXTENDED RELEASE ORAL at 09:07

## 2023-07-08 RX ADMIN — PANTOPRAZOLE SODIUM 40 MG: 40 TABLET, DELAYED RELEASE ORAL at 05:07

## 2023-07-08 RX ADMIN — LOSARTAN POTASSIUM 100 MG: 50 TABLET, FILM COATED ORAL at 09:07

## 2023-07-08 RX ADMIN — ASPIRIN 81 MG: 81 TABLET, COATED ORAL at 09:07

## 2023-07-08 RX ADMIN — PRAVASTATIN SODIUM 20 MG: 20 TABLET ORAL at 09:07

## 2023-07-08 RX ADMIN — SENNOSIDES AND DOCUSATE SODIUM 1 TABLET: 50; 8.6 TABLET ORAL at 09:07

## 2023-07-08 RX ADMIN — LEVALBUTEROL HYDROCHLORIDE 0.63 MG: 0.63 SOLUTION RESPIRATORY (INHALATION) at 07:07

## 2023-07-08 RX ADMIN — BENZONATATE 100 MG: 100 CAPSULE ORAL at 03:07

## 2023-07-08 NOTE — PT/OT/SLP PROGRESS
"Physical Therapy      Patient Name:  Julio Benites   MRN:  [id number]    Patient not seen today secondary to pt unwilling to participate "I'm not doing anything" no specific c/o .pt declined getting OOB/therex/gait. Will follow-up next PT session.    "

## 2023-07-09 PROCEDURE — 94761 N-INVAS EAR/PLS OXIMETRY MLT: CPT | Mod: HCNC

## 2023-07-09 PROCEDURE — 94640 AIRWAY INHALATION TREATMENT: CPT | Mod: HCNC

## 2023-07-09 PROCEDURE — 25000003 PHARM REV CODE 250: Mod: HCNC | Performed by: FAMILY MEDICINE

## 2023-07-09 PROCEDURE — 25000242 PHARM REV CODE 250 ALT 637 W/ HCPCS: Mod: HCNC | Performed by: FAMILY MEDICINE

## 2023-07-09 PROCEDURE — 11000004 HC SNF PRIVATE: Mod: HCNC

## 2023-07-09 PROCEDURE — 25000003 PHARM REV CODE 250: Mod: HCNC | Performed by: HOSPITALIST

## 2023-07-09 RX ADMIN — BENZONATATE 100 MG: 100 CAPSULE ORAL at 04:07

## 2023-07-09 RX ADMIN — PRAVASTATIN SODIUM 20 MG: 20 TABLET ORAL at 08:07

## 2023-07-09 RX ADMIN — NIFEDIPINE 60 MG: 30 TABLET, FILM COATED, EXTENDED RELEASE ORAL at 08:07

## 2023-07-09 RX ADMIN — METOPROLOL SUCCINATE 12.5 MG: 25 TABLET, EXTENDED RELEASE ORAL at 09:07

## 2023-07-09 RX ADMIN — ASPIRIN 81 MG: 81 TABLET, COATED ORAL at 09:07

## 2023-07-09 RX ADMIN — AMOXICILLIN AND CLAVULANATE POTASSIUM 1 TABLET: 875; 125 TABLET, FILM COATED ORAL at 09:07

## 2023-07-09 RX ADMIN — BENZONATATE 100 MG: 100 CAPSULE ORAL at 09:07

## 2023-07-09 RX ADMIN — BENZONATATE 100 MG: 100 CAPSULE ORAL at 08:07

## 2023-07-09 RX ADMIN — PANTOPRAZOLE SODIUM 40 MG: 40 TABLET, DELAYED RELEASE ORAL at 06:07

## 2023-07-09 RX ADMIN — PANTOPRAZOLE SODIUM 40 MG: 40 TABLET, DELAYED RELEASE ORAL at 05:07

## 2023-07-09 RX ADMIN — GUAIFENESIN 600 MG: 600 TABLET, EXTENDED RELEASE ORAL at 09:07

## 2023-07-09 RX ADMIN — AMOXICILLIN AND CLAVULANATE POTASSIUM 1 TABLET: 875; 125 TABLET, FILM COATED ORAL at 08:07

## 2023-07-09 RX ADMIN — LEVALBUTEROL HYDROCHLORIDE 0.63 MG: 0.63 SOLUTION RESPIRATORY (INHALATION) at 06:07

## 2023-07-09 RX ADMIN — LEVALBUTEROL HYDROCHLORIDE 0.63 MG: 0.63 SOLUTION RESPIRATORY (INHALATION) at 07:07

## 2023-07-09 RX ADMIN — GUAIFENESIN 600 MG: 600 TABLET, EXTENDED RELEASE ORAL at 08:07

## 2023-07-09 RX ADMIN — LOSARTAN POTASSIUM 100 MG: 50 TABLET, FILM COATED ORAL at 09:07

## 2023-07-09 RX ADMIN — NIFEDIPINE 60 MG: 30 TABLET, FILM COATED, EXTENDED RELEASE ORAL at 09:07

## 2023-07-09 RX ADMIN — SENNOSIDES AND DOCUSATE SODIUM 1 TABLET: 50; 8.6 TABLET ORAL at 09:07

## 2023-07-10 PROCEDURE — 25000003 PHARM REV CODE 250: Mod: HCNC | Performed by: FAMILY MEDICINE

## 2023-07-10 PROCEDURE — 97110 THERAPEUTIC EXERCISES: CPT | Mod: HCNC

## 2023-07-10 PROCEDURE — 25000242 PHARM REV CODE 250 ALT 637 W/ HCPCS: Mod: HCNC | Performed by: FAMILY MEDICINE

## 2023-07-10 PROCEDURE — 97535 SELF CARE MNGMENT TRAINING: CPT | Mod: HCNC

## 2023-07-10 PROCEDURE — 11000004 HC SNF PRIVATE: Mod: HCNC

## 2023-07-10 PROCEDURE — 94640 AIRWAY INHALATION TREATMENT: CPT | Mod: HCNC

## 2023-07-10 PROCEDURE — 92526 ORAL FUNCTION THERAPY: CPT | Mod: HCNC

## 2023-07-10 PROCEDURE — 97535 SELF CARE MNGMENT TRAINING: CPT | Mod: HCNC,CO

## 2023-07-10 PROCEDURE — 97116 GAIT TRAINING THERAPY: CPT | Mod: HCNC

## 2023-07-10 PROCEDURE — 25000003 PHARM REV CODE 250: Mod: HCNC | Performed by: HOSPITALIST

## 2023-07-10 RX ORDER — GUAIFENESIN/DEXTROMETHORPHAN 100-10MG/5
5 SYRUP ORAL EVERY 6 HOURS
Status: DISCONTINUED | OUTPATIENT
Start: 2023-07-10 | End: 2023-07-17 | Stop reason: HOSPADM

## 2023-07-10 RX ADMIN — ASPIRIN 81 MG: 81 TABLET, COATED ORAL at 09:07

## 2023-07-10 RX ADMIN — AMOXICILLIN AND CLAVULANATE POTASSIUM 1 TABLET: 875; 125 TABLET, FILM COATED ORAL at 09:07

## 2023-07-10 RX ADMIN — NIFEDIPINE 60 MG: 30 TABLET, FILM COATED, EXTENDED RELEASE ORAL at 09:07

## 2023-07-10 RX ADMIN — GUAIFENESIN AND DEXTROMETHORPHAN 5 ML: 100; 10 SYRUP ORAL at 05:07

## 2023-07-10 RX ADMIN — METOPROLOL SUCCINATE 12.5 MG: 25 TABLET, EXTENDED RELEASE ORAL at 09:07

## 2023-07-10 RX ADMIN — BENZONATATE 100 MG: 100 CAPSULE ORAL at 09:07

## 2023-07-10 RX ADMIN — LEVALBUTEROL HYDROCHLORIDE 0.63 MG: 0.63 SOLUTION RESPIRATORY (INHALATION) at 07:07

## 2023-07-10 RX ADMIN — NIFEDIPINE 60 MG: 30 TABLET, FILM COATED, EXTENDED RELEASE ORAL at 08:07

## 2023-07-10 RX ADMIN — PANTOPRAZOLE SODIUM 40 MG: 40 TABLET, DELAYED RELEASE ORAL at 06:07

## 2023-07-10 RX ADMIN — SENNOSIDES AND DOCUSATE SODIUM 1 TABLET: 50; 8.6 TABLET ORAL at 09:07

## 2023-07-10 RX ADMIN — PANTOPRAZOLE SODIUM 40 MG: 40 TABLET, DELAYED RELEASE ORAL at 04:07

## 2023-07-10 RX ADMIN — PRAVASTATIN SODIUM 20 MG: 20 TABLET ORAL at 09:07

## 2023-07-10 RX ADMIN — LOSARTAN POTASSIUM 100 MG: 50 TABLET, FILM COATED ORAL at 09:07

## 2023-07-10 RX ADMIN — GUAIFENESIN 600 MG: 600 TABLET, EXTENDED RELEASE ORAL at 09:07

## 2023-07-10 RX ADMIN — LEVALBUTEROL HYDROCHLORIDE 0.63 MG: 0.63 SOLUTION RESPIRATORY (INHALATION) at 08:07

## 2023-07-10 RX ADMIN — BENZONATATE 100 MG: 100 CAPSULE ORAL at 02:07

## 2023-07-10 NOTE — PROGRESS NOTES
[facility] - Skilled Nursing  Adult Nutrition  Progress Note    SUMMARY   Recommendations  Continue pureed diet, dc cardiac diet, boost plus TID chocolate, assist with set up RD following  Goals: PO to meet 75% of needs with ONS by next RD follow up  Nutrition Goal Status: progressing towards goal  Communication of RD Recs: other (comment) (POC)    Assessment and Plan  Endocrine  Severe malnutrition  Malnutrition Type:  Context: chronic illness  Level: severe     Related to (etiology):   Inadequate oral intake     Signs and Symptoms (as evidenced by):   Aspirating on all consistencies  Decline in ADL's , altered GI     Malnutrition Characteristic Summary:  Weight Loss (Malnutrition): greater than 7.5% in 3 months  Energy Intake (Malnutrition): less than or equal to 50% for greater than or equal to 1 month  Subcutaneous Fat (Malnutrition): severe depletion  Muscle Mass (Malnutrition): severe depletion    Malnutrition Assessment 6/29  Malnutrition Context: chronic illness  Malnutrition Level: severe  Skin (Micronutrient): dry  Hair/Scalp (Micronutrient): dry  Teeth (Micronutrient): dentures  Tongue (Micronutrient):  (coated)  Neck/Chest (Micronutrient): muscle wasting, bony prominence, subcutaneous fat loss  Musculoskeletal/Lower Extremities: muscle wasting, subcutaneous fat loss, muscle control poor       Weight Loss (Malnutrition): greater than 7.5% in 3 months  Energy Intake (Malnutrition): less than or equal to 50% for greater than or equal to 1 month  Subcutaneous Fat (Malnutrition): severe depletion  Muscle Mass (Malnutrition): severe depletion   Orbital Region (Subcutaneous Fat Loss): severe depletion  Upper Arm Region (Subcutaneous Fat Loss): severe depletion  Thoracic and Lumbar Region: severe depletion   [place] Region (Muscle Loss): severe depletion  Clavicle Bone Region (Muscle Loss): severe depletion  Scapular Bone Region (Muscle Loss): severe depletion  Dorsal Hand (Muscle Loss): severe  "depletion  Patellar Region (Muscle Loss): severe depletion  Anterior Thigh Region (Muscle Loss): severe depletion                 Reason for Assessment    Reason For Assessment: RD follow-up  Diagnosis:  (fecal impaction, Colitis,)  Relevant Medical History: chronic constipation, chronic anemia, s/p resp failure with hx of Asbestosis, stomach cancer with subtotal gastrectomy,DM,HTN, HLD, CHF, STEMI, weight loss  Interdisciplinary Rounds: attended  General Information Comments: patient getting full at lunch today when only eating 50%, he does like the boost plus but is behind on servings, supply at bedside  Nutrition Discharge Planning: DC on pureed diet, thin liquids, ONS of choice    Nutrition/Diet History    Patient Reported Diet/Restrictions/Preferences: general  Spiritual, Cultural Beliefs, Sabianism Practices, Values that Affect Care: no  Supplemental Drinks or Food Habits: Ensure Plus  Food Allergies: NKFA  Factors Affecting Nutritional Intake: abdominal pain, difficulty/impaired swallowing, constipation    Anthropometrics    Temp: 97.1 °F (36.2 °C)  Height Method: Stated  Height: 5' 9" (175.3 cm)  Height (inches): 69 in  Weight Method: Bed Scale  Weight: 38.3 kg (84 lb 7 oz)  Weight (lb): 84.44 lb  Ideal Body Weight (IBW), Male: 160 lb  % Ideal Body Weight, Male (lb): 49.47 %  BMI (Calculated): 12.5  BMI Grade: less than 16 protein-energy malnutrition grade III  Weight Loss: unintentional  Usual Body Weight (UBW), k.7 kg (2020 weight)  % Usual Body Weight: 75.42  % Weight Change From Usual Weight: -24.74 %       Lab/Procedures/Meds    Pertinent Labs Reviewed: reviewed  Pertinent Labs Comments: Hg 8.5, Hct 28.0, PO4 2.6,  Pertinent Medications Reviewed: reviewed  Pertinent Medications Comments: Abx    Estimated/Assessed Needs    Weight Used For Calorie Calculations: 47.7 kg (105 lb 2.6 oz) (UBW)  Energy Calorie Requirements (kcal): 7161-1215  Energy Need Method: Kcal/kg (x 30-35 kcal/kg)  Protein " Requirements: 71  Weight Used For Protein Calculations: 47.7 kg (105 lb 2.6 oz) (x 1.5 kg UBW)  Fluid Requirements (mL): 1431 or per MD  Estimated Fluid Requirement Method: RDA Method  RDA Method (mL): 1431  CHO Requirement: 179      Nutrition Prescription Ordered    Current Diet Order: Pureed, IDDSI level 4, cardiac, thin liquids  Nutrition Order Comments: patient does not like pureed texture  Oral Nutrition Supplement: boost plus TID chocolate    Evaluation of Received Nutrient/Fluid Intake    I/O: no data  Energy Calories Required: meeting needs  Protein Required: meeting needs  Fluid Required: meeting needs  Comments: LBM 7/10  Tolerance: tolerating  % Intake of Estimated Energy Needs: 75 - 100 %  % Meal Intake: 50 - 75 %    Nutrition Risk    Level of Risk/Frequency of Follow-up: low (one time per week)     Monitor and Evaluation    Food and Nutrient Intake: food and beverage intake  Food and Nutrient Adminstration: diet order  Knowledge/Beliefs/Attitudes: beliefs and attitudes  Anthropometric Measurements: weight change  Biochemical Data, Medical Tests and Procedures: gastrointestinal profile, glucose/endocrine profile, electrolyte and renal panel     Nutrition Follow-Up    RD Follow-up?: Yes

## 2023-07-10 NOTE — PT/OT/SLP PROGRESS
Physical Therapy Treatment    Patient Name:  Julio Benites   MRN:  [id number]  Admit Date: 6/26/2023  Admitting Diagnosis: Severe malnutrition    General Precautions: Standard, aspiration, fall, hearing impaired, pureed diet (DNR)  Orthopedic Precautions: N/A  Braces: N/A    Recommendations:     Discharge Recommendations: home health PT  Level of Assistance Recommended at Discharge: 24 hours significant assistance  Discharge Equipment Recommendations: bedside commode, wheelchair, walker, rolling  Barriers to discharge: Decreased caregiver support, Inaccessible home environment    Assessment:     Julio Benites is a 88 y.o. male admitted with a medical diagnosis of Severe malnutrition . Pt continues to complain of fatigue and generalized weakness but willing to work with PT within his tolerance level per pt. Pt remains at [facility] for sit<>stand d.t chronic B U/e and L/E weakness. Pt will therefore benefit from continued SNF rehab.      Performance deficits affecting function: weakness, impaired endurance, impaired self care skills, impaired functional mobility, gait instability, impaired balance, decreased upper extremity function, decreased lower extremity function, impaired cardiopulmonary response to activity.    Rehab Potential is good    Activity Tolerance: Fair    Plan:     Patient to be seen 5 x/week to address the above listed problems via gait training, therapeutic activities, therapeutic exercises, neuromuscular re-education, wheelchair management/training    Plan of Care Expires: 07/28/23  Plan of Care Reviewed with: patient    Subjective     Pt. Agreeable to work with PT.     Pain/Comfort:  Pain Rating 1: 0/10  Pain Rating Post-Intervention 1: 0/10    Patient's cultural, spiritual, Oriental orthodox conflicts given the current situation:  no    Objective:     Communicated with pt's nurse prior to session.  Patient found up in chair with   upon PT entry to room.     Therapeutic Activities and Exercises: LBEx  10mins with resistance set at low to medium to help improve B l/E MMT and endurance    Functional Mobility:  Transfers:     Sit to Stand:  minimum assistance with rolling walker  Gait: ~80ft x 2 trials with RW and CGA for safety d.t pt with generalized weakness.    AM-PAC 6 CLICK MOBILITY  16    Patient left up in chair with call button in reach.    GOALS:   Multidisciplinary Problems       Physical Therapy Goals          Problem: Physical Therapy    Goal Priority Disciplines Outcome Goal Variances Interventions   Physical Therapy Goal     PT, PT/OT Ongoing, Progressing     Description: Goals to be met by: 7/28/23     Patient will increase functional independence with mobility by performing:    . Supine to sit with Set-up [place]  . Sit to supine with Set-up [place]  . Rolling to Left and Right with Set-up Assistance.  . Sit to stand transfer with Stand-by Assistance  . Bed to chair transfer with Stand-by Assistance using Rolling Walker/ No AD  . Gait  x 100 feet with Stand-by Assistance using Rolling Walker.   . Wheelchair propulsion x150 feet with Modified [place] using bilateral uppper extremities  . Ascend/Descend 4 inch curb step with Minimal Assistance using Rolling Walker.                         Time Tracking:     PT Received On: 07/10/23  PT Start Time: 1053  PT Stop Time: 1127  PT Total Time (min): 34 min    Billable Minutes: Gait Training 22 and Therapeutic Exercise 12    Treatment Type: Treatment  PT/PTA: PT     Number of PTA visits since last PT visit: 0     07/10/2023

## 2023-07-10 NOTE — PROGRESS NOTES
[facility]                                  Skilled Nursing Facility                   Progress Note     Patient Name: Julio Benites  YOB: 1934  MRN: [id number]  Room: [place] [address]/[place] [address] A     Admit Date: 6/26/2023   KYLAH: 7/17/2023     Principal Problem: Severe malnutrition    HPI obtained from patient interview and chart review     Chief Complaint: Re-evaluation of medical treatment and therapy status: cough,pna, dysphagia, hospice    HPI:   Julio Benites is an 88 y.o. male with a past medical history of DM, NSTEMI, HTN, HLD, and systolic CHF who was recently treated for CAP  with azithromycin who presented to ED on 6/16/23 with abdominal pain. Admitted with colitis with fecal impaction/constipation. General surgery and GI consulted. Bowel regimen initiated following manual disimpaction and enema. EGD 06/19 showing grade D esophagitis, lavell-en-Y gastrojejunostomy with healthy-appearing anastomosis. MBSS 06/20 which showed laryngeal penetration with all consistencies. Patient declined PEG placement. Patient lives at home alone with support from grandson. Patient goal to discharge to [facility] where his wife resides. Patient and family considering hospice.    Patient will be treated at [facility] with PT and OT to improve functional status and ability to perform ADLs.     Interval History  24 hour chart review completed. NAEON. NAD.  Afebrile, VSS.   Frequent, coarse, productive cough improved with q 12 hour scheduled xopanex tx and continue prn nebs tessalon 100 mg tid and change robitussin from prn to q 6 hours now that mucinex course complete.  CXR obtained 7/7: no acute worsening.  Continue Augmentin q 12 hours.   Pt confirms he wishes to transition to hospice and join his wife in [facility] at discharge.   CM aware and coordinating discharge plans; will consult hospice if needed to facilitate process.    Allergies: Patient  is allergic to lisinopril, nicoderm, and nicoderm cq [nicotine].      ROS  Constitutional:  Negative for appetite change and fever.   Respiratory:  Positive for cough, shortness of breath.    Cardiovascular:  Negative for chest pain and palpitations.   Gastrointestinal:  Negative for abdominal pain, nausea and vomiting.   Genitourinary:  Negative for dysuria.   Musculoskeletal:  Negative for arthralgias and back pain.   Neurological:  Positive for weakness. Negative for dizziness and light-headedness.   Psychiatric/Behavioral:  Negative for sleep disturbance. The patient is not nervous/anxious.     24 hour Vital Sign Range   Temp:  [97.1 °F (36.2 °C)-97.9 °F (36.6 °C)]   Pulse:  [63-70]   Resp:  [18-20]   BP: (118-128)/(70-79)   SpO2:  [97 %-100 %]       Physical Exam  Constitutional:       General: He is not in acute distress. Hard of hearing, dentures     Appearance: He is well-developed. He is cachectic. He is not diaphoretic.   Cardiovascular:      Rate and Rhythm: Normal rate and regular rhythm.      Heart sounds: S1 normal and S2 normal. Murmur heard.   Systolic murmur is present.   Pulmonary:      Effort: Pulmonary effort is normal. No respiratory distress.      Breath sounds: Normal breath sounds. No wheezing or rales.   Productive cough with clear thick sputum  Abdominal:      General: Bowel sounds are normal. There is no distension.      Palpations: Abdomen is soft, flat.      Tenderness: There is no abdominal tenderness.   Musculoskeletal:      Right lower leg: No edema.      Left lower leg: No edema.   Skin:     General: Skin is warm and dry, intact  Neurological:      Mental Status: He is alert and oriented to person, place, and time.   Psychiatric:         Mood and Affect: Mood normal.         Behavior: Behavior normal. Behavior is cooperative.         Thought Content: Thought content normal.           Labs:  Recent Labs   Lab 07/04/23  0400 07/07/23  0519   WBC 4.37 6.90   HGB 9.7* 8.5*   HCT 31.0*  28.0*    204       Recent Labs   Lab 07/04/23  0400 07/07/23  0519    139   K 4.1 3.9    103   CO2 29 29   BUN 19 20   CREATININE 0.5 0.6   * 88   CALCIUM 9.6 9.1   MG 1.8 1.8   PHOS 3.0 2.6*       No results for input(s): ALKPHOS, ALT, AST, ALBUMIN, PROT, BILITOT, INR in the last 168 hours.  No results for input(s): POCTGLUCOSE in the last 72 hours.    Meds Scheduled:   amoxicillin-clavulanate 875-125mg  1 tablet Oral Q12H    aspirin  81 mg Oral Daily    benzonatate  100 mg Oral TID    levalbuterol  0.63 mg Nebulization Q12H    losartan  100 mg Oral Daily    metoprolol succinate  12.5 mg Oral Daily    NIFEdipine  60 mg Oral BID    pantoprazole  40 mg Oral BID AC    pravastatin  20 mg Oral QHS    senna-docusate 8.6-50 mg  1 tablet Oral BID       PRN:   acetaminophen, acetaminophen, calcium carbonate, colchicine, dextromethorphan-guaiFENesin  mg/5 ml, levalbuterol, melatonin      Assessment and Plan:    Hypophosphatemia, new 7/7/23  7/10/2023 Initiated potassium phosphate 280-160-250 mg 2 packet q4h x 2 doses    Severe Protein Calorie Malnutrition  Cachexia  Dysphagia  - Continue bowel regimen.  - SLP and RD evaluated and following  - MBSS 6/20 showed laryngeal penetration with all consistencies.  - Declined PEG placement.  7/10/2023   - Aspiration precautions  - Purred diet per SLP recommendations     Aspiration Pneumonia   CXR reviewed; Indicative of developing pna.   Started on augmentin 975 mg q 12 hours x 5 days and oral probiotic tab daily x 7 days..   Continue xopanex nebs q 12 hour and prn q 6 hours.   Initiate tessalon, robitussin prn  7/6: unstable, CXR ordered and Augmentin resumed   CXR obtained 7/7: no acute worsening.  7/10: Frequent, coarse, productive cough improving   Continue tessalon 100 mg tid and change robitussin from prn to q 6 hours now that 5Dmucinex course complete.  Continue Augmentin q 12 hours, will plan for 14 day course given prolonged and recurrent  symptoms     Debility  7/10/2023   - Continue with PT/OT for gait training and strengthening and restoration of ADL's   - Encourage mobility, OOB in chair, and early ambulation as appropriate  - Fall precautions   - Monitor for bowel and bladder dysfunction  - Monitor for and prevent skin breakdown and pressure ulcers     Grade D esophagitis  - GI consulted and underwent EGD 6/19 showing grade D esophagitis, lavell-en-Y gastrojejunostomy with healthy-appearing anastomosis.  7/10/2023 Protonix po 40 mg bid     Discharge planning issues  - Patient seeking shelter NH with hospice with his wife at [facility].  - Recurrent aspiration likely given MBSS findings. Patient declines PEG.  - CM consulted  - Offer hospice consult to patient in SNF  - TB skin test obtained for placement.  - Pt wishes to transition to hospice and join his wife in [facility] at discharge. CM aware and coordinating discharge plans.    Chronic systolic heart failure  - Monitor for acute decompensation     Mixed hyperlipidemia  - Continue pravastatin     Essential hypertension  - Continue nifedipine at 60mg PO BID, losartan 100mg PO daily.        Anticipated Disposition:  senior living nursing home +/- hospice.       Follow-up needed during SNF admission:       Follow-up needed after discharge from SNF:   - PCP within 1-2 weeks  - See appt scheduled below     No future appointments.      I certify that SNF services are required to be given on an inpatient basis because Julio Benites needs for skilled nursing care and/or skilled rehabilitation are required on a daily basis and such services can only practically be provided in a skilled nursing facility setting and are for an ongoing condition for which she received inpatient care in the hospital.       Total time spent: > 31 minutes  Description of Time: counseling provided on clinical condition, therapies provided, plan of care, emotional support, coordinating patient care with other care team members,  reviewing and interpreting labs and imaging, collaboration with physician, initiating new orders, chart review, and documentation. See interval hx.        Nicolette Tripp NP  Department of Hospital Medicine   [facility]     DOS: 7/10/2023

## 2023-07-10 NOTE — PT/OT/SLP PROGRESS
Speech Language Pathology  Treatment    Julio Benites   MRN: [id number]   Admitting Diagnosis: Severe malnutrition    Diet recommendations: Solid Diet Level: Puree  Liquid Diet Level: Thin 1 bite/sip at a time, Alternating bites/sips, Avoid talking while eating, Double swallow with each bite/sip, Frequent oral care, HOB to 90 degrees, Meds crushed in puree, Monitor for s/s of aspiration, Remain upright 30 minutes post meal, Small bites/sips, and Strict aspiration precautions    SLP Treatment Date: 07/10/23  Speech Start Time: 0942     Speech Stop Time: 1001     Speech Total (min): 19 min       TREATMENT BILLABLE MINUTES:  Treatment Swallowing Dysfunction 11 and Self Care/Home Management Training 8    Has the patient been evaluated by SLP for swallowing? : Yes  Keep patient NPO?: No   General Precautions: Standard, aspiration, fall, hearing impaired, pureed diet          Subjective:  Pt sitting up in w/c in room this morning. No visitors present.          Objective:      Pt participated in ongoing dysphagia tx. Pt with spontaneous wet cough prior to and after PO intake. Expectoration and oral suctioning completed at times.  Pt accepted ice chips x 3 to facilitate swallowing production for dysphagia exercises.  Pt performed 3-4 Effortful swallows per trial (x10 total). Congested throat clear/cough x 1 and delayed cough x 1 after ice chips.  Pt also was observed receiving medications whole (multiple at once) with straw sip of water. Nurse reported this is how he received medications as far as she knew.  Pt exhibited wet/gurgly vocal quality and cough after receiving medication. SLP recommends medications are administered crushed and buried in puree due reduce risks of aspiration.  Nurse notified of recommendations.  Pt also performed Supraglottic swallows x 10, basic tongue base retraction exercises x 10, Kimberli maneuver x 5, chin tuck with resistance exercises x 10, and Falsetto exercise x 5.  Volitional and reflexive  cough remain reduced in strength. Pt with low intensity/volume and decreased pitch when performing Falsetto.  Education provided to pt regarding risks of aspiration, dysphagia exercises, aspiration precautions, and SLP treatment plan and POC.  Pt demonstrated understanding of education provided, but will benefit from continued reinforcement.       Assessment:  Julio Benites is a 88 y.o. male with a medical diagnosis of Severe malnutrition and presents with dysphagia.     Discharge recommendations: Discharge Facility/Level of Care Needs:  (tbd)     Goals:   Multidisciplinary Problems       SLP Goals          Problem: SLP    Goal Priority Disciplines Outcome   SLP Goal     SLP    Description: Speech Language Pathology Goals  Goals expected to be met by 7/4 (goals remain appropriate - reassess on 7/12):   1. Pt will tolerate pureed consistencies and thin liquids with minimal s/s of aspiration.   2. Pt will perform dysphagia exercises x 5-10 to improve pharyngeal swallow.                                 Plan:   Patient to be seen Therapy Frequency: 3 x/week  Planned Interventions: Dysphagia Therapy  Plan of Care expires: 07/26/23  Plan of Care reviewed with: patient  SLP Follow-up?: Yes  SLP - Next Visit Date: 07/12/23             07/10/2023

## 2023-07-10 NOTE — PT/OT/SLP PROGRESS
Occupational Therapy   Treatment    Name: Julio Benites  MRN: [id number]  Admit Date: 6/26/2023  Admitting Diagnosis:  Severe malnutrition    General Precautions: Standard, aspiration, fall, pureed diet, hearing impaired   Orthopedic Precautions: N/A   Braces: N/A    Recommendations:     Discharge Recommendations:  home health OT  Level of Assistance Recommended at Discharge: 24 hours physical assistance for all ADL's and home management tasks  Discharge Equipment Recommendations: bedside commode, wheelchair, walker, rolling  Barriers to discharge:  Decreased caregiver support, Inaccessible home environment    Assessment:     Julio Benites is a 88 y.o. male with a medical diagnosis of Severe malnutrition.  He presents with. Performance deficits affecting function are weakness, impaired endurance, impaired self care skills, impaired functional mobility, gait instability, impaired balance, decreased ROM, impaired cardiopulmonary response to activity, decreased lower extremity function, decreased upper extremity function.     Pt. Was cooperative and participated well with session on this day. Pt continues to require (A) for selfcare and t/fs management for safety. Pt.  continues to demonstrate levels of physical deficits with  functional indep with daily management activities tasks, selfcare skills with balance,  functional mobility, UB strength and endurance. Pt. Will continue to benefit from continued OT to progress towards goals    Rehab Potential is fair    Activity tolerance:  Fair    Plan:     Patient to be seen 5 x/week to address the above listed problems via self-care/home management, therapeutic activities, therapeutic exercises    Plan of Care Expires: 07/26/23  Plan of Care Reviewed with: patient    Subjective     Communicated with: nsg and Pt. prior to session. Pt. Agreeable to session    Pain/Comfort:  Pain Rating 1: 0/10  Pain Rating Post-Intervention 1: 0/10    Patient's cultural, spiritual,  Rastafari conflicts given the current situation:  no    Objective:     Patient found HOB elevated    upon OT entry to room.    Bed Mobility:    Patient completed Rolling/Turning to Left with  contact guard assistance  Patient completed Rolling/Turning to Right with contact guard assistance  Patient completed Scooting/Bridging with minimum assistance  Patient completed Supine to Sit with minimum assistance     Functional Mobility/Transfers:  Patient completed Sit <> Stand Transfer with minimum assistance  with  rolling walker   Patient completed Bed <> Chair Transfer using Stand Pivot technique with minimum assistance with rolling walker    Activities of Daily Living:  Grooming: stand by assistance with grooming needs  Bathing: moderate assistance for BLE's and post sarwat area Pt. Able to clean  upper body  Upper Body Dressing: minimum assistance to darren pull over shirt (A) over head level and (A) with pull over sweat shirt   Lower Body Dressing: maximal assistance to darren BLE feet into pants and Pt. able to mange over hips with RW for bal  Toileting: total assistance to clean BM and apply fresh diaper management     [facility] 6 Click ADL: 17    Treatment & Education:  Pt edu on role of OT, POC, safety when performing self care tasks , benefit of performing OOB activity, and safety when performing functional transfers and mobility management for preparation with goals to progress towards next level of care     Patient left up in chair with all lines intact and call button in reach    GOALS:   Multidisciplinary Problems       Occupational Therapy Goals          Problem: Occupational Therapy    Goal Priority Disciplines Outcome Interventions   Occupational Therapy Goal     OT, PT/OT Ongoing, Progressing    Description: Goals to be met by:  7/26/2023    Patient will increase functional independence with ADLs by performing:    UE Dressing with Set-up.   LE Dressing with Min A, AE, and LRAD as needed.   Grooming while seated  at sink with [place].  Toileting from toilet with Minimal Assistance for hygiene and clothing management with AE as needed and LRAD.  Bathing from  shower chair/bench with Supervision and AE as needed.  Stand pivot transfers with Min A for 100% t/f and LRAD.  Toilet transfer to toilet with Min A for 100% t/f and LRAD.  Upper extremity exercise program x10 reps per handout, with independence.                         Time Tracking:     OT Date of Treatment: 07/10/23  OT Start Time: 0855    OT Stop Time: 0934  OT Total Time (min): 39 min    Billable Minutes:Self Care/Home Management 39    7/10/2023

## 2023-07-11 LAB
ANION GAP SERPL CALC-SCNC: 6 MMOL/L (ref 8–16)
BASOPHILS # BLD AUTO: 0.03 K/UL (ref 0–0.2)
BASOPHILS NFR BLD: 0.7 % (ref 0–1.9)
BUN SERPL-MCNC: 23 MG/DL (ref 8–23)
CALCIUM SERPL-MCNC: 8.9 MG/DL (ref 8.7–10.5)
CHLORIDE SERPL-SCNC: 106 MMOL/L (ref 95–110)
CO2 SERPL-SCNC: 28 MMOL/L (ref 23–29)
CREAT SERPL-MCNC: 0.5 MG/DL (ref 0.5–1.4)
DIFFERENTIAL METHOD: ABNORMAL
EOSINOPHIL # BLD AUTO: 0.1 K/UL (ref 0–0.5)
EOSINOPHIL NFR BLD: 2.1 % (ref 0–8)
ERYTHROCYTE [DISTWIDTH] IN BLOOD BY AUTOMATED COUNT: 17.1 % (ref 11.5–14.5)
EST. GFR  (NO RACE VARIABLE): >60 ML/MIN/1.73 M^2
GLUCOSE SERPL-MCNC: 93 MG/DL (ref 70–110)
HCT VFR BLD AUTO: 25.1 % (ref 40–54)
HGB BLD-MCNC: 7.9 G/DL (ref 14–18)
IMM GRANULOCYTES # BLD AUTO: 0.07 K/UL (ref 0–0.04)
IMM GRANULOCYTES NFR BLD AUTO: 1.6 % (ref 0–0.5)
LYMPHOCYTES # BLD AUTO: 0.9 K/UL (ref 1–4.8)
LYMPHOCYTES NFR BLD: 19.6 % (ref 18–48)
MAGNESIUM SERPL-MCNC: 1.9 MG/DL (ref 1.6–2.6)
MCH RBC QN AUTO: 25.2 PG (ref 27–31)
MCHC RBC AUTO-ENTMCNC: 31.5 G/DL (ref 32–36)
MCV RBC AUTO: 80 FL (ref 82–98)
MONOCYTES # BLD AUTO: 0.5 K/UL (ref 0.3–1)
MONOCYTES NFR BLD: 11.5 % (ref 4–15)
NEUTROPHILS # BLD AUTO: 2.8 K/UL (ref 1.8–7.7)
NEUTROPHILS NFR BLD: 64.5 % (ref 38–73)
NRBC BLD-RTO: 0 /100 WBC
PHOSPHATE SERPL-MCNC: 2.8 MG/DL (ref 2.7–4.5)
PLATELET # BLD AUTO: 184 K/UL (ref 150–450)
PMV BLD AUTO: 12.4 FL (ref 9.2–12.9)
POTASSIUM SERPL-SCNC: 3.9 MMOL/L (ref 3.5–5.1)
RBC # BLD AUTO: 3.13 M/UL (ref 4.6–6.2)
SODIUM SERPL-SCNC: 140 MMOL/L (ref 136–145)
WBC # BLD AUTO: 4.33 K/UL (ref 3.9–12.7)

## 2023-07-11 PROCEDURE — 94640 AIRWAY INHALATION TREATMENT: CPT | Mod: HCNC

## 2023-07-11 PROCEDURE — 80048 BASIC METABOLIC PNL TOTAL CA: CPT | Mod: HCNC | Performed by: FAMILY MEDICINE

## 2023-07-11 PROCEDURE — 83735 ASSAY OF MAGNESIUM: CPT | Mod: HCNC | Performed by: FAMILY MEDICINE

## 2023-07-11 PROCEDURE — 97535 SELF CARE MNGMENT TRAINING: CPT | Mod: HCNC,CO

## 2023-07-11 PROCEDURE — 84100 ASSAY OF PHOSPHORUS: CPT | Mod: HCNC | Performed by: FAMILY MEDICINE

## 2023-07-11 PROCEDURE — 36415 COLL VENOUS BLD VENIPUNCTURE: CPT | Mod: HCNC | Performed by: FAMILY MEDICINE

## 2023-07-11 PROCEDURE — 25000003 PHARM REV CODE 250: Mod: HCNC | Performed by: FAMILY MEDICINE

## 2023-07-11 PROCEDURE — 25000242 PHARM REV CODE 250 ALT 637 W/ HCPCS: Mod: HCNC | Performed by: FAMILY MEDICINE

## 2023-07-11 PROCEDURE — 85025 COMPLETE CBC W/AUTO DIFF WBC: CPT | Mod: HCNC | Performed by: FAMILY MEDICINE

## 2023-07-11 PROCEDURE — 11000004 HC SNF PRIVATE: Mod: HCNC

## 2023-07-11 PROCEDURE — 94761 N-INVAS EAR/PLS OXIMETRY MLT: CPT | Mod: HCNC

## 2023-07-11 PROCEDURE — 25000003 PHARM REV CODE 250: Mod: HCNC | Performed by: HOSPITALIST

## 2023-07-11 PROCEDURE — 99900035 HC TECH TIME PER 15 MIN (STAT): Mod: HCNC

## 2023-07-11 RX ADMIN — METOPROLOL SUCCINATE 12.5 MG: 25 TABLET, EXTENDED RELEASE ORAL at 09:07

## 2023-07-11 RX ADMIN — GUAIFENESIN AND DEXTROMETHORPHAN 5 ML: 100; 10 SYRUP ORAL at 12:07

## 2023-07-11 RX ADMIN — PANTOPRAZOLE SODIUM 40 MG: 40 TABLET, DELAYED RELEASE ORAL at 04:07

## 2023-07-11 RX ADMIN — BENZONATATE 100 MG: 100 CAPSULE ORAL at 08:07

## 2023-07-11 RX ADMIN — BENZONATATE 100 MG: 100 CAPSULE ORAL at 09:07

## 2023-07-11 RX ADMIN — SENNOSIDES AND DOCUSATE SODIUM 1 TABLET: 50; 8.6 TABLET ORAL at 08:07

## 2023-07-11 RX ADMIN — PRAVASTATIN SODIUM 20 MG: 20 TABLET ORAL at 08:07

## 2023-07-11 RX ADMIN — BENZONATATE 100 MG: 100 CAPSULE ORAL at 04:07

## 2023-07-11 RX ADMIN — GUAIFENESIN AND DEXTROMETHORPHAN 5 ML: 100; 10 SYRUP ORAL at 05:07

## 2023-07-11 RX ADMIN — NIFEDIPINE 60 MG: 30 TABLET, FILM COATED, EXTENDED RELEASE ORAL at 09:07

## 2023-07-11 RX ADMIN — LEVALBUTEROL HYDROCHLORIDE 0.63 MG: 0.63 SOLUTION RESPIRATORY (INHALATION) at 08:07

## 2023-07-11 RX ADMIN — NIFEDIPINE 60 MG: 30 TABLET, FILM COATED, EXTENDED RELEASE ORAL at 08:07

## 2023-07-11 RX ADMIN — ASPIRIN 81 MG: 81 TABLET, COATED ORAL at 09:07

## 2023-07-11 RX ADMIN — AMOXICILLIN AND CLAVULANATE POTASSIUM 1 TABLET: 875; 125 TABLET, FILM COATED ORAL at 09:07

## 2023-07-11 RX ADMIN — PANTOPRAZOLE SODIUM 40 MG: 40 TABLET, DELAYED RELEASE ORAL at 05:07

## 2023-07-11 RX ADMIN — AMOXICILLIN AND CLAVULANATE POTASSIUM 1 TABLET: 875; 125 TABLET, FILM COATED ORAL at 08:07

## 2023-07-11 RX ADMIN — LOSARTAN POTASSIUM 100 MG: 50 TABLET, FILM COATED ORAL at 09:07

## 2023-07-11 NOTE — PT/OT/SLP PROGRESS
Occupational Therapy   Treatment    Name: Julio Benites  MRN: [id number]  Admit Date: 6/26/2023  Admitting Diagnosis:  Severe malnutrition    General Precautions: Standard, aspiration, fall, pureed diet, hearing impaired   Orthopedic Precautions: N/A   Braces: N/A    Recommendations:     Discharge Recommendations:  home health OT  Level of Assistance Recommended at Discharge: 24 hours physical assistance for all ADL's and home management tasks  Discharge Equipment Recommendations: bedside commode, wheelchair, walker, rolling  Barriers to discharge:  Decreased caregiver support, Inaccessible home environment    Assessment:     Julio Benites is a 88 y.o. male with a medical diagnosis of Severe malnutrition.  He presents with. Performance deficits affecting function are weakness, impaired endurance, impaired self care skills, impaired functional mobility, gait instability, impaired balance, decreased ROM, impaired cardiopulmonary response to activity, decreased lower extremity function, decreased upper extremity function.     Pt. Reports feeling under the weather today not feeling well but was agreeable to selfcare task.  Pt continues to require (A) for selfcare and t/fs management for safety. Pt.  continues to demonstrate levels of physical deficits with  functional indep with daily management activities tasks, selfcare skills with balance,  functional mobility, UB strength and endurance. Pt. Will continue to benefit from continued OT to progress towards goals      Rehab Potential is fair    Activity tolerance:  Fair    Plan:     Patient to be seen 5 x/week to address the above listed problems via self-care/home management, therapeutic activities, therapeutic exercises    Plan of Care Expires: 07/26/23  Plan of Care Reviewed with: patient    Subjective     Communicated with: nsg and Pt. prior to session. I just don't feel well today    Pain/Comfort:  Pain Rating 1: 0/10  Pain Rating Post-Intervention 1:  0/10    Patient's cultural, spiritual, Baptist conflicts given the current situation:  no    Objective:     Patient found HOB elevated    upon OT entry to room.    Bed Mobility:    Patient completed Rolling/Turning to Left with  contact guard assistance  Patient completed Rolling/Turning to Right with contact guard assistance  Patient completed Scooting/Bridging with minimum assistance  Patient completed Supine to Sit with minimum assistance     Functional Mobility/Transfers:  Patient completed Sit <> Stand Transfer with minimum assistance  with  rolling walker     Activities of Daily Living:  Grooming: stand by assistance with grooming needs  Bathing: moderate assistance for BLE management   Upper Body Dressing: minimum assistance to darren pull over shirt (A) over head level    Lower Body Dressing: maximal assistance to darren BLE feet into pants and Pt. able to mange over hips with RW for bal  Toileting: total assistance to clean BM and apply fresh diaper management     [facility] 6 Click ADL: 17    Treatment & Education:  Pt edu on role of OT, POC, safety when performing self care tasks , benefit of performing OOB activity, and safety when performing functional transfers and mobility management for preparation with goals to progress towards next level of care     Patient left HOB elevated with all lines intact, call button in reach, and nsg notified    GOALS:   Multidisciplinary Problems       Occupational Therapy Goals          Problem: Occupational Therapy    Goal Priority Disciplines Outcome Interventions   Occupational Therapy Goal     OT, PT/OT Ongoing, Progressing    Description: Goals to be met by:  7/26/2023    Patient will increase functional independence with ADLs by performing:    UE Dressing with Set-up.   LE Dressing with Min A, AE, and LRAD as needed.   Grooming while seated at sink with [place].  Toileting from toilet with Minimal Assistance for hygiene and clothing management with AE as needed and  LRAD.  Bathing from  shower chair/bench with Supervision and AE as needed.  Stand pivot transfers with Min A for 100% t/f and LRAD.  Toilet transfer to toilet with Min A for 100% t/f and LRAD.  Upper extremity exercise program x10 reps per handout, with independence.                         Time Tracking:     OT Date of Treatment: 07/11/23  OT Start Time: 1025    OT Stop Time: 1056  OT Total Time (min): 31 min    Billable Minutes:Self Care/Home Management 31 7/11/2023

## 2023-07-11 NOTE — PROGRESS NOTES
[facility]                                  Skilled Nursing Facility                   Progress Note     Patient Name: Julio Benites  YOB: 1934  MRN: [id number]  Room: [place] [address]/[place] [address] A     Admit Date: 6/26/2023   KYLAH: 7/17/2023     Principal Problem: Severe malnutrition    HPI obtained from patient interview and chart review     Chief Complaint: Re-evaluation of medical treatment and therapy status: lab review,  cough, pna, dysphagia, hospice    HPI:   Julio Benites is an 88 y.o. male with a past medical history of DM, NSTEMI, HTN, HLD, and systolic CHF who was recently treated for CAP  with azithromycin who presented to ED on 6/16/23 with abdominal pain. Admitted with colitis with fecal impaction/constipation. General surgery and GI consulted. Bowel regimen initiated following manual disimpaction and enema. EGD 06/19 showing grade D esophagitis, lavell-en-Y gastrojejunostomy with healthy-appearing anastomosis. MBSS 06/20 which showed laryngeal penetration with all consistencies. Patient declined PEG placement. Patient lives at home alone with support from grandson. Patient goal to discharge to [facility] where his wife resides. Patient and family considering hospice.    Patient will be treated at [facility] with PT and OT to improve functional status and ability to perform ADLs.     Interval History  24 hour chart review completed. NAEON. NAD.  Afebrile, vital signs stable.  All of today's labs reviewed; listed and addressed below.   Hgb down trending, will continue to monitor.  Frequent, coarse, productive cough improved with q 12 hour scheduled xopanex tx and continue prn nebs tessalon 100 mg tid and change robitussin from prn to q 6 hours now that mucinex course complete.  CXR obtained 7/7: no acute worsening.  Continue 14 day course Augmentin q 12 hours. EOC 7/15.  Patient wants to transition to hospice and join his  wife in [facility] at discharge.   CM aware and coordinating discharge plans; will consult hospice if needed to facilitate process.    Allergies: Patient is allergic to lisinopril, nicoderm, and nicoderm cq [nicotine].      ROS  Constitutional:  Negative for appetite change and fever.   Respiratory:  Positive for cough, shortness of breath.    Cardiovascular:  Negative for chest pain and palpitations.   Gastrointestinal:  Negative for abdominal pain, nausea and vomiting.   Genitourinary:  Negative for dysuria.   Musculoskeletal:  Negative for arthralgias and back pain.   Neurological:  Positive for weakness. Negative for dizziness and light-headedness.   Psychiatric/Behavioral:  Negative for sleep disturbance. The patient is not nervous/anxious.     24 hour Vital Sign Range   Temp:  [96.9 °F (36.1 °C)-98.3 °F (36.8 °C)]   Pulse:  [70-96]   Resp:  [15-20]   BP: (121-128)/(73-79)   SpO2:  [99 %-100 %]       Physical Exam  Constitutional:       General: He is not in acute distress. Hard of hearing, dentures     Appearance: He is well-developed. He is cachectic. He is not diaphoretic.   Cardiovascular:      Rate and Rhythm: Normal rate and regular rhythm.      Heart sounds: S1 normal and S2 normal. Murmur heard.   Systolic murmur is present.   Pulmonary:      Effort: Pulmonary effort is normal. No respiratory distress.      Breath sounds: Normal breath sounds. No wheezing or rales.   Productive cough with clear thick sputum  Abdominal:      General: Bowel sounds are normal. There is no distension.      Palpations: Abdomen is soft, flat.      Tenderness: There is no abdominal tenderness.   Musculoskeletal:      Right lower leg: No edema.      Left lower leg: No edema.   Skin:     General: Skin is warm and dry, intact  Neurological:      Mental Status: He is alert and oriented to person, place, and time.   Psychiatric:         Mood and Affect: Mood normal.         Behavior: Behavior normal. Behavior is cooperative.          Thought Content: Thought content normal.           Labs:  Recent Labs   Lab 07/07/23  0519 07/11/23  0433   WBC 6.90 4.33   HGB 8.5* 7.9*   HCT 28.0* 25.1*    184       Recent Labs   Lab 07/07/23  0519 07/11/23  0433    140   K 3.9 3.9    106   CO2 29 28   BUN 20 23   CREATININE 0.6 0.5   GLU 88 93   CALCIUM 9.1 8.9   MG 1.8 1.9   PHOS 2.6* 2.8       No results for input(s): ALKPHOS, ALT, AST, ALBUMIN, PROT, BILITOT, INR in the last 168 hours.  No results for input(s): POCTGLUCOSE in the last 72 hours.    Meds Scheduled:   amoxicillin-clavulanate 875-125mg  1 tablet Oral Q12H    aspirin  81 mg Oral Daily    benzonatate  100 mg Oral TID    dextromethorphan-guaiFENesin  mg/5 ml  5 mL Oral Q6H    levalbuterol  0.63 mg Nebulization Q12H    losartan  100 mg Oral Daily    metoprolol succinate  12.5 mg Oral Daily    NIFEdipine  60 mg Oral BID    pantoprazole  40 mg Oral BID AC    pravastatin  20 mg Oral QHS    senna-docusate 8.6-50 mg  1 tablet Oral BID       PRN:   acetaminophen, acetaminophen, calcium carbonate, colchicine, levalbuterol, melatonin      Assessment and Plan:    Hypophosphatemia, new 7/7/23 7/7: potassium phosphate 280-160-250 mg 2 packet q4h x 2 doses    Severe Protein Calorie Malnutrition  Cachexia  Dysphagia  - Continue bowel regimen.  - SLP and RD evaluated and following  - MBSS 6/20 showed laryngeal penetration with all consistencies.  - Declined PEG placement.  - Aspiration precautions  - Purred diet per SLP recommendations     Aspiration Pneumonia   CXR reviewed; Indicative of developing pna.   Started on augmentin 975 mg q 12 hours x 5 days and oral probiotic tab daily x 7 days..   Continue xopanex nebs q 12 hour and prn q 6 hours.   Initiate tessalon, robitussin prn  7/6: unstable, CXR ordered and Augmentin resumed   CXR obtained 7/7: no acute worsening.  7/10: Frequent, coarse, productive cough improving   Continue tessalon 100 mg tid and change robitussin from prn to q 6  hours now that 5Dmucinex course complete.  Continue Augmentin q 12 hours - 14 day course given prolonged and recurrent symptoms. EOC 7/15.     Debility  7/11/2023   - Continue with PT/OT for gait training and strengthening and restoration of ADL's   - Encourage mobility, OOB in chair, and early ambulation as appropriate  - Fall precautions   - Monitor for bowel and bladder dysfunction  - Monitor for and prevent skin breakdown and pressure ulcers     Grade D esophagitis  - GI consulted and underwent EGD 6/19 showing grade D esophagitis, lavell-en-Y gastrojejunostomy with healthy-appearing anastomosis.  7/11/2023 Protonix po 40 mg bid     Discharge planning issues  - [facility] with his wife at [facility].  - Recurrent aspiration likely given MBSS findings. Patient declines PEG.  - CM consulted  - Offer hospice consult to patient in SNF  - TB skin test obtained for placement.  - Pt wishes to transition to hospice and join his wife in [facility] at discharge. CM aware and coordinating discharge plans.    Chronic systolic heart failure  - Monitor for acute decompensation     Mixed hyperlipidemia  - Continue pravastatin     Essential hypertension  - Continue nifedipine at 60mg PO BID, losartan 100mg PO daily.        Anticipated Disposition:  [facility] nursing home +/- hospice.       Follow-up needed during SNF admission:       Follow-up needed after discharge from SNF:   - PCP within 1-2 weeks  - See appt scheduled below     No future appointments.      I certify that SNF services are required to be given on an inpatient basis because Julio Benites needs for skilled nursing care and/or skilled rehabilitation are required on a daily basis and such services can only practically be provided in a skilled nursing facility setting and are for an ongoing condition for which she received inpatient care in the hospital.       Total time spent: > 47 minutes  Description of Time: counseling provided on  clinical condition, therapies provided, plan of care, emotional support, coordinating patient care with other care team members, reviewing and interpreting labs and imaging, collaboration with physician, initiating new orders, chart review, and documentation. See interval hx.        Nicolette Tripp NP  Department of Hospital Medicine   [facility]     DOS: 7/11/2023

## 2023-07-11 NOTE — PT/OT/SLP PROGRESS
Physical Therapy      Patient Name:  Julio Benites   MRN:  [id number]    Patient not seen today secondary to  pt c/o of fatigue and weakness. Attempted to see pt twice but on both occasions pt. Refused.. Will follow-up 7/12/23.    Caprice Cobb, PT  7/11/2023

## 2023-07-12 LAB — TB INDURATION 48 - 72 HR READ: 0 MM

## 2023-07-12 PROCEDURE — 97535 SELF CARE MNGMENT TRAINING: CPT | Mod: HCNC

## 2023-07-12 PROCEDURE — 92526 ORAL FUNCTION THERAPY: CPT | Mod: HCNC

## 2023-07-12 PROCEDURE — 94761 N-INVAS EAR/PLS OXIMETRY MLT: CPT | Mod: HCNC

## 2023-07-12 PROCEDURE — 25000003 PHARM REV CODE 250: Mod: HCNC | Performed by: HOSPITALIST

## 2023-07-12 PROCEDURE — 11000004 HC SNF PRIVATE: Mod: HCNC

## 2023-07-12 PROCEDURE — 94640 AIRWAY INHALATION TREATMENT: CPT | Mod: HCNC

## 2023-07-12 PROCEDURE — 25000003 PHARM REV CODE 250: Mod: HCNC | Performed by: FAMILY MEDICINE

## 2023-07-12 PROCEDURE — 97530 THERAPEUTIC ACTIVITIES: CPT | Mod: HCNC

## 2023-07-12 PROCEDURE — 25000242 PHARM REV CODE 250 ALT 637 W/ HCPCS: Mod: HCNC | Performed by: FAMILY MEDICINE

## 2023-07-12 RX ADMIN — GUAIFENESIN AND DEXTROMETHORPHAN 5 ML: 100; 10 SYRUP ORAL at 06:07

## 2023-07-12 RX ADMIN — ASPIRIN 81 MG: 81 TABLET, COATED ORAL at 09:07

## 2023-07-12 RX ADMIN — LEVALBUTEROL HYDROCHLORIDE 0.63 MG: 0.63 SOLUTION RESPIRATORY (INHALATION) at 06:07

## 2023-07-12 RX ADMIN — PRAVASTATIN SODIUM 20 MG: 20 TABLET ORAL at 08:07

## 2023-07-12 RX ADMIN — PANTOPRAZOLE SODIUM 40 MG: 40 TABLET, DELAYED RELEASE ORAL at 09:07

## 2023-07-12 RX ADMIN — AMOXICILLIN AND CLAVULANATE POTASSIUM 1 TABLET: 875; 125 TABLET, FILM COATED ORAL at 09:07

## 2023-07-12 RX ADMIN — PANTOPRAZOLE SODIUM 40 MG: 40 TABLET, DELAYED RELEASE ORAL at 04:07

## 2023-07-12 RX ADMIN — NIFEDIPINE 60 MG: 30 TABLET, FILM COATED, EXTENDED RELEASE ORAL at 08:07

## 2023-07-12 RX ADMIN — LOSARTAN POTASSIUM 100 MG: 50 TABLET, FILM COATED ORAL at 09:07

## 2023-07-12 RX ADMIN — BENZONATATE 100 MG: 100 CAPSULE ORAL at 02:07

## 2023-07-12 RX ADMIN — GUAIFENESIN AND DEXTROMETHORPHAN 5 ML: 100; 10 SYRUP ORAL at 11:07

## 2023-07-12 RX ADMIN — GUAIFENESIN AND DEXTROMETHORPHAN 5 ML: 100; 10 SYRUP ORAL at 05:07

## 2023-07-12 RX ADMIN — GUAIFENESIN AND DEXTROMETHORPHAN 5 ML: 100; 10 SYRUP ORAL at 12:07

## 2023-07-12 RX ADMIN — METOPROLOL SUCCINATE 12.5 MG: 25 TABLET, EXTENDED RELEASE ORAL at 09:07

## 2023-07-12 RX ADMIN — AMOXICILLIN AND CLAVULANATE POTASSIUM 1 TABLET: 875; 125 TABLET, FILM COATED ORAL at 08:07

## 2023-07-12 RX ADMIN — LEVALBUTEROL HYDROCHLORIDE 0.63 MG: 0.63 SOLUTION RESPIRATORY (INHALATION) at 07:07

## 2023-07-12 RX ADMIN — SENNOSIDES AND DOCUSATE SODIUM 1 TABLET: 50; 8.6 TABLET ORAL at 08:07

## 2023-07-12 RX ADMIN — NIFEDIPINE 60 MG: 30 TABLET, FILM COATED, EXTENDED RELEASE ORAL at 09:07

## 2023-07-12 RX ADMIN — BENZONATATE 100 MG: 100 CAPSULE ORAL at 09:07

## 2023-07-12 RX ADMIN — BENZONATATE 100 MG: 100 CAPSULE ORAL at 08:07

## 2023-07-12 NOTE — PT/OT/SLP PROGRESS
"Occupational Therapy   Treatment    Name: Julio Benites  MRN: [id number]  Admit Date: 6/26/2023  Admitting Diagnosis:  Severe malnutrition    General Precautions: Standard, aspiration, fall, pureed diet, hearing impaired   Orthopedic Precautions: N/A   Braces: N/A    Recommendations:     Discharge Recommendations:  home health OT  Level of Assistance Recommended at Discharge: 24 hours physical assistance for all ADL's and home management tasks  Discharge Equipment Recommendations: bedside commode, wheelchair, walker, rolling  Barriers to discharge:  Decreased caregiver support, Inaccessible home environment    Assessment:     Julio Benites is a 88 y.o. male with a medical diagnosis of Severe malnutrition . Pt tolerated session well and without incident and shows excellent motivation and potential to improve, but the pt continues to require assistance to perform self-care tasks and mobility. Pt strengths include Functional cognition, Following multi-step tasks, and Attention to task and Motivation and Willingness to participate. Pt improved UBD and LBD. However, pt would continue to benefit from cont'd OT services in the SNF setting to improve safety and independence /c functional tasks and ADLs upon discharge. Performance deficits affecting function are weakness, impaired endurance, impaired self care skills, impaired functional mobility, gait instability, impaired balance, decreased ROM, impaired cardiopulmonary response to activity, decreased lower extremity function, decreased upper extremity function.     Rehab Potential is fair    Activity tolerance:  Fair    Plan:     Patient to be seen 5 x/week to address the above listed problems via self-care/home management, therapeutic activities, therapeutic exercises    Plan of Care Expires: 07/26/23  Plan of Care Reviewed with: patient    Subjective     Communicated with: AGATHA prior to session.   "I need everything to be changed."    Pain/Comfort:  Pain Rating 1: " 0/10  Pain Rating Post-Intervention 1: 0/10    Patient's cultural, spiritual, Mandaeism conflicts given the current situation:  no    Objective:     Patient found supine with Other (comments) (no lines attached) upon OT entry to room.Pt alert and agreeable to OT tx. Pt found soiled; addressed during ADL session.     Bed Mobility:    Patient completed Rolling/Turning to Left with  supervision to don/doff brief/hygiene  Patient completed Rolling/Turning to Right with supervision to don/doff brief/hygiene  Patient completed Scooting/Bridging with supervision to don pants  Patient completed Supine to Sit with moderate assistance     Functional Mobility/Transfers:  Patient completed Sit <> Stand Transfer with moderate assistance  with  rolling walker   Patient completed Bed <> Chair Transfer using Stand Pivot technique with moderate assistance with rolling walker      Activities of Daily Living:  Upper Body Dressing: minimum assistance seated at EOB; A required to don over head.   Lower Body Dressing: moderate assistance Pt req Min A to doff brief, max A to don brief and Min A   Toileting: moderate assistance ; Pt min A to doff brief, req mod A for donning brief, and A for pericare (bottom).     [facility] 6 Click ADL: 18      Treatment & Education:  Pt educated on POC, role of OT, adaptive dressing techniques, and proper body mechanics for performing functional transfers. Pt performed tasks to improve safety and independence in functional tasks and ADLs as mentioned above.      Patient left up in chair with call button in reach, NSG notified, and all needs met.    GOALS:   Multidisciplinary Problems       Occupational Therapy Goals          Problem: Occupational Therapy    Goal Priority Disciplines Outcome Interventions   Occupational Therapy Goal     OT, PT/OT Ongoing, Progressing    Description: Goals to be met by:  7/26/2023    Patient will increase functional independence with ADLs by performing:    UE Dressing with  Set-up.   LE Dressing with Min A, AE, and LRAD as needed.   Grooming while seated at sink with [place].  Toileting from toilet with Minimal Assistance for hygiene and clothing management with AE as needed and LRAD.  Bathing from  shower chair/bench with Supervision and AE as needed.  Stand pivot transfers with Min A for 100% t/f and LRAD.  Toilet transfer to toilet with Min A for 100% t/f and LRAD.  Upper extremity exercise program x10 reps per handout, with independence.                         Time Tracking:     OT Date of Treatment: 07/12/23  OT Start Time: 1238    OT Stop Time: 1305  OT Total Time (min): 27 min    Billable Minutes:Self Care/Home Management 27 7/12/2023

## 2023-07-12 NOTE — PT/OT/SLP PROGRESS
Speech Language Pathology  Treatment    Julio Benites   MRN: [id number]   Admitting Diagnosis: Severe malnutrition    Diet recommendations: Solid Diet Level: Puree  Liquid Diet Level: Thin 1 bite/sip at a time, Alternating bites/sips, Avoid talking while eating, Double swallow with each bite/sip, Frequent oral care, HOB to 90 degrees, Meds crushed in puree, Monitor for s/s of aspiration, Remain upright 30 minutes post meal, Small bites/sips, and Strict aspiration precautions    SLP Treatment Date: 07/12/23  Speech Start Time: 1426     Speech Stop Time: 1442     Speech Total (min): 16 min       TREATMENT BILLABLE MINUTES:  Treatment Swallowing Dysfunction 8 and Self Care/Home Management Training 8    Has the patient been evaluated by SLP for swallowing? : Yes  Keep patient NPO?: No   General Precautions: Standard, aspiration, fall, pureed diet          Subjective:  Pt appearing weak and tired, but agreeable to participate in bedside dysphagia tx.          Objective:      Pt seen for ongoing dysphagia tx. Pt noted to have audible crackly breath sounds and cough prior to PO intake, as well as t/o session.  Pt accepted ice chips x 4 to facilitate swallowing production for dysphagia exercises. Pt performed 5 Effortful swallows per ice chip (20 in total). Pt exhibited weak cough after ice chip trials occasionally, usually after a delay. Weak volitional cough noted. During dysphagia exercises, pt performed Supraglottic swallow x 5, basic TBR exercises x 20, chin tuck with resistance exercises x 5, and Kimberli maneuver x 5.  Ongoing education was provided to pt regarding dysphagia exercises, aspiration precautions, and SLP treatment plan and POC.  Pt demonstrated understanding of education provided, but will benefit from continued reinforcement.       Assessment:  Julio Benites is a 88 y.o. male with a medical diagnosis of Severe malnutrition and presents with dysphagia.     Discharge recommendations: Discharge  Facility/Level of Care Needs: home health speech therapy     Goals:   Multidisciplinary Problems       SLP Goals          Problem: SLP    Goal Priority Disciplines Outcome   SLP Goal     SLP    Description: Speech Language Pathology Goals  Goals expected to be met by 7/4 and 7/12 (goals remain appropriate - reassess on 7/19):   1. Pt will tolerate pureed consistencies and thin liquids with minimal s/s of aspiration.   2. Pt will perform dysphagia exercises x 5-10 to improve pharyngeal swallow.                                 Plan:   Patient to be seen Therapy Frequency: 3 x/week  Planned Interventions: Dysphagia Therapy  Plan of Care expires: 07/26/23  Plan of Care reviewed with: patient  SLP Follow-up?: Yes  SLP - Next Visit Date: 07/14/23 07/12/2023

## 2023-07-12 NOTE — PT/OT/SLP PROGRESS
Physical Therapy Treatment    Patient Name:  Julio Benites   MRN:  [id number]  Admit Date: 6/26/2023  Admitting Diagnosis: Severe malnutrition      General Precautions: Standard, aspiration, fall, hearing impaired, pureed diet (DNR)  Orthopedic Precautions: N/A  Braces: N/A    Recommendations:     Discharge Recommendations: home health PT  Level of Assistance Recommended at Discharge: 24 hours significant assistance  Discharge Equipment Recommendations: bedside commode, wheelchair, walker, rolling  Barriers to discharge: Decreased caregiver support, Inaccessible home environment    Assessment:     Julio Benites is a 88 y.o. male admitted with a medical diagnosis of Severe malnutrition . Pt refused coming to the gym today to continue with PT however pt willing to walk in  the room and be returned to bed.      Performance deficits affecting function: weakness, impaired endurance, impaired self care skills, impaired functional mobility, gait instability, impaired balance, decreased upper extremity function, decreased lower extremity function, impaired cardiopulmonary response to activity.    Rehab Potential is good    Activity Tolerance: Fair    Plan:     Patient to be seen 5 x/week to address the above listed problems via gait training, therapeutic activities, therapeutic exercises, neuromuscular re-education, wheelchair management/training    Plan of Care Expires: 07/28/23  Plan of Care Reviewed with: patient    Subjective     Pt. Agreeable only to GT in room.     Pain/Comfort:  Pain Rating 1: 0/10  Pain Rating Post-Intervention 1: 0/10    Patient's cultural, spiritual, Hinduism conflicts given the current situation:  no    Objective:     Communicated with pt's nurse prior to session.  Patient found up in chair with   upon PT entry to room.       Functional Mobility:  Bed Mobility:     Sit to Supine: moderate assistance for B l/e elevation onto bed.  Transfers:     Sit to Stand from [place]:  minimum assistance  with rolling walker  Gait: ~ 20ft with RW and CGA for safety as pt c/o of fatigue and weakness.    AM-PAC 6 CLICK MOBILITY  16    Patient left HOB elevated with all lines intact and call button in reach.    GOALS:   Multidisciplinary Problems       Physical Therapy Goals          Problem: Physical Therapy    Goal Priority Disciplines Outcome Goal Variances Interventions   Physical Therapy Goal     PT, PT/OT Ongoing, Progressing     Description: Goals to be met by: 7/28/23     Patient will increase functional independence with mobility by performing:    . Supine to sit with Set-up [place]  . Sit to supine with Set-up [place]  . Rolling to Left and Right with Set-up Assistance.  . Sit to stand transfer with Stand-by Assistance  . Bed to chair transfer with Stand-by Assistance using Rolling Walker/ No AD  . Gait  x 100 feet with Stand-by Assistance using Rolling Walker.   . Wheelchair propulsion x150 feet with Modified [place] using bilateral uppper extremities  . Ascend/Descend 4 inch curb step with Minimal Assistance using Rolling Walker.                         Time Tracking:     PT Received On: 07/12/23  PT Start Time: 1413  PT Stop Time: 1421  PT Total Time (min): 8 min    Billable Minutes: Therapeutic Activity 8    Treatment Type: Treatment  PT/PTA: PT     Number of PTA visits since last PT visit: 0     07/12/2023

## 2023-07-12 NOTE — PLAN OF CARE
Problem: Occupational Therapy  Goal: Occupational Therapy Goal  Description: Goals to be met by:  7/26/2023    Patient will increase functional independence with ADLs by performing:    UE Dressing with Set-up.   LE Dressing with Min A, AE, and LRAD as needed.   Grooming while seated at sink with [place].  Toileting from toilet with Minimal Assistance for hygiene and clothing management with AE as needed and LRAD.  Bathing from  shower chair/bench with Supervision and AE as needed.  Stand pivot transfers with Min A for 100% t/f and LRAD.  Toilet transfer to toilet with Min A for 100% t/f and LRAD.  Upper extremity exercise program x10 reps per handout, with independence.    Outcome: Ongoing, Progressing

## 2023-07-12 NOTE — PROGRESS NOTES
[facility]                                  Skilled Nursing Facility                   Progress Note     Patient Name: Julio Benites  YOB: 1934  MRN: [id number]  Room: [place] [address]/[place] [address] A     Admit Date: 6/26/2023   KYLAH: 7/17/2023     Principal Problem: Severe malnutrition    HPI obtained from patient interview and chart review     Chief Complaint: Re-evaluation of medical treatment and therapy status: cough, pna, dysphagia, hospice    HPI:   Julio Benites is an 88 y.o. male with a past medical history of DM, NSTEMI, HTN, HLD, and systolic CHF who was recently treated for CAP  with azithromycin who presented to ED on 6/16/23 with abdominal pain. Admitted with colitis with fecal impaction/constipation. General surgery and GI consulted. Bowel regimen initiated following manual disimpaction and enema. EGD 06/19 showing grade D esophagitis, lavell-en-Y gastrojejunostomy with healthy-appearing anastomosis. MBSS 06/20 which showed laryngeal penetration with all consistencies. Patient declined PEG placement. Patient lives at home alone with support from grandson. Patient goal to discharge to [facility] where his wife resides. Patient and family considering hospice.    Patient will be treated at [facility] with PT and OT to improve functional status and ability to perform ADLs.     Interval History  24 hour chart review completed. NAEON. NAD.  Afebrile, vital signs stable.  Frequent, coarse, productive cough somewhat improved with q 12 hour  xopanex tx.   Will add CPT q 12.  Robitussin q 6 hours.  CXR 7/7: no acute worsening.  Continue 14 day course Augmentin q 12 hours. EOC 7/15.  Patient wants to transition to hospice and join his wife in [facility] at discharge.   CM aware and coordinating discharge plans;  d/w CM - awaiting information from [facility] regarding hospice. Ambulatory referral placed for external hospice  service.    Allergies: Patient is allergic to lisinopril, nicoderm, and nicoderm cq [nicotine].      ROS  Constitutional:  Negative for appetite change and fever.   Respiratory:  Positive for cough, shortness of breath.    Cardiovascular:  Negative for chest pain and palpitations.   Gastrointestinal:  Negative for abdominal pain, nausea and vomiting.   Genitourinary:  Negative for dysuria.   Musculoskeletal:  Negative for arthralgias and back pain.   Neurological:  Positive for weakness. Negative for dizziness and light-headedness.   Psychiatric/Behavioral:  Negative for sleep disturbance. The patient is not nervous/anxious.     24 hour Vital Sign Range   Temp:  [97.9 °F (36.6 °C)-98.7 °F (37.1 °C)]   Pulse:  [73-88]   Resp:  [16-20]   BP: (117-119)/(68-71)   SpO2:  [95 %-100 %]       Physical Exam  Constitutional:       General: He is not in acute distress. Hard of hearing, dentures     Appearance: He is well-developed. He is cachectic. He is not diaphoretic.   Cardiovascular:      Rate and Rhythm: Normal rate and regular rhythm.      Heart sounds: S1 normal and S2 normal. Murmur heard.   Systolic murmur is present.   Pulmonary:      Effort: Pulmonary effort is normal. No respiratory distress.      Breath sounds: Normal breath sounds. No wheezing or rales.   Productive cough with clear thick sputum  Abdominal:      General: Bowel sounds are normal. There is no distension.      Palpations: Abdomen is soft, flat.      Tenderness: There is no abdominal tenderness.   Musculoskeletal:      Right lower leg: No edema.      Left lower leg: No edema.   Skin:     General: Skin is warm and dry, intact  Neurological:      Mental Status: He is alert and oriented to person, place, and time.   Psychiatric:         Mood and Affect: Mood normal.         Behavior: Behavior normal. Behavior is cooperative.         Thought Content: Thought content normal.           Labs:  Recent Labs   Lab 07/07/23  0519 07/11/23  0433   WBC 6.90 4.33    HGB 8.5* 7.9*   HCT 28.0* 25.1*    184       Recent Labs   Lab 07/07/23  0519 07/11/23  0433    140   K 3.9 3.9    106   CO2 29 28   BUN 20 23   CREATININE 0.6 0.5   GLU 88 93   CALCIUM 9.1 8.9   MG 1.8 1.9   PHOS 2.6* 2.8       No results for input(s): ALKPHOS, ALT, AST, ALBUMIN, PROT, BILITOT, INR in the last 168 hours.  No results for input(s): POCTGLUCOSE in the last 72 hours.    Meds Scheduled:   amoxicillin-clavulanate 875-125mg  1 tablet Oral Q12H    aspirin  81 mg Oral Daily    benzonatate  100 mg Oral TID    dextromethorphan-guaiFENesin  mg/5 ml  5 mL Oral Q6H    levalbuterol  0.63 mg Nebulization Q12H    losartan  100 mg Oral Daily    metoprolol succinate  12.5 mg Oral Daily    NIFEdipine  60 mg Oral BID    pantoprazole  40 mg Oral BID AC    pravastatin  20 mg Oral QHS    senna-docusate 8.6-50 mg  1 tablet Oral BID       PRN:   acetaminophen, acetaminophen, calcium carbonate, colchicine, levalbuterol, melatonin      Assessment and Plan:    Hypophosphatemia, new 7/7/23 7/7: potassium phosphate 280-160-250 mg 2 packet q4h x 2 doses    Severe Protein Calorie Malnutrition  Cachexia  Dysphagia  - Continue bowel regimen.  - SLP and RD evaluated and following  - MBSS 6/20 showed laryngeal penetration with all consistencies.  - Declined PEG placement.  - Aspiration precautions  - Purred diet per SLP recommendations     Aspiration Pneumonia   CXR reviewed; Indicative of developing pna.   Started on augmentin 975 mg q 12 hours x 5 days and oral probiotic tab daily x 7 days..   Continue xopanex nebs q 12 hour and prn q 6 hours.   Initiate tessalon, robitussin prn  7/6: unstable, CXR ordered and Augmentin resumed   CXR obtained 7/7: no acute worsening.  7/10: Frequent, coarse, productive cough improving   Continue tessalon 100 mg tid and change robitussin from prn to q 6 hours now that 5Dmucinex course complete.  Continue Augmentin q 12 hours - 14 day course given prolonged and recurrent  symptoms. EOC 7/15.     Debility  7/12/2023   - Continue with PT/OT for gait training and strengthening and restoration of ADL's   - Encourage mobility, OOB in chair, and early ambulation as appropriate  - Fall precautions   - Monitor for bowel and bladder dysfunction  - Monitor for and prevent skin breakdown and pressure ulcers     Grade D esophagitis  - GI consulted and underwent EGD 6/19 showing grade D esophagitis, lavell-en-Y gastrojejunostomy with healthy-appearing anastomosis.  7/12/2023 Protonix po 40 mg bid     Discharge planning issues  - Patient seeking senior living NH with hospice with his wife at [facility].  - Recurrent aspiration likely given MBSS findings. Patient declines PEG.  - CM consulted  - Offer hospice consult to patient in SNF  - TB skin test obtained for placement.  - Pt wishes to transition to hospice and join his wife in [facility] at discharge. CM aware and coordinating discharge plans.    Chronic systolic heart failure  - Monitor for acute decompensation     Mixed hyperlipidemia  - Continue pravastatin     Essential hypertension  - Continue nifedipine at 60mg PO BID, losartan 100mg PO daily.        Anticipated Disposition:  group home nursing home +/- hospice.       Follow-up needed during SNF admission:       Follow-up needed after discharge from SNF:   - PCP within 1-2 weeks  - See appt scheduled below     No future appointments.      I certify that SNF services are required to be given on an inpatient basis because Julio Benites needs for skilled nursing care and/or skilled rehabilitation are required on a daily basis and such services can only practically be provided in a skilled nursing facility setting and are for an ongoing condition for which she received inpatient care in the hospital.       Total time spent: > 46 minutes  Description of Time: counseling provided on clinical condition, therapies provided, plan of care, emotional support, coordinating patient care with other care team  members, reviewing and interpreting labs and imaging, collaboration with physician, initiating new orders, chart review, and documentation. See interval hx.        Nicolette Tripp NP  Department of Hospital Medicine   [facility]- Skilled Nursing [facility] [[email or web address]]     DOS: 7/12/2023

## 2023-07-13 PROCEDURE — 94640 AIRWAY INHALATION TREATMENT: CPT | Mod: HCNC

## 2023-07-13 PROCEDURE — 97535 SELF CARE MNGMENT TRAINING: CPT | Mod: HCNC,CO

## 2023-07-13 PROCEDURE — 11000004 HC SNF PRIVATE: Mod: HCNC

## 2023-07-13 PROCEDURE — 97110 THERAPEUTIC EXERCISES: CPT | Mod: HCNC,CO

## 2023-07-13 PROCEDURE — 25000003 PHARM REV CODE 250: Mod: HCNC | Performed by: FAMILY MEDICINE

## 2023-07-13 PROCEDURE — 94761 N-INVAS EAR/PLS OXIMETRY MLT: CPT | Mod: HCNC

## 2023-07-13 PROCEDURE — 25000003 PHARM REV CODE 250: Mod: HCNC | Performed by: HOSPITALIST

## 2023-07-13 PROCEDURE — 25000242 PHARM REV CODE 250 ALT 637 W/ HCPCS: Mod: HCNC | Performed by: FAMILY MEDICINE

## 2023-07-13 PROCEDURE — 97110 THERAPEUTIC EXERCISES: CPT | Mod: HCNC,CQ

## 2023-07-13 RX ADMIN — GUAIFENESIN AND DEXTROMETHORPHAN 5 ML: 100; 10 SYRUP ORAL at 06:07

## 2023-07-13 RX ADMIN — LEVALBUTEROL HYDROCHLORIDE 0.63 MG: 0.63 SOLUTION RESPIRATORY (INHALATION) at 06:07

## 2023-07-13 RX ADMIN — NIFEDIPINE 60 MG: 30 TABLET, FILM COATED, EXTENDED RELEASE ORAL at 08:07

## 2023-07-13 RX ADMIN — PRAVASTATIN SODIUM 20 MG: 20 TABLET ORAL at 08:07

## 2023-07-13 RX ADMIN — BENZONATATE 100 MG: 100 CAPSULE ORAL at 02:07

## 2023-07-13 RX ADMIN — PANTOPRAZOLE SODIUM 40 MG: 40 TABLET, DELAYED RELEASE ORAL at 06:07

## 2023-07-13 RX ADMIN — SENNOSIDES AND DOCUSATE SODIUM 1 TABLET: 50; 8.6 TABLET ORAL at 08:07

## 2023-07-13 RX ADMIN — BENZONATATE 100 MG: 100 CAPSULE ORAL at 08:07

## 2023-07-13 RX ADMIN — LEVALBUTEROL HYDROCHLORIDE 0.63 MG: 0.63 SOLUTION RESPIRATORY (INHALATION) at 07:07

## 2023-07-13 RX ADMIN — AMOXICILLIN AND CLAVULANATE POTASSIUM 1 TABLET: 875; 125 TABLET, FILM COATED ORAL at 08:07

## 2023-07-13 RX ADMIN — PANTOPRAZOLE SODIUM 40 MG: 40 TABLET, DELAYED RELEASE ORAL at 04:07

## 2023-07-13 RX ADMIN — LOSARTAN POTASSIUM 100 MG: 50 TABLET, FILM COATED ORAL at 08:07

## 2023-07-13 RX ADMIN — ASPIRIN 81 MG: 81 TABLET, COATED ORAL at 08:07

## 2023-07-13 RX ADMIN — GUAIFENESIN AND DEXTROMETHORPHAN 5 ML: 100; 10 SYRUP ORAL at 12:07

## 2023-07-13 RX ADMIN — METOPROLOL SUCCINATE 12.5 MG: 25 TABLET, EXTENDED RELEASE ORAL at 08:07

## 2023-07-13 NOTE — PT/OT/SLP PROGRESS
Occupational Therapy   Treatment    Name: Julio Benites  MRN: [id number]  Admit Date: 6/26/2023  Admitting Diagnosis:  Severe malnutrition    General Precautions: Standard, aspiration, fall, hearing impaired, pureed diet   Orthopedic Precautions: N/A   Braces: N/A    Recommendations:     Discharge Recommendations:  home health OT  Level of Assistance Recommended at Discharge: 24 hours physical assistance for all ADL's and home management tasks  Discharge Equipment Recommendations: bedside commode, wheelchair, walker, rolling  Barriers to discharge:  Decreased caregiver support, Inaccessible home environment    Assessment:     Julio Benites is a 88 y.o. male with a medical diagnosis of Severe malnutrition.  He presents with limitations in performance of self-care, functional mobility, and ADLs. Performance deficits affecting function are weakness, impaired endurance, impaired self care skills, impaired functional mobility, gait instability, impaired balance, decreased ROM, impaired cardiopulmonary response to activity, decreased lower extremity function, decreased upper extremity function. Pt reports fatigue. Pt tolerated Tx without incident and is making progress but continues to require assist to perform self care tasks, functional mobility and functional transfers. Pt would continue to benefit from OT intervention to further functional (I)ce and safety.     Rehab Potential is fair    Activity tolerance:  Fair    Plan:     Patient to be seen 5 x/week to address the above listed problems via self-care/home management, therapeutic activities, therapeutic exercises    Plan of Care Expires: 07/26/23  Plan of Care Reviewed with: patient    Subjective     Communicated with: Nursing prior to session.     Pain/Comfort:  Pain Rating 1: 0/10  Pain Rating Post-Intervention 1: 0/10    Patient's cultural, spiritual, Temple conflicts given the current situation:  no    Objective:     Patient found HOB elevated with  (no  lines) upon OT entry to room.    Bed Mobility:    Patient completed Rolling/Turning to Left with  supervision and with side rail  Patient completed Rolling/Turning to Right with supervision and with side rail  Patient completed Scooting/Bridging with stand by assistance and with side rail  Patient completed Supine to Sit with moderate assistance and with side rail     Functional Mobility/Transfers:  Patient completed Sit <> Stand Transfer with minimum assistance  with  rolling walker   Patient completed Bed <> Chair Transfer using Stand Pivot technique with contact guard assistance with rolling walker    Activities of Daily Living:  Upper Body Dressing: minimum assistance to darren pull over sweater with (A) to manage over head. Pt able to darren pullover shirt  Lower Body Dressing: minimum assistance seated EOB with RW when standing to manage pants over hips with (A) to steady in standing  Toileting: total assistance at bedlevel to doff/darren pull tab brief and to perform rear sarwat care    [facility] 6 Click ADL: 18    OT Exercises: Pt performed UBE exercise for 10 minutes with Min resistance.  UE exercises performed to increase functional endurance and strength in order increase independence when performing self care tasks, functional ambulation, W/C propulsion, and functional standing activities .    Treatment & Education:  Pt educated on:  - role of OT  - level of assistance  - energy conservation and task modification to maximized independence with ADL's and mobility   -  safety while performing functional transfers and self care tasks  - progress towards OT goals      Patient left up in chair with  PTA present    GOALS:   Multidisciplinary Problems       Occupational Therapy Goals          Problem: Occupational Therapy    Goal Priority Disciplines Outcome Interventions   Occupational Therapy Goal     OT, PT/OT Ongoing, Progressing    Description: Goals to be met by:  7/26/2023    Patient will increase functional  independence with ADLs by performing:    UE Dressing with Set-up.   LE Dressing with Min A, AE, and LRAD as needed.   Grooming while seated at sink with [place].  Toileting from toilet with Minimal Assistance for hygiene and clothing management with AE as needed and LRAD.  Bathing from  shower chair/bench with Supervision and AE as needed.  Stand pivot transfers with Min A for 100% t/f and LRAD.  Toilet transfer to toilet with Min A for 100% t/f and LRAD.  Upper extremity exercise program x10 reps per handout, with independence.                         Time Tracking:     OT Date of Treatment: 07/13/23  OT Start Time: 1058    OT Stop Time: 1122  OT Total Time (min): 24 min    Billable Minutes:Self Care/Home Management 12  Therapeutic Exercise 12    7/13/2023

## 2023-07-13 NOTE — PLAN OF CARE
Problem: Adult Inpatient Plan of Care  Goal: Plan of Care Review  Outcome: Ongoing, Progressing  Goal: Patient-Specific Goal (Individualized)  Outcome: Ongoing, Progressing  Goal: Absence of Hospital-Acquired Illness or Injury  Outcome: Ongoing, Progressing  Goal: Optimal Comfort and Wellbeing  Outcome: Ongoing, Progressing  Goal: Readiness for Transition of Care  Outcome: Ongoing, Progressing     Problem: Fall Injury Risk  Goal: Absence of Fall and Fall-Related Injury  Outcome: Ongoing, Progressing     Problem: Skin Injury Risk Increased  Goal: Skin Health and Integrity  Outcome: Ongoing, Progressing     Problem: Malnutrition  Goal: Improved Nutritional Intake  Outcome: Ongoing, Progressing     Pt remained free from falls or injuries this shift. Pt turned q2hrs. No skin breakdown noticed. Pt rested well through the night.

## 2023-07-13 NOTE — NURSING
Informed by therapy that patient was sweating and skin warm to touch.  Patient also stated he was cold.  Observed patient in bed laying supine with eyes closed.  Respond to verbal stimuli.  Skin w/d to touch.  T- 98.1. Patient stated he that he feels a little chilly.  Voices no complaint of pain or discomfort and no distress noted.  Will monitor.

## 2023-07-13 NOTE — PROGRESS NOTES
[facility]                                  Skilled Nursing Facility                   Progress Note     Patient Name: Julio Benites  YOB: 1934  MRN: [id number]  Room: [place] [address]/[place] [address] A     Admit Date: 6/26/2023   KYLAH: 7/17/2023     Principal Problem: Severe malnutrition    HPI obtained from patient interview and chart review     Chief Complaint: Re-evaluation of medical treatment and therapy status: cough, pna, dysphagia, hospice    HPI:   Julio Benites is an 88 y.o. male with a past medical history of DM, NSTEMI, HTN, HLD, and systolic CHF who was recently treated for CAP  with azithromycin who presented to ED on 6/16/23 with abdominal pain. Admitted with colitis with fecal impaction/constipation. General surgery and GI consulted. Bowel regimen initiated following manual disimpaction and enema. EGD 06/19 showing grade D esophagitis, lavell-en-Y gastrojejunostomy with healthy-appearing anastomosis. MBSS 06/20 which showed laryngeal penetration with all consistencies. Patient declined PEG placement. Patient lives at home alone with support from grandson. Patient goal to discharge to [facility] where his wife resides. Patient and family considering hospice.    Patient will be treated at [facility] with PT and OT to improve functional status and ability to perform ADLs.     Interval History  24 hour chart review completed. NAEON. NAD.  Afebrile, vital signs stable.  Frequent, coarse, productive cough decreasing in frquency with q 12 hour  xopanex tx.   Cont CPT q 12.  Robitussin q 6 hours.  CXR 7/7: no acute worsening.  Continue 14 day course Augmentin q 12 hours. EOC 7/15.  Patient wants to transition to hospice and join his wife in [facility] at discharge. D/w Daughter at bedside, she is in agreement.   D/w CM today - coordinating discharge plans; awaiting information from [facility] regarding hospice transition. Ambulatory  referral in place for external hospice service.    Allergies: Patient is allergic to lisinopril, nicoderm, and nicoderm cq [nicotine].      ROS  Constitutional:  Negative for appetite change and fever.   Respiratory:  Positive for cough, shortness of breath.    Cardiovascular:  Negative for chest pain and palpitations.   Gastrointestinal:  Negative for abdominal pain, nausea and vomiting.   Genitourinary:  Negative for dysuria.   Musculoskeletal:  Negative for arthralgias and back pain.   Neurological:  Positive for weakness. Negative for dizziness and light-headedness.   Psychiatric/Behavioral:  Negative for sleep disturbance. The patient is not nervous/anxious.     24 hour Vital Sign Range   Temp:  [98.4 °F (36.9 °C)-98.7 °F (37.1 °C)]   Pulse:  [74-91]   Resp:  [14-18]   BP: (125-131)/(76-82)   SpO2:  [97 %-100 %]       Physical Exam  Constitutional:       General: He is not in acute distress. Hard of hearing, dentures     Appearance: He is well-developed. He is cachectic. He is not diaphoretic.   Cardiovascular:      Rate and Rhythm: Normal rate and regular rhythm.      Heart sounds: S1 normal and S2 normal. Murmur heard.   Systolic murmur is present.   Pulmonary:      Effort: Pulmonary effort is normal. No respiratory distress.      Breath sounds: Normal breath sounds. No wheezing or rales.   Productive cough with clear thick sputum  Abdominal:      General: Bowel sounds are normal. There is no distension.      Palpations: Abdomen is soft, flat.      Tenderness: There is no abdominal tenderness.   Musculoskeletal:      Right lower leg: No edema.      Left lower leg: No edema.   Skin:     General: Skin is warm and dry, intact  Neurological:      Mental Status: He is alert and oriented to person, place, and time.   Psychiatric:         Mood and Affect: Mood normal.         Behavior: Behavior normal. Behavior is cooperative.         Thought Content: Thought content normal.           Labs:  Recent Labs   Lab  07/07/23  0519 07/11/23 0433   WBC 6.90 4.33   HGB 8.5* 7.9*   HCT 28.0* 25.1*    184       Recent Labs   Lab 07/07/23  0519 07/11/23 0433    140   K 3.9 3.9    106   CO2 29 28   BUN 20 23   CREATININE 0.6 0.5   GLU 88 93   CALCIUM 9.1 8.9   MG 1.8 1.9   PHOS 2.6* 2.8       No results for input(s): ALKPHOS, ALT, AST, ALBUMIN, PROT, BILITOT, INR in the last 168 hours.  No results for input(s): POCTGLUCOSE in the last 72 hours.    Meds Scheduled:   amoxicillin-clavulanate 875-125mg  1 tablet Oral Q12H    aspirin  81 mg Oral Daily    benzonatate  100 mg Oral TID    dextromethorphan-guaiFENesin  mg/5 ml  5 mL Oral Q6H    levalbuterol  0.63 mg Nebulization Q12H    losartan  100 mg Oral Daily    metoprolol succinate  12.5 mg Oral Daily    NIFEdipine  60 mg Oral BID    pantoprazole  40 mg Oral BID AC    pravastatin  20 mg Oral QHS    senna-docusate 8.6-50 mg  1 tablet Oral BID       PRN:   acetaminophen, acetaminophen, calcium carbonate, colchicine, levalbuterol, melatonin      Assessment and Plan:    Hypophosphatemia, new 7/7/23 7/7: potassium phosphate 280-160-250 mg 2 packet q4h x 2 doses    Severe Protein Calorie Malnutrition  Cachexia  Dysphagia  - Continue bowel regimen.  - SLP and RD evaluated and following  - MBSS 6/20 showed laryngeal penetration with all consistencies.  - Declined PEG placement.  - Aspiration precautions  - Purred diet per SLP recommendations     Aspiration Pneumonia   CXR reviewed; Indicative of developing pna.   Started on augmentin 975 mg q 12 hours x 5 days and oral probiotic tab daily x 7 days..   Continue xopanex nebs q 12 hour and prn q 6 hours.   Initiate tessalon, robitussin prn  7/6: unstable, CXR ordered and Augmentin resumed   CXR obtained 7/7: no acute worsening.  7/10: Frequent, coarse, productive cough improving   Continue tessalon 100 mg tid and change robitussin from prn to q 6 hours now that 5Dmucinex course complete.  Continue Augmentin q 12 hours -  14 day course given prolonged and recurrent symptoms. EOC 7/15.     Debility  7/13/2023   - Continue with PT/OT for gait training and strengthening and restoration of ADL's   - Encourage mobility, OOB in chair, and early ambulation as appropriate  - Fall precautions   - Monitor for bowel and bladder dysfunction  - Monitor for and prevent skin breakdown and pressure ulcers     Grade D esophagitis  - GI consulted and underwent EGD 6/19 showing grade D esophagitis, lavell-en-Y gastrojejunostomy with healthy-appearing anastomosis.  7/13/2023 Protonix po 40 mg bid     Discharge planning issues  - Patient seeking halfway NH with hospice with his wife at [facility].  - Recurrent aspiration likely given MBSS findings. Patient declines PEG.  - CM consulted  - Offer hospice consult to patient in SNF  - TB skin test obtained for placement.  - Pt wishes to transition to hospice and join his [facility] in [facility] at discharge. CM aware and coordinating discharge plans.    Chronic systolic heart failure  - Monitor for acute decompensation     Mixed hyperlipidemia  - Continue pravastatin     Essential hypertension  - Continue nifedipine at 60mg PO BID, losartan 100mg PO daily.        Anticipated Disposition:  group home nursing home +/- hospice.       Follow-up needed during SNF admission:       Follow-up needed after discharge from SNF:   - PCP within 1-2 weeks  - See appt scheduled below     No future appointments.      I certify that SNF services are required to be given on an inpatient basis because Julio Benites needs for skilled nursing care and/or skilled rehabilitation are required on a daily basis and such services can only practically be provided in a skilled nursing facility setting and are for an ongoing condition for which she received inpatient care in the hospital.       Total time spent: > 48 minutes  Description of Time: counseling provided on clinical condition, therapies provided, plan of care, emotional support,  coordinating patient care with other care team members, reviewing and interpreting labs and imaging, collaboration with physician, initiating new orders, chart review, and documentation. See interval hx.        Nicolette Tripp NP  Department of Hospital Medicine   [facility] Skilled Nursing [facility]     DOS: 7/13/2023

## 2023-07-13 NOTE — PT/OT/SLP PROGRESS
"Physical Therapy Treatment    Patient Name:  Julio Benites   MRN:  [id number]  Admit Date: 6/26/2023  Admitting Diagnosis: Severe malnutrition  Recent Surgeries:     General Precautions: Standard, aspiration, fall, hearing impaired, pureed diet (DNR)  Orthopedic Precautions: N/A  Braces: N/A    Recommendations:     Discharge Recommendations: home health PT  Level of Assistance Recommended at Discharge: 24 hours significant assistance  Discharge Equipment Recommendations: bedside commode, wheelchair, walker, rolling  Barriers to discharge: Decreased caregiver support, Inaccessible home environment    Assessment:     Julio Benites is a 88 y.o. male admitted with a medical diagnosis of Severe malnutrition . Pt tolerated well, pt would continue to benefit from skilled PT services to improve overall functional mobility, strength and endurance.  .      Performance deficits affecting function: weakness, impaired endurance, impaired self care skills, impaired functional mobility, gait instability, impaired balance, decreased upper extremity function, decreased lower extremity function, impaired cardiopulmonary response to activity.    Rehab Potential is good and fair    Activity Tolerance: Fair and Poor    Plan:     Patient to be seen 5 x/week to address the above listed problems via gait training, therapeutic activities, therapeutic exercises, neuromuscular re-education, wheelchair management/training    Plan of Care Expires: 07/28/23  Plan of Care Reviewed with: patient    Subjective     "OK" pt agreeable to therapy after OT .     Pain/Comfort:  Pain Rating 1: 0/10  Pain Rating Post-Intervention 1: 0/10    Patient's cultural, spiritual, Nondenominational conflicts given the current situation:  no    Objective:     .  Patient found with  (in wc) upon PT entry to room. Nsg Clear for therapy    Therapeutic Activities and Exercises: mini elliptical x 10 min 1 rest break    Functional Mobility:  Transfers:     Sit to Stand:  " minimum assistance with rolling walker  Gait: amb with RW CGA wc follow ~ 96 ft no LOB    AM-PAC 6 CLICK MOBILITY  16    Patient left up in chair with call button in reach and belonging sin reach .    GOALS:   Multidisciplinary Problems       Physical Therapy Goals          Problem: Physical Therapy    Goal Priority Disciplines Outcome Goal Variances Interventions   Physical Therapy Goal     PT, PT/OT Ongoing, Progressing     Description: Goals to be met by: 7/28/23     Patient will increase functional independence with mobility by performing:    . Supine to sit with Set-up [place]  . Sit to supine with Set-up [place]  . Rolling to Left and Right with Set-up Assistance.  . Sit to stand transfer with Stand-by Assistance  . Bed to chair transfer with Stand-by Assistance using Rolling Walker/ No AD  . Gait  x 100 feet with Stand-by Assistance using Rolling Walker.   . Wheelchair propulsion x150 feet with Modified [place] using bilateral uppper extremities  . Ascend/Descend 4 inch curb step with Minimal Assistance using Rolling Walker.                         Time Tracking:     PT Received On: 07/13/23  PT Start Time: 1133  PT Stop Time: 1151  PT Total Time (min): 18 min    Billable Minutes: Therapeutic Exercise 18    Treatment Type: Treatment  PT/PTA: PTA     Number of PTA visits since last PT visit: 1 07/13/2023

## 2023-07-14 LAB
ANION GAP SERPL CALC-SCNC: 6 MMOL/L (ref 8–16)
BASOPHILS # BLD AUTO: 0.02 K/UL (ref 0–0.2)
BASOPHILS NFR BLD: 0.5 % (ref 0–1.9)
BUN SERPL-MCNC: 22 MG/DL (ref 8–23)
CALCIUM SERPL-MCNC: 9.1 MG/DL (ref 8.7–10.5)
CHLORIDE SERPL-SCNC: 105 MMOL/L (ref 95–110)
CO2 SERPL-SCNC: 28 MMOL/L (ref 23–29)
CREAT SERPL-MCNC: 0.5 MG/DL (ref 0.5–1.4)
DIFFERENTIAL METHOD: ABNORMAL
EOSINOPHIL # BLD AUTO: 0.1 K/UL (ref 0–0.5)
EOSINOPHIL NFR BLD: 1.9 % (ref 0–8)
ERYTHROCYTE [DISTWIDTH] IN BLOOD BY AUTOMATED COUNT: 16.9 % (ref 11.5–14.5)
EST. GFR  (NO RACE VARIABLE): >60 ML/MIN/1.73 M^2
GLUCOSE SERPL-MCNC: 93 MG/DL (ref 70–110)
HCT VFR BLD AUTO: 24.3 % (ref 40–54)
HGB BLD-MCNC: 7.8 G/DL (ref 14–18)
IMM GRANULOCYTES # BLD AUTO: 0.06 K/UL (ref 0–0.04)
IMM GRANULOCYTES NFR BLD AUTO: 1.4 % (ref 0–0.5)
LYMPHOCYTES # BLD AUTO: 1 K/UL (ref 1–4.8)
LYMPHOCYTES NFR BLD: 24.4 % (ref 18–48)
MAGNESIUM SERPL-MCNC: 1.7 MG/DL (ref 1.6–2.6)
MCH RBC QN AUTO: 25.4 PG (ref 27–31)
MCHC RBC AUTO-ENTMCNC: 32.1 G/DL (ref 32–36)
MCV RBC AUTO: 79 FL (ref 82–98)
MONOCYTES # BLD AUTO: 0.6 K/UL (ref 0.3–1)
MONOCYTES NFR BLD: 13.6 % (ref 4–15)
NEUTROPHILS # BLD AUTO: 2.5 K/UL (ref 1.8–7.7)
NEUTROPHILS NFR BLD: 58.2 % (ref 38–73)
NRBC BLD-RTO: 0 /100 WBC
PHOSPHATE SERPL-MCNC: 3.1 MG/DL (ref 2.7–4.5)
PLATELET # BLD AUTO: 174 K/UL (ref 150–450)
PMV BLD AUTO: 11.4 FL (ref 9.2–12.9)
POTASSIUM SERPL-SCNC: 3.8 MMOL/L (ref 3.5–5.1)
RBC # BLD AUTO: 3.07 M/UL (ref 4.6–6.2)
SODIUM SERPL-SCNC: 139 MMOL/L (ref 136–145)
WBC # BLD AUTO: 4.26 K/UL (ref 3.9–12.7)

## 2023-07-14 PROCEDURE — 11000004 HC SNF PRIVATE: Mod: HCNC

## 2023-07-14 PROCEDURE — 94640 AIRWAY INHALATION TREATMENT: CPT | Mod: HCNC

## 2023-07-14 PROCEDURE — 36415 COLL VENOUS BLD VENIPUNCTURE: CPT | Mod: HCNC | Performed by: FAMILY MEDICINE

## 2023-07-14 PROCEDURE — 85025 COMPLETE CBC W/AUTO DIFF WBC: CPT | Mod: HCNC | Performed by: FAMILY MEDICINE

## 2023-07-14 PROCEDURE — 97110 THERAPEUTIC EXERCISES: CPT | Mod: HCNC

## 2023-07-14 PROCEDURE — 97116 GAIT TRAINING THERAPY: CPT | Mod: HCNC

## 2023-07-14 PROCEDURE — 25000003 PHARM REV CODE 250: Mod: HCNC | Performed by: HOSPITALIST

## 2023-07-14 PROCEDURE — 25000242 PHARM REV CODE 250 ALT 637 W/ HCPCS: Mod: HCNC | Performed by: FAMILY MEDICINE

## 2023-07-14 PROCEDURE — 84100 ASSAY OF PHOSPHORUS: CPT | Mod: HCNC | Performed by: FAMILY MEDICINE

## 2023-07-14 PROCEDURE — 80048 BASIC METABOLIC PNL TOTAL CA: CPT | Mod: HCNC | Performed by: FAMILY MEDICINE

## 2023-07-14 PROCEDURE — 97530 THERAPEUTIC ACTIVITIES: CPT | Mod: HCNC,CO

## 2023-07-14 PROCEDURE — 83735 ASSAY OF MAGNESIUM: CPT | Mod: HCNC | Performed by: FAMILY MEDICINE

## 2023-07-14 PROCEDURE — 97110 THERAPEUTIC EXERCISES: CPT | Mod: HCNC,CO

## 2023-07-14 PROCEDURE — 25000003 PHARM REV CODE 250: Mod: HCNC | Performed by: FAMILY MEDICINE

## 2023-07-14 PROCEDURE — 94761 N-INVAS EAR/PLS OXIMETRY MLT: CPT | Mod: HCNC

## 2023-07-14 RX ORDER — LOSARTAN POTASSIUM 100 MG/1
100 TABLET ORAL DAILY
Qty: 90 TABLET | Refills: 3
Start: 2023-07-14

## 2023-07-14 RX ORDER — ACETAMINOPHEN 325 MG/1
650 TABLET ORAL EVERY 6 HOURS PRN
Refills: 0
Start: 2023-07-14

## 2023-07-14 RX ORDER — AMOXICILLIN 250 MG
1 CAPSULE ORAL 2 TIMES DAILY
Start: 2023-07-14

## 2023-07-14 RX ORDER — ASPIRIN 81 MG/1
81 TABLET ORAL DAILY
Qty: 90 TABLET | Refills: 3
Start: 2023-07-14

## 2023-07-14 RX ORDER — NIFEDIPINE 60 MG/1
60 TABLET, EXTENDED RELEASE ORAL 2 TIMES DAILY
Qty: 60 TABLET | Refills: 11
Start: 2023-07-14 | End: 2024-07-13

## 2023-07-14 RX ORDER — TALC
6 POWDER (GRAM) TOPICAL NIGHTLY PRN
Refills: 0
Start: 2023-07-14

## 2023-07-14 RX ORDER — GUAIFENESIN/DEXTROMETHORPHAN 100-10MG/5
5 SYRUP ORAL EVERY 6 HOURS
Qty: 118 ML | Refills: 0
Start: 2023-07-14 | End: 2023-07-24

## 2023-07-14 RX ORDER — COLCHICINE 0.6 MG/1
0.6 TABLET ORAL 2 TIMES DAILY PRN
Start: 2023-07-14

## 2023-07-14 RX ORDER — BENZONATATE 100 MG/1
100 CAPSULE ORAL 3 TIMES DAILY
Qty: 30 CAPSULE | Refills: 0
Start: 2023-07-14 | End: 2023-07-24

## 2023-07-14 RX ORDER — LEVALBUTEROL INHALATION SOLUTION 0.63 MG/3ML
0.63 SOLUTION RESPIRATORY (INHALATION) EVERY 6 HOURS PRN
Qty: 3 ML | Refills: 0
Start: 2023-07-14 | End: 2024-07-13

## 2023-07-14 RX ORDER — LEVALBUTEROL INHALATION SOLUTION 0.63 MG/3ML
0.63 SOLUTION RESPIRATORY (INHALATION) EVERY 12 HOURS
Qty: 3 ML | Refills: 0
Start: 2023-07-14 | End: 2024-07-13

## 2023-07-14 RX ORDER — PRAVASTATIN SODIUM 20 MG/1
20 TABLET ORAL NIGHTLY
Qty: 90 TABLET | Refills: 0
Start: 2023-07-14

## 2023-07-14 RX ORDER — METOPROLOL SUCCINATE 25 MG/1
12.5 TABLET, EXTENDED RELEASE ORAL DAILY
Qty: 45 TABLET | Refills: 3
Start: 2023-07-14 | End: 2024-07-13

## 2023-07-14 RX ORDER — PANTOPRAZOLE SODIUM 40 MG/1
40 TABLET, DELAYED RELEASE ORAL 2 TIMES DAILY
Qty: 60 TABLET | Refills: 11
Start: 2023-07-14 | End: 2024-07-13

## 2023-07-14 RX ADMIN — GUAIFENESIN AND DEXTROMETHORPHAN 5 ML: 100; 10 SYRUP ORAL at 11:07

## 2023-07-14 RX ADMIN — AMOXICILLIN AND CLAVULANATE POTASSIUM 1 TABLET: 875; 125 TABLET, FILM COATED ORAL at 09:07

## 2023-07-14 RX ADMIN — AMOXICILLIN AND CLAVULANATE POTASSIUM 1 TABLET: 875; 125 TABLET, FILM COATED ORAL at 08:07

## 2023-07-14 RX ADMIN — NIFEDIPINE 60 MG: 30 TABLET, FILM COATED, EXTENDED RELEASE ORAL at 08:07

## 2023-07-14 RX ADMIN — SENNOSIDES AND DOCUSATE SODIUM 1 TABLET: 50; 8.6 TABLET ORAL at 08:07

## 2023-07-14 RX ADMIN — BENZONATATE 100 MG: 100 CAPSULE ORAL at 09:07

## 2023-07-14 RX ADMIN — LEVALBUTEROL HYDROCHLORIDE 0.63 MG: 0.63 SOLUTION RESPIRATORY (INHALATION) at 08:07

## 2023-07-14 RX ADMIN — PANTOPRAZOLE SODIUM 40 MG: 40 TABLET, DELAYED RELEASE ORAL at 05:07

## 2023-07-14 RX ADMIN — PRAVASTATIN SODIUM 20 MG: 20 TABLET ORAL at 08:07

## 2023-07-14 RX ADMIN — GUAIFENESIN AND DEXTROMETHORPHAN 5 ML: 100; 10 SYRUP ORAL at 05:07

## 2023-07-14 RX ADMIN — BENZONATATE 100 MG: 100 CAPSULE ORAL at 03:07

## 2023-07-14 RX ADMIN — SENNOSIDES AND DOCUSATE SODIUM 1 TABLET: 50; 8.6 TABLET ORAL at 09:07

## 2023-07-14 RX ADMIN — BENZONATATE 100 MG: 100 CAPSULE ORAL at 08:07

## 2023-07-14 RX ADMIN — METOPROLOL SUCCINATE 12.5 MG: 25 TABLET, EXTENDED RELEASE ORAL at 09:07

## 2023-07-14 RX ADMIN — ASPIRIN 81 MG: 81 TABLET, COATED ORAL at 09:07

## 2023-07-14 RX ADMIN — GUAIFENESIN AND DEXTROMETHORPHAN 5 ML: 100; 10 SYRUP ORAL at 12:07

## 2023-07-14 RX ADMIN — LEVALBUTEROL HYDROCHLORIDE 0.63 MG: 0.63 SOLUTION RESPIRATORY (INHALATION) at 07:07

## 2023-07-14 RX ADMIN — PANTOPRAZOLE SODIUM 40 MG: 40 TABLET, DELAYED RELEASE ORAL at 03:07

## 2023-07-14 RX ADMIN — LOSARTAN POTASSIUM 100 MG: 50 TABLET, FILM COATED ORAL at 09:07

## 2023-07-14 RX ADMIN — NIFEDIPINE 60 MG: 30 TABLET, FILM COATED, EXTENDED RELEASE ORAL at 09:07

## 2023-07-14 NOTE — PLAN OF CARE
[facility]    FACILITY TRANSFER ORDERS      Patient Name: Julio Benites  YOB: 1934    PCP: Gregorio Jensen MD   PCP Address: [address] [place], [place] [address] [facility] [address]  PCP Phone Number: [phone]  PCP Fax: [phone]    Encounter Date: 07/14/2023    Admit to: [facility]    Vital Signs:  Routine    Diagnoses:   Active Hospital Problems    Diagnosis  POA    *Severe malnutrition [E43]  Yes    Dysphagia [R13.10]  Yes     Chronic    Fecal impaction [K56.41]  Yes    Cachexia [R64]  Yes     Chronic    Chronic systolic heart failure [I50.22]  Yes     Chronic    Gastroesophageal reflux disease without esophagitis [K21.9]  Yes    Type 2 diabetes mellitus with microalbuminuria, without long-term current use of insulin [E11.29, R80.9]  Yes     Chronic    Essential hypertension [I10]  Yes     Chronic    Mixed hyperlipidemia [E78.2]  Yes     Chronic    Anemia of chronic disease [D63.8]  Yes      Resolved Hospital Problems   No resolved problems to display.       Allergies:  Review of patient's allergies indicates:   Allergen Reactions    Lisinopril Rash     Patient reports history of rash with previous lisinopril use.     Nicoderm     Nicoderm cq [nicotine] Rash       Diet:  as tolerated for pleasure. Purred diet with thin liquids at present.    Activities: Activity as tolerated    Goals of Care Treatment Preferences:  Code Status: DNR       LaPOST: Yes  What is most important right now is to focus on improvement in condition but with limits to invasive therapies.  Accordingly, we have decided that the best plan to meet the patient's goals includes continuing with treatment.      Medications: Review discharge medications with patient and family and provide education.      Current Discharge Medication List        START taking these medications    Details   acetaminophen (TYLENOL) 325 MG tablet Take 2 tablets (650 mg total) by mouth every 6 (six) hours as  needed for Temperature greater than or Pain (100.4 F).  Refills: 0      benzonatate (TESSALON) 100 MG capsule Take 1 capsule (100 mg total) by mouth 3 (three) times daily. for 10 days  Qty: 30 capsule, Refills: 0      dextromethorphan-guaiFENesin  mg/5 ml (ROBITUSSIN-DM)  mg/5 mL liquid Take 5 mLs by mouth every 6 (six) hours. for 10 days  Qty: 118 mL, Refills: 0      !! levalbuterol (XOPENEX) 0.63 mg/3 mL nebulizer solution Take 3 mLs (0.63 mg total) by nebulization every 6 (six) hours as needed for Wheezing or Shortness of Breath. Rescue  Qty: 3 mL, Refills: 0      !! levalbuterol (XOPENEX) 0.63 mg/3 mL nebulizer solution Take 3 mLs (0.63 mg total) by nebulization every 12 (twelve) hours. Rescue  Qty: 3 mL, Refills: 0      melatonin (MELATIN) 3 mg tablet Take 2 tablets (6 mg total) by mouth nightly as needed for Insomnia.  Refills: 0      senna-docusate 8.6-50 mg (PERICOLACE) 8.6-50 mg per tablet Take 1 tablet by mouth 2 (two) times daily.       !! - Potential duplicate medications found. Please discuss with provider.        CONTINUE these medications which have CHANGED    Details   aspirin (ECOTRIN) 81 MG EC tablet Take 1 tablet (81 mg total) by mouth once daily.  Qty: 90 tablet, Refills: 3      colchicine (COLCRYS) 0.6 mg tablet Take 1 tablet (0.6 mg total) by mouth 2 (two) times daily as needed (gout attack).      losartan (COZAAR) 100 MG tablet Take 1 tablet (100 mg total) by mouth once daily.  Qty: 90 tablet, Refills: 3    Comments: .  Associated Diagnoses: Essential hypertension, benign      metoprolol succinate (TOPROL-XL) 25 MG 24 hr tablet Take 0.5 tablets (12.5 mg total) by mouth once daily.  Qty: 45 tablet, Refills: 3    Comments: .      NIFEdipine (PROCARDIA-XL) 60 MG (OSM) 24 hr tablet Take 1 tablet (60 mg total) by mouth 2 (two) times a day.  Qty: 60 tablet, Refills: 11    Comments: .      pantoprazole (PROTONIX) 40 MG tablet Take 1 tablet (40 mg total) by mouth 2 (two) times daily.  Qty:  60 tablet, Refills: 11      pravastatin (PRAVACHOL) 20 MG tablet Take 1 tablet (20 mg total) by mouth every evening.  Qty: 90 tablet, Refills: 0    Associated Diagnoses: Mixed hyperlipidemia                Nicolette Tripp NP  07/14/2023

## 2023-07-14 NOTE — PT/OT/SLP PROGRESS
Physical Therapy Treatment    Patient Name:  Julio Benites   MRN:  [id number]  Admit Date: 6/26/2023  Admitting Diagnosis: Severe malnutrition  Recent Surgeries: N/A    General Precautions: Standard, aspiration, fall, hearing impaired, pureed diet (DNR)  Orthopedic Precautions: N/A  Braces: N/A    Recommendations:     Discharge Recommendations: home health PT  Level of Assistance Recommended at Discharge: 24 hours light assistance  Discharge Equipment Recommendations: bedside commode, wheelchair, walker, rolling  Barriers to discharge: Decreased caregiver support, Inaccessible home environment    Assessment:     Julio Benites is a 88 y.o. male admitted with a medical diagnosis of Severe malnutrition .   Performance deficits affecting function: weakness, impaired endurance, impaired self care skills, gait instability, impaired functional mobility, impaired balance, decreased upper extremity function, decreased lower extremity function, impaired cardiopulmonary response to activity.    Rehab Potential is good    Activity Tolerance: Fair    Plan:     Patient to be seen 5 x/week to address the above listed problems via gait training, therapeutic activities, therapeutic exercises, neuromuscular re-education, wheelchair management/training    Plan of Care Expires: 07/28/23  Plan of Care Reviewed with: patient    Subjective     Pt. Agreeable to work with PT.     Pain/Comfort:  Pain Rating 1: 0/10  Pain Rating Post-Intervention 1: 0/10    Patient's cultural, spiritual, Buddhism conflicts given the current situation:  no    Objective:     Communicated with pt's nurse prior to session.  Patient found up in chair with   upon PT entry to room.     Therapeutic Activities and Exercises: LBEx 15mins to help improve B L/E MMT and endurance    Functional Mobility:  Bed Mobility:     Supine to Sit: moderate assistance  Transfers:     Sit to Stand:  minimum assistance with no AD and rolling walker  Bed to Chair: minimum  assistance with  no AD  using  Stand Pivot  Gait: ~80ft x 2 trials with RW and CGA for safety d.t pt with generalized weakness    AM-PAC 6 CLICK MOBILITY  16    Patient left up in chair with  direct hand off to OT in the gym .    GOALS:   Multidisciplinary Problems       Physical Therapy Goals          Problem: Physical Therapy    Goal Priority Disciplines Outcome Goal Variances Interventions   Physical Therapy Goal     PT, PT/OT Ongoing, Progressing     Description: Goals to be met by: 7/28/23     Patient will increase functional independence with mobility by performing:    . Supine to sit with Set-up [place]  . Sit to supine with Set-up [place]  . Rolling to Left and Right with Set-up Assistance.  . Sit to stand transfer with Stand-by Assistance  . Bed to chair transfer with Stand-by Assistance using Rolling Walker/ No AD  . Gait  x 100 feet with Stand-by Assistance using Rolling Walker.   . Wheelchair propulsion x150 feet with Modified [place] using bilateral uppper extremities  . Ascend/Descend 4 inch curb step with Minimal Assistance using Rolling Walker.                         Time Tracking:     PT Received On: 07/14/23  PT Start Time: 0907  PT Stop Time: 0939  PT Total Time (min): 32 min    Billable Minutes: Gait Training 17 and Therapeutic Exercise 15    Treatment Type: Treatment  PT/PTA: PT     Number of PTA visits since last PT visit: 0     07/14/2023

## 2023-07-14 NOTE — PLAN OF CARE
Patient pain has been well controlled. All needs anticipated and met. No complaints voiced. Uneventful day noted.

## 2023-07-14 NOTE — PLAN OF CARE
CHW served per facility NOMNC to patients daughter Brandy. Brandy stated she understands the NOMNC and that her dad will D/C on Monday to NH. CHW informed daughter a copy of NOMNC will be at patients bedside. Also, NOMNC was faxed to [facility].

## 2023-07-14 NOTE — PLAN OF CARE
Patient is set to discharge Monday 7/17, to be determined. Will be discharging to [facility]. [facility] who provides hospice care there has been sent referral. Only awaiting Covid test Sunday. Will set up wheelchair van for Monday after we get results form Covid test.

## 2023-07-14 NOTE — PROGRESS NOTES
[facility]                                  Skilled Nursing Facility                   Progress Note     Patient Name: Julio Benites  YOB: 1934  MRN: [id number]  Room: [place] [address] [address] A     Admit Date: 6/26/2023   KYLAH: 7/17/2023     Principal Problem: Severe malnutrition    HPI obtained from patient interview and chart review     Chief Complaint: Re-evaluation of medical treatment and therapy status: cough, pna, dysphagia, hospice care planning    HPI:   Julio Benites is an 88 y.o. male with a past medical history of DM, NSTEMI, HTN, HLD, and systolic CHF who was recently treated for CAP  with azithromycin who presented to ED on 6/16/23 with abdominal pain. Admitted with colitis with fecal impaction/constipation. General surgery and GI consulted. Bowel regimen initiated following manual disimpaction and enema. EGD 06/19 showing grade D esophagitis, lavell-en-Y gastrojejunostomy with healthy-appearing anastomosis. MBSS 06/20 which showed laryngeal penetration with all consistencies. Patient declined PEG placement. Patient lives at home alone with support from grandson. Patient goal to discharge to [facility] where his wife resides. Patient and family considering hospice.    Patient will be treated at [facility] with PT and OT to improve functional status and ability to perform ADLs.     Interval History  24 hour chart review completed. NAEON. NAD.  Afebrile, vital signs stable.  Cont  xopanex tx and CPT q 12.  Robitussin q 6 hours.  Continue 14 day course Augmentin q 12 hours. EOC 7/15.  Patient wants to transition to hospice and join his wife in [facility] at discharge.   Transfer of Care orders placed for [facility] per patient and daughter's wishes.  Ambulatory referral in place for external hospice service.  D/w CM today.  Anticipating discharge 7/17 to hospice care.     Allergies: Patient is allergic to lisinopril,  nicoderm, and nicoderm cq [nicotine].      ROS  Constitutional:  Negative for appetite change and fever.   Respiratory:  Positive for cough, shortness of breath.    Cardiovascular:  Negative for chest pain and palpitations.   Gastrointestinal:  Negative for abdominal pain, nausea and vomiting.   Genitourinary:  Negative for dysuria.   Musculoskeletal:  Negative for arthralgias and back pain.   Neurological:  Positive for weakness. Negative for dizziness and light-headedness.   Psychiatric/Behavioral:  Negative for sleep disturbance. The patient is not nervous/anxious.     24 hour Vital Sign Range   Temp:  [97.9 °F (36.6 °C)-98.7 °F (37.1 °C)]   Pulse:  [70-76]   Resp:  [16-17]   BP: (121-126)/(71-76)   SpO2:  [98 %-100 %]       Physical Exam  Constitutional:       General: He is not in acute distress. Hard of hearing, dentures     Appearance: He is well-developed. He is cachectic. He is not diaphoretic.   Cardiovascular:      Rate and Rhythm: Normal rate and regular rhythm.      Heart sounds: S1 normal and S2 normal. Murmur heard.   Systolic murmur is present.   Pulmonary:      Effort: Pulmonary effort is normal. No respiratory distress.      Breath sounds: Normal breath sounds. No wheezing or rales.   Productive cough with clear thick sputum  Abdominal:      General: Bowel sounds are normal. There is no distension.      Palpations: Abdomen is soft, flat.      Tenderness: There is no abdominal tenderness.   Musculoskeletal:      Right lower leg: No edema.      Left lower leg: No edema.   Skin:     General: Skin is warm and dry, intact  Neurological:      Mental Status: He is alert and oriented to person, place, and time.   Psychiatric:         Mood and Affect: Mood normal.         Behavior: Behavior normal. Behavior is cooperative.         Thought Content: Thought content normal.           Labs:  Recent Labs   Lab 07/11/23  0433 07/14/23  0613   WBC 4.33 4.26   HGB 7.9* 7.8*   HCT 25.1* 24.3*    174        Recent Labs   Lab 07/11/23  0433 07/14/23  0613    139   K 3.9 3.8    105   CO2 28 28   BUN 23 22   CREATININE 0.5 0.5   GLU 93 93   CALCIUM 8.9 9.1   MG 1.9 1.7   PHOS 2.8 3.1       No results for input(s): ALKPHOS, ALT, AST, ALBUMIN, PROT, BILITOT, INR in the last 168 hours.  No results for input(s): POCTGLUCOSE in the last 72 hours.    Meds Scheduled:   amoxicillin-clavulanate 875-125mg  1 tablet Oral Q12H    aspirin  81 mg Oral Daily    benzonatate  100 mg Oral TID    dextromethorphan-guaiFENesin  mg/5 ml  5 mL Oral Q6H    levalbuterol  0.63 mg Nebulization Q12H    losartan  100 mg Oral Daily    metoprolol succinate  12.5 mg Oral Daily    NIFEdipine  60 mg Oral BID    pantoprazole  40 mg Oral BID AC    pravastatin  20 mg Oral QHS    senna-docusate 8.6-50 mg  1 tablet Oral BID       PRN:   acetaminophen, acetaminophen, calcium carbonate, colchicine, levalbuterol, melatonin      Assessment and Plan:    Hypophosphatemia, new 7/7/23 7/7: potassium phosphate 280-160-250 mg 2 packet q4h x 2 doses    Severe Protein Calorie Malnutrition  Cachexia  Dysphagia  - Continue bowel regimen.  - SLP and RD evaluated and following  - MBSS 6/20 showed laryngeal penetration with all consistencies.  - Declined PEG placement.  - Aspiration precautions  - Purred diet per SLP recommendations     Aspiration Pneumonia   CXR reviewed; Indicative of developing pna.   Started on augmentin 975 mg q 12 hours x 5 days and oral probiotic tab daily x 7 days..   Continue xopanex nebs q 12 hour and prn q 6 hours.   Initiate tessalon, robitussin prn  7/6: unstable, CXR ordered and Augmentin resumed   CXR obtained 7/7: no acute worsening.  7/10: Frequent, coarse, productive cough improving   Continue tessalon 100 mg tid and change robitussin from prn to q 6 hours now that 5Dmucinex course complete.  Continue Augmentin q 12 hours - 14 day course given prolonged and recurrent symptoms. EOC 7/15.     Debility  7/14/2023   -  Continue with PT/OT for gait training and strengthening and restoration of ADL's   - Encourage mobility, OOB in chair, and early ambulation as appropriate  - Fall precautions   - Monitor for bowel and bladder dysfunction  - Monitor for and prevent skin breakdown and pressure ulcers     Grade D esophagitis  - GI consulted and underwent EGD 6/19 showing grade D esophagitis, lavell-en-Y gastrojejunostomy with healthy-appearing anastomosis.  7/14/2023 Protonix po 40 mg bid     Discharge planning issues  - Patient seeking assisted NH with hospice with his wife at [facility].  - Recurrent aspiration likely given MBSS findings. Patient declines PEG.  - CM consulted  - Offer hospice consult to patient in SNF  - TB skin test obtained for placement.  - Pt wishes to transition to hospice and join his wife in [facility] at discharge. CM aware and coordinating discharge plans.    Chronic systolic heart failure  - Monitor for acute decompensation     Mixed hyperlipidemia  - Continue pravastatin     Essential hypertension  - Continue nifedipine at 60mg PO BID, losartan 100mg PO daily.        Anticipated Disposition:  correction nursing home with hospice on 7/17.       Follow-up needed during SNF admission:       Follow-up needed after discharge from SNF:   - PCP within 1-2 weeks  - See appt scheduled below     No future appointments.      I certify that SNF services are required to be given on an inpatient basis because Julio Benites needs for skilled nursing care and/or skilled rehabilitation are required on a daily basis and such services can only practically be provided in a skilled nursing facility setting and are for an ongoing condition for which she received inpatient care in the hospital.       Total time spent: > 46 minutes  Description of Time: counseling provided on clinical condition, therapies provided, plan of care, emotional support, coordinating patient care with other care team members, reviewing and interpreting labs  and imaging, collaboration with physician, initiating new orders, chart review, and documentation. See interval hx.        Nicolette Tripp NP  Department of Hospital Medicine   [facility]     DOS: 7/14/2023

## 2023-07-14 NOTE — PLAN OF CARE
Problem: Adult Inpatient Plan of Care  Goal: Readiness for Transition of Care  Outcome: Ongoing, Progressing     Problem: Adult Inpatient Plan of Care  Goal: Absence of Hospital-Acquired Illness or Injury  Outcome: Ongoing, Progressing     Problem: Adult Inpatient Plan of Care  Goal: Plan of Care Review  Outcome: Ongoing, Progressing

## 2023-07-14 NOTE — PT/OT/SLP PROGRESS
Occupational Therapy   Treatment    Name: Julio Benites  MRN: [id number]  Admit Date: 6/26/2023  Admitting Diagnosis:  Severe malnutrition    General Precautions: Standard, aspiration, fall, hearing impaired, pureed diet   Orthopedic Precautions: N/A   Braces: N/A    Recommendations:     Discharge Recommendations:  home health OT  Level of Assistance Recommended at Discharge: 24 hours physical assistance for all ADL's and home management tasks  Discharge Equipment Recommendations: bedside commode, wheelchair, walker, rolling  Barriers to discharge:  Decreased caregiver support, Inaccessible home environment    Assessment:     Julio Benites is a 88 y.o. male with a medical diagnosis of Severe malnutrition.  He presents with limitations in performance of self-care, functional mobility, and ADLs. Performance deficits affecting function are weakness, impaired endurance, impaired self care skills, impaired functional mobility, gait instability, impaired balance, decreased ROM, impaired cardiopulmonary response to activity, decreased lower extremity function, decreased upper extremity function. Pt reported fatigue with cessation of Tx with pt requesting to transfer back to bed. Nursing notified. Pt continues to require assist to perform self care tasks, functional mobility and functional transfers. Pt would continue to benefit from OT intervention to further functional (I)ce and safety.     Rehab Potential is fair    Activity tolerance:  Fair    Plan:     Patient to be seen 5 x/week to address the above listed problems via self-care/home management, therapeutic activities, therapeutic exercises    Plan of Care Expires: 07/26/23  Plan of Care Reviewed with: patient    Subjective     Communicated with: Nursing prior to session.     Pain/Comfort:  Pain Rating 1: 0/10  Pain Rating Post-Intervention 1: 0/10    Patient's cultural, spiritual, [employer] conflicts given the current situation:  no    Objective:     Patient  found up in chair with  (no lines) upon OT entry to room.    Bed Mobility:    Patient completed Sit to Supine with maximal assistance     Functional Mobility/Transfers:  Patient completed Sit <> Stand Transfer with moderate assistance  with  rolling walker   Patient completed Chair <> Bed Step Transfer technique with minimum assistance with rolling walker    AMPAC 6 Click ADL: 18    OT Exercises: Pt performed UBE exercise for 12 minutes with Min resistance.  UE exercises performed to increase functional endurance and strength in order increase independence when performing self care tasks, functional ambulation, W/C propulsion, and functional standing activities .     Treatment & Education:  Pt educated on:  - role of OT  - level of assistance  - energy conservation and task modification to maximized independence with ADL's and mobility   -  safety while performing functional transfers and self care tasks  - progress towards OT goals      Patient left HOB elevated with call button in reach and PCT notified    GOALS:   Multidisciplinary Problems       Occupational Therapy Goals          Problem: Occupational Therapy    Goal Priority Disciplines Outcome Interventions   Occupational Therapy Goal     OT, PT/OT Ongoing, Progressing    Description: Goals to be met by:  7/26/2023    Patient will increase functional independence with ADLs by performing:    UE Dressing with Set-up.   LE Dressing with Min A, AE, and LRAD as needed.   Grooming while seated at sink with [place].  Toileting from toilet with Minimal Assistance for hygiene and clothing management with AE as needed and LRAD.  Bathing from  shower chair/bench with Supervision and AE as needed.  Stand pivot transfers with Min A for 100% t/f and LRAD.  Toilet transfer to toilet with Min A for 100% t/f and LRAD.  Upper extremity exercise program x10 reps per handout, with independence.                         Time Tracking:     OT Date of Treatment: 07/14/23  OT  Start Time: 0946    OT Stop Time: 1011  OT Total Time (min): 25 min    Billable Minutes:Therapeutic Activity 10  Therapeutic Exercise 15    7/14/2023

## 2023-07-14 NOTE — PT/OT/SLP PROGRESS
Speech Language Pathology      Julio Benites  MRN: [id number]    Patient not seen today secondary to  (pt declined therapy on this service date. Pt is scheduled to discharge on Monday 7/17. Will likely not see for ST on discharge date.). Will follow-up 7/18 if does not d/c on 7/17.

## 2023-07-15 PROCEDURE — 25000003 PHARM REV CODE 250: Mod: HCNC | Performed by: FAMILY MEDICINE

## 2023-07-15 PROCEDURE — 94761 N-INVAS EAR/PLS OXIMETRY MLT: CPT | Mod: HCNC

## 2023-07-15 PROCEDURE — 25000003 PHARM REV CODE 250: Mod: HCNC | Performed by: HOSPITALIST

## 2023-07-15 PROCEDURE — 94640 AIRWAY INHALATION TREATMENT: CPT | Mod: HCNC

## 2023-07-15 PROCEDURE — 11000004 HC SNF PRIVATE: Mod: HCNC

## 2023-07-15 PROCEDURE — 25000242 PHARM REV CODE 250 ALT 637 W/ HCPCS: Mod: HCNC | Performed by: FAMILY MEDICINE

## 2023-07-15 RX ADMIN — SENNOSIDES AND DOCUSATE SODIUM 1 TABLET: 50; 8.6 TABLET ORAL at 08:07

## 2023-07-15 RX ADMIN — GUAIFENESIN AND DEXTROMETHORPHAN 5 ML: 100; 10 SYRUP ORAL at 12:07

## 2023-07-15 RX ADMIN — PANTOPRAZOLE SODIUM 40 MG: 40 TABLET, DELAYED RELEASE ORAL at 05:07

## 2023-07-15 RX ADMIN — BENZONATATE 100 MG: 100 CAPSULE ORAL at 04:07

## 2023-07-15 RX ADMIN — AMOXICILLIN AND CLAVULANATE POTASSIUM 1 TABLET: 875; 125 TABLET, FILM COATED ORAL at 12:07

## 2023-07-15 RX ADMIN — PRAVASTATIN SODIUM 20 MG: 20 TABLET ORAL at 08:07

## 2023-07-15 RX ADMIN — GUAIFENESIN AND DEXTROMETHORPHAN 5 ML: 100; 10 SYRUP ORAL at 05:07

## 2023-07-15 RX ADMIN — ASPIRIN 81 MG: 81 TABLET, COATED ORAL at 08:07

## 2023-07-15 RX ADMIN — NIFEDIPINE 60 MG: 30 TABLET, FILM COATED, EXTENDED RELEASE ORAL at 08:07

## 2023-07-15 RX ADMIN — BENZONATATE 100 MG: 100 CAPSULE ORAL at 08:07

## 2023-07-15 RX ADMIN — LOSARTAN POTASSIUM 100 MG: 50 TABLET, FILM COATED ORAL at 08:07

## 2023-07-15 RX ADMIN — LEVALBUTEROL HYDROCHLORIDE 0.63 MG: 0.63 SOLUTION RESPIRATORY (INHALATION) at 07:07

## 2023-07-15 RX ADMIN — PANTOPRAZOLE SODIUM 40 MG: 40 TABLET, DELAYED RELEASE ORAL at 04:07

## 2023-07-15 RX ADMIN — METOPROLOL SUCCINATE 12.5 MG: 25 TABLET, EXTENDED RELEASE ORAL at 08:07

## 2023-07-15 NOTE — PROGRESS NOTES
[facility]                                  Skilled Nursing Facility                   Progress Note     Patient Name: Julio Benites  YOB: 1934  MRN: [id number]  Room: [place] [address]/[place] [address] A     Admit Date: 6/26/2023   KYLAH: 7/17/2023     Principal Problem: Severe malnutrition    HPI obtained from patient interview and chart review     Chief Complaint: Re-evaluation of medical treatment and therapy status: cough, pna, dysphagia    HPI:   Julio Benites is an 88 y.o. male with a past medical history of DM, NSTEMI, HTN, HLD, and systolic CHF who was recently treated for CAP  with azithromycin who presented to ED on 6/16/23 with abdominal pain. Admitted with colitis with fecal impaction/constipation. General surgery and GI consulted. Bowel regimen initiated following manual disimpaction and enema. EGD 06/19 showing grade D esophagitis, lavell-en-Y gastrojejunostomy with healthy-appearing anastomosis. MBSS 06/20 which showed laryngeal penetration with all consistencies. Patient declined PEG placement. Patient lives at home alone with support from grandson. Patient goal to discharge to [facility] where his wife resides. Patient and family considering hospice.    Patient will be treated at [facility] with PT and OT to improve functional status and ability to perform ADLs.     Interval History  24 hour chart review completed. NAEON. NAD.  Afebrile, vital signs stable.  Frequent coarse cough persists.  -Continue with  xopanex tx and CPT q 12.  -Robitussin q 6 hours.  -14 day course Augmentin q 12 hours ends today, 7/15.  Patient wants to transition to hospice and join his wife in [facility] at discharge.   Transfer of Care orders placed for [facility] per patient and daughter's wishes.  Ambulatory referral in place for external hospice service.  D/w CM today.  Anticipating discharge 7/17 to hospice care.     Allergies: Patient is  allergic to lisinopril, nicoderm, and nicoderm cq [nicotine].      ROS  Constitutional:  Negative for appetite change and fever.   Respiratory:  Positive for cough, shortness of breath.    Cardiovascular:  Negative for chest pain and palpitations.   Gastrointestinal:  Negative for abdominal pain, nausea and vomiting.   Genitourinary:  Negative for dysuria.   Musculoskeletal:  Negative for arthralgias and back pain.   Neurological:  Positive for weakness. Negative for dizziness and light-headedness.   Psychiatric/Behavioral:  Negative for sleep disturbance. The patient is not nervous/anxious.     24 hour Vital Sign Range   Temp:  [98.5 °F (36.9 °C)-98.8 °F (37.1 °C)]   Pulse:  [64-84]   Resp:  [18-20]   BP: (123-135)/(72-82)   SpO2:  [96 %-100 %]       Physical Exam  Constitutional:       General: He is not in acute distress. Hard of hearing, dentures     Appearance: He is well-developed. He is cachectic. He is not diaphoretic.   Cardiovascular:      Rate and Rhythm: Normal rate and regular rhythm.      Heart sounds: S1 normal and S2 normal. Murmur heard.   Systolic murmur is present.   Pulmonary:      Effort: Pulmonary effort is normal. No respiratory distress.      Breath sounds: Normal breath sounds. No wheezing or rales.   Productive cough with clear thick sputum  Abdominal:      General: Bowel sounds are normal. There is no distension.      Palpations: Abdomen is soft, flat.      Tenderness: There is no abdominal tenderness.   Musculoskeletal:      Right lower leg: No edema.      Left lower leg: No edema.   Skin:     General: Skin is warm and dry, intact  Neurological:      Mental Status: He is alert and oriented to person, place, and time.   Psychiatric:         Mood and Affect: Mood normal.         Behavior: Behavior normal. Behavior is cooperative.         Thought Content: Thought content normal.           Labs:  Recent Labs   Lab 07/11/23  0433 07/14/23  0613   WBC 4.33 4.26   HGB 7.9* 7.8*   HCT 25.1* 24.3*     174       Recent Labs   Lab 07/11/23  0433 07/14/23  0613    139   K 3.9 3.8    105   CO2 28 28   BUN 23 22   CREATININE 0.5 0.5   GLU 93 93   CALCIUM 8.9 9.1   MG 1.9 1.7   PHOS 2.8 3.1       No results for input(s): ALKPHOS, ALT, AST, ALBUMIN, PROT, BILITOT, INR in the last 168 hours.  No results for input(s): POCTGLUCOSE in the last 72 hours.    Meds Scheduled:   amoxicillin-clavulanate 875-125mg  1 tablet Oral Q12H    aspirin  81 mg Oral Daily    benzonatate  100 mg Oral TID    dextromethorphan-guaiFENesin  mg/5 ml  5 mL Oral Q6H    levalbuterol  0.63 mg Nebulization Q12H    losartan  100 mg Oral Daily    metoprolol succinate  12.5 mg Oral Daily    NIFEdipine  60 mg Oral BID    pantoprazole  40 mg Oral BID AC    pravastatin  20 mg Oral QHS    senna-docusate 8.6-50 mg  1 tablet Oral BID       PRN:   acetaminophen, acetaminophen, calcium carbonate, colchicine, levalbuterol, melatonin      Assessment and Plan:    Hypophosphatemia, new 7/7/23 7/7: potassium phosphate 280-160-250 mg 2 packet q4h x 2 doses    Severe Protein Calorie Malnutrition  Cachexia  Dysphagia  - Continue bowel regimen.  - SLP and RD evaluated and following  - MBSS 6/20 showed laryngeal penetration with all consistencies.  - Declined PEG placement.  - Aspiration precautions  - Purred diet per SLP recommendations     Aspiration Pneumonia   CXR reviewed; Indicative of developing pna.   Started on augmentin 975 mg q 12 hours x 5 days and oral probiotic tab daily x 7 days..   Continue xopanex nebs q 12 hour and prn q 6 hours.   Initiate tessalon, robitussin prn  7/6: unstable, CXR ordered and Augmentin resumed   CXR obtained 7/7: no acute worsening.  7/10: Frequent, coarse, productive cough improving   Continue tessalon 100 mg tid and change robitussin from prn to q 6 hours now that 5Dmucinex course complete.  Continue Augmentin q 12 hours - 14 day course given prolonged and recurrent symptoms. EOC 7/15.      Debility  7/15/2023   - Continue with PT/OT for gait training and strengthening and restoration of ADL's   - Encourage mobility, OOB in chair, and early ambulation as appropriate  - Fall precautions   - Monitor for bowel and bladder dysfunction  - Monitor for and prevent skin breakdown and pressure ulcers     Grade D esophagitis  - GI consulted and underwent EGD 6/19 showing grade D esophagitis, lavell-en-Y gastrojejunostomy with healthy-appearing anastomosis.  7/15/2023 Protonix po 40 mg bid     Discharge planning issues  - Patient seeking assisted NH with hospice with his wife at [facility].  - Recurrent aspiration likely given MBSS findings. Patient declines PEG.  - CM consulted  - Offer hospice consult to patient in SNF  - TB skin test obtained for placement.  - Pt wishes to transition to hospice and join his wife in [facility] at discharge. CM aware and coordinating discharge plans.    Chronic systolic heart failure  - Monitor for acute decompensation     Mixed hyperlipidemia  - Continue pravastatin     Essential hypertension  - Continue nifedipine at 60mg PO BID, losartan 100mg PO daily.        Anticipated Disposition:  group home nursing home with hospice on 7/17.       Follow-up needed during SNF admission:       Follow-up needed after discharge from SNF:   - PCP within 1-2 weeks  - See appt scheduled below     No future appointments.      I certify that SNF services are required to be given on an inpatient basis because Julio Benites needs for skilled nursing care and/or skilled rehabilitation are required on a daily basis and such services can only practically be provided in a skilled nursing facility setting and are for an ongoing condition for which she received inpatient care in the hospital.       Total time spent: > 32 minutes  Description of Time: counseling provided on clinical condition, therapies provided, plan of care, emotional support, coordinating patient care with other care team members,  reviewing and interpreting labs and imaging, collaboration with physician, initiating new orders, chart review, and documentation. See interval hx.        Nicolette Tripp NP  Department of Hospital Medicine   [facility]     DOS: 7/15/2023

## 2023-07-16 LAB — SARS-COV-2 RNA RESP QL NAA+PROBE: NOT DETECTED

## 2023-07-16 PROCEDURE — 25000003 PHARM REV CODE 250: Mod: HCNC | Performed by: FAMILY MEDICINE

## 2023-07-16 PROCEDURE — 87635 SARS-COV-2 COVID-19 AMP PRB: CPT | Mod: HCNC | Performed by: HOSPITALIST

## 2023-07-16 PROCEDURE — 11000004 HC SNF PRIVATE: Mod: HCNC

## 2023-07-16 PROCEDURE — 99900035 HC TECH TIME PER 15 MIN (STAT): Mod: HCNC

## 2023-07-16 PROCEDURE — 94640 AIRWAY INHALATION TREATMENT: CPT | Mod: HCNC

## 2023-07-16 PROCEDURE — 94761 N-INVAS EAR/PLS OXIMETRY MLT: CPT | Mod: HCNC

## 2023-07-16 PROCEDURE — 25000003 PHARM REV CODE 250: Mod: HCNC | Performed by: HOSPITALIST

## 2023-07-16 PROCEDURE — 25000242 PHARM REV CODE 250 ALT 637 W/ HCPCS: Mod: HCNC | Performed by: FAMILY MEDICINE

## 2023-07-16 RX ADMIN — GUAIFENESIN AND DEXTROMETHORPHAN 5 ML: 100; 10 SYRUP ORAL at 12:07

## 2023-07-16 RX ADMIN — METOPROLOL SUCCINATE 12.5 MG: 25 TABLET, EXTENDED RELEASE ORAL at 09:07

## 2023-07-16 RX ADMIN — GUAIFENESIN AND DEXTROMETHORPHAN 5 ML: 100; 10 SYRUP ORAL at 05:07

## 2023-07-16 RX ADMIN — NIFEDIPINE 60 MG: 30 TABLET, FILM COATED, EXTENDED RELEASE ORAL at 08:07

## 2023-07-16 RX ADMIN — SENNOSIDES AND DOCUSATE SODIUM 1 TABLET: 50; 8.6 TABLET ORAL at 09:07

## 2023-07-16 RX ADMIN — LOSARTAN POTASSIUM 100 MG: 50 TABLET, FILM COATED ORAL at 09:07

## 2023-07-16 RX ADMIN — LEVALBUTEROL HYDROCHLORIDE 0.63 MG: 0.63 SOLUTION RESPIRATORY (INHALATION) at 07:07

## 2023-07-16 RX ADMIN — LEVALBUTEROL HYDROCHLORIDE 0.63 MG: 0.63 SOLUTION RESPIRATORY (INHALATION) at 08:07

## 2023-07-16 RX ADMIN — BENZONATATE 100 MG: 100 CAPSULE ORAL at 02:07

## 2023-07-16 RX ADMIN — BENZONATATE 100 MG: 100 CAPSULE ORAL at 08:07

## 2023-07-16 RX ADMIN — NIFEDIPINE 60 MG: 30 TABLET, FILM COATED, EXTENDED RELEASE ORAL at 09:07

## 2023-07-16 RX ADMIN — SENNOSIDES AND DOCUSATE SODIUM 1 TABLET: 50; 8.6 TABLET ORAL at 08:07

## 2023-07-16 RX ADMIN — PANTOPRAZOLE SODIUM 40 MG: 40 TABLET, DELAYED RELEASE ORAL at 03:07

## 2023-07-16 RX ADMIN — PANTOPRAZOLE SODIUM 40 MG: 40 TABLET, DELAYED RELEASE ORAL at 05:07

## 2023-07-16 RX ADMIN — BENZONATATE 100 MG: 100 CAPSULE ORAL at 09:07

## 2023-07-16 RX ADMIN — PRAVASTATIN SODIUM 20 MG: 20 TABLET ORAL at 08:07

## 2023-07-16 RX ADMIN — ASPIRIN 81 MG: 81 TABLET, COATED ORAL at 09:07

## 2023-07-17 VITALS
HEART RATE: 67 BPM | TEMPERATURE: 98 F | SYSTOLIC BLOOD PRESSURE: 130 MMHG | BODY MASS INDEX: 12.12 KG/M2 | RESPIRATION RATE: 17 BRPM | OXYGEN SATURATION: 97 % | DIASTOLIC BLOOD PRESSURE: 79 MMHG | HEIGHT: 69 IN | WEIGHT: 81.81 LBS

## 2023-07-17 PROCEDURE — 25000003 PHARM REV CODE 250: Mod: HCNC | Performed by: FAMILY MEDICINE

## 2023-07-17 PROCEDURE — 94640 AIRWAY INHALATION TREATMENT: CPT | Mod: HCNC

## 2023-07-17 PROCEDURE — 94761 N-INVAS EAR/PLS OXIMETRY MLT: CPT | Mod: HCNC

## 2023-07-17 PROCEDURE — 25000242 PHARM REV CODE 250 ALT 637 W/ HCPCS: Mod: HCNC | Performed by: FAMILY MEDICINE

## 2023-07-17 PROCEDURE — 25000003 PHARM REV CODE 250: Mod: HCNC | Performed by: HOSPITALIST

## 2023-07-17 RX ADMIN — NIFEDIPINE 60 MG: 30 TABLET, FILM COATED, EXTENDED RELEASE ORAL at 08:07

## 2023-07-17 RX ADMIN — LEVALBUTEROL HYDROCHLORIDE 0.63 MG: 0.63 SOLUTION RESPIRATORY (INHALATION) at 07:07

## 2023-07-17 RX ADMIN — GUAIFENESIN AND DEXTROMETHORPHAN 5 ML: 100; 10 SYRUP ORAL at 05:07

## 2023-07-17 RX ADMIN — LOSARTAN POTASSIUM 100 MG: 50 TABLET, FILM COATED ORAL at 08:07

## 2023-07-17 RX ADMIN — GUAIFENESIN AND DEXTROMETHORPHAN 5 ML: 100; 10 SYRUP ORAL at 12:07

## 2023-07-17 RX ADMIN — METOPROLOL SUCCINATE 12.5 MG: 25 TABLET, EXTENDED RELEASE ORAL at 08:07

## 2023-07-17 RX ADMIN — SENNOSIDES AND DOCUSATE SODIUM 1 TABLET: 50; 8.6 TABLET ORAL at 08:07

## 2023-07-17 RX ADMIN — ASPIRIN 81 MG: 81 TABLET, COATED ORAL at 08:07

## 2023-07-17 RX ADMIN — PANTOPRAZOLE SODIUM 40 MG: 40 TABLET, DELAYED RELEASE ORAL at 05:07

## 2023-07-17 RX ADMIN — BENZONATATE 100 MG: 100 CAPSULE ORAL at 08:07

## 2023-07-17 NOTE — DISCHARGE SUMMARY
[facility]   Skilled Nursing Facility   Discharge Summary  Patient Name: Julio Benites  : 10/25/1934  MRN: [id number]  Attending Physician: Dr. NIALL Mcguire  Nurse Practitioner: MOLLY ESTEVEZ, MAR    Admit Date: 2023   Date of Discharge:  2023  Length of Stay:  LOS: 21 days     Date of Service: 2023    Principal Problem: Severe malnutrition    HPI  Julio Benites is an 88 y.o. male with a past medical history of DM, NSTEMI, HTN, HLD, and systolic CHF who was recently treated for CAP  with azithromycin who presented to ED on 23 with abdominal pain. Admitted with colitis with fecal impaction/constipation. General surgery and GI consulted. Bowel regimen initiated following manual disimpaction and enema. EGD  showing grade D esophagitis, lavell-en-Y gastrojejunostomy with healthy-appearing anastomosis. MBSS  which showed laryngeal penetration with all consistencies. Patient declined PEG placement. Patient lives at home alone with support from grandson. Patient goal to discharge to [facility] where his wife resides. Patient and family considering hospice.     Patient admitted at [facility] with PT and OT to improve functional status and ability to perform ADLs.     Hospital Course  Patient progressed well with PT and OT- last PT note states that patient ambulated ~80ft x 2 trials with RW and CGA for safety d.t pt with generalized weakness. Patient had no significant events during their stay at [facility]. Recurrent aspiration likely given MBSS findings. Patient declines PEG. All prescriptions and discharge instructions were ordered to be given to the patient prior to discharge.     Hypophosphatemia, new 23: potassium phosphate 280-160-250 mg 2 packet q4h x 2 doses     Severe Protein Calorie Malnutrition  Cachexia  Dysphagia  - Continue bowel regimen.  - SLP and RD evaluated and following  - MBSS  showed laryngeal penetration with all consistencies.  - Declined PEG  placement.  - Aspiration precautions  - Purred diet per SLP recommendations     Aspiration Pneumonia   CXR reviewed; Indicative of developing pna.   Started on augmentin 975 mg q 12 hours x 5 days and oral probiotic tab daily x 7 days..   Continue xopanex nebs q 12 hour and prn q 6 hours.   Initiate tessalon, robitussin prn  7/6: unstable, CXR ordered and Augmentin resumed   CXR obtained 7/7: no acute worsening.  7/10: Frequent, coarse, productive cough improving   Continue tessalon 100 mg tid and change robitussin from prn to q 6 hours now that 5Dmucinex course complete.  Continue Augmentin q 12 hours - 14 day course given prolonged and recurrent symptoms. EOC 7/15.     Debility  7/15/2023   - Continue with PT/OT for gait training and strengthening and restoration of ADL's   - Encourage mobility, OOB in chair, and early ambulation as appropriate  - Fall precautions   - Monitor for bowel and bladder dysfunction  - Monitor for and prevent skin breakdown and pressure ulcers     Grade D esophagitis  - GI consulted and underwent EGD 6/19 showing grade D esophagitis, lavell-en-Y gastrojejunostomy with healthy-appearing anastomosis.  7/15/2023 Protonix po 40 mg bid     Discharge planning issues  - Patient seeking nursing home NH with hospice with his wife at [facility].  - Recurrent aspiration likely given MBSS findings. Patient declines PEG.  - CM consulted  - Offer hospice consult to patient in SNF  - TB skin test obtained for placement.  - Pt wishes to transition to hospice and join his wife in [facility] at discharge. CM aware and coordinating discharge plans.     Chronic systolic heart failure  - Monitor for acute decompensation     Mixed hyperlipidemia  - Continue pravastatin     Essential hypertension  - Continue nifedipine at 60mg PO BID, losartan 100mg PO daily.    Physical Exam  Constitutional:       General: He is not in acute distress. Hard of hearing, dentures     Appearance: He is well-developed. He is cachectic. He is  not diaphoretic.   Cardiovascular:      Rate and Rhythm: Normal rate and regular rhythm.      Heart sounds: S1 normal and S2 normal. Murmur heard.   Systolic murmur is present.   Pulmonary:      Effort: Pulmonary effort is normal. No respiratory distress.      Breath sounds: Normal breath sounds. No wheezing or rales.   Productive cough with clear thick sputum  Abdominal:      General: Bowel sounds are normal. There is no distension.      Palpations: Abdomen is soft, flat.      Tenderness: There is no abdominal tenderness.   Musculoskeletal:      Right lower leg: No edema.      Left lower leg: No edema.   Skin:     General: Skin is warm and dry, intact  Neurological:      Mental Status: He is alert and oriented to person, place, and time.   Psychiatric:         Mood and Affect: Mood normal.         Behavior: Behavior normal. Behavior is cooperative.         Thought Content: Thought content normal.                 Recent Labs   Lab 07/11/23 0433 07/14/23  0613   WBC 4.33 4.26   HGB 7.9* 7.8*   HCT 25.1* 24.3*    174     Recent Labs   Lab 07/11/23 0433 07/14/23  0613    139   K 3.9 3.8    105   CO2 28 28   BUN 23 22   CREATININE 0.5 0.5   GLU 93 93   CALCIUM 8.9 9.1   MG 1.9 1.7   PHOS 2.8 3.1     No results for input(s): ALKPHOS, ALT, AST, ALBUMIN, PROT, BILITOT, INR in the last 168 hours.   No results for input(s): CPK, CPKMB, MB, TROPONINI in the last 72 hours.  No results for input(s): LACTATE in the last 72 hours.    No results for input(s): POCTGLUCOSE in the last 168 hours.   Hemoglobin A1C   Date Value Ref Range Status   05/11/2023 6.7 (H) 4.0 - 5.6 % Final     Comment:     ADA Screening Guidelines:  5.7-6.4%  Consistent with prediabetes  >or=6.5%  Consistent with diabetes    High levels of fetal hemoglobin interfere with the HbA1C  assay. Heterozygous hemoglobin variants (HbS, HgC, etc)do  not significantly interfere with this assay.   However, presence of multiple variants may affect  accuracy.     09/13/2022 6.2 (H) 4.0 - 5.6 % Final     Comment:     ADA Screening Guidelines:  5.7-6.4%  Consistent with prediabetes  >or=6.5%  Consistent with diabetes    High levels of fetal hemoglobin interfere with the HbA1C  assay. Heterozygous hemoglobin variants (HbS, HgC, etc)do  not significantly interfere with this assay.   However, presence of multiple variants may affect accuracy.     02/21/2022 6.7 (H) 4.0 - 5.6 % Final     Comment:     ADA Screening Guidelines:  5.7-6.4%  Consistent with prediabetes  >or=6.5%  Consistent with diabetes    High levels of fetal hemoglobin interfere with the HbA1C  assay. Heterozygous hemoglobin variants (HbS, HgC, etc)do  not significantly interfere with this assay.   However, presence of multiple variants may affect accuracy.           Discharge Medication List as of 7/17/2023  3:16 PM        START taking these medications    Details   acetaminophen (TYLENOL) 325 MG tablet Take 2 tablets (650 mg total) by mouth every 6 (six) hours as needed for Temperature greater than or Pain (100.4 F)., Starting Fri 7/14/2023, No Print      benzonatate (TESSALON) 100 MG capsule Take 1 capsule (100 mg total) by mouth 3 (three) times daily. for 10 days, Starting Fri 7/14/2023, Until Mon 7/24/2023, No Print      dextromethorphan-guaiFENesin  mg/5 ml (ROBITUSSIN-DM)  mg/5 mL liquid Take 5 mLs by mouth every 6 (six) hours. for 10 days, Starting Fri 7/14/2023, Until Mon 7/24/2023, No Print      !! levalbuterol (XOPENEX) 0.63 mg/3 mL nebulizer solution Take 3 mLs (0.63 mg total) by nebulization every 6 (six) hours as needed for Wheezing or Shortness of Breath. Rescue, Starting Fri 7/14/2023, Until Sat 7/13/2024 at 2359, No Print      !! levalbuterol (XOPENEX) 0.63 mg/3 mL nebulizer solution Take 3 mLs (0.63 mg total) by nebulization every 12 (twelve) hours. Rescue, Starting Fri 7/14/2023, Until Sat 7/13/2024, No Print      melatonin (MELATIN) 3 mg tablet Take 2 tablets (6 mg  total) by mouth nightly as needed for Insomnia., Starting Fri 7/14/2023, No Print      senna-docusate 8.6-50 mg (PERICOLACE) 8.6-50 mg per tablet Take 1 tablet by mouth 2 (two) times daily., Starting Fri 7/14/2023, No Print       !! - Potential duplicate medications found. Please discuss with provider.        CONTINUE these medications which have CHANGED    Details   aspirin (ECOTRIN) 81 MG EC tablet Take 1 tablet (81 mg total) by mouth once daily., Starting Fri 7/14/2023, No Print      colchicine (COLCRYS) 0.6 mg tablet Take 1 tablet (0.6 mg total) by mouth 2 (two) times daily as needed (gout attack)., Starting Fri 7/14/2023, No Print      losartan (COZAAR) 100 MG tablet Take 1 tablet (100 mg total) by mouth once daily., Starting Fri 7/14/2023, No Print      metoprolol succinate (TOPROL-XL) 25 MG 24 hr tablet Take 0.5 tablets (12.5 mg total) by mouth once daily., Starting Fri 7/14/2023, Until Sat 7/13/2024, No Print      NIFEdipine (PROCARDIA-XL) 60 MG (OSM) 24 hr tablet Take 1 tablet (60 mg total) by mouth 2 (two) times a day., Starting Fri 7/14/2023, Until Sat 7/13/2024, No Print      pantoprazole (PROTONIX) 40 MG tablet Take 1 tablet (40 mg total) by mouth 2 (two) times daily., Starting Fri 7/14/2023, Until Sat 7/13/2024, No Print      pravastatin (PRAVACHOL) 20 MG tablet Take 1 tablet (20 mg total) by mouth every evening., Starting Fri 7/14/2023, No Print             Discharge Diet:as tolerated for pleasure. Purred diet with thin liquids at present.    Activity: activity as tolerated    Discharge Condition: Stable     Disposition: [facility] with [facility]    No future appointments.    32 minutes total time spent on the discharge of the patient including review of hospital course with the patient, reviewing discharge medications, and arranging follow-up care.      MAR BLANCHARD  [facility]   Skilled [facility]

## 2023-07-17 NOTE — PLAN OF CARE
Problem: Adult Inpatient Plan of Care  Goal: Plan of Care Review  Outcome: Met  Goal: Patient-Specific Goal (Individualized)  Outcome: Met  Goal: Absence of Hospital-Acquired Illness or Injury  Outcome: Met  Goal: Optimal Comfort and Wellbeing  Outcome: Met  Goal: Readiness for Transition of Care  Outcome: Met     Problem: Diabetes Comorbidity  Goal: Blood Glucose Level Within Targeted Range  Outcome: Met     Problem: Fall Injury Risk  Goal: Absence of Fall and Fall-Related Injury  Outcome: Met     Problem: Skin Injury Risk Increased  Goal: Skin Health and Integrity  Outcome: Met     Problem: Malnutrition  Goal: Improved Nutritional Intake  Outcome: Met     Problem: Adult Inpatient Plan of Care  Goal: Plan of Care Review  Outcome: Met  Goal: Patient-Specific Goal (Individualized)  Outcome: Met  Goal: Absence of Hospital-Acquired Illness or Injury  Outcome: Met  Goal: Optimal Comfort and Wellbeing  Outcome: Met  Goal: Readiness for Transition of Care  Outcome: Met     Problem: Diabetes Comorbidity  Goal: Blood Glucose Level Within Targeted Range  Outcome: Met     Problem: Fall Injury Risk  Goal: Absence of Fall and Fall-Related Injury  Outcome: Met     Problem: Skin Injury Risk Increased  Goal: Skin Health and Integrity  Outcome: Met     Problem: Malnutrition  Goal: Improved Nutritional Intake  Outcome: Met     Problem: Adult Inpatient Plan of Care  Goal: Plan of Care Review  Outcome: Met  Goal: Patient-Specific Goal (Individualized)  Outcome: Met  Goal: Absence of Hospital-Acquired Illness or Injury  Outcome: Met  Goal: Optimal Comfort and Wellbeing  Outcome: Met  Goal: Readiness for Transition of Care  Outcome: Met     Problem: Diabetes Comorbidity  Goal: Blood Glucose Level Within Targeted Range  Outcome: Met     Problem: Fall Injury Risk  Goal: Absence of Fall and Fall-Related Injury  Outcome: Met     Problem: Skin Injury Risk Increased  Goal: Skin Health and Integrity  Outcome: Met     Problem:  Malnutrition  Goal: Improved Nutritional Intake  Outcome: Met     Problem: Adult Inpatient Plan of Care  Goal: Plan of Care Review  Outcome: Met  Goal: Patient-Specific Goal (Individualized)  Outcome: Met  Goal: Absence of Hospital-Acquired Illness or Injury  Outcome: Met  Goal: Optimal Comfort and Wellbeing  Outcome: Met  Goal: Readiness for Transition of Care  Outcome: Met     Problem: Diabetes Comorbidity  Goal: Blood Glucose Level Within Targeted Range  Outcome: Met     Problem: Fall Injury Risk  Goal: Absence of Fall and Fall-Related Injury  Outcome: Met     Problem: Skin Injury Risk Increased  Goal: Skin Health and Integrity  Outcome: Met     Problem: Malnutrition  Goal: Improved Nutritional Intake  Outcome: Met

## 2023-07-17 NOTE — PT/OT/SLP DISCHARGE
Occupational Therapy Discharge Summary    Julio Benites  MRN: [id number]   Principal Problem: Severe malnutrition      Patient Discharged from acute Occupational Therapy on 7/17/2023.  Please refer to prior OT note dated 7/14/2023 for functional status.    Assessment:      Patient appropriate for care in another setting. Pt not seen today by OT secondary to pt refusal. Attempted 2x.     Objective:     GOALS:   Multidisciplinary Problems       Occupational Therapy Goals          Problem: Occupational Therapy    Goal Priority Disciplines Outcome Interventions   Occupational Therapy Goal     OT, PT/OT Ongoing, Progressing    Description: Goals to be met by:  7/26/2023    Patient will increase functional independence with ADLs by performing:    UE Dressing with Set-up. - Met (with loose clothing)   LE Dressing with Min A, AE, and LRAD as needed. -Met  Grooming while seated at sink with [place]. - Not Met  Toileting from toilet with Minimal Assistance for hygiene and clothing management with AE as needed and LRAD. -Not Met  Bathing from  shower chair/bench with Supervision and AE as needed. - Not Met  Stand pivot transfers with Min A for 100% t/f and LRAD. -Not Met  Toilet transfer to toilet with Min A for 100% t/f and LRAD. - Not Met  Upper extremity exercise program x10 reps per handout, with independence. -Not Met                          Reasons for Discontinuation of Therapy Services  Transfer to alternate level of care.      Plan:     [facility]     7/17/2023

## 2023-07-17 NOTE — PT/OT/SLP DISCHARGE
Physical Therapy Discharge Summary    Name: Julio Benites  MRN: [id number]   Principal Problem: Severe malnutrition     Patient Discharged from acute Physical Therapy on 7/17/23.  Please refer to prior PT noted date on 7/14/23 for functional status.     Assessment:     Patient appropriate for care in another setting. PT attempted to treat pt today, however pt refused twice.    Objective:     GOALS:   Multidisciplinary Problems       Physical Therapy Goals          Problem: Physical Therapy    Goal Priority Disciplines Outcome Goal Variances Interventions   Physical Therapy Goal     PT, PT/OT Ongoing, Progressing     Description: Goals to be met by: 7/28/23     Patient will increase functional independence with mobility by performing:    . Supine to sit with Set-up [place]  . Sit to supine with Set-up [place]  . Rolling to Left and Right with Set-up Assistance.  . Sit to stand transfer with Stand-by Assistance  . Bed to chair transfer with Stand-by Assistance using Rolling Walker/ No AD  . Gait  x 100 feet with Stand-by Assistance using Rolling Walker.   . Wheelchair propulsion x150 feet with Modified [place] using bilateral uppper extremities  . Ascend/Descend 4 inch curb step with Minimal Assistance using Rolling Walker.                         Reasons for Discontinuation of Therapy Services  Transfer to alternate level of care.      Plan:     Patient Discharged to: Skilled Nursing Facility.      7/17/2023

## 2023-07-17 NOTE — NURSING
VERBAL REPORT GAVE TO [facility] LORETTA SPENCER LPN.DISCHARGED TO [facility] BY [facility]SPORT [facility] ACCOMPANIED BY [facility] X2.AWAKE ALERT ORIENTED X4 PERSONAL BELONGINGS WITH PATIENT.

## 2023-09-18 PROBLEM — J96.21 ACUTE ON CHRONIC RESPIRATORY FAILURE WITH HYPOXIA: Status: RESOLVED | Noted: 2023-06-16 | Resolved: 2023-09-18

## 2023-10-16 NOTE — SUBJECTIVE & OBJECTIVE
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;  Service: Endoscopy;  Laterality: Left;    EYE SURGERY      INFECTED SKIN DEBRIDEMENT  8/2012    spider bite with surgery to right forearm    OK EVAL,SWALLOW FUNCTION,CINE/VIDEO RECORD  9/22/2021         subtotal gastrectomy  2013       Review of patient's allergies indicates:   Allergen Reactions    Lisinopril Rash     Patient reports history of rash with previous lisinopril use.     Nicoderm     Nicoderm cq [nicotine] Rash       Medications:  Continuous Infusions:  Scheduled Meds:   losartan  100 mg Oral Daily    NIFEdipine  90 mg Oral Daily    pantoprazole  40 mg Intravenous Q12H    polyethylene glycol  17 g Oral BID    pravastatin  20 mg Oral QHS    senna-docusate 8.6-50 mg  1 tablet Oral BID     PRN Meds:acetaminophen, albuterol-ipratropium, bisacodyL, HYDROmorphone, naloxone, ondansetron, oxyCODONE, prochlorperazine, sodium chloride 0.9%, sodium chloride 0.9%    Family History       Problem Relation (Age of Onset)    Breast cancer Sister, Daughter    Lung cancer Mother    No Known Problems Father, Son, Maternal Grandmother, Maternal Grandfather, Paternal Grandmother, Paternal Grandfather          Tobacco Use    Smoking status:  Former     Packs/day: 0.25     Types: Cigarettes     Quit date: 2022     Years since quittin.4     Passive exposure: Never    Smokeless tobacco: Never    Tobacco comments:     patient states that he started smoking cigarettes again: half a pack of cigarettes over 4 or 5 days   Substance and Sexual Activity    Alcohol use: Yes     Comment: drinks scotch 2X weekly    Drug use: No    Sexual activity: Not Currently     Partners: Female       Review of Systems   Constitutional:  Positive for fatigue and unexpected weight change. Negative for activity change and appetite change.   Respiratory:  Positive for cough. Negative for shortness of breath.    Objective:     Vital Signs (Most Recent):  Temp: 97.5 °F (36.4 °C) (23 1045)  Pulse: 80 (23 1045)  Resp: 16 (23 1045)  BP: 126/74 (23 1045)  SpO2: 100 % (23 1045) Vital Signs (24h Range):  Temp:  [97.3 °F (36.3 °C)-98.6 °F (37 °C)] 97.5 °F (36.4 °C)  Pulse:  [70-80] 80  Resp:  [15-18] 16  SpO2:  [86 %-100 %] 100 %  BP: (117-131)/(66-75) 126/74     Weight: 32.5 kg (71 lb 10.4 oz)  Body mass index is 10.58 kg/m².       Physical Exam  Constitutional:       Appearance: He is ill-appearing (Chronically).      Comments: Very frail/ cachectic/ temporal lobe wasting    Cardiovascular:      Rate and Rhythm: Normal rate.   Pulmonary:      Effort: No respiratory distress.      Comments: Weak cough   Neurological:      Mental Status: He is alert and oriented to person, place, and time.      Comments: Sitting up in chair           Review of Symptoms      Symptom Assessment (ESAS 0-10 Scale)  Fatigue:  2         Living Arrangements:  Lives alone    Psychosocial/Cultural:   See Palliative Psychosocial Note: Yes  Patient lives alone, however his grandson helps him at home. His wife is a resident at [facility].   **Primary  to Follow**  Palliative Care  Consult: No      Advance Care Planning   Advance Directives:   Do Not Resuscitate  15 Status: Yes      Decision Making:  Patient answered questions  Goals of Care: The patient endorses that what is most important right now is to focus on spending time at home and quality of life, even if it means sacrificing a little time    Accordingly, we have decided that the best plan to meet the patient's goals includes continuing with treatment       Significant Labs: All pertinent labs within the past 24 hours have been reviewed.  CBC:   Recent Labs   Lab 06/19/23  0418   WBC 9.26   HGB 10.7*   HCT 34.2*   MCV 81*        BMP:  No results for input(s): GLU, NA, K, CL, CO2, BUN, CREATININE, CALCIUM, MG in the last 24 hours.  LFT:  Lab Results   Component Value Date    AST 18 06/19/2023    ALKPHOS 53 (L) 06/19/2023    BILITOT 0.5 06/19/2023     Albumin:   Albumin   Date Value Ref Range Status   06/19/2023 2.7 (L) 3.5 - 5.2 g/dL Final     Protein:   Total Protein   Date Value Ref Range Status   06/19/2023 6.3 6.0 - 8.4 g/dL Final     Lactic acid:   Lab Results   Component Value Date    LACTATE 2.4 (H) 06/16/2023       Significant Imaging: I have reviewed all pertinent imaging results/findings within the past 24 hours.

## 2025-08-01 NOTE — HPI
"Per H&P: "HPI: The patient is a 88 y.o. male with a past medical history of DM, NSTEMI, HTN, HLD, and systolic CHF who was recently treated for CAP  with azithromycin who presents with left upper quadrant abdominal pain.  Patient reports worsening left upper quadrant abdominal pain over the past 4-5 days.  He states it is positional and worse when he sits up or stands.  He rates the pain at a 10/10 during these times but 0/10 currently.  Patient also endorses about 1-1/2 months of shortness of breath.  He is also endorsed a significant weight loss during this time.  He denies chest pain, nausea, vomiting, diarrhea, fevers, chills."    At time of initial consult, patient seen sitting up in bedside chair. Patient is very frail and cachectic. Please see encounter for palliative care.   " The patient's goals for the shift include      The clinical goals for the shift include pt will remain hds throughout shift      Problem: Pain - Adult  Goal: Verbalizes/displays adequate comfort level or baseline comfort level  Outcome: Progressing     Problem: Discharge Planning  Goal: Discharge to home or other facility with appropriate resources  Outcome: Progressing     Problem: Chronic Conditions and Co-morbidities  Goal: Patient's chronic conditions and co-morbidity symptoms are monitored and maintained or improved  Outcome: Progressing     Problem: Nutrition  Goal: Nutrient intake appropriate for maintaining nutritional needs  Outcome: Progressing     Problem: Skin  Goal: Decreased wound size/increased tissue granulation at next dressing change  Outcome: Progressing  Goal: Participates in plan/prevention/treatment measures  Outcome: Progressing  Goal: Prevent/manage excess moisture  Outcome: Progressing  Goal: Prevent/minimize sheer/friction injuries  Outcome: Progressing  Goal: Promote/optimize nutrition  Outcome: Progressing  Goal: Promote skin healing  Outcome: Progressing     Problem: Pain  Goal: Takes deep breaths with improved pain control throughout the shift  Outcome: Progressing  Goal: Turns in bed with improved pain control throughout the shift  Outcome: Progressing  Goal: Walks with improved pain control throughout the shift  Outcome: Progressing  Goal: Performs ADL's with improved pain control throughout shift  Outcome: Progressing  Goal: Participates in PT with improved pain control throughout the shift  Outcome: Progressing  Goal: Free from opioid side effects throughout the shift  Outcome: Progressing  Goal: Free from acute confusion related to pain meds throughout the shift  Outcome: Progressing     Problem: Fall/Injury  Goal: Not fall by end of shift  Outcome: Progressing  Goal: Be free from injury by end of the shift  Outcome: Progressing  Goal: Verbalize understanding of personal risk  factors for fall in the hospital  Outcome: Progressing  Goal: Verbalize understanding of risk factor reduction measures to prevent injury from fall in the home  Outcome: Progressing  Goal: Use assistive devices by end of the shift  Outcome: Progressing  Goal: Pace activities to prevent fatigue by end of the shift  Outcome: Progressing